# Patient Record
Sex: MALE | Race: WHITE | Employment: FULL TIME | URBAN - NONMETROPOLITAN AREA
[De-identification: names, ages, dates, MRNs, and addresses within clinical notes are randomized per-mention and may not be internally consistent; named-entity substitution may affect disease eponyms.]

---

## 2020-03-23 ENCOUNTER — OFFICE VISIT (OUTPATIENT)
Dept: INTERNAL MEDICINE CLINIC | Age: 60
End: 2020-03-23
Payer: COMMERCIAL

## 2020-03-23 VITALS
WEIGHT: 260 LBS | BODY MASS INDEX: 41.78 KG/M2 | HEART RATE: 66 BPM | SYSTOLIC BLOOD PRESSURE: 138 MMHG | HEIGHT: 66 IN | TEMPERATURE: 97.9 F | DIASTOLIC BLOOD PRESSURE: 78 MMHG

## 2020-03-23 PROCEDURE — 99244 OFF/OP CNSLTJ NEW/EST MOD 40: CPT | Performed by: INTERNAL MEDICINE

## 2020-03-23 RX ORDER — CHLORHEXIDINE GLUCONATE 0.12 MG/ML
RINSE ORAL
COMMUNITY
Start: 2020-03-11 | End: 2020-08-19 | Stop reason: ALTCHOICE

## 2020-03-23 RX ORDER — PENICILLIN V POTASSIUM 250 MG/1
500 TABLET ORAL 4 TIMES DAILY
COMMUNITY
Start: 2020-03-19 | End: 2020-08-19 | Stop reason: ALTCHOICE

## 2020-03-23 ASSESSMENT — PATIENT HEALTH QUESTIONNAIRE - PHQ9
2. FEELING DOWN, DEPRESSED OR HOPELESS: 0
SUM OF ALL RESPONSES TO PHQ QUESTIONS 1-9: 0
SUM OF ALL RESPONSES TO PHQ9 QUESTIONS 1 & 2: 0
1. LITTLE INTEREST OR PLEASURE IN DOING THINGS: 0
SUM OF ALL RESPONSES TO PHQ QUESTIONS 1-9: 0

## 2020-08-19 ENCOUNTER — OFFICE VISIT (OUTPATIENT)
Dept: FAMILY MEDICINE CLINIC | Age: 60
End: 2020-08-19
Payer: COMMERCIAL

## 2020-08-19 VITALS
HEART RATE: 88 BPM | HEIGHT: 66 IN | DIASTOLIC BLOOD PRESSURE: 84 MMHG | TEMPERATURE: 97.8 F | WEIGHT: 265 LBS | SYSTOLIC BLOOD PRESSURE: 160 MMHG | BODY MASS INDEX: 42.59 KG/M2 | RESPIRATION RATE: 14 BRPM

## 2020-08-19 PROBLEM — I10 ESSENTIAL HYPERTENSION: Status: ACTIVE | Noted: 2020-08-19

## 2020-08-19 PROCEDURE — 99396 PREV VISIT EST AGE 40-64: CPT | Performed by: NURSE PRACTITIONER

## 2020-08-19 RX ORDER — LISINOPRIL 20 MG/1
20 TABLET ORAL DAILY
Qty: 30 TABLET | Refills: 0 | Status: SHIPPED | OUTPATIENT
Start: 2020-08-19 | End: 2020-08-31 | Stop reason: SDUPTHER

## 2020-08-19 ASSESSMENT — ENCOUNTER SYMPTOMS
SHORTNESS OF BREATH: 0
ABDOMINAL PAIN: 0
COLOR CHANGE: 0
EYE PAIN: 0
BLOOD IN STOOL: 0
SINUS PRESSURE: 0
EYE REDNESS: 0
EYE ITCHING: 0
WHEEZING: 0
COUGH: 0
EYE DISCHARGE: 0
BACK PAIN: 0
DIARRHEA: 0
CONSTIPATION: 0

## 2020-08-19 NOTE — PATIENT INSTRUCTIONS
during a routine doctor visit. Your doctor will tell you how often to check your blood pressure based on your age, your blood pressure results, and other factors. · Prostate exam. Talk to your doctor about whether you should have a blood test (called a PSA test) for prostate cancer. Experts recommend that you discuss the benefits and risks of the test with your doctor before you decide whether to have this test.  · Diabetes. Ask your doctor whether you should have tests for diabetes. · Vision. Some experts recommend that you have yearly exams for glaucoma and other age-related eye problems starting at age 48. · Hearing. Tell your doctor if you notice any change in your hearing. You can have tests to find out how well you hear. · Colorectal cancer. Your risk for colorectal cancer gets higher as you get older. Some experts say that adults should start regular screening at age 48 and stop at age 76. Others say to start before age 48 or continue after age 76. Talk with your doctor about your risk and when to start and stop screening. · Heart attack and stroke risk. At least every 4 to 6 years, you should have your risk for heart attack and stroke assessed. Your doctor uses factors such as your age, blood pressure, cholesterol, and whether you smoke or have diabetes to show what your risk for a heart attack or stroke is over the next 10 years. · Abdominal aortic aneurysm. Ask your doctor whether you should have a test to check for an aneurysm. You may need a test if you ever smoked or if your parent, brother, sister, or child has had an aneurysm. When should you call for help? Watch closely for changes in your health, and be sure to contact your doctor if you have any problems or symptoms that concern you. Where can you learn more? Go to https://terrie.Shipu. org and sign in to your Sugar [employer] Media account.  Enter S716 in the [facility] box to learn more about \"Well Visit, Men 48 to 72: Care Instructions. \"     If you do not have an account, please click on the \"Sign Up Now\" link. Current as of: August 22, 2019               Content Version: 12.5  © 2006-2020 Healthwise, Incorporated. Care instructions adapted under license by [facility] ([facility]). If you have questions about a medical condition or this instruction, always ask your healthcare professional. [facility] any warranty or liability for your use of this information. Patient Education        High Blood Pressure: Care Instructions  Overview     It's normal for blood pressure to go up and down throughout the day. But if it stays up, you have high blood pressure. Another name for high blood pressure is hypertension. Despite what a lot of people think, high blood pressure usually doesn't cause headaches or make you feel dizzy or lightheaded. It usually has no symptoms. But it does increase your risk of stroke, heart attack, and other problems. You and your doctor will talk about your risks of these problems based on your blood pressure. Your doctor will give you a goal for your blood pressure. Your goal will be based on your health and your age. Lifestyle changes, such as eating healthy and being active, are always important to help lower blood pressure. You might also take medicine to reach your blood pressure goal.  Follow-up care is a key part of your treatment and safety. Be sure to make and go to all appointments, and call your doctor if you are having problems. It's also a good idea to know your test results and keep a list of the medicines you take. How can you care for yourself at home? Medical treatment  · If you stop taking your medicine, your blood pressure will go back up. You may take one or more types of medicine to lower your blood pressure. Be safe with medicines. Take your medicine exactly as prescribed. Call your doctor if you think you are having a problem with your medicine.   · Talk to your doctor serious heart or blood vessel problem. Your blood pressure may be over 180/120. For example, call 911 if:  · You have symptoms of a heart attack. These may include:  ? Chest pain or pressure, or a strange feeling in the chest.  ? Sweating. ? Shortness of breath. ? Nausea or vomiting. ? Pain, pressure, or a strange feeling in the back, neck, jaw, or upper belly or in one or both shoulders or arms. ? Lightheadedness or sudden weakness. ? A fast or irregular heartbeat. · You have symptoms of a stroke. These may include:  ? Sudden numbness, tingling, weakness, or loss of movement in your face, arm, or leg, especially on only one side of your body. ? Sudden vision changes. ? Sudden trouble speaking. ? Sudden confusion or trouble understanding simple statements. ? Sudden problems with walking or balance. ? A sudden, severe headache that is different from past headaches. · You have severe back or belly pain. Do not wait until your blood pressure comes down on its own. Get help right away. Call your doctor now or seek immediate care if:  · Your blood pressure is much higher than normal (such as 180/120 or higher), but you don't have symptoms. · You think high blood pressure is causing symptoms, such as:  ? Severe headache.  ? Blurry vision. Watch closely for changes in your health, and be sure to contact your doctor if:  · Your blood pressure measures higher than your doctor recommends at least 2 times. That means the top number is higher or the bottom number is higher, or both. · You think you may be having side effects from your blood pressure medicine. Where can you learn more? Go to https://Kaixin001gabyNational Payment Network.All My Data. org and sign in to your Sk[employer]mets account. Enter G459 in the [employer] box to learn more about \"High Blood Pressure: Care Instructions. \"     If you do not have an account, please click on the \"Sign Up Now\" link.   Current as of: December 16, 2019               Content using tomatoes, squash, spinach, broccoli, carrots, cauliflower, and onions. ? Have a variety of cut-up vegetables with a low-fat dip as an appetizer instead of chips and dip. ? Sprinkle sunflower seeds or chopped almonds over salads. Or try adding chopped walnuts or almonds to cooked vegetables. ? Try some vegetarian meals using beans and peas. Add garbanzo or kidney beans to salads. Make burritos and tacos with mashed reyes beans or black beans. Where can you learn more? Go to https://Eloquiipepiceweb.CS Disco. org and sign in to your Clipyoo account. Enter W877 in the [employer]dock box to learn more about \"DASH Diet: Care Instructions. \"     If you do not have an account, please click on the \"Sign Up Now\" link. Current as of: December 16, 2019               Content Version: 12.5  © 2006-2020 [employer]. Care instructions adapted under license by [facility] ([facility]). If you have questions about a medical condition or this instruction, always ask your healthcare professional. [place] [address] any warranty or liability for your use of this information. Patient Education        Learning About ACE Inhibitors  What are ACE inhibitors? ACE (angiotensin-converting enzyme) inhibitors are medicines that lower blood pressure. They stop the release of an enzyme. This enzyme makes your blood vessels smaller. Without it, your blood vessels relax and get bigger. This lowers your blood pressure. These medicines also increase how much water and salt go into your urine. This also lowers blood pressure. You may take this kind of medicine if you have high blood pressure. Or you may take it if you have heart problems, kidney problems, or diabetes. If you have coronary artery disease, this medicine can help prevent heart attacks and strokes.   Examples  · benazepril (Lotensin)  · enalapril (Vasotec)  · lisinopril (Prinivil, Zestril)  · ramipril (Altace)  This is not a complete list.  Possible side effects  Side effects may include:  · A cough. · Low blood pressure. This can make you feel dizzy or weak. · Too much potassium in your body. · Swelling of your lips, tongue, or face. If the swelling is severe, you may need treatment right away. Severe swelling can make it hard to breathe, but this is rare. You may have other side effects or reactions not listed here. Check the information that comes with your medicine. What to know about taking this medicine  · ACE inhibitors can cause a dry cough. Talk to your doctor if you have a dry cough. You may need a different medicine. · These medicines can cause an allergic reaction. This can cause a little swelling. Or it can cause red bumps on your skin that hurt. In rare cases, the swelling may make it hard for you to breathe. · Do not take this medicine if you are pregnant or plan to become pregnant. · Take your medicines exactly as prescribed. Call your doctor if you think you are having a problem with your medicine. · Check with your doctor or pharmacist before you use any other medicines. This includes over-the-counter medicines. Make sure your doctor knows all of the medicines, vitamins, herbal products, and supplements you take. Taking some medicines together can cause problems. · You may need regular blood tests. Where can you learn more? Go to https://Bright Industry.DesignCrowd. org and sign in to your Bufys account. Enter P050 in the [facility] box to learn more about \"Learning About ACE Inhibitors. \"     If you do not have an account, please click on the \"Sign Up Now\" link. Current as of: December 16, 2019               Content Version: 12.5  © 6418-3741 Healthwise, Incorporated. Care instructions adapted under license by [facility] ([facility]).  If you have questions about a medical condition or this instruction, always ask your healthcare professional. [address]  any warranty or liability for your use of this information. Patient Education        Home Blood Pressure Test: About This Test  What is it? A home blood pressure test allows you to keep track of your blood pressure at home. Blood pressure is a measure of the force of blood against the walls of your arteries. Blood pressure readings include two numbers, such as 130/80 (say \"130 over 80\"). The first number is the systolic pressure. The second number is the diastolic pressure. Why is this test done? You may do this test at home to:  · Find out if you have high blood pressure. · Track your blood pressure if you have high blood pressure. · Track how well medicine is working to reduce high blood pressure. · Check how lifestyle changes, such as weight loss and exercise, are affecting blood pressure. How do you prepare for the test?  For at least 30 minutes before you take your blood pressure, don't exercise or use caffeine, tobacco, or medicines that raise blood pressure. Take your blood pressure while you feel comfortable and relaxed. Sit quietly with both feet on the floor for at least 5 minutes before the test.  How is the test done? · Sit with your arm slightly bent and resting on a table so that your upper arm is at the same level as your heart. · Roll up your sleeve or take off your shirt to expose your upper arm. · Wrap the blood pressure cuff around your upper arm so that the lower edge of the cuff is about 1 inch above the bend of your elbow. Proceed with the following steps depending on if you are using an automatic or manual pressure monitor. Automatic blood pressure monitors  · Press the on/off button on the automatic monitor and wait until the ready-to-measure \"heart\" symbol appears next to zero in the display window. · Press the start button. The cuff will inflate and deflate by itself. · Your blood pressure numbers will appear on the screen.   · Write your numbers in your log book, along with the date and time.  Manual blood pressure monitors  · Place the earpieces of a stethoscope in your ears, and place the bell of the stethoscope over the artery, just below the cuff. · Close the valve on the rubber inflating bulb. · Squeeze the bulb rapidly with your opposite hand to inflate the cuff until the dial or column of mercury reads about 30 mm Hg higher than your usual systolic pressure. If you do not know your usual pressure, inflate the cuff to 210 mm Hg or until the pulse at your wrist disappears. · Open the pressure valve just slightly by twisting or pressing the valve on the bulb. · As you watch the pressure slowly fall, note the level on the dial at which you first start to hear a pulsing or tapping sound through the stethoscope. This is your systolic blood pressure. · Continue letting the air out slowly. The sounds will become muffled and will finally disappear. Note the pressure when the sounds completely disappear. This is your diastolic blood pressure. Let out all the remaining air. · Write your numbers in your log book, along with the date and time. Follow-up care is a key part of your treatment and safety. Be sure to make and go to all appointments, and call your doctor if you are having problems. It's also a good idea to keep a list of the medicines you take. Where can you learn more? Go to https://UpCounsel.MetaMed. org and sign in to your Social Rewards account. Enter C427 in the [facility] box to learn more about \"Home Blood Pressure Test: About This Test.\"     If you do not have an account, please click on the \"Sign Up Now\" link. Current as of: December 16, 2019               Content Version: 12.5  © 8595-4652 Healthwise, Incorporated. Care instructions adapted under license by [facility] ([facility]).  If you have questions about a medical condition or this instruction, always ask your healthcare professional. Lester Chase any warranty or liability for your use of this information.

## 2020-08-19 NOTE — PROGRESS NOTES
[address] [address]  Dept: [phone]  Dept Fax: [phone]: [phone]     Visit Date:  8/19/2020    Patient:  Rianna Palmer  YOB: 1960  Age: 62 y.o. Gender: male  BMI: Body mass index is 43.16 kg/m². HPI:     Chief Complaint   Patient presents with    Established New Doctor     HTN, hasn't established care in a long time        Rianna Palmer is being seen today to establish care. Body mass index is 43.16 kg/m². reviewed with patient. Nonsmoker. Has not seen provider in >20 years. Recent hip replacement. Had blood work completed at that time. Works as a  in [place]. Feels well. Presents today for hypertension. BP as high as 212/108 while at a recent dentist appt. Decreased to 139/68 30 minutes later at home. BP this morning at home was 138/66. Denies SE at that time- chest pain, SOB, headache, tremors. No other concerns. Medications    Current Outpatient Medications:     lisinopril (PRINIVIL) 20 MG tablet, Take 1 tablet by mouth daily, Disp: 30 tablet, Rfl: 0    The patient has No Known Allergies. Past Medical History  Blu Jara  has a past medical history of Morbid obesity ([address] Osteoarthritis, and Snoring. Past Surgical History  The patient  has a past surgical history that includes hernia repair (1982); Tonsillectomy and adenoidectomy; [place] tooth extraction (1974); other surgical history; and joint replacement (Left, 05/06/2020). Family History  This patient's family history includes Cancer in his mother; Hypertension in his father. Social History  Blu Jara  reports that he has never smoked. He has never used smokeless tobacco. He reports current alcohol use of about 24.0 - 30.0 standard drinks of alcohol per week. He reports that he does not use drugs. Health Maintenance:    Health maintenance reviewed.    Health Maintenance   Topic Date Due    Potassium monitoring  1960    Creatinine monitoring  1960    Hepatitis C screen  1960    HIV screen  11/21/1973    DTaP/Tdap/Td vaccine (1 - Tdap) 11/21/1977    Lipid screen  11/21/1998    Diabetes screen  11/21/1998    Shingles Vaccine (1 of 2) 11/21/2008    Colon cancer screen colonoscopy  11/21/2008    Flu vaccine (1) 09/01/2020    Hepatitis A vaccine  Aged Out    Hepatitis B vaccine  Aged Out    Hib vaccine  Aged Out    Meningococcal (ACWY) vaccine  Aged Out    Pneumococcal 0-64 years Vaccine  Aged Out       Subjective:      Review of Systems   Constitutional: Negative for chills, fatigue and fever. HENT: Negative for congestion, ear pain, sinus pressure and sneezing. Eyes: Negative for pain, discharge, redness and itching. Respiratory: Negative for cough, shortness of breath and wheezing. Cardiovascular: Negative for chest pain, palpitations and leg swelling. Gastrointestinal: Negative for abdominal pain, blood in stool, constipation and diarrhea. Endocrine: Negative for polydipsia, polyphagia and polyuria. Genitourinary: Negative for difficulty urinating and hematuria. Musculoskeletal: Negative for arthralgias, back pain and neck pain. Skin: Negative for color change, pallor and rash. Allergic/Immunologic: Negative for environmental allergies and food allergies. Neurological: Negative for dizziness, light-headedness, numbness and headaches. Psychiatric/Behavioral: Negative for agitation and confusion. The patient is not nervous/anxious. Objective:     BP (!) 160/84   Pulse 88   Temp 97.8 °F (36.6 °C)   Resp 14   Ht 5' 5.7\" (1.669 m)   Wt 265 lb (120.2 kg)   BMI 43.16 kg/m²     Physical Exam  Vitals signs and nursing note reviewed. Constitutional:       Appearance: He is well-developed. HENT:      Head: Normocephalic.       Right Ear: Hearing, tympanic membrane, ear canal and external ear normal.      Left Ear: Hearing, tympanic membrane, ear canal and external ear normal.      Nose:      Right Sinus: No maxillary sinus tenderness or frontal sinus tenderness. Left Sinus: No maxillary sinus tenderness or frontal sinus tenderness. Mouth/Throat:      Mouth: Mucous membranes are moist.      Tongue: No lesions. Pharynx: No posterior oropharyngeal erythema. Eyes:      General:         Right eye: No discharge. Left eye: No discharge. Conjunctiva/sclera: Conjunctivae normal.      Pupils: Pupils are equal, round, and reactive to light. Neck:      Musculoskeletal: Normal range of motion. Vascular: No JVD. Cardiovascular:      Rate and Rhythm: Normal rate and regular rhythm. Heart sounds: Normal heart sounds. No murmur. Pulmonary:      Effort: Pulmonary effort is normal. No tachypnea. Breath sounds: Normal breath sounds. No stridor. No wheezing. Abdominal:      General: Bowel sounds are normal. There is no distension. Palpations: Abdomen is soft. Tenderness: There is no abdominal tenderness. Musculoskeletal:         General: No deformity. Right lower le+ Edema present. Left lower le+ Edema present. Comments: ROM in all extremities WNL. No deformities. Lymphadenopathy:      Head:      Right side of head: No submental or submandibular adenopathy. Left side of head: No submental or submandibular adenopathy. Skin:     General: Skin is warm and dry. Capillary Refill: Capillary refill takes less than 2 seconds. Findings: No rash. Neurological:      Mental Status: He is alert and oriented to person, place, and time. Coordination: Coordination normal.   Psychiatric:         Mood and Affect: Mood normal.         Behavior: Behavior normal.         Thought Content:  Thought content normal.         Judgment: Judgment normal.         Labs Reviewed 2020:    No results found for: WBC, HGB, HCT, PLT, CHOL, TRIG, HDL, LDLDIRECT, ALT, AST, NA, K, CL, CREATININE, BUN, CO2, TSH, PSA, INR, GLUF, LABA1C, LABMICR    Assessment/Plan      1. Well adult exam  - Age-appropriate education provided. - Health maintenance reviewed. - Encouraged healthy diet and regular exercise. - Reviewed previously completed lab work    2. Essential hypertension  - New  - Patient would benefit from initiation of antihypertensive  - Jennifer Courser to start lisinopril 20 mg once daily  - Follow up in 2 weeks  - Notified of potential SE including cough, angioedema  - Encouraged DASH diet, exercise  - lisinopril (PRINIVIL) 20 MG tablet; Take 1 tablet by mouth daily  Dispense: 30 tablet; Refill: 0  - Lipid Panel; Future    3. Screening for hyperlipidemia  - Lipid Panel; Future    4. Screening for colorectal cancer  - AFL - Angel Braxton MD, Gastroenterology, [facility]      Return in about 2 weeks (around 9/2/2020) for Hypertension. Patient given educational materials - see patient instructions. Discussed use, benefit, and side effects of prescribed medications. All patient questions answered. Pt voiced understanding. Reviewed health maintenance.        Electronically signed by JAZZY Moreno CNP on 8/19/2020 at 10:09 AM EDT

## 2020-08-31 ENCOUNTER — OFFICE VISIT (OUTPATIENT)
Dept: FAMILY MEDICINE CLINIC | Age: 60
End: 2020-08-31
Payer: COMMERCIAL

## 2020-08-31 VITALS
DIASTOLIC BLOOD PRESSURE: 82 MMHG | TEMPERATURE: 97.4 F | HEART RATE: 70 BPM | RESPIRATION RATE: 14 BRPM | WEIGHT: 265 LBS | SYSTOLIC BLOOD PRESSURE: 154 MMHG | BODY MASS INDEX: 43.16 KG/M2

## 2020-08-31 LAB
CHOLESTEROL, TOTAL: 195 MG/DL (ref 100–199)
HDLC SERPL-MCNC: 110 MG/DL
LDL CHOLESTEROL CALCULATED: 77 MG/DL
TRIGL SERPL-MCNC: 40 MG/DL (ref 0–199)

## 2020-08-31 PROCEDURE — 99213 OFFICE O/P EST LOW 20 MIN: CPT | Performed by: NURSE PRACTITIONER

## 2020-08-31 RX ORDER — LISINOPRIL 40 MG/1
40 TABLET ORAL DAILY
Qty: 30 TABLET | Refills: 0 | Status: SHIPPED | OUTPATIENT
Start: 2020-08-31 | End: 2020-09-28 | Stop reason: SINTOL

## 2020-08-31 RX ORDER — HYDROCHLOROTHIAZIDE 12.5 MG/1
12.5 TABLET ORAL DAILY
Qty: 30 TABLET | Refills: 0 | Status: SHIPPED | OUTPATIENT
Start: 2020-08-31 | End: 2020-09-28 | Stop reason: SDUPTHER

## 2020-08-31 ASSESSMENT — ENCOUNTER SYMPTOMS
EYE DISCHARGE: 0
EYE REDNESS: 0
SHORTNESS OF BREATH: 0
EYE PAIN: 0
WHEEZING: 0
DIARRHEA: 0
CONSTIPATION: 0
BLOOD IN STOOL: 0
EYE ITCHING: 0
COUGH: 0
SINUS PRESSURE: 0
ABDOMINAL PAIN: 0
BACK PAIN: 0
COLOR CHANGE: 0

## 2020-08-31 NOTE — PATIENT INSTRUCTIONS
\"reduced-sodium\" and \"light sodium\" may still have too much sodium. ? Flavor your food with garlic, lemon juice, onion, vinegar, herbs, and spices instead of salt. Do not use soy sauce, steak sauce, onion salt, garlic salt, mustard, or ketchup on your food. ? Use less salt (or none) when recipes call for it. You can often use half the salt a recipe calls for without losing flavor. · Be physically active. Get at least 30 minutes of exercise on most days of the week. Walking is a good choice. You also may want to do other activities, such as running, swimming, cycling, or playing tennis or team sports. · Limit alcohol to 2 drinks a day for men and 1 drink a day for women. · Eat plenty of fruits, vegetables, and low-fat dairy products. Eat less saturated and total fats. How is high blood pressure treated? · Your doctor will suggest making lifestyle changes to help your heart. For example, your doctor may ask you to eat healthy foods, quit smoking, lose extra weight, and be more active. · If lifestyle changes don't help enough, your doctor may recommend that you take medicine. · When blood pressure is very high, medicines are needed to lower it. Follow-up care is a key part of your treatment and safety. Be sure to make and go to all appointments, and call your doctor if you are having problems. It's also a good idea to know your test results and keep a list of the medicines you take. Where can you learn more? Go to https://Localistoamelie.FMP Products. org and sign in to your Parametric account. Enter P501 in the Thrill box to learn more about \"Learning About High Blood Pressure. \"     If you do not have an account, please click on the \"Sign Up Now\" link. Current as of: December 16, 2019               Content Version: 12.5  © 7386-0823 Healthwise, Incorporated. Care instructions adapted under license by [facility] ([facility]).  If you have questions about a medical condition or this instruction, always ask your healthcare professional. [place] [address] any warranty or liability for your use of this information. Patient Education        Low Sodium Diet (2,000 Milligram): Care Instructions  Your Care Instructions     Too much sodium causes your body to hold on to extra water. This can raise your blood pressure and force your heart and kidneys to work harder. In very serious cases, this could cause you to be put in the hospital. It might even be life-threatening. By limiting sodium, you will feel better and lower your risk of serious problems. The most common source of sodium is salt. People get most of the salt in their diet from canned, prepared, and packaged foods. Fast food and restaurant meals also are very high in sodium. Your doctor will probably limit your sodium to less than 2,000 milligrams (mg) a day. This limit counts all the sodium in prepared and packaged foods and any salt you add to your food. Follow-up care is a key part of your treatment and safety. Be sure to make and go to all appointments, and call your doctor if you are having problems. It's also a good idea to know your test results and keep a list of the medicines you take. How can you care for yourself at home? Read food labels  · Read labels on cans and food packages. The labels tell you how much sodium is in each serving. Make sure that you look at the serving size. If you eat more than the serving size, you have eaten more sodium. · Food labels also tell you the Percent Daily Value for sodium. Choose products with low Percent Daily Values for sodium. · Be aware that sodium can come in forms other than salt, including monosodium glutamate (MSG), sodium citrate, and sodium bicarbonate (baking soda). MSG is often added to Asian food. When you eat out, you can sometimes ask for food without MSG or added salt.   Buy low-sodium foods  · Buy foods that are labeled \"unsalted\" (no salt added), \"sodium-free\" (less than 5 mg of sodium per serving), or \"low-sodium\" (less than 140 mg of sodium per serving). Foods labeled \"reduced-sodium\" and \"light sodium\" may still have too much sodium. Be sure to read the label to see how much sodium you are getting. · Buy fresh vegetables, or frozen vegetables without added sauces. Buy low-sodium versions of canned vegetables, soups, and other canned goods. Prepare low-sodium meals  · Cut back on the amount of salt you use in cooking. This will help you adjust to the taste. Do not add salt after cooking. One teaspoon of salt has about 2,300 mg of sodium. · Take the salt shaker off the table. · Flavor your food with garlic, lemon juice, onion, vinegar, herbs, and spices. Do not use soy sauce, lite soy sauce, steak sauce, onion salt, garlic salt, celery salt, mustard, or ketchup on your food. · Use low-sodium salad dressings, sauces, and ketchup. Or make your own salad dressings and sauces without adding salt. · Use less salt (or none) when recipes call for it. You can often use half the salt a recipe calls for without losing flavor. Other foods such as rice, pasta, and grains do not need added salt. · Rinse canned vegetables, and cook them in fresh water. This removes some--but not all--of the salt. · Avoid water that is naturally high in sodium or that has been treated with water softeners, which add sodium. Call your local water company to find out the sodium content of your water supply. If you buy bottled water, read the label and choose a sodium-free brand. Avoid high-sodium foods  · Avoid eating:  ? Smoked, cured, salted, and canned meat, fish, and poultry. ? Ham, schaefer, hot dogs, and luncheon meats. ? Regular, hard, and processed cheese and regular peanut butter. ? Crackers with salted tops, and other salted snack foods such as pretzels, chips, and salted popcorn. ? Frozen prepared meals, unless labeled low-sodium. ?  Canned and dried soups, broths, and bouillon, unless labeled sodium-free or low-sodium. ? Canned vegetables, unless labeled sodium-free or low-sodium. ? [place] fries, pizza, tacos, and other fast foods. ? Pickles, olives, ketchup, and other condiments, especially soy sauce, unless labeled sodium-free or low-sodium. Where can you learn more? Go to https://Escape the CitypepicLinkConnector Corporation.View Medical. org and sign in to your Niblitz account. Enter G590 in the [facility] box to learn more about \"Low Sodium Diet (2,000 Milligram): Care Instructions. \"     If you do not have an account, please click on the \"Sign Up Now\" link. Current as of: August 22, 2019               Content Version: 12.5  © 2291-8804 Healthwise, Incorporated. Care instructions adapted under license by [facility] ([facility]). If you have questions about a medical condition or this instruction, always ask your healthcare professional. [place] [address] any warranty or liability for your use of this information.

## 2020-08-31 NOTE — PROGRESS NOTES
[facility] [address] [place] [address]  Dept: [phone]  Dept Fax: [phone]: [phone]     Visit Date:  8/31/2020    Patient:  Lia Morris  YOB: 1960  Age: 62 y.o. Gender: male  BMI: Body mass index is 43.16 kg/m². HPI:     Chief Complaint   Patient presents with    Hypertension     no problems with medication, 180s in am and 140s in evening        iLa Morris is being seen today for hypertension follow up. Body mass index is 43.16 kg/m². reviewed with patient. Was started on lisinopril 20 mg two weeks ago. Denies medication SE. Reports improved but elevated blood pressures. SBP as high as 180's in the am and 140's in the evenings. BP is elevated in the office today. Denies chest pain, headache. Medications    Current Outpatient Medications:     hydroCHLOROthiazide (HYDRODIURIL) 12.5 MG tablet, Take 1 tablet by mouth daily, Disp: 30 tablet, Rfl: 0    lisinopril (PRINIVIL;ZESTRIL) 40 MG tablet, Take 1 tablet by mouth daily, Disp: 30 tablet, Rfl: 0    The patient has No Known Allergies. Past Medical History  Alison James  has a past medical history of Morbid obesity ([address] Osteoarthritis, and Snoring. Past Surgical History  The patient  has a past surgical history that includes hernia repair (1982); Tonsillectomy and adenoidectomy; [place] tooth extraction (1974); other surgical history; and joint replacement (Left, 05/06/2020). Family History  This patient's family history includes Cancer in his mother; Hypertension in his father. Social History  Alison James  reports that he has never smoked. He has never used smokeless tobacco. He reports current alcohol use of about 24.0 - 30.0 standard drinks of alcohol per week. He reports that he does not use drugs. Health Maintenance:    Health maintenance reviewed.    Health Maintenance   Topic Date Due    Potassium monitoring  1960  Creatinine monitoring  1960    Hepatitis C screen  1960    HIV screen  11/21/1973    DTaP/Tdap/Td vaccine (1 - Tdap) 11/21/1977    Lipid screen  11/21/1998    Diabetes screen  11/21/1998    Shingles Vaccine (1 of 2) 11/21/2008    Colon cancer screen colonoscopy  11/21/2008    Flu vaccine (1) 09/01/2020    Hepatitis A vaccine  Aged Out    Hepatitis B vaccine  Aged Out    Hib vaccine  Aged Out    Meningococcal (ACWY) vaccine  Aged Out    Pneumococcal 0-64 years Vaccine  Aged Out       Subjective:      Review of Systems   Constitutional: Negative for chills, fatigue and fever. HENT: Negative for congestion, ear pain, sinus pressure and sneezing. Eyes: Negative for pain, discharge, redness and itching. Respiratory: Negative for cough, shortness of breath and wheezing. Cardiovascular: Negative for chest pain, palpitations and leg swelling. Gastrointestinal: Negative for abdominal pain, blood in stool, constipation and diarrhea. Endocrine: Negative for polydipsia, polyphagia and polyuria. Genitourinary: Negative for difficulty urinating and hematuria. Musculoskeletal: Negative for arthralgias, back pain and neck pain. Skin: Negative for color change, pallor and rash. Allergic/Immunologic: Negative for environmental allergies and food allergies. Neurological: Negative for dizziness, light-headedness, numbness and headaches. Psychiatric/Behavioral: Negative for agitation and confusion. The patient is not nervous/anxious. Objective:     BP (!) 154/82   Pulse 70   Temp 97.4 °F (36.3 °C)   Resp 14   Wt 265 lb (120.2 kg)   BMI 43.16 kg/m²     Physical Exam  Vitals signs and nursing note reviewed. Constitutional:       Appearance: He is well-developed. HENT:      Head: Normocephalic.       Right Ear: Hearing, tympanic membrane, ear canal and external ear normal.      Left Ear: Hearing, tympanic membrane, ear canal and external ear normal.      Nose:      Right Sinus: No maxillary sinus tenderness or frontal sinus tenderness. Left Sinus: No maxillary sinus tenderness or frontal sinus tenderness. Mouth/Throat:      Mouth: Mucous membranes are moist.      Tongue: No lesions. Pharynx: No posterior oropharyngeal erythema. Eyes:      General:         Right eye: No discharge. Left eye: No discharge. Conjunctiva/sclera: Conjunctivae normal.      Pupils: Pupils are equal, round, and reactive to light. Neck:      Musculoskeletal: Normal range of motion. Vascular: No JVD. Cardiovascular:      Rate and Rhythm: Normal rate and regular rhythm. Heart sounds: Normal heart sounds. No murmur. Pulmonary:      Effort: Pulmonary effort is normal. No tachypnea. Breath sounds: Normal breath sounds. No stridor. No wheezing. Abdominal:      General: Bowel sounds are normal. There is no distension. Palpations: Abdomen is soft. Tenderness: There is no abdominal tenderness. Musculoskeletal:         General: No deformity. Comments: ROM in all extremities WNL. No deformities. Lymphadenopathy:      Head:      Right side of head: No submental or submandibular adenopathy. Left side of head: No submental or submandibular adenopathy. Skin:     General: Skin is warm and dry. Capillary Refill: Capillary refill takes less than 2 seconds. Findings: No rash. Neurological:      Mental Status: He is alert and oriented to person, place, and time. Coordination: Coordination normal.   Psychiatric:         Mood and Affect: Mood normal.         Behavior: Behavior normal.         Thought Content: Thought content normal.         Judgment: Judgment normal.         Labs Reviewed 8/31/2020:    No results found for: WBC, HGB, HCT, PLT, CHOL, TRIG, HDL, LDLDIRECT, ALT, AST, NA, K, CL, CREATININE, BUN, CO2, TSH, PSA, INR, GLUF, LABA1C, LABMICR    Assessment/Plan      1.  Essential hypertension  - Chronic, uncontrolled  - Increased lisinopril from 20 mg daily to 40 mg daily. Added hctz  - Dash diet, exercise encouraged  - hydroCHLOROthiazide (HYDRODIURIL) 12.5 MG tablet; Take 1 tablet by mouth daily  Dispense: 30 tablet; Refill: 0  - lisinopril (PRINIVIL;ZESTRIL) 40 MG tablet; Take 1 tablet by mouth daily  Dispense: 30 tablet; Refill: 0      Return in about 1 month (around 9/30/2020) for Hypertension. Patient given educational materials - see patient instructions. Discussed use, benefit, and side effects of prescribed medications. All patient questions answered. Pt voiced understanding. Reviewed health maintenance.        Electronically signed by JAZZY Barnes CNP on 8/31/2020 at 8:43 AM EDT

## 2020-09-01 ENCOUNTER — TELEPHONE (OUTPATIENT)
Dept: FAMILY MEDICINE CLINIC | Age: 60
End: 2020-09-01

## 2020-09-01 NOTE — TELEPHONE ENCOUNTER
----- Message from JAZZY Pike CNP sent at 9/1/2020  8:01 AM EDT -----  Cholesterol levels are WNL. Recheck in one year.

## 2020-09-22 ENCOUNTER — TELEPHONE (OUTPATIENT)
Dept: FAMILY MEDICINE CLINIC | Age: 60
End: 2020-09-22

## 2020-09-22 NOTE — TELEPHONE ENCOUNTER
LMTCB- checking w patient to see if he is going to complete his colonoscopy. If he is not interested, please offer FIT or cologuard.

## 2020-09-28 ENCOUNTER — OFFICE VISIT (OUTPATIENT)
Dept: FAMILY MEDICINE CLINIC | Age: 60
End: 2020-09-28
Payer: COMMERCIAL

## 2020-09-28 VITALS
SYSTOLIC BLOOD PRESSURE: 138 MMHG | WEIGHT: 255 LBS | BODY MASS INDEX: 41.53 KG/M2 | TEMPERATURE: 97.2 F | RESPIRATION RATE: 14 BRPM | HEART RATE: 74 BPM | DIASTOLIC BLOOD PRESSURE: 80 MMHG

## 2020-09-28 PROCEDURE — 99213 OFFICE O/P EST LOW 20 MIN: CPT | Performed by: NURSE PRACTITIONER

## 2020-09-28 RX ORDER — HYDROCHLOROTHIAZIDE 12.5 MG/1
12.5 TABLET ORAL DAILY
Qty: 30 TABLET | Refills: 0 | Status: SHIPPED | OUTPATIENT
Start: 2020-09-28 | End: 2020-10-20

## 2020-09-28 RX ORDER — OLMESARTAN MEDOXOMIL 40 MG/1
40 TABLET ORAL DAILY
Qty: 60 TABLET | Refills: 0 | Status: SHIPPED | OUTPATIENT
Start: 2020-09-28 | End: 2020-10-20 | Stop reason: SDUPTHER

## 2020-09-28 ASSESSMENT — ENCOUNTER SYMPTOMS
COUGH: 1
RHINORRHEA: 0
SHORTNESS OF BREATH: 0

## 2020-09-28 NOTE — PATIENT INSTRUCTIONS
Patient Education     Cough Medicine  Coricidin HBP Cold & Flu Tablet    High Blood Pressure: Care Instructions  Overview     It's normal for blood pressure to go up and down throughout the day. But if it stays up, you have high blood pressure. Another name for high blood pressure is hypertension. Despite what a lot of people think, high blood pressure usually doesn't cause headaches or make you feel dizzy or lightheaded. It usually has no symptoms. But it does increase your risk of stroke, heart attack, and other problems. You and your doctor will talk about your risks of these problems based on your blood pressure. Your doctor will give you a goal for your blood pressure. Your goal will be based on your health and your age. Lifestyle changes, such as eating healthy and being active, are always important to help lower blood pressure. You might also take medicine to reach your blood pressure goal.  Follow-up care is a key part of your treatment and safety. Be sure to make and go to all appointments, and call your doctor if you are having problems. It's also a good idea to know your test results and keep a list of the medicines you take. How can you care for yourself at home? Medical treatment  · If you stop taking your medicine, your blood pressure will go back up. You may take one or more types of medicine to lower your blood pressure. Be safe with medicines. Take your medicine exactly as prescribed. Call your doctor if you think you are having a problem with your medicine. · Talk to your doctor before you start taking aspirin every day. Aspirin can help certain people lower their risk of a heart attack or stroke. But taking aspirin isn't right for everyone, because it can cause serious bleeding. · See your doctor regularly. You may need to see the doctor more often at first or until your blood pressure comes down.   · If you are taking blood pressure medicine, talk to your doctor before you take decongestants or anti-inflammatory medicine, such as ibuprofen. Some of these medicines can raise blood pressure. · Learn how to check your blood pressure at home. Lifestyle changes  · Stay at a healthy weight. This is especially important if you put on weight around the waist. Losing even 10 pounds can help you lower your blood pressure. · If your doctor recommends it, get more exercise. Walking is a good choice. Bit by bit, increase the amount you walk every day. Try for at least 30 minutes on most days of the week. You also may want to swim, bike, or do other activities. · Avoid or limit alcohol. Talk to your doctor about whether you can drink any alcohol. · Try to limit how much sodium you eat to less than 2,300 milligrams (mg) a day. Your doctor may ask you to try to eat less than 1,500 mg a day. · Eat plenty of fruits (such as bananas and oranges), vegetables, legumes, whole grains, and low-fat dairy products. · Lower the amount of saturated fat in your diet. Saturated fat is found in animal products such as milk, cheese, and meat. Limiting these foods may help you lose weight and also lower your risk for heart disease. · Do not smoke. Smoking increases your risk for heart attack and stroke. If you need help quitting, talk to your doctor about stop-smoking programs and medicines. These can increase your chances of quitting for good. When should you call for help? Call  911 anytime you think you may need emergency care. This may mean having symptoms that suggest that your blood pressure is causing a serious heart or blood vessel problem. Your blood pressure may be over 180/120. For example, call 911 if:  · You have symptoms of a heart attack. These may include:  ? Chest pain or pressure, or a strange feeling in the chest.  ? Sweating. ? Shortness of breath. ? Nausea or vomiting.   ? Pain, pressure, or a strange feeling in the back, neck, jaw, or upper belly or in one or both shoulders or arms.  ? Lightheadedness or sudden weakness. ? A fast or irregular heartbeat. · You have symptoms of a stroke. These may include:  ? Sudden numbness, tingling, weakness, or loss of movement in your face, arm, or leg, especially on only one side of your body. ? Sudden vision changes. ? Sudden trouble speaking. ? Sudden confusion or trouble understanding simple statements. ? Sudden problems with walking or balance. ? A sudden, severe headache that is different from past headaches. · You have severe back or belly pain. Do not wait until your blood pressure comes down on its own. Get help right away. Call your doctor now or seek immediate care if:  · Your blood pressure is much higher than normal (such as 180/120 or higher), but you don't have symptoms. · You think high blood pressure is causing symptoms, such as:  ? Severe headache.  ? Blurry vision. Watch closely for changes in your health, and be sure to contact your doctor if:  · Your blood pressure measures higher than your doctor recommends at least 2 times. That means the top number is higher or the bottom number is higher, or both. · You think you may be having side effects from your blood pressure medicine. Where can you learn more? Go to https://Neli Technologies.Shipping Company. org and sign in to your Inxero account. Enter V465 in the [facility] box to learn more about \"High Blood Pressure: Care Instructions. \"     If you do not have an account, please click on the \"Sign Up Now\" link. Current as of: December 16, 2019               Content Version: 12.5  © 9995-7168 Healthwise, Incorporated. Care instructions adapted under license by [facility] ([facility]). If you have questions about a medical condition or this instruction, always ask your healthcare professional. [place] [address] any warranty or liability for your use of this information.          Patient Education        Learning About ARBs  Introduction     ARBs (angiotensin II receptor blockers) block a hormone that makes blood vessels narrow. As a result, the blood vessels relax and widen. This lowers blood pressure. ARBs also put more water and salt into the urine. This also lowers blood pressure. ARBs can treat:  · High blood pressure. · Coronary artery disease. · Heart failure. They also may be used to help your kidneys when you have diabetes. Examples  · candesartan (Atacand)  · irbesartan (Avapro)  · losartan (Cozaar)  · olmesartan (Benicar)  · valsartan (Diovan)  This is not a complete list of all ARBs. Possible side effects  Side effects may include:  · Low blood pressure. You may feel dizzy and weak. · High potassium levels. You may have other side effects or reactions not listed here. Check the information that comes with your medicine. What to know about taking this medicine  · ARBs may be used if you had a cough when you tried to take an ACE inhibitor. ARBs are less likely to cause a cough. · You may need regular blood tests. · Take your medicines exactly as prescribed. Call your doctor if you think you are having a problem with your medicine. · Tell your doctor or pharmacist all the medicines you take. This includes over-the-counter medicines, vitamins, herbal products, and supplements. Taking some medicines together can cause problems. · You should not take ARBs if you are pregnant or planning to become pregnant. Where can you learn more? Go to https://ESILLAGEcorinaCommunity Infopoint.Mixpo. org and sign in to your H[employer]i [employer] account. Enter K212 in the [facility] box to learn more about \"Learning About ARBs. \"     If you do not have an account, please click on the \"Sign Up Now\" link. Current as of: December 16, 2019               Content Version: 12.5  © 6905-4712 Healthwise, Incorporated. Care instructions adapted under license by [facility] ([facility]).  If you have questions about a medical condition or this instruction, always ask your healthcare professional. [address] any warranty or liability for your use of this information.

## 2020-09-28 NOTE — PROGRESS NOTES
[address] [facility] [address] [address]  Dept: [phone]  Dept Fax: [phone]: [phone]     Visit Date:  9/28/2020    Patient:  Gabbi Renteria  YOB: 1960  Age: 62 y.o. Gender: male  BMI: Body mass index is 41.53 kg/m². HPI:     Chief Complaint   Patient presents with    Follow-up     hypertension, does have cough-wondering if that a side effect of the medication        Gabbi Renteria is being seen today for hypertension follow up. Body mass index is 41.53 kg/m². reviewed with patient. Blood pressures have improved. Has been taking blood pressures twice daily. Has developed a dry cough since starting lisinopril. Worse in the am. Denies any other SE.    Medications    Current Outpatient Medications:     olmesartan (BENICAR) 40 MG tablet, Take 1 tablet by mouth daily, Disp: 60 tablet, Rfl: 0    hydroCHLOROthiazide (HYDRODIURIL) 12.5 MG tablet, Take 1 tablet by mouth daily, Disp: 30 tablet, Rfl: 0    The patient has No Known Allergies. Past Medical History  Jayden Rice  has a past medical history of Morbid obesity ([address] Osteoarthritis, and Snoring. Past Surgical History  The patient  has a past surgical history that includes hernia repair (1982); Tonsillectomy and adenoidectomy; [place] tooth extraction (1974); other surgical history; and joint replacement (Left, 05/06/2020). Family History  This patient's family history includes Cancer in his mother; Hypertension in his father. Social History  Jayden Rice  reports that he has never smoked. He has never used smokeless tobacco. He reports current alcohol use of about 24.0 - 30.0 standard drinks of alcohol per week. He reports that he does not use drugs. Health Maintenance:    Health maintenance reviewed.    Health Maintenance   Topic Date Due    Potassium monitoring  1960    Creatinine monitoring  1960    Hepatitis C screen 1960    HIV screen  11/21/1973    DTaP/Tdap/Td vaccine (1 - Tdap) 11/21/1977    Diabetes screen  11/21/1998    Shingles Vaccine (1 of 2) 11/21/2008    Colon cancer screen colonoscopy  11/21/2008    Flu vaccine (1) 09/01/2020    Lipid screen  08/31/2025    Hepatitis A vaccine  Aged Out    Hepatitis B vaccine  Aged Out    Hib vaccine  Aged Out    Meningococcal (ACWY) vaccine  Aged Out    Pneumococcal 0-64 years Vaccine  Aged Out       Subjective:      Review of Systems   Constitutional: Negative for fatigue. HENT: Negative for congestion and rhinorrhea. Respiratory: Positive for cough. Negative for shortness of breath. Cardiovascular: Negative for chest pain. Neurological: Negative for light-headedness. Objective:     /80   Pulse 74   Temp 97.2 °F (36.2 °C)   Resp 14   Wt 255 lb (115.7 kg)   BMI 41.53 kg/m²     Physical Exam  Vitals signs and nursing note reviewed. Constitutional:       Appearance: He is well-developed. HENT:      Head: Normocephalic. Right Ear: Hearing, tympanic membrane, ear canal and external ear normal.      Left Ear: Hearing, tympanic membrane, ear canal and external ear normal.      Nose:      Right Sinus: No maxillary sinus tenderness or frontal sinus tenderness. Left Sinus: No maxillary sinus tenderness or frontal sinus tenderness. Mouth/Throat:      Mouth: Mucous membranes are moist.      Tongue: No lesions. Pharynx: No posterior oropharyngeal erythema. Eyes:      General:         Right eye: No discharge. Left eye: No discharge. Conjunctiva/sclera: Conjunctivae normal.      Pupils: Pupils are equal, round, and reactive to light. Neck:      Musculoskeletal: Normal range of motion. Vascular: No JVD. Cardiovascular:      Rate and Rhythm: Normal rate and regular rhythm. Heart sounds: Normal heart sounds. No murmur. Pulmonary:      Effort: Pulmonary effort is normal. No tachypnea.       Breath sounds: Normal breath sounds. No stridor. No wheezing. Abdominal:      General: Bowel sounds are normal. There is no distension. Palpations: Abdomen is soft. Tenderness: There is no abdominal tenderness. Musculoskeletal:         General: No deformity. Comments: ROM in all extremities WNL. No deformities. Lymphadenopathy:      Head:      Right side of head: No submental or submandibular adenopathy. Left side of head: No submental or submandibular adenopathy. Skin:     General: Skin is warm and dry. Capillary Refill: Capillary refill takes less than 2 seconds. Findings: No rash. Neurological:      Mental Status: He is alert and oriented to person, place, and time. Coordination: Coordination normal.   Psychiatric:         Mood and Affect: Mood normal.         Behavior: Behavior normal.         Thought Content: Thought content normal.         Judgment: Judgment normal.         Labs Reviewed 9/28/2020:    Lab Results   Component Value Date    CHOL 195 08/31/2020    TRIG 40 08/31/2020     08/31/2020       Assessment/Plan      1. Essential hypertension  - Chronic, stable  - Stop lisinopril d/t cough, start olmesartan  - Continue tracking blood pressures   - DASH diet  - olmesartan (BENICAR) 40 MG tablet; Take 1 tablet by mouth daily  Dispense: 60 tablet; Refill: 0  - hydroCHLOROthiazide (HYDRODIURIL) 12.5 MG tablet; Take 1 tablet by mouth daily  Dispense: 30 tablet; Refill: 0      Return in about 6 months (around 3/28/2021), or if symptoms worsen or fail to improve, for Hypertension. Patient given educational materials - see patient instructions. Discussed use, benefit, and side effects of prescribed medications. All patient questions answered. Pt voiced understanding. Reviewed health maintenance.        Electronically signed by JAZZY Coombs CNP on 9/28/2020 at 8:10 AM EDT

## 2020-10-20 RX ORDER — OLMESARTAN MEDOXOMIL 40 MG/1
40 TABLET ORAL DAILY
Qty: 90 TABLET | Refills: 1 | Status: SHIPPED | OUTPATIENT
Start: 2020-10-20 | End: 2021-03-29 | Stop reason: SINTOL

## 2020-10-20 RX ORDER — HYDROCHLOROTHIAZIDE 12.5 MG/1
12.5 TABLET ORAL DAILY
Qty: 90 TABLET | Refills: 1 | Status: SHIPPED | OUTPATIENT
Start: 2020-10-20 | End: 2021-04-26 | Stop reason: SDUPTHER

## 2020-10-20 NOTE — TELEPHONE ENCOUNTER
Refill requests completed. Please abstract lab work from his media for creatinine, gfr and potassium.

## 2020-12-17 ENCOUNTER — TELEPHONE (OUTPATIENT)
Dept: FAMILY MEDICINE CLINIC | Age: 60
End: 2020-12-17

## 2020-12-17 NOTE — TELEPHONE ENCOUNTER
Patient was referred to [facility] sent intake paperwork back in August and did not call them back.     Please check with patient to see if he is still interested in colonoscopy, FIT or cologuard

## 2021-03-29 ENCOUNTER — OFFICE VISIT (OUTPATIENT)
Dept: FAMILY MEDICINE CLINIC | Age: 61
End: 2021-03-29

## 2021-03-29 VITALS
WEIGHT: 262 LBS | TEMPERATURE: 97 F | RESPIRATION RATE: 18 BRPM | HEIGHT: 66 IN | HEART RATE: 78 BPM | DIASTOLIC BLOOD PRESSURE: 62 MMHG | SYSTOLIC BLOOD PRESSURE: 138 MMHG | BODY MASS INDEX: 42.11 KG/M2

## 2021-03-29 DIAGNOSIS — R42 EPISODIC LIGHTHEADEDNESS: ICD-10-CM

## 2021-03-29 DIAGNOSIS — I10 ESSENTIAL HYPERTENSION: Primary | ICD-10-CM

## 2021-03-29 DIAGNOSIS — L98.9 SKIN LESION OF SCALP: ICD-10-CM

## 2021-03-29 LAB
ALBUMIN SERPL-MCNC: 4.2 G/DL (ref 3.5–5.1)
ALP BLD-CCNC: 91 U/L (ref 38–126)
ALT SERPL-CCNC: 9 U/L (ref 11–66)
ANION GAP SERPL CALCULATED.3IONS-SCNC: 9 MEQ/L (ref 8–16)
AST SERPL-CCNC: 13 U/L (ref 5–40)
BASOPHILS # BLD: 1 %
BASOPHILS ABSOLUTE: 0 THOU/MM3 (ref 0–0.1)
BILIRUB SERPL-MCNC: 0.3 MG/DL (ref 0.3–1.2)
BUN BLDV-MCNC: 16 MG/DL (ref 7–22)
CALCIUM SERPL-MCNC: 9 MG/DL (ref 8.5–10.5)
CHLORIDE BLD-SCNC: 102 MEQ/L (ref 98–111)
CO2: 27 MEQ/L (ref 23–33)
CREAT SERPL-MCNC: 0.8 MG/DL (ref 0.4–1.2)
EOSINOPHIL # BLD: 2.9 %
EOSINOPHILS ABSOLUTE: 0.1 THOU/MM3 (ref 0–0.4)
ERYTHROCYTE [DISTWIDTH] IN BLOOD BY AUTOMATED COUNT: 11.7 % (ref 11.5–14.5)
ERYTHROCYTE [DISTWIDTH] IN BLOOD BY AUTOMATED COUNT: 41.4 FL (ref 35–45)
GFR SERPL CREATININE-BSD FRML MDRD: > 90 ML/MIN/1.73M2
GLUCOSE BLD-MCNC: 94 MG/DL (ref 70–108)
HCT VFR BLD CALC: 44.8 % (ref 42–52)
HEMOGLOBIN: 14.5 GM/DL (ref 14–18)
IMMATURE GRANS (ABS): 0 THOU/MM3 (ref 0–0.07)
IMMATURE GRANULOCYTES: 0 %
LYMPHOCYTES # BLD: 19.3 %
LYMPHOCYTES ABSOLUTE: 0.7 THOU/MM3 (ref 1–4.8)
MCH RBC QN AUTO: 31.3 PG (ref 26–33)
MCHC RBC AUTO-ENTMCNC: 32.4 GM/DL (ref 32.2–35.5)
MCV RBC AUTO: 96.6 FL (ref 80–94)
MONOCYTES # BLD: 6.8 %
MONOCYTES ABSOLUTE: 0.3 THOU/MM3 (ref 0.4–1.3)
NUCLEATED RED BLOOD CELLS: 0 /100 WBC
PLATELET # BLD: 221 THOU/MM3 (ref 130–400)
PMV BLD AUTO: 11 FL (ref 9.4–12.4)
POTASSIUM SERPL-SCNC: 4 MEQ/L (ref 3.5–5.2)
RBC # BLD: 4.64 MILL/MM3 (ref 4.7–6.1)
SEG NEUTROPHILS: 70 %
SEGMENTED NEUTROPHILS ABSOLUTE COUNT: 2.7 THOU/MM3 (ref 1.8–7.7)
SODIUM BLD-SCNC: 138 MEQ/L (ref 135–145)
TOTAL PROTEIN: 7.3 G/DL (ref 6.1–8)
WBC # BLD: 3.8 THOU/MM3 (ref 4.8–10.8)

## 2021-03-29 PROCEDURE — 99214 OFFICE O/P EST MOD 30 MIN: CPT | Performed by: NURSE PRACTITIONER

## 2021-03-29 RX ORDER — AMLODIPINE BESYLATE 5 MG/1
5 TABLET ORAL DAILY
Qty: 30 TABLET | Refills: 0 | Status: SHIPPED | OUTPATIENT
Start: 2021-03-29 | End: 2021-04-26 | Stop reason: DRUGHIGH

## 2021-03-29 ASSESSMENT — ENCOUNTER SYMPTOMS
EYE REDNESS: 0
EYE ITCHING: 0
SINUS PRESSURE: 0
CONSTIPATION: 0
COLOR CHANGE: 0
BLOOD IN STOOL: 0
BACK PAIN: 0
SHORTNESS OF BREATH: 0
WHEEZING: 0
ROS SKIN COMMENTS: LESION
DIARRHEA: 0
COUGH: 1
EYE DISCHARGE: 0
ABDOMINAL PAIN: 0
EYE PAIN: 0

## 2021-03-29 ASSESSMENT — PATIENT HEALTH QUESTIONNAIRE - PHQ9
SUM OF ALL RESPONSES TO PHQ9 QUESTIONS 1 & 2: 0
SUM OF ALL RESPONSES TO PHQ QUESTIONS 1-9: 0
2. FEELING DOWN, DEPRESSED OR HOPELESS: 0
SUM OF ALL RESPONSES TO PHQ QUESTIONS 1-9: 0
SUM OF ALL RESPONSES TO PHQ QUESTIONS 1-9: 0

## 2021-03-29 NOTE — PROGRESS NOTES
Mary Kauffman (:  1960) is a 56 y.o. male,Established patient, here for evaluation of the following chief complaint(s):  Follow-up (BP, slight cough, light headedness )      ASSESSMENT/PLAN:  1. Essential hypertension  - Unstable  - D/c ARB, start amlodipine. Continue hctz  - Monitor bp daily  - Call office with daily blood pressure readings  -     amLODIPine (NORVASC) 5 MG tablet; Take 1 tablet by mouth daily, Disp-30 tablet, R-0Normal  -     CBC Auto Differential; Future  -     Comprehensive Metabolic Panel; Future  2. Skin lesion of scalp  - Concern for malignancy  -     Toro Moreno MD, Dermatology, [facility]    3. Episodic lightheadedness  - Last episode was 4 days ago. - Continue to monitor  - Keep journal of episodes, including details of potential contributing circumstances    Return for Annual Well Visit. SUBJECTIVE/OBJECTIVE:  6 month follow up for hypertension. Has not been checking blood pressures at home. Reports prolonged cough since initiation of antihypertensive medication. Not improving or worsening. Dry. Nagging. Was switched from ACE to ARB d/t c/o cough during last visit. Reports intermittent lightheadedness. Lasts for 2-3 seconds then resolves spontaneously. Reports 12 episodes over the last 2 weeks. Last occurrence was 4 days ago. Denies syncope, vertigo. Denies concurrent symptoms. Has never experienced similar symptoms in the past. Denies changes in diet, fluid intake and medications. Skin lesion to left temporal scalp. First noticed two years ago. Unsure if it has changed in shape or size. Review of Systems   Constitutional: Negative for chills, fatigue and fever. HENT: Negative for congestion, ear pain, sinus pressure and sneezing. Eyes: Negative for pain, discharge, redness and itching. Respiratory: Positive for cough. Negative for shortness of breath and wheezing. Cardiovascular: Negative for chest pain, palpitations and leg swelling. Gastrointestinal: Negative for abdominal pain, blood in stool, constipation and diarrhea. Endocrine: Negative for polydipsia, polyphagia and polyuria. Genitourinary: Negative for difficulty urinating and hematuria. Musculoskeletal: Negative for arthralgias, back pain and neck pain. Skin: Negative for color change, pallor and rash. Lesion   Allergic/Immunologic: Negative for environmental allergies and food allergies. Neurological: Positive for light-headedness. Negative for dizziness, syncope, facial asymmetry, speech difficulty, weakness, numbness and headaches. Psychiatric/Behavioral: Negative for agitation and confusion. The patient is not nervous/anxious. Physical Exam  Vitals signs and nursing note reviewed. Constitutional:       Appearance: He is well-developed. HENT:      Head: Normocephalic. Right Ear: Hearing, tympanic membrane, ear canal and external ear normal.      Left Ear: Hearing, tympanic membrane, ear canal and external ear normal.      Nose:      Right Sinus: No maxillary sinus tenderness or frontal sinus tenderness. Left Sinus: No maxillary sinus tenderness or frontal sinus tenderness. Mouth/Throat:      Mouth: Mucous membranes are moist.      Tongue: No lesions. Pharynx: No posterior oropharyngeal erythema. Eyes:      General:         Right eye: No discharge. Left eye: No discharge. Conjunctiva/sclera: Conjunctivae normal.      Pupils: Pupils are equal, round, and reactive to light. Neck:      Musculoskeletal: Normal range of motion. Vascular: No JVD. Cardiovascular:      Rate and Rhythm: Normal rate and regular rhythm. Heart sounds: Normal heart sounds. No murmur. Pulmonary:      Effort: Pulmonary effort is normal. No tachypnea. Breath sounds: Normal breath sounds. No stridor. No wheezing. Abdominal:      General: There is no distension. Tenderness: There is no abdominal tenderness.    Musculoskeletal: General: No deformity. Comments: ROM in all extremities WNL. No deformities. Lymphadenopathy:      Head:      Right side of head: No submental or submandibular adenopathy. Left side of head: No submental or submandibular adenopathy. Skin:     General: Skin is warm and dry. Capillary Refill: Capillary refill takes less than 2 seconds. Findings: Lesion present. No rash. Neurological:      Mental Status: He is alert and oriented to person, place, and time. Coordination: Coordination normal.   Psychiatric:         Mood and Affect: Mood normal.         Behavior: Behavior normal.         Thought Content: Thought content normal.         Judgment: Judgment normal.                 An electronic signature was used to authenticate this note.     --JAZZY La - CNP

## 2021-03-29 NOTE — PATIENT INSTRUCTIONS
Patient Education        High Blood Pressure: Care Instructions  Overview     It's normal for blood pressure to go up and down throughout the day. But if it stays up, you have high blood pressure. Another name for high blood pressure is hypertension. Despite what a lot of people think, high blood pressure usually doesn't cause headaches or make you feel dizzy or lightheaded. It usually has no symptoms. But it does increase your risk of stroke, heart attack, and other problems. You and your doctor will talk about your risks of these problems based on your blood pressure. Your doctor will give you a goal for your blood pressure. Your goal will be based on your health and your age. Lifestyle changes, such as eating healthy and being active, are always important to help lower blood pressure. You might also take medicine to reach your blood pressure goal.  Follow-up care is a key part of your treatment and safety. Be sure to make and go to all appointments, and call your doctor if you are having problems. It's also a good idea to know your test results and keep a list of the medicines you take. How can you care for yourself at home? Medical treatment  · If you stop taking your medicine, your blood pressure will go back up. You may take one or more types of medicine to lower your blood pressure. Be safe with medicines. Take your medicine exactly as prescribed. Call your doctor if you think you are having a problem with your medicine. · Talk to your doctor before you start taking aspirin every day. Aspirin can help certain people lower their risk of a heart attack or stroke. But taking aspirin isn't right for everyone, because it can cause serious bleeding. · See your doctor regularly. You may need to see the doctor more often at first or until your blood pressure comes down.   · If you are taking blood pressure medicine, talk to your doctor before you take decongestants or anti-inflammatory medicine, such as ibuprofen. Some of these medicines can raise blood pressure. · Learn how to check your blood pressure at home. Lifestyle changes  · Stay at a healthy weight. This is especially important if you put on weight around the waist. Losing even 10 pounds can help you lower your blood pressure. · If your doctor recommends it, get more exercise. Walking is a good choice. Bit by bit, increase the amount you walk every day. Try for at least 30 minutes on most days of the week. You also may want to swim, bike, or do other activities. · Avoid or limit alcohol. Talk to your doctor about whether you can drink any alcohol. · Try to limit how much sodium you eat to less than 2,300 milligrams (mg) a day. Your doctor may ask you to try to eat less than 1,500 mg a day. · Eat plenty of fruits (such as bananas and oranges), vegetables, legumes, whole grains, and low-fat dairy products. · Lower the amount of saturated fat in your diet. Saturated fat is found in animal products such as milk, cheese, and meat. Limiting these foods may help you lose weight and also lower your risk for heart disease. · Do not smoke. Smoking increases your risk for heart attack and stroke. If you need help quitting, talk to your doctor about stop-smoking programs and medicines. These can increase your chances of quitting for good. When should you call for help? Call  911 anytime you think you may need emergency care. This may mean having symptoms that suggest that your blood pressure is causing a serious heart or blood vessel problem. Your blood pressure may be over 180/120. For example, call 911 if:    · You have symptoms of a heart attack. These may include:  ? Chest pain or pressure, or a strange feeling in the chest.  ? Sweating. ? Shortness of breath. ? Nausea or vomiting. ? Pain, pressure, or a strange feeling in the back, neck, jaw, or upper belly or in one or both shoulders or arms. ? Lightheadedness or sudden weakness.   ? A fast or irregular heartbeat.     · You have symptoms of a stroke. These may include:  ? Sudden numbness, tingling, weakness, or loss of movement in your face, arm, or leg, especially on only one side of your body. ? Sudden vision changes. ? Sudden trouble speaking. ? Sudden confusion or trouble understanding simple statements. ? Sudden problems with walking or balance. ? A sudden, severe headache that is different from past headaches.     · You have severe back or belly pain. Do not wait until your blood pressure comes down on its own. Get help right away. Call your doctor now or seek immediate care if:    · Your blood pressure is much higher than normal (such as 180/120 or higher), but you don't have symptoms.     · You think high blood pressure is causing symptoms, such as:  ? Severe headache.  ? Blurry vision. Watch closely for changes in your health, and be sure to contact your doctor if:    · Your blood pressure measures higher than your doctor recommends at least 2 times. That means the top number is higher or the bottom number is higher, or both.     · You think you may be having side effects from your blood pressure medicine. Where can you learn more? Go to https://ClarityAdpePeeleweb.[employer]. org and sign in to your [employer] account. Enter Q192 in the [employer] box to learn more about \"High Blood Pressure: Care Instructions. \"     If you do not have an account, please click on the \"Sign Up Now\" link. Current as of: August 31, 2020               Content Version: 12.8  © 2006-2021 [employer]. Care instructions adapted under license by [facility] ([facility]). If you have questions about a medical condition or this instruction, always ask your healthcare professional. [place] [address] any warranty or liability for your use of this information.          Patient Education        DASH Diet: Care Instructions  Your Care Instructions     The DASH diet is an eating plan that can help lower your blood pressure. DASH stands for Dietary Approaches to Stop Hypertension. Hypertension is high blood pressure. The DASH diet focuses on eating foods that are high in calcium, potassium, and magnesium. These nutrients can lower blood pressure. The foods that are highest in these nutrients are fruits, vegetables, low-fat dairy products, nuts, seeds, and legumes. But taking calcium, potassium, and magnesium supplements instead of eating foods that are high in those nutrients does not have the same effect. The DASH diet also includes whole grains, fish, and poultry. The DASH diet is one of several lifestyle changes your doctor may recommend to lower your high blood pressure. Your doctor may also want you to decrease the amount of sodium in your diet. Lowering sodium while following the DASH diet can lower blood pressure even further than just the DASH diet alone. Follow-up care is a key part of your treatment and safety. Be sure to make and go to all appointments, and call your doctor if you are having problems. It's also a good idea to know your test results and keep a list of the medicines you take. How can you care for yourself at home? Following the DASH diet  · Eat 4 to 5 servings of fruit each day. A serving is 1 medium-sized piece of fruit, ½ cup chopped or canned fruit, 1/4 cup dried fruit, or 4 ounces (½ cup) of fruit juice. Choose fruit more often than fruit juice. · Eat 4 to 5 servings of vegetables each day. A serving is 1 cup of lettuce or raw leafy vegetables, ½ cup of chopped or cooked vegetables, or 4 ounces (½ cup) of vegetable juice. Choose vegetables more often than vegetable juice. · Get 2 to 3 servings of low-fat and fat-free dairy each day. A serving is 8 ounces of milk, 1 cup of yogurt, or 1 ½ ounces of cheese. · Eat 6 to 8 servings of grains each day.  A serving is 1 slice of bread, 1 ounce of dry cereal, or ½ cup of cooked rice, pasta, or cooked cereal. Try to choose whole-grain products as much as possible. · Limit lean meat, poultry, and fish to 2 servings each day. A serving is 3 ounces, about the size of a deck of cards. · Eat 4 to 5 servings of nuts, seeds, and legumes (cooked dried beans, lentils, and split peas) each week. A serving is 1/3 cup of nuts, 2 tablespoons of seeds, or ½ cup of cooked beans or peas. · Limit fats and oils to 2 to 3 servings each day. A serving is 1 teaspoon of vegetable oil or 2 tablespoons of salad dressing. · Limit sweets and added sugars to 5 servings or less a week. A serving is 1 tablespoon jelly or jam, ½ cup sorbet, or 1 cup of lemonade. · Eat less than 2,300 milligrams (mg) of sodium a day. If you limit your sodium to 1,500 mg a day, you can lower your blood pressure even more. · Be aware that all of these are the suggested number of servings for people who eat 1,800 to 2,000 calories a day. Your recommended number of servings may be different if you need more or fewer calories. Tips for success  · Start small. Do not try to make dramatic changes to your diet all at once. You might feel that you are missing out on your favorite foods and then be more likely to not follow the plan. Make small changes, and stick with them. Once those changes become habit, add a few more changes. · Try some of the following:  ? Make it a goal to eat a fruit or vegetable at every meal and at snacks. This will make it easy to get the recommended amount of fruits and vegetables each day. ? Try yogurt topped with fruit and nuts for a snack or healthy dessert. ? Add lettuce, tomato, cucumber, and onion to sandwiches. ? Combine a ready-made pizza crust with low-fat mozzarella cheese and lots of vegetable toppings. Try using tomatoes, squash, spinach, broccoli, carrots, cauliflower, and onions. ? Have a variety of cut-up vegetables with a low-fat dip as an appetizer instead of chips and dip. ? Sprinkle sunflower seeds or chopped almonds over salads.  Or try adding chopped walnuts or almonds to cooked vegetables. ? Try some vegetarian meals using beans and peas. Add garbanzo or kidney beans to salads. Make burritos and tacos with mashed reyes beans or black beans. Where can you learn more? Go to https://chpewyattewdelia.[facility]. org and sign in to your [employer] account. Enter D038 in the [employer] box to learn more about \"DASH Diet: Care Instructions. \"     If you do not have an account, please click on the \"Sign Up Now\" link. Current as of: August 31, 2020               Content Version: 12.8  © 2006-2021 [employer]. Care instructions adapted under license by [facility] ([facility]). If you have questions about a medical condition or this instruction, always ask your healthcare professional. [address] any warranty or liability for your use of this information. Patient Education        Learning About Diuretics for High Blood Pressure  Overview  Diuretics help to lower blood pressure. This reduces your risk of a heart attack and stroke. It also reduces your risk of kidney disease. Diuretics cause your kidneys to remove sodium and water. They also relax the blood vessel walls. These help lower your blood pressure. Examples  · Chlorthalidone  · Hydrochlorothiazide  Possible side effects  There are some common side effects. They are:  · Too little potassium. · Feeling dizzy. · Rash. · Urinating a lot. · High blood sugar. (But this is not common.)  You may have other side effects. Check the information that comes with your medicine. What to know about taking this medicine  · You may take other medicines for blood pressure. Diuretics can help those work better. They can also prevent extra fluid in your body. · You may need to take potassium pills. Ask your doctor about this. · You may need blood tests to check on your health. For example, you may have tests to check your kidneys and your potassium level.   · Take your medicines exactly as prescribed. Call your doctor if you think you are having a problem with your medicine. · Check with your doctor or pharmacist before you use any other medicines. This includes over-the-counter medicines. Make sure your doctor knows all of the medicines, vitamins, herbal products, and supplements you take. Taking some medicines together can cause problems. Where can you learn more? Go to https://chpepiceweb.[email or web address] org and sign in to your [employer] account. Enter L242 in the [facility] box to learn more about \"Learning About Diuretics for High Blood Pressure. \"     If you do not have an account, please click on the \"Sign Up Now\" link. Current as of: August 31, 2020               Content Version: 12.8  © 2006-2021 [employer], [employer]. Care instructions adapted under license by [facility] ([facility]). If you have questions about a medical condition or this instruction, always ask your healthcare professional. [place] [address] any warranty or liability for your use of this information. Patient Education        Lightheadedness or Faintness: Care Instructions  Your Care Instructions  Lightheadedness is a feeling that you are about to faint or \"pass out. \" You do not feel as if you or your surroundings are moving. It is different from vertigo, which is the feeling that you or things around you are spinning or tilting. Lightheadedness usually goes away or gets better when you lie down. If lightheadedness gets worse, it can lead to a fainting spell. It is common to feel lightheaded from time to time. Lightheadedness usually is not caused by a serious problem. It often is caused by a short-lasting drop in blood pressure and blood flow to your head that occurs when you get up too quickly from a seated or lying position. Follow-up care is a key part of your treatment and safety.  Be sure to make and go to all appointments, and call your doctor if you are having problems. It's also a good idea to know your test results and keep a list of the medicines you take. How can you care for yourself at home? · Lie down for 1 or 2 minutes when you feel lightheaded. After lying down, sit up slowly and remain sitting for 1 to 2 minutes before slowly standing up. · Avoid movements, positions, or activities that have made you lightheaded in the past.  · Get plenty of rest, especially if you have a cold or flu, which can cause lightheadedness. · Make sure you drink plenty of fluids, especially if you have a fever or have been sweating. · Do not drive or put yourself and others in danger while you feel lightheaded. When should you call for help? Call 911 anytime you think you may need emergency care. For example, call if:    · You have symptoms of a stroke. These may include:  ? Sudden numbness, tingling, weakness, or loss of movement in your face, arm, or leg, especially on only one side of your body. ? Sudden vision changes. ? Sudden trouble speaking. ? Sudden confusion or trouble understanding simple statements. ? Sudden problems with walking or balance. ? A sudden, severe headache that is different from past headaches.     · You have symptoms of a heart attack. These may include:  ? Chest pain or pressure, or a strange feeling in the chest.  ? Sweating. ? Shortness of breath. ? Nausea or vomiting. ? Pain, pressure, or a strange feeling in the back, neck, jaw, or upper belly or in one or both shoulders or arms. ? Lightheadedness or sudden weakness. ? A fast or irregular heartbeat. After you call 911, the  may tell you to chew 1 adult-strength or 2 to 4 low-dose aspirin. Wait for an ambulance. Do not try to drive yourself. Watch closely for changes in your health, and be sure to contact your doctor if:    · Your lightheadedness gets worse or does not get better with home care. Where can you learn more?   Go to [email or web address]://[email or web address].[email or web address] and sign in to your [employer] account. Enter V706 in the [employer] box to learn more about \"Lightheadedness or Faintness: Care Instructions. \"     If you do not have an account, please click on the \"Sign Up Now\" link. Current as of: February 26, 2020               Content Version: 12.8  © 2006-2021 [facility], [facility]. Care instructions adapted under license by [facility] ([facility]). If you have questions about a medical condition or this instruction, always ask your healthcare professional. [place] [address] any warranty or liability for your use of this information.

## 2021-03-29 NOTE — PROGRESS NOTES
Referral faxed to Dr Alegre [facility] with a notes asking them to contact patient to schedule.

## 2021-03-30 ENCOUNTER — TELEPHONE (OUTPATIENT)
Dept: FAMILY MEDICINE CLINIC | Age: 61
End: 2021-03-30

## 2021-03-30 NOTE — TELEPHONE ENCOUNTER
----- Message from JAZZY Ruth CNP sent at 3/30/2021  8:44 AM EDT -----  Labs look okay. Recheck in one year.

## 2021-04-22 DIAGNOSIS — I10 ESSENTIAL HYPERTENSION: ICD-10-CM

## 2021-04-22 RX ORDER — AMLODIPINE BESYLATE 5 MG/1
TABLET ORAL
Qty: 30 TABLET | Refills: 0 | OUTPATIENT
Start: 2021-04-22

## 2021-04-26 RX ORDER — AMLODIPINE BESYLATE 10 MG/1
10 TABLET ORAL DAILY
Qty: 30 TABLET | Refills: 0 | Status: SHIPPED | OUTPATIENT
Start: 2021-04-26 | End: 2021-05-24 | Stop reason: SDUPTHER

## 2021-04-26 RX ORDER — HYDROCHLOROTHIAZIDE 25 MG/1
25 TABLET ORAL DAILY
Qty: 30 TABLET | Refills: 0 | Status: SHIPPED | OUTPATIENT
Start: 2021-04-26 | End: 2021-05-24 | Stop reason: SDUPTHER

## 2021-05-19 DIAGNOSIS — I10 ESSENTIAL HYPERTENSION: ICD-10-CM

## 2021-05-19 RX ORDER — AMLODIPINE BESYLATE 10 MG/1
TABLET ORAL
Qty: 30 TABLET | Refills: 0 | OUTPATIENT
Start: 2021-05-19

## 2021-05-19 NOTE — TELEPHONE ENCOUNTER
Alice Cavazos needs refill of   Requested Prescriptions     Pending Prescriptions Disp Refills    amLODIPine (NORVASC) 10 MG tablet [Pharmacy Med Name: AMLODIPINE BESYLATE 10 MG TAB] 30 tablet 0     Sig: TAKE 1 TABLET BY MOUTH EVERY DAY       Last Filled on:  4/26/2021    Last Visit Date:  3/29/2021    Next Visit Date:    5/24/2021

## 2021-05-24 ENCOUNTER — OFFICE VISIT (OUTPATIENT)
Dept: FAMILY MEDICINE CLINIC | Age: 61
End: 2021-05-24
Payer: COMMERCIAL

## 2021-05-24 VITALS
BODY MASS INDEX: 43.29 KG/M2 | DIASTOLIC BLOOD PRESSURE: 94 MMHG | RESPIRATION RATE: 12 BRPM | WEIGHT: 266.6 LBS | HEART RATE: 68 BPM | SYSTOLIC BLOOD PRESSURE: 156 MMHG

## 2021-05-24 DIAGNOSIS — I10 ESSENTIAL HYPERTENSION: ICD-10-CM

## 2021-05-24 LAB
ANION GAP SERPL CALCULATED.3IONS-SCNC: 9 MEQ/L (ref 8–16)
BUN BLDV-MCNC: 11 MG/DL (ref 7–22)
CALCIUM SERPL-MCNC: 9.6 MG/DL (ref 8.5–10.5)
CHLORIDE BLD-SCNC: 98 MEQ/L (ref 98–111)
CO2: 32 MEQ/L (ref 23–33)
CREAT SERPL-MCNC: 0.6 MG/DL (ref 0.4–1.2)
GFR SERPL CREATININE-BSD FRML MDRD: > 90 ML/MIN/1.73M2
GLUCOSE BLD-MCNC: 84 MG/DL (ref 70–108)
POTASSIUM SERPL-SCNC: 4.3 MEQ/L (ref 3.5–5.2)
SODIUM BLD-SCNC: 139 MEQ/L (ref 135–145)

## 2021-05-24 PROCEDURE — 99214 OFFICE O/P EST MOD 30 MIN: CPT | Performed by: NURSE PRACTITIONER

## 2021-05-24 RX ORDER — HYDROCHLOROTHIAZIDE 50 MG/1
50 TABLET ORAL DAILY
Qty: 30 TABLET | Refills: 0 | Status: SHIPPED | OUTPATIENT
Start: 2021-05-24 | End: 2021-06-16 | Stop reason: SDUPTHER

## 2021-05-24 RX ORDER — AMLODIPINE BESYLATE 10 MG/1
10 TABLET ORAL DAILY
Qty: 30 TABLET | Refills: 0 | Status: SHIPPED | OUTPATIENT
Start: 2021-05-24 | End: 2021-06-16

## 2021-05-24 SDOH — ECONOMIC STABILITY: FOOD INSECURITY: WITHIN THE PAST 12 MONTHS, THE FOOD YOU BOUGHT JUST DIDN'T LAST AND YOU DIDN'T HAVE MONEY TO GET MORE.: NEVER TRUE

## 2021-05-24 SDOH — ECONOMIC STABILITY: FOOD INSECURITY: WITHIN THE PAST 12 MONTHS, YOU WORRIED THAT YOUR FOOD WOULD RUN OUT BEFORE YOU GOT MONEY TO BUY MORE.: NEVER TRUE

## 2021-05-24 ASSESSMENT — SOCIAL DETERMINANTS OF HEALTH (SDOH): HOW HARD IS IT FOR YOU TO PAY FOR THE VERY BASICS LIKE FOOD, HOUSING, MEDICAL CARE, AND HEATING?: NOT HARD AT ALL

## 2021-05-24 ASSESSMENT — ENCOUNTER SYMPTOMS: COUGH: 0

## 2021-05-24 NOTE — PATIENT INSTRUCTIONS
Patient Education        High Blood Pressure: Care Instructions  Overview     It's normal for blood pressure to go up and down throughout the day. But if it stays up, you have high blood pressure. Another name for high blood pressure is hypertension. Despite what a lot of people think, high blood pressure usually doesn't cause headaches or make you feel dizzy or lightheaded. It usually has no symptoms. But it does increase your risk of stroke, heart attack, and other problems. You and your doctor will talk about your risks of these problems based on your blood pressure. Your doctor will give you a goal for your blood pressure. Your goal will be based on your health and your age. Lifestyle changes, such as eating healthy and being active, are always important to help lower blood pressure. You might also take medicine to reach your blood pressure goal.  Follow-up care is a key part of your treatment and safety. Be sure to make and go to all appointments, and call your doctor if you are having problems. It's also a good idea to know your test results and keep a list of the medicines you take. How can you care for yourself at home? Medical treatment  · If you stop taking your medicine, your blood pressure will go back up. You may take one or more types of medicine to lower your blood pressure. Be safe with medicines. Take your medicine exactly as prescribed. Call your doctor if you think you are having a problem with your medicine. · Talk to your doctor before you start taking aspirin every day. Aspirin can help certain people lower their risk of a heart attack or stroke. But taking aspirin isn't right for everyone, because it can cause serious bleeding. · See your doctor regularly. You may need to see the doctor more often at first or until your blood pressure comes down.   · If you are taking blood pressure medicine, talk to your doctor before you take decongestants or anti-inflammatory medicine, such as ibuprofen. Some of these medicines can raise blood pressure. · Learn how to check your blood pressure at home. Lifestyle changes  · Stay at a healthy weight. This is especially important if you put on weight around the waist. Losing even 10 pounds can help you lower your blood pressure. · If your doctor recommends it, get more exercise. Walking is a good choice. Bit by bit, increase the amount you walk every day. Try for at least 30 minutes on most days of the week. You also may want to swim, bike, or do other activities. · Avoid or limit alcohol. Talk to your doctor about whether you can drink any alcohol. · Try to limit how much sodium you eat to less than 2,300 milligrams (mg) a day. Your doctor may ask you to try to eat less than 1,500 mg a day. · Eat plenty of fruits (such as bananas and oranges), vegetables, legumes, whole grains, and low-fat dairy products. · Lower the amount of saturated fat in your diet. Saturated fat is found in animal products such as milk, cheese, and meat. Limiting these foods may help you lose weight and also lower your risk for heart disease. · Do not smoke. Smoking increases your risk for heart attack and stroke. If you need help quitting, talk to your doctor about stop-smoking programs and medicines. These can increase your chances of quitting for good. When should you call for help? Call  911 anytime you think you may need emergency care. This may mean having symptoms that suggest that your blood pressure is causing a serious heart or blood vessel problem. Your blood pressure may be over 180/120. For example, call 911 if:    · You have symptoms of a heart attack. These may include:  ? Chest pain or pressure, or a strange feeling in the chest.  ? Sweating. ? Shortness of breath. ? Nausea or vomiting. ? Pain, pressure, or a strange feeling in the back, neck, jaw, or upper belly or in one or both shoulders or arms. ? Lightheadedness or sudden weakness.   ? A fast or irregular heartbeat.     · You have symptoms of a stroke. These may include:  ? Sudden numbness, tingling, weakness, or loss of movement in your face, arm, or leg, especially on only one side of your body. ? Sudden vision changes. ? Sudden trouble speaking. ? Sudden confusion or trouble understanding simple statements. ? Sudden problems with walking or balance. ? A sudden, severe headache that is different from past headaches.     · You have severe back or belly pain. Do not wait until your blood pressure comes down on its own. Get help right away. Call your doctor now or seek immediate care if:    · Your blood pressure is much higher than normal (such as 180/120 or higher), but you don't have symptoms.     · You think high blood pressure is causing symptoms, such as:  ? Severe headache.  ? Blurry vision. Watch closely for changes in your health, and be sure to contact your doctor if:    · Your blood pressure measures higher than your doctor recommends at least 2 times. That means the top number is higher or the bottom number is higher, or both.     · You think you may be having side effects from your blood pressure medicine. Where can you learn more? Go to https://Horse Sense ShoespeTPG Marineeweb.Atreca. org and sign in to your Super Vitamin D account. Enter D476 in the Applico box to learn more about \"High Blood Pressure: Care Instructions. \"     If you do not have an account, please click on the \"Sign Up Now\" link. Current as of: August 31, 2020               Content Version: 12.8  © 2006-2021 [employer]. Care instructions adapted under license by [facility] ([facility]). If you have questions about a medical condition or this instruction, always ask your healthcare professional. [place] [address] any warranty or liability for your use of this information.          Patient Education        DASH Diet: Care Instructions  Your Care Instructions     The DASH diet is an eating plan that can help lower your blood pressure. DASH stands for Dietary Approaches to Stop Hypertension. Hypertension is high blood pressure. The DASH diet focuses on eating foods that are high in calcium, potassium, and magnesium. These nutrients can lower blood pressure. The foods that are highest in these nutrients are fruits, vegetables, low-fat dairy products, nuts, seeds, and legumes. But taking calcium, potassium, and magnesium supplements instead of eating foods that are high in those nutrients does not have the same effect. The DASH diet also includes whole grains, fish, and poultry. The DASH diet is one of several lifestyle changes your doctor may recommend to lower your high blood pressure. Your doctor may also want you to decrease the amount of sodium in your diet. Lowering sodium while following the DASH diet can lower blood pressure even further than just the DASH diet alone. Follow-up care is a key part of your treatment and safety. Be sure to make and go to all appointments, and call your doctor if you are having problems. It's also a good idea to know your test results and keep a list of the medicines you take. How can you care for yourself at home? Following the DASH diet  · Eat 4 to 5 servings of fruit each day. A serving is 1 medium-sized piece of fruit, ½ cup chopped or canned fruit, 1/4 cup dried fruit, or 4 ounces (½ cup) of fruit juice. Choose fruit more often than fruit juice. · Eat 4 to 5 servings of vegetables each day. A serving is 1 cup of lettuce or raw leafy vegetables, ½ cup of chopped or cooked vegetables, or 4 ounces (½ cup) of vegetable juice. Choose vegetables more often than vegetable juice. · Get 2 to 3 servings of low-fat and fat-free dairy each day. A serving is 8 ounces of milk, 1 cup of yogurt, or 1 ½ ounces of cheese. · Eat 6 to 8 servings of grains each day.  A serving is 1 slice of bread, 1 ounce of dry cereal, or ½ cup of cooked rice, pasta, or cooked cereal. Try to choose whole-grain products as much as possible. · Limit lean meat, poultry, and fish to 2 servings each day. A serving is 3 ounces, about the size of a deck of cards. · Eat 4 to 5 servings of nuts, seeds, and legumes (cooked dried beans, lentils, and split peas) each week. A serving is 1/3 cup of nuts, 2 tablespoons of seeds, or ½ cup of cooked beans or peas. · Limit fats and oils to 2 to 3 servings each day. A serving is 1 teaspoon of vegetable oil or 2 tablespoons of salad dressing. · Limit sweets and added sugars to 5 servings or less a week. A serving is 1 tablespoon jelly or jam, ½ cup sorbet, or 1 cup of lemonade. · Eat less than 2,300 milligrams (mg) of sodium a day. If you limit your sodium to 1,500 mg a day, you can lower your blood pressure even more. · Be aware that all of these are the suggested number of servings for people who eat 1,800 to 2,000 calories a day. Your recommended number of servings may be different if you need more or fewer calories. Tips for success  · Start small. Do not try to make dramatic changes to your diet all at once. You might feel that you are missing out on your favorite foods and then be more likely to not follow the plan. Make small changes, and stick with them. Once those changes become habit, add a few more changes. · Try some of the following:  ? Make it a goal to eat a fruit or vegetable at every meal and at snacks. This will make it easy to get the recommended amount of fruits and vegetables each day. ? Try yogurt topped with fruit and nuts for a snack or healthy dessert. ? Add lettuce, tomato, cucumber, and onion to sandwiches. ? Combine a ready-made pizza crust with low-fat mozzarella cheese and lots of vegetable toppings. Try using tomatoes, squash, spinach, broccoli, carrots, cauliflower, and onions. ? Have a variety of cut-up vegetables with a low-fat dip as an appetizer instead of chips and dip. ? Sprinkle sunflower seeds or chopped almonds over salads.  Or try adding chopped walnuts or almonds to cooked vegetables. ? Try some vegetarian meals using beans and peas. Add garbanzo or kidney beans to salads. Make burritos and tacos with mashed reyes beans or black beans. Where can you learn more? Go to https://chpewyattewdelia.healthzePASS. org and sign in to your Murfie account. Enter I319 in the Dinamundo box to learn more about \"DASH Diet: Care Instructions. \"     If you do not have an account, please click on the \"Sign Up Now\" link. Current as of: August 31, 2020               Content Version: 12.8  © 2006-2021 [employer]. Care instructions adapted under license by [facility] ([facility]). If you have questions about a medical condition or this instruction, always ask your healthcare professional. [address] any warranty or liability for your use of this information. Patient Education        Home Blood Pressure Test: About This Test  What is it? A home blood pressure test allows you to keep track of your blood pressure at home. Blood pressure is a measure of the force of blood against the walls of your arteries. Blood pressure readings include two numbers, such as 130/80 (say \"130 over 80\"). The first number is the systolic pressure. The second number is the diastolic pressure. Why is this test done? You may do this test at home to:  · Find out if you have high blood pressure. · Track your blood pressure if you have high blood pressure. · Track how well medicine is working to reduce high blood pressure. · Check how lifestyle changes, such as weight loss and exercise, are affecting blood pressure. How do you prepare for the test?  For at least 30 minutes before you take your blood pressure, don't exercise, drink caffeine, or smoke. Empty your bladder before the test. Sit quietly with your back straight and both feet on the floor for at least 5 minutes.  This helps you take your blood pressure while you feel comfortable and relaxed. How is the test done? · If your doctor recommends it, take your blood pressure twice a day. Take it in the morning and evening. · Sit with your arm slightly bent and resting on a table so that your upper arm is at the same level as your heart. · Use the same arm each time you take your blood pressure. · Place the blood pressure cuff on the bare skin of your upper arm. You may have to roll up your sleeve, remove your arm from the sleeve, or take your shirt off. · Wrap the blood pressure cuff around your upper arm so that the lower edge of the cuff is about 1 inch above the bend of your elbow. · Do not move, talk, or text while you take your blood pressure. Follow the instructions that came with your blood pressure monitor. They might be different from the following. · Press the on/off button on the automatic monitor. Then you may need to wait until the screen says the monitor is ready. · Press the start button. The cuff will inflate and deflate by itself. · Your blood pressure numbers will appear on the screen. · Wait one minute and take your blood pressure again. · If your monitor does not automatically save your numbers, write them in your log book, along with the date and time. Follow-up care is a key part of your treatment and safety. Be sure to make and go to all appointments, and call your doctor if you are having problems. It's also a good idea to keep a list of the medicines you take. Where can you learn more? Go to https://PicplumpewyattewExercise.com.GENIAC. org and sign in to your SugarSync account. Enter C427 in the [facility] box to learn more about \"Home Blood Pressure Test: About This Test.\"     If you do not have an account, please click on the \"Sign Up Now\" link. Current as of: August 31, 2020               Content Version: 12.8  © 5133-7765 Healthwise, Incorporated. Care instructions adapted under license by [facility] ([facility]).  If you have questions about a

## 2021-05-24 NOTE — PROGRESS NOTES
Ny Harry (:  1960) is a 56 y.o. male,Established patient, here for evaluation of the following chief complaint(s):  Follow-up (BP)         ASSESSMENT/PLAN:  1. Essential hypertension  - Chronic, unstable  - Add metoprolol, increase HCTZ  - Continue amlodipine  - Consider cardiology consult if bp not improved with today's medication changes  - Daily blood pressure journal  - DASH diet, regular exercise encouraged  -     amLODIPine (NORVASC) 10 MG tablet; Take 1 tablet by mouth daily, Disp-30 tablet, R-0Normal  -     hydroCHLOROthiazide (HYDRODIURIL) 50 MG tablet; Take 1 tablet by mouth daily, Disp-30 tablet, R-0Normal  -     metoprolol tartrate (LOPRESSOR) 25 MG tablet; Take 1 tablet by mouth 2 times daily, Disp-60 tablet, R-0Normal  -     Basic Metabolic Panel; Future      Return in about 6 months (around 2021) for Hypertension. Subjective   SUBJECTIVE/OBJECTIVE:  One month hypertension follow up. Currently taking amlodipine and HCTZ. Blood pressures remain elevated. Presents with blood pressure journal. Denies medication SE. Denies headache, chest pain. Was unable to tolerate ACE and ARB previously. Reports cough has improved since discontinuing medications. Reports medication compliance. Review of Systems   Constitutional: Negative for fatigue. Respiratory: Negative for cough. Cardiovascular: Negative for chest pain, palpitations and leg swelling. Neurological: Negative for light-headedness and headaches. Objective   Physical Exam  Vitals and nursing note reviewed. Constitutional:       Appearance: He is well-developed. HENT:      Head: Normocephalic and atraumatic. Right Ear: Hearing, tympanic membrane, ear canal and external ear normal.      Left Ear: Hearing, tympanic membrane, ear canal and external ear normal.      Nose:      Right Sinus: No maxillary sinus tenderness or frontal sinus tenderness.       Left Sinus: No maxillary sinus tenderness or

## 2021-05-25 ENCOUNTER — TELEPHONE (OUTPATIENT)
Dept: FAMILY MEDICINE CLINIC | Age: 61
End: 2021-05-25

## 2021-05-25 NOTE — TELEPHONE ENCOUNTER
Per HIPAA, message left for pt notifying him labs are normal and to call the office if any questions.

## 2021-06-14 DIAGNOSIS — I10 ESSENTIAL HYPERTENSION: ICD-10-CM

## 2021-06-14 NOTE — TELEPHONE ENCOUNTER
Sherlene Kayser needs refill of   Requested Prescriptions     Pending Prescriptions Disp Refills    hydroCHLOROthiazide (HYDRODIURIL) 50 MG tablet [Pharmacy Med Name: HYDROCHLOROTHIAZIDE 50 MG TAB] 30 tablet 0     Sig: TAKE 1 TABLET BY MOUTH EVERY DAY       Last Filled on:  5/24/21     Last Visit Date:  5/24/2021    Next Visit Date:    8/30/2021

## 2021-06-15 RX ORDER — HYDROCHLOROTHIAZIDE 50 MG/1
TABLET ORAL
Qty: 30 TABLET | Refills: 0 | OUTPATIENT
Start: 2021-06-15

## 2021-06-16 DIAGNOSIS — I10 ESSENTIAL HYPERTENSION: ICD-10-CM

## 2021-06-16 RX ORDER — AMLODIPINE BESYLATE 10 MG/1
TABLET ORAL
Qty: 30 TABLET | Refills: 0 | Status: SHIPPED | OUTPATIENT
Start: 2021-06-16 | End: 2021-07-13

## 2021-06-16 RX ORDER — METOPROLOL TARTRATE 50 MG/1
50 TABLET, FILM COATED ORAL 2 TIMES DAILY
Qty: 60 TABLET | Refills: 0 | Status: SHIPPED | OUTPATIENT
Start: 2021-06-16 | End: 2021-07-13

## 2021-06-16 RX ORDER — HYDROCHLOROTHIAZIDE 50 MG/1
50 TABLET ORAL DAILY
Qty: 30 TABLET | Refills: 0 | Status: SHIPPED | OUTPATIENT
Start: 2021-06-16 | End: 2021-07-12

## 2021-06-16 NOTE — TELEPHONE ENCOUNTER
Patient called back and left message on nurses vm to call him back.    Attempted to call, no answer, left another message for pt to call the office

## 2021-06-16 NOTE — TELEPHONE ENCOUNTER
Patient called back and left  with BP readings, states last 3 days BP was 172/92, 181/91, 170/80.   States BP usually runs 170's-180's/84-94    Refill request also received for amlodipine and metoprolol

## 2021-06-18 ENCOUNTER — NURSE TRIAGE (OUTPATIENT)
Dept: OTHER | Facility: CLINIC | Age: 61
End: 2021-06-18

## 2021-06-18 NOTE — TELEPHONE ENCOUNTER
Reason for Disposition   [1] Caller requesting NON-URGENT health information AND [2] PCP's office is the best resource    Answer Assessment - Initial Assessment Questions  1. REASON FOR CALL or QUESTION: \"What is your reason for calling today? \" or \"How can I best help you? \" or \"What question do you have that I can help answer? \"      Patient was returning a call back to his PCP about his ongoing hypertension and getting referred to a cardiologist. Office is currently closed so patient instructed to call back Monday morning. Patient reports BP this morning was 171/90. Patient reports he is taking his prescriptions as prescribed and denies any symptoms. Patient instructed to call back with any new symptoms. Protocols used: INFORMATION ONLY CALL - NO TRIAGE-ADULT-AH    Received call from Teresa Ruggiero at [facility] with [employer] Complaint. Brief description of triage: See above note    Triage indicates for patient to: See disposition    Care advice provided, patient verbalizes understanding; denies any other questions or concerns; instructed to call back for any new or worsening symptoms. Attention Provider: Thank you for allowing me to participate in the care of your patient. The patient was connected to triage in response to information provided to the [facility]. Please do not respond through this encounter as the response is not directed to a shared pool.

## 2021-06-22 ENCOUNTER — TELEPHONE (OUTPATIENT)
Dept: CARDIOLOGY CLINIC | Age: 61
End: 2021-06-22

## 2021-06-22 ENCOUNTER — OFFICE VISIT (OUTPATIENT)
Dept: CARDIOLOGY CLINIC | Age: 61
End: 2021-06-22
Payer: COMMERCIAL

## 2021-06-22 VITALS
HEART RATE: 53 BPM | DIASTOLIC BLOOD PRESSURE: 80 MMHG | SYSTOLIC BLOOD PRESSURE: 178 MMHG | HEIGHT: 65 IN | WEIGHT: 168 LBS | BODY MASS INDEX: 27.99 KG/M2

## 2021-06-22 DIAGNOSIS — R06.02 SOB (SHORTNESS OF BREATH) ON EXERTION: ICD-10-CM

## 2021-06-22 DIAGNOSIS — I10 ESSENTIAL HYPERTENSION: Primary | ICD-10-CM

## 2021-06-22 PROCEDURE — 99204 OFFICE O/P NEW MOD 45 MIN: CPT | Performed by: NUCLEAR MEDICINE

## 2021-06-22 PROCEDURE — 93000 ELECTROCARDIOGRAM COMPLETE: CPT | Performed by: NUCLEAR MEDICINE

## 2021-06-22 RX ORDER — VALSARTAN 160 MG/1
160 TABLET ORAL DAILY
COMMUNITY
End: 2021-06-22 | Stop reason: SDUPTHER

## 2021-06-22 RX ORDER — VALSARTAN 160 MG/1
160 TABLET ORAL DAILY
Qty: 90 TABLET | Refills: 1 | Status: SHIPPED | OUTPATIENT
Start: 2021-06-22 | End: 2021-12-10

## 2021-06-22 ASSESSMENT — ENCOUNTER SYMPTOMS
CHEST TIGHTNESS: 0
CONSTIPATION: 0
PHOTOPHOBIA: 0
RECTAL PAIN: 0
SHORTNESS OF BREATH: 1
BACK PAIN: 1
COLOR CHANGE: 0
ABDOMINAL DISTENTION: 0
BLOOD IN STOOL: 0
DIARRHEA: 0
NAUSEA: 0
VOMITING: 0
ANAL BLEEDING: 0
ABDOMINAL PAIN: 0

## 2021-06-22 NOTE — PROGRESS NOTES
[facility] [address]  [address] [address]  Dept: [phone]  Dept Fax: [phone]  Loc: [phone]    Visit Date: 6/22/2021    Altagracia Daniels is a 56 y.o. male who presents todayfor:  Chief Complaint   Patient presents with    New Patient    Hypertension    Obesity   here for the first time  Dealing with uncontrolled HTN   Different meds tried   Still high   No chest pain   Does have severe obesity   Does have sleep apnea signs   Not tested  Does have some baseline dyspnea  No changes clinically lately   No known CAD before  No known stents or CABG   Doesn't have Dm or hyperlipidemia  No dizziness  No syncope  No smoking  Heavier drinking alcohol   Family history of CAD       HPI:  HPI  Past Medical History:   Diagnosis Date    Morbid obesity ([address]     Osteoarthritis     Snoring     possible apnea - per wife, better since lost 20# recently. BMI 41.97, neck circ 17.5 cm, no daytime somnolence, no am headaches. Past Surgical History:   Procedure Laterality Date    HERNIA REPAIR  1982    left inguinal    JOINT REPLACEMENT Left 05/06/2020    left hip    OTHER SURGICAL HISTORY      right leg vein stripping    TONSILLECTOMY AND ADENOIDECTOMY      WISDOM TOOTH EXTRACTION  1974     Family History   Problem Relation Age of Onset    Cancer Mother     Hypertension Father      Social History     Tobacco Use    Smoking status: Never Smoker    Smokeless tobacco: Never Used   Substance Use Topics    Alcohol use:  Yes     Alcohol/week: 24.0 - 30.0 standard drinks     Types: 24 - 30 Cans of beer per week     Comment: case in one week       Current Outpatient Medications   Medication Sig Dispense Refill    metoprolol tartrate (LOPRESSOR) 50 MG tablet Take 1 tablet by mouth 2 times daily 60 tablet 0    amLODIPine (NORVASC) 10 MG tablet TAKE 1 TABLET BY MOUTH EVERY DAY 30 tablet 0    hydroCHLOROthiazide (HYDRODIURIL) 50 MG tablet Take 1 tablet by mouth daily 30 tablet 0     No current facility-administered medications for this visit. Allergies   Allergen Reactions    Ace Inhibitors      cough    Olmesartan      Cough       Health Maintenance   Topic Date Due    Hepatitis C screen  Never done    COVID-19 Vaccine (1) Never done    HIV screen  Never done    DTaP/Tdap/Td vaccine (1 - Tdap) Never done    Shingles Vaccine (1 of 2) Never done    Colon cancer screen colonoscopy  Never done    Flu vaccine (Season Ended) 09/01/2021    Potassium monitoring  05/24/2022    Creatinine monitoring  05/24/2022    Lipid screen  08/31/2025    Hepatitis A vaccine  Aged Out    Hepatitis B vaccine  Aged Out    Hib vaccine  Aged Out    Meningococcal (ACWY) vaccine  Aged Out    Pneumococcal 0-64 years Vaccine  Aged Out       Subjective:  Review of Systems   Constitutional: Positive for fatigue. Negative for diaphoresis. HENT: Negative for ear discharge and mouth sores. Eyes: Negative for photophobia. Respiratory: Positive for shortness of breath. Negative for chest tightness. Cardiovascular: Negative for chest pain, palpitations and leg swelling. Gastrointestinal: Negative for abdominal distention, abdominal pain, anal bleeding, blood in stool, constipation, diarrhea, nausea, rectal pain and vomiting. Endocrine: Negative for polyphagia. Genitourinary: Negative for dysuria, hematuria and urgency. Musculoskeletal: Positive for arthralgias and back pain. Negative for gait problem, joint swelling, myalgias, neck pain and neck stiffness. Skin: Negative for color change, pallor, rash and wound. Neurological: Negative for syncope, facial asymmetry and light-headedness. Psychiatric/Behavioral: Negative for confusion, decreased concentration, dysphoric mood and hallucinations. The patient is not hyperactive. Objective:  Physical Exam  HENT:      Head: Normocephalic.       Right Ear: Tympanic membrane normal.      Nose: Nose normal. Mouth/Throat:      Mouth: Mucous membranes are moist.   Eyes:      Pupils: Pupils are equal, round, and reactive to light. Cardiovascular:      Rate and Rhythm: Normal rate. Heart sounds: Murmur heard. Pulmonary:      Effort: No respiratory distress. Breath sounds: No stridor. No wheezing, rhonchi or rales. Chest:      Chest wall: No tenderness. Abdominal:      General: There is no distension. Palpations: There is no mass. Tenderness: There is no abdominal tenderness. There is no right CVA tenderness, left CVA tenderness, guarding or rebound. Hernia: No hernia is present. Musculoskeletal:         General: No swelling, tenderness, deformity or signs of injury. Cervical back: Normal range of motion. Right lower leg: No edema. Left lower leg: No edema. Skin:     Coloration: Skin is not jaundiced or pale. Findings: No bruising, erythema, lesion or rash. Neurological:      Mental Status: He is alert and oriented to person, place, and time. Cranial Nerves: No cranial nerve deficit. Sensory: No sensory deficit. Motor: No weakness. Coordination: Coordination normal.      Gait: Gait normal.      Deep Tendon Reflexes: Reflexes normal.   Psychiatric:         Mood and Affect: Mood normal.         Thought Content: Thought content normal.       BP (!) 178/80   Pulse 53   Ht 5' 5\" (1.651 m)   Wt 168 lb (76.2 kg)   BMI 27.96 kg/m²     Assessment:      Diagnosis Orders   1. Essential hypertension  EKG 12 lead   2. SOB (shortness of breath) on exertion  EKG 12 lead   as above  Controlled HTN   Higher risk for CAD  Higher risk for sleep apnea  Obesity: extensive discussion was made with the patient regarding diet and weight control including specific food items to avoid and cut down  ECG in office was done today. I reviewed the ECG. No acute findings      Plan:  No follow-ups on file.   Consider diovan   Monitor the BP  Echo   Obesity: extensive

## 2021-07-07 DIAGNOSIS — I10 ESSENTIAL HYPERTENSION: ICD-10-CM

## 2021-07-07 DIAGNOSIS — R06.02 SOB (SHORTNESS OF BREATH) ON EXERTION: ICD-10-CM

## 2021-07-13 DIAGNOSIS — I10 ESSENTIAL HYPERTENSION: ICD-10-CM

## 2021-07-13 RX ORDER — METOPROLOL TARTRATE 50 MG/1
50 TABLET, FILM COATED ORAL 2 TIMES DAILY
Qty: 60 TABLET | Refills: 0 | Status: SHIPPED | OUTPATIENT
Start: 2021-07-13 | End: 2021-08-12

## 2021-07-13 RX ORDER — AMLODIPINE BESYLATE 10 MG/1
TABLET ORAL
Qty: 30 TABLET | Refills: 1 | Status: SHIPPED | OUTPATIENT
Start: 2021-07-13 | End: 2021-09-02 | Stop reason: SDUPTHER

## 2021-07-13 NOTE — TELEPHONE ENCOUNTER
Monica Monday needs refill of   Requested Prescriptions     Pending Prescriptions Disp Refills    amLODIPine (NORVASC) 10 MG tablet [Pharmacy Med Name: AMLODIPINE BESYLATE 10 MG TAB] 30 tablet 1     Sig: TAKE 1 TABLET BY MOUTH EVERY DAY       Last Filled on:  06/16/2021    Last Visit Date:  5/24/2021    Next Visit Date:    8/30/2021

## 2021-07-19 ENCOUNTER — TELEPHONE (OUTPATIENT)
Dept: CARDIOLOGY CLINIC | Age: 61
End: 2021-07-19

## 2021-07-19 NOTE — TELEPHONE ENCOUNTER
Spoke with pt about BP   Been running    180/87   130/70  134/67  173/77  143/82    Please advise if you want him to continue same dose of diovan

## 2021-07-19 NOTE — TELEPHONE ENCOUNTER
Patient said that he has refills on this medication but he wanted to verify with the provider if he was wanting him to stay on this dosage amount or if it should be changed.  He said he only met with the doctor the one time and had an ECHO but has not heard anything since and just wanted to follow up.  valsartan (DIOVAN) 160 MG tablet  DOLV 06/22/2021 DONV 12/14/2021  # [phone]

## 2021-07-22 ENCOUNTER — OFFICE VISIT (OUTPATIENT)
Dept: FAMILY MEDICINE CLINIC | Age: 61
End: 2021-07-22
Payer: COMMERCIAL

## 2021-07-22 ENCOUNTER — NURSE TRIAGE (OUTPATIENT)
Dept: OTHER | Facility: CLINIC | Age: 61
End: 2021-07-22

## 2021-07-22 VITALS
RESPIRATION RATE: 16 BRPM | DIASTOLIC BLOOD PRESSURE: 80 MMHG | WEIGHT: 275 LBS | SYSTOLIC BLOOD PRESSURE: 124 MMHG | BODY MASS INDEX: 45.76 KG/M2 | HEART RATE: 68 BPM

## 2021-07-22 DIAGNOSIS — L03.115 CELLULITIS OF RIGHT LEG: Primary | ICD-10-CM

## 2021-07-22 PROCEDURE — 99213 OFFICE O/P EST LOW 20 MIN: CPT | Performed by: NURSE PRACTITIONER

## 2021-07-22 RX ORDER — SULFAMETHOXAZOLE AND TRIMETHOPRIM 800; 160 MG/1; MG/1
1 TABLET ORAL 2 TIMES DAILY
Qty: 20 TABLET | Refills: 0 | Status: SHIPPED | OUTPATIENT
Start: 2021-07-22 | End: 2021-08-01

## 2021-07-22 ASSESSMENT — ENCOUNTER SYMPTOMS
SHORTNESS OF BREATH: 0
COLOR CHANGE: 1

## 2021-07-22 NOTE — PATIENT INSTRUCTIONS
Patient Education        Cellulitis: Care Instructions  Your Care Instructions     Cellulitis is a skin infection caused by bacteria, most often strep or staph. It often occurs after a break in the skin from a scrape, cut, bite, or puncture, or after a rash. Cellulitis may be treated without doing tests to find out what caused it. But your doctor may do tests, if needed, to look for a specific bacteria, like methicillin-resistant Staphylococcus aureus (MRSA). The doctor has checked you carefully, but problems can develop later. If you notice any problems or new symptoms, get medical treatment right away. Follow-up care is a key part of your treatment and safety. Be sure to make and go to all appointments, and call your doctor if you are having problems. It's also a good idea to know your test results and keep a list of the medicines you take. How can you care for yourself at home? · Take your antibiotics as directed. Do not stop taking them just because you feel better. You need to take the full course of antibiotics. · Prop up the infected area on pillows to reduce pain and swelling. Try to keep the area above the level of your heart as often as you can. · If your doctor told you how to care for your wound, follow your doctor's instructions. If you did not get instructions, follow this general advice:  ? Wash the wound with clean water 2 times a day. Don't use hydrogen peroxide or alcohol, which can slow healing. ? You may cover the wound with a thin layer of petroleum jelly, such as Vaseline, and a nonstick bandage. ? Apply more petroleum jelly and replace the bandage as needed. · Be safe with medicines. Take pain medicines exactly as directed. ? If the doctor gave you a prescription medicine for pain, take it as prescribed. ? If you are not taking a prescription pain medicine, ask your doctor if you can take an over-the-counter medicine.   To prevent cellulitis in the future  · Try to prevent cuts, scrapes, or other injuries to your skin. Cellulitis most often occurs where there is a break in the skin. · If you get a scrape, cut, mild burn, or bite, wash the wound with clean water as soon as you can to help avoid infection. Don't use hydrogen peroxide or alcohol, which can slow healing. · If you have swelling in your legs (edema), support stockings and good skin care may help prevent leg sores and cellulitis. · Take care of your feet, especially if you have diabetes or other conditions that increase the risk of infection. Wear shoes and socks. Do not go barefoot. If you have athlete's foot or other skin problems on your feet, talk to your doctor about how to treat them. When should you call for help? Call your doctor now or seek immediate medical care if:    · You have signs that your infection is getting worse, such as:  ? Increased pain, swelling, warmth, or redness. ? Red streaks leading from the area. ? Pus draining from the area. ? A fever.     · You get a rash. Watch closely for changes in your health, and be sure to contact your doctor if:    · You do not get better as expected. Where can you learn more? Go to https://[employer]ia. org and sign in to your [email or web address] account. Enter P843 in the [facility] box to learn more about \"Cellulitis: Care Instructions. \"     If you do not have an account, please click on the \"Sign Up Now\" link. Current as of: March 3, 2021               Content Version: 12.9  © 2006-2021 [employer]. Care instructions adapted under license by [facility] ([facility]). If you have questions about a medical condition or this instruction, always ask your healthcare professional. [place] [address] any warranty or liability for your use of this information.

## 2021-07-22 NOTE — PROGRESS NOTES
[facility] (:  1960) is a 56 y.o. male,Established patient, here for evaluation of the following chief complaint(s):  Leg Swelling (right leg )         ASSESSMENT/PLAN:  1. Cellulitis of right leg  - Acute  - Start antibiotic therapy  - Compression stocking x12 hours per day d/t significant chronic leg swelling  - Notify office if symptoms not improving or worsening  -     sulfamethoxazole-trimethoprim (BACTRIM DS) 800-160 MG per tablet; Take 1 tablet by mouth 2 times daily for 10 days, Disp-20 tablet, R-0Normal      Return if symptoms worsen or fail to improve. Subjective   SUBJECTIVE/OBJECTIVE:  Right leg swelling, redness, drainage. Yellow/white. Purulent. Reports unilateral RLE swelling is normal for him. Ongoing for last 5-6 years. Had a phlebectomy approximately 15 years ago which temporarily improved symptoms. Has not been wearing compression stockings. Denies fever, SOB, nausea. Review of Systems   Constitutional: Negative for fatigue and fever. Respiratory: Negative for shortness of breath. Cardiovascular: Positive for leg swelling. Negative for chest pain. Gastrointestinal: Negative for nausea. Skin: Positive for color change and wound. Objective   Physical Exam  Vitals and nursing note reviewed. Constitutional:       Appearance: He is well-developed. HENT:      Head: Normocephalic and atraumatic. Right Ear: Hearing, tympanic membrane, ear canal and external ear normal.      Left Ear: Hearing, tympanic membrane, ear canal and external ear normal.      Nose:      Right Sinus: No maxillary sinus tenderness or frontal sinus tenderness. Left Sinus: No maxillary sinus tenderness or frontal sinus tenderness. Eyes:      General:         Right eye: No discharge. Left eye: No discharge. Conjunctiva/sclera: Conjunctivae normal.      Pupils: Pupils are equal, round, and reactive to light. Neck:      Vascular: No JVD.    Cardiovascular:

## 2021-07-22 NOTE — TELEPHONE ENCOUNTER
Reason for Disposition   SEVERE swelling (e.g., swelling extends above knee, entire leg is swollen, weeping fluid)    Answer Assessment - Initial Assessment Questions  1. ONSET: \"When did the swelling start? \" (e.g., minutes, hours, days)        4-5 yrs ago and has had vein surgery in past but in the last week it started swelling more than normal, weeping, feels like its on fire at times, open sores on leg \"like the skin just broke\"    2. LOCATION: \"What part of the leg is swollen? \"  \"Are both legs swollen or just one leg? \"         BLLE - R leg is \"10x worse than the L leg\" and R leg is blue and purple but has been seen by a doctor for this previous- not a new onset- pt states it has been like that for about 15 yrs     3. SEVERITY: \"How bad is the swelling? \" (e.g., localized; mild, moderate, severe)   - Localized - small area of swelling localized to one leg   - MILD pedal edema - swelling limited to foot and ankle, pitting edema < 1/4 inch (6 mm) deep, rest and elevation eliminate most or all swelling   - MODERATE edema - swelling of lower leg to knee, pitting edema > 1/4 inch (6 mm) deep, rest and elevation only partially reduce swelling   - SEVERE edema - swelling extends above knee, facial or hand swelling present           R- to the knee with weeping, sleeps with wedge pillows and they look fine when he wakes, leg swells throughout the day from knee to ankle- ankle and foot are not swollen, does pit when pushed on             L - to the knee from ankle area, but very mild swelling and has good color     4. REDNESS: \"Does the swelling look red or infected? \"          R - Hard to tell if it is red but does look like it possibly is and it is hot to touch            L- pink and no heat     5. PAIN: \"Is the swelling painful to touch? \" If so, ask: \"How painful is it? \"   (Scale 1-10; mild, moderate or severe)          R feels like its on fire at times, but not a constant pain           L- no pain     6.  FEVER: \"Do you have a fever? \" If so, ask: \"What is it, how was it measured, and when did it start? \"            Denies     7. CAUSE: \"What do you think is causing the leg swelling? \"            Has HTN, but echo was good just recently, but states he has not ever been given diagnosis for swelling     8. MEDICAL HISTORY: \"Do you have a history of heart failure, kidney disease, liver failure, or cancer? \"            Denies all     9. RECURRENT SYMPTOM: \"Have you had leg swelling before? \" If so, ask: \"When was the last time? \" \"What happened that time? \"            Pt has had swelling for the past 4-5 yrs but the sores, weeping, burning is all new     10. OTHER SYMPTOMS: \"Do you have any other symptoms? \" (e.g., chest pain, difficulty breathing)             Denies all other symptoms     11. PREGNANCY: \"Is there any chance you are pregnant? \" \"When was your last menstrual period? \"        NA    Protocols used: LEG SWELLING AND EDEMA-ADULT-OH    Received call from [address] [facility] with Red Flag Complaint. Brief description of triage: see above     Triage indicates for patient to go to [address] [place], [place] [address]r [facility] for R leg swelling that is weeping with open sore areas, redness and warmth. Writer called PCP office and was unable to speak to a provider. Writer called Urbano Goins NP with recommendation of ED now. Pt advised, verbalized understanding and was agreeable to plan. Care advice provided, patient verbalizes understanding; denies any other questions or concerns; instructed to call back for any new or worsening symptoms. Attention Provider: Thank you for allowing me to participate in the care of your patient. The patient was connected to triage in response to information provided to the [facility]. Please do not respond through this encounter as the response is not directed to a shared pool.

## 2021-07-23 ASSESSMENT — ENCOUNTER SYMPTOMS: NAUSEA: 0

## 2021-07-30 ENCOUNTER — TELEPHONE (OUTPATIENT)
Dept: FAMILY MEDICINE CLINIC | Age: 61
End: 2021-07-30

## 2021-07-30 NOTE — TELEPHONE ENCOUNTER
Pt wife notified of dose adjustment, she states his leg has improved, swelling is gone by morning and wound I drying up.

## 2021-07-30 NOTE — TELEPHONE ENCOUNTER
Pt called, states his BP was 97/54 yesterday afternoon. He felt dizzy, light headed and fatigue. Home readings 154/82, 107/53  Pt scheduled an appointment for Monday 08/2/2021 to discuss. Recommendations prior to appointment?

## 2021-08-02 ENCOUNTER — OFFICE VISIT (OUTPATIENT)
Dept: FAMILY MEDICINE CLINIC | Age: 61
End: 2021-08-02
Payer: COMMERCIAL

## 2021-08-02 VITALS
SYSTOLIC BLOOD PRESSURE: 120 MMHG | HEART RATE: 55 BPM | DIASTOLIC BLOOD PRESSURE: 70 MMHG | RESPIRATION RATE: 18 BRPM | WEIGHT: 270 LBS | BODY MASS INDEX: 44.93 KG/M2

## 2021-08-02 DIAGNOSIS — I10 ESSENTIAL HYPERTENSION: Primary | ICD-10-CM

## 2021-08-02 PROCEDURE — 99213 OFFICE O/P EST LOW 20 MIN: CPT | Performed by: NURSE PRACTITIONER

## 2021-08-02 ASSESSMENT — ENCOUNTER SYMPTOMS: SHORTNESS OF BREATH: 0

## 2021-08-02 NOTE — PROGRESS NOTES
sounds. No stridor. No wheezing. Abdominal:      General: There is no distension. Tenderness: There is no abdominal tenderness. Musculoskeletal:         General: No deformity. Cervical back: Normal range of motion. Right lower le+ Pitting Edema present. Left lower le+ Pitting Edema present. Comments: ROM in all extremities WNL. No deformities. Lymphadenopathy:      Head:      Right side of head: No submental or submandibular adenopathy. Left side of head: No submental or submandibular adenopathy. Skin:     General: Skin is warm and dry. Capillary Refill: Capillary refill takes less than 2 seconds. Findings: Wound present. No rash. Neurological:      Mental Status: He is alert and oriented to person, place, and time. Coordination: Coordination normal.   Psychiatric:         Mood and Affect: Mood normal.         Behavior: Behavior normal.         Thought Content: Thought content normal.         Judgment: Judgment normal.                  An electronic signature was used to authenticate this note.     --Caprice Mason, JAZZY - CNP

## 2021-08-02 NOTE — PATIENT INSTRUCTIONS
Patient Education        DASH Diet: Care Instructions  Your Care Instructions     The DASH diet is an eating plan that can help lower your blood pressure. DASH stands for Dietary Approaches to Stop Hypertension. Hypertension is high blood pressure. The DASH diet focuses on eating foods that are high in calcium, potassium, and magnesium. These nutrients can lower blood pressure. The foods that are highest in these nutrients are fruits, vegetables, low-fat dairy products, nuts, seeds, and legumes. But taking calcium, potassium, and magnesium supplements instead of eating foods that are high in those nutrients does not have the same effect. The DASH diet also includes whole grains, fish, and poultry. The DASH diet is one of several lifestyle changes your doctor may recommend to lower your high blood pressure. Your doctor may also want you to decrease the amount of sodium in your diet. Lowering sodium while following the DASH diet can lower blood pressure even further than just the DASH diet alone. Follow-up care is a key part of your treatment and safety. Be sure to make and go to all appointments, and call your doctor if you are having problems. It's also a good idea to know your test results and keep a list of the medicines you take. How can you care for yourself at home? Following the DASH diet  · Eat 4 to 5 servings of fruit each day. A serving is 1 medium-sized piece of fruit, ½ cup chopped or canned fruit, 1/4 cup dried fruit, or 4 ounces (½ cup) of fruit juice. Choose fruit more often than fruit juice. · Eat 4 to 5 servings of vegetables each day. A serving is 1 cup of lettuce or raw leafy vegetables, ½ cup of chopped or cooked vegetables, or 4 ounces (½ cup) of vegetable juice. Choose vegetables more often than vegetable juice. · Get 2 to 3 servings of low-fat and fat-free dairy each day. A serving is 8 ounces of milk, 1 cup of yogurt, or 1 ½ ounces of cheese. · Eat 6 to 8 servings of grains each day. A serving is 1 slice of bread, 1 ounce of dry cereal, or ½ cup of cooked rice, pasta, or cooked cereal. Try to choose whole-grain products as much as possible. · Limit lean meat, poultry, and fish to 2 servings each day. A serving is 3 ounces, about the size of a deck of cards. · Eat 4 to 5 servings of nuts, seeds, and legumes (cooked dried beans, lentils, and split peas) each week. A serving is 1/3 cup of nuts, 2 tablespoons of seeds, or ½ cup of cooked beans or peas. · Limit fats and oils to 2 to 3 servings each day. A serving is 1 teaspoon of vegetable oil or 2 tablespoons of salad dressing. · Limit sweets and added sugars to 5 servings or less a week. A serving is 1 tablespoon jelly or jam, ½ cup sorbet, or 1 cup of lemonade. · Eat less than 2,300 milligrams (mg) of sodium a day. If you limit your sodium to 1,500 mg a day, you can lower your blood pressure even more. · Be aware that all of these are the suggested number of servings for people who eat 1,800 to 2,000 calories a day. Your recommended number of servings may be different if you need more or fewer calories. Tips for success  · Start small. Do not try to make dramatic changes to your diet all at once. You might feel that you are missing out on your favorite foods and then be more likely to not follow the plan. Make small changes, and stick with them. Once those changes become habit, add a few more changes. · Try some of the following:  ? Make it a goal to eat a fruit or vegetable at every meal and at snacks. This will make it easy to get the recommended amount of fruits and vegetables each day. ? Try yogurt topped with fruit and nuts for a snack or healthy dessert. ? Add lettuce, tomato, cucumber, and onion to sandwiches. ? Combine a ready-made pizza crust with low-fat mozzarella cheese and lots of vegetable toppings. Try using tomatoes, squash, spinach, broccoli, carrots, cauliflower, and onions. ?  Have a variety of cut-up vegetables with a low-fat dip as an appetizer instead of chips and dip. ? Sprinkle sunflower seeds or chopped almonds over salads. Or try adding chopped walnuts or almonds to cooked vegetables. ? Try some vegetarian meals using beans and peas. Add garbanzo or kidney beans to salads. Make burritos and tacos with mashed reyes beans or black beans. Where can you learn more? Go to https://Oonair[employer]University Media.Eve. org and sign in to your Tailored account. Enter C609 in the [employer] box to learn more about \"DASH Diet: Care Instructions. \"     If you do not have an account, please click on the \"Sign Up Now\" link. Current as of: August 31, 2020               Content Version: 12.9  © 1848-8428 [employer]. Care instructions adapted under license by [facility] ([facility] [place]). If you have questions about a medical condition or this instruction, always ask your healthcare professional. [place] [address] any warranty or liability for your use of this information. Patient Education        High Blood Pressure: Care Instructions  Overview     It's normal for blood pressure to go up and down throughout the day. But if it stays up, you have high blood pressure. Another name for high blood pressure is hypertension. Despite what a lot of people think, high blood pressure usually doesn't cause headaches or make you feel dizzy or lightheaded. It usually has no symptoms. But it does increase your risk of stroke, heart attack, and other problems. You and your doctor will talk about your risks of these problems based on your blood pressure. Your doctor will give you a goal for your blood pressure. Your goal will be based on your health and your age. Lifestyle changes, such as eating healthy and being active, are always important to help lower blood pressure. You might also take medicine to reach your blood pressure goal.  Follow-up care is a key part of your treatment and safety.  Be sure to make and go to all appointments, and call your doctor if you are having problems. It's also a good idea to know your test results and keep a list of the medicines you take. How can you care for yourself at home? Medical treatment  · If you stop taking your medicine, your blood pressure will go back up. You may take one or more types of medicine to lower your blood pressure. Be safe with medicines. Take your medicine exactly as prescribed. Call your doctor if you think you are having a problem with your medicine. · Talk to your doctor before you start taking aspirin every day. Aspirin can help certain people lower their risk of a heart attack or stroke. But taking aspirin isn't right for everyone, because it can cause serious bleeding. · See your doctor regularly. You may need to see the doctor more often at first or until your blood pressure comes down. · If you are taking blood pressure medicine, talk to your doctor before you take decongestants or anti-inflammatory medicine, such as ibuprofen. Some of these medicines can raise blood pressure. · Learn how to check your blood pressure at home. Lifestyle changes  · Stay at a healthy weight. This is especially important if you put on weight around the waist. Losing even 10 pounds can help you lower your blood pressure. · If your doctor recommends it, get more exercise. Walking is a good choice. Bit by bit, increase the amount you walk every day. Try for at least 30 minutes on most days of the week. You also may want to swim, bike, or do other activities. · Avoid or limit alcohol. Talk to your doctor about whether you can drink any alcohol. · Try to limit how much sodium you eat to less than 2,300 milligrams (mg) a day. Your doctor may ask you to try to eat less than 1,500 mg a day. · Eat plenty of fruits (such as bananas and oranges), vegetables, legumes, whole grains, and low-fat dairy products. · Lower the amount of saturated fat in your diet.  Saturated fat is found in animal products such as milk, cheese, and meat. Limiting these foods may help you lose weight and also lower your risk for heart disease. · Do not smoke. Smoking increases your risk for heart attack and stroke. If you need help quitting, talk to your doctor about stop-smoking programs and medicines. These can increase your chances of quitting for good. When should you call for help? Call 911  anytime you think you may need emergency care. This may mean having symptoms that suggest that your blood pressure is causing a serious heart or blood vessel problem. Your blood pressure may be over 180/120. For example, call 911 if:    · You have symptoms of a heart attack. These may include:  ? Chest pain or pressure, or a strange feeling in the chest.  ? Sweating. ? Shortness of breath. ? Nausea or vomiting. ? Pain, pressure, or a strange feeling in the back, neck, jaw, or upper belly or in one or both shoulders or arms. ? Lightheadedness or sudden weakness. ? A fast or irregular heartbeat.     · You have symptoms of a stroke. These may include:  ? Sudden numbness, tingling, weakness, or loss of movement in your face, arm, or leg, especially on only one side of your body. ? Sudden vision changes. ? Sudden trouble speaking. ? Sudden confusion or trouble understanding simple statements. ? Sudden problems with walking or balance. ? A sudden, severe headache that is different from past headaches.     · You have severe back or belly pain. Do not wait until your blood pressure comes down on its own. Get help right away. Call your doctor now or seek immediate care if:    · Your blood pressure is much higher than normal (such as 180/120 or higher), but you don't have symptoms.     · You think high blood pressure is causing symptoms, such as:  ? Severe headache.  ? Blurry vision.    Watch closely for changes in your health, and be sure to contact your doctor if:    · Your blood pressure measures higher than your doctor recommends at least 2 times. That means the top number is higher or the bottom number is higher, or both.     · You think you may be having side effects from your blood pressure medicine. Where can you learn more? Go to https://terrie.FashionAde.com ([employer]). org and sign in to your Wote account. Enter W482 in the [employer] box to learn more about \"High Blood Pressure: Care Instructions. \"     If you do not have an account, please click on the \"Sign Up Now\" link. Current as of: August 31, 2020               Content Version: 12.9  © 2006-2021 [employer]. Care instructions adapted under license by [facility] ([facility]). If you have questions about a medical condition or this instruction, always ask your healthcare professional. [address] any warranty or liability for your use of this information. Patient Education        Home Blood Pressure Test: About This Test  What is it? A home blood pressure test allows you to keep track of your blood pressure at home. Blood pressure is a measure of the force of blood against the walls of your arteries. Blood pressure readings include two numbers, such as 130/80 (say \"130 over 80\"). The first number is the systolic pressure. The second number is the diastolic pressure. Why is this test done? You may do this test at home to:  · Find out if you have high blood pressure. · Track your blood pressure if you have high blood pressure. · Track how well medicine is working to reduce high blood pressure. · Check how lifestyle changes, such as weight loss and exercise, are affecting blood pressure. How do you prepare for the test?  For at least 30 minutes before you take your blood pressure, don't exercise, drink caffeine, or smoke. Empty your bladder before the test. Sit quietly with your back straight and both feet on the floor for at least 5 minutes.  This helps you take your blood pressure while you feel comfortable and relaxed. How is the test done? · If your doctor recommends it, take your blood pressure twice a day. Take it in the morning and evening. · Sit with your arm slightly bent and resting on a table so that your upper arm is at the same level as your heart. · Use the same arm each time you take your blood pressure. · Place the blood pressure cuff on the bare skin of your upper arm. You may have to roll up your sleeve, remove your arm from the sleeve, or take your shirt off. · Wrap the blood pressure cuff around your upper arm so that the lower edge of the cuff is about 1 inch above the bend of your elbow. · Do not move, talk, or text while you take your blood pressure. Follow the instructions that came with your blood pressure monitor. They might be different from the following. · Press the on/off button on the automatic monitor. Then you may need to wait until the screen says the monitor is ready. · Press the start button. The cuff will inflate and deflate by itself. · Your blood pressure numbers will appear on the screen. · Wait one minute and take your blood pressure again. · If your monitor does not automatically save your numbers, write them in your log book, along with the date and time. Follow-up care is a key part of your treatment and safety. Be sure to make and go to all appointments, and call your doctor if you are having problems. It's also a good idea to keep a list of the medicines you take. Where can you learn more? Go to https://LamahuipewyattewGreenbird [employer] Technology.IndiaIdeas. org and sign in to your Convene account. Enter C427 in the [employer] box to learn more about \"Home Blood Pressure Test: About This Test.\"     If you do not have an account, please click on the \"Sign Up Now\" link. Current as of: August 31, 2020               Content Version: 12.9  © 2006-2021 Healthwise, Incorporated. Care instructions adapted under license by [facility] ([facility]).  If you have questions about a medical condition or this instruction, always ask your healthcare professional. [place] [address] any warranty or liability for your use of this information.

## 2021-09-02 ENCOUNTER — OFFICE VISIT (OUTPATIENT)
Dept: FAMILY MEDICINE CLINIC | Age: 61
End: 2021-09-02
Payer: COMMERCIAL

## 2021-09-02 VITALS
OXYGEN SATURATION: 96 % | DIASTOLIC BLOOD PRESSURE: 68 MMHG | BODY MASS INDEX: 42.72 KG/M2 | SYSTOLIC BLOOD PRESSURE: 128 MMHG | RESPIRATION RATE: 18 BRPM | HEART RATE: 60 BPM | HEIGHT: 67 IN | TEMPERATURE: 98 F | WEIGHT: 272.2 LBS

## 2021-09-02 DIAGNOSIS — Z12.12 SCREENING FOR COLORECTAL CANCER: ICD-10-CM

## 2021-09-02 DIAGNOSIS — Z12.11 SCREENING FOR COLORECTAL CANCER: ICD-10-CM

## 2021-09-02 DIAGNOSIS — I10 ESSENTIAL HYPERTENSION: ICD-10-CM

## 2021-09-02 DIAGNOSIS — Z00.00 WELL ADULT EXAM: Primary | ICD-10-CM

## 2021-09-02 PROCEDURE — 99396 PREV VISIT EST AGE 40-64: CPT | Performed by: NURSE PRACTITIONER

## 2021-09-02 RX ORDER — AMLODIPINE BESYLATE 10 MG/1
10 TABLET ORAL DAILY
Qty: 90 TABLET | Refills: 1 | Status: SHIPPED | OUTPATIENT
Start: 2021-09-02 | End: 2022-02-09 | Stop reason: SDUPTHER

## 2021-09-02 ASSESSMENT — ENCOUNTER SYMPTOMS
SHORTNESS OF BREATH: 0
EYE DISCHARGE: 0
EYE PAIN: 0
COLOR CHANGE: 0
SINUS PRESSURE: 0
WHEEZING: 0
ABDOMINAL PAIN: 0
EYE ITCHING: 0
DIARRHEA: 0
BACK PAIN: 0
BLOOD IN STOOL: 0
CONSTIPATION: 0
EYE REDNESS: 0
COUGH: 0

## 2021-09-02 ASSESSMENT — PATIENT HEALTH QUESTIONNAIRE - PHQ9
2. FEELING DOWN, DEPRESSED OR HOPELESS: 0
SUM OF ALL RESPONSES TO PHQ9 QUESTIONS 1 & 2: 0
1. LITTLE INTEREST OR PLEASURE IN DOING THINGS: 0
SUM OF ALL RESPONSES TO PHQ QUESTIONS 1-9: 0

## 2021-09-02 NOTE — PATIENT INSTRUCTIONS
Patient Education        Well Visit, Men 48 to 72: Care Instructions  Overview     Well visits can help you stay healthy. Your doctor has checked your overall health and may have suggested ways to take good care of yourself. Your doctor also may have recommended tests. At home, you can help prevent illness with healthy eating, regular exercise, and other steps. Follow-up care is a key part of your treatment and safety. Be sure to make and go to all appointments, and call your doctor if you are having problems. It's also a good idea to know your test results and keep a list of the medicines you take. How can you care for yourself at home? · Get screening tests that you and your doctor decide on. Screening helps find diseases before any symptoms appear. · Eat healthy foods. Choose fruits, vegetables, whole grains, protein, and low-fat dairy foods. Limit fat, especially saturated fat. Reduce salt in your diet. · Limit alcohol. Have no more than 2 drinks a day or 14 drinks a week. · Get at least 30 minutes of exercise on most days of the week. Walking is a good choice. You also may want to do other activities, such as running, swimming, cycling, or playing tennis or team sports. · Reach and stay at a healthy weight. This will lower your risk for many problems, such as obesity, diabetes, heart disease, and high blood pressure. · Do not smoke. Smoking can make health problems worse. If you need help quitting, talk to your doctor about stop-smoking programs and medicines. These can increase your chances of quitting for good. · Care for your mental health. It is easy to get weighed down by worry and stress. Learn strategies to manage stress, like deep breathing and mindfulness, and stay connected with your family and community. If you find you often feel sad or hopeless, talk with your doctor. Treatment can help.   · Talk to your doctor about whether you have any risk factors for sexually transmitted infections (STIs). You can help prevent STIs if you wait to have sex with a new partner (or partners) until you've each been tested for STIs. It also helps if you use condoms (male or female condoms) and if you limit your sex partners to one person who only has sex with you. Vaccines are available for some STIs. · If it's important to you to prevent pregnancy with your partner, talk with your doctor about birth control options that might be best for you. · If you think you may have a problem with alcohol or drug use, talk to your doctor. This includes prescription medicines (such as amphetamines and opioids) and illegal drugs (such as cocaine and methamphetamine). Your doctor can help you figure out what type of treatment is best for you. · Protect your skin from too much sun. When you're outdoors from 10 a.m. to 4 p.m., stay in the shade or cover up with clothing and a hat with a wide brim. Wear sunglasses that block UV rays. Even when it's cloudy, put broad-spectrum sunscreen (SPF 30 or higher) on any exposed skin. · See a dentist one or two times a year for checkups and to have your teeth cleaned. · Wear a seat belt in the car. When should you call for help? Watch closely for changes in your health, and be sure to contact your doctor if you have any problems or symptoms that concern you. Where can you learn more? Go to https://Hydrocapsuleamelie.health-partners. org and sign in to your iPrint account. Enter Y922 in the [facility] box to learn more about \"Well Visit, Men 48 to 72: Care Instructions. \"     If you do not have an account, please click on the \"Sign Up Now\" link. Current as of: May 27, 2020               Content Version: 12.9  © 2006-2021 Healthwise, [employer]. Care instructions adapted under license by [facility] ([facility]).  If you have questions about a medical condition or this instruction, always ask your healthcare professional. [address]  any warranty or liability for your use of this information. Patient Education        High Blood Pressure: Care Instructions  Overview     It's normal for blood pressure to go up and down throughout the day. But if it stays up, you have high blood pressure. Another name for high blood pressure is hypertension. Despite what a lot of people think, high blood pressure usually doesn't cause headaches or make you feel dizzy or lightheaded. It usually has no symptoms. But it does increase your risk of stroke, heart attack, and other problems. You and your doctor will talk about your risks of these problems based on your blood pressure. Your doctor will give you a goal for your blood pressure. Your goal will be based on your health and your age. Lifestyle changes, such as eating healthy and being active, are always important to help lower blood pressure. You might also take medicine to reach your blood pressure goal.  Follow-up care is a key part of your treatment and safety. Be sure to make and go to all appointments, and call your doctor if you are having problems. It's also a good idea to know your test results and keep a list of the medicines you take. How can you care for yourself at home? Medical treatment  · If you stop taking your medicine, your blood pressure will go back up. You may take one or more types of medicine to lower your blood pressure. Be safe with medicines. Take your medicine exactly as prescribed. Call your doctor if you think you are having a problem with your medicine. · Talk to your doctor before you start taking aspirin every day. Aspirin can help certain people lower their risk of a heart attack or stroke. But taking aspirin isn't right for everyone, because it can cause serious bleeding. · See your doctor regularly. You may need to see the doctor more often at first or until your blood pressure comes down.   · If you are taking blood pressure medicine, talk to your doctor before you take decongestants or anti-inflammatory medicine, such as ibuprofen. Some of these medicines can raise blood pressure. · Learn how to check your blood pressure at home. Lifestyle changes  · Stay at a healthy weight. This is especially important if you put on weight around the waist. Losing even 10 pounds can help you lower your blood pressure. · If your doctor recommends it, get more exercise. Walking is a good choice. Bit by bit, increase the amount you walk every day. Try for at least 30 minutes on most days of the week. You also may want to swim, bike, or do other activities. · Avoid or limit alcohol. Talk to your doctor about whether you can drink any alcohol. · Try to limit how much sodium you eat to less than 2,300 milligrams (mg) a day. Your doctor may ask you to try to eat less than 1,500 mg a day. · Eat plenty of fruits (such as bananas and oranges), vegetables, legumes, whole grains, and low-fat dairy products. · Lower the amount of saturated fat in your diet. Saturated fat is found in animal products such as milk, cheese, and meat. Limiting these foods may help you lose weight and also lower your risk for heart disease. · Do not smoke. Smoking increases your risk for heart attack and stroke. If you need help quitting, talk to your doctor about stop-smoking programs and medicines. These can increase your chances of quitting for good. When should you call for help? Call 911  anytime you think you may need emergency care. This may mean having symptoms that suggest that your blood pressure is causing a serious heart or blood vessel problem. Your blood pressure may be over 180/120. For example, call 911 if:    · You have symptoms of a heart attack. These may include:  ? Chest pain or pressure, or a strange feeling in the chest.  ? Sweating. ? Shortness of breath. ? Nausea or vomiting. ? Pain, pressure, or a strange feeling in the back, neck, jaw, or upper belly or in one or both shoulders or arms.   ? Lightheadedness or sudden weakness. ? A fast or irregular heartbeat.     · You have symptoms of a stroke. These may include:  ? Sudden numbness, tingling, weakness, or loss of movement in your face, arm, or leg, especially on only one side of your body. ? Sudden vision changes. ? Sudden trouble speaking. ? Sudden confusion or trouble understanding simple statements. ? Sudden problems with walking or balance. ? A sudden, severe headache that is different from past headaches.     · You have severe back or belly pain. Do not wait until your blood pressure comes down on its own. Get help right away. Call your doctor now or seek immediate care if:    · Your blood pressure is much higher than normal (such as 180/120 or higher), but you don't have symptoms.     · You think high blood pressure is causing symptoms, such as:  ? Severe headache.  ? Blurry vision. Watch closely for changes in your health, and be sure to contact your doctor if:    · Your blood pressure measures higher than your doctor recommends at least 2 times. That means the top number is higher or the bottom number is higher, or both.     · You think you may be having side effects from your blood pressure medicine. Where can you learn more? Go to https://MedDaypeTextÃ¡do.GeckoGo. org and sign in to your Optiant account. Enter W976 in the [employer] box to learn more about \"High Blood Pressure: Care Instructions. \"     If you do not have an account, please click on the \"Sign Up Now\" link. Current as of: August 31, 2020               Content Version: 12.9  © 2006-2021 Healthwise, Incorporated. Care instructions adapted under license by [facility] ([facility]). If you have questions about a medical condition or this instruction, always ask your healthcare professional. [place] [address] any warranty or liability for your use of this information.          Patient Education        DASH Diet: Care Instructions  Your Care Instructions     The DASH diet is an eating plan that can help lower your blood pressure. DASH stands for Dietary Approaches to Stop Hypertension. Hypertension is high blood pressure. The DASH diet focuses on eating foods that are high in calcium, potassium, and magnesium. These nutrients can lower blood pressure. The foods that are highest in these nutrients are fruits, vegetables, low-fat dairy products, nuts, seeds, and legumes. But taking calcium, potassium, and magnesium supplements instead of eating foods that are high in those nutrients does not have the same effect. The DASH diet also includes whole grains, fish, and poultry. The DASH diet is one of several lifestyle changes your doctor may recommend to lower your high blood pressure. Your doctor may also want you to decrease the amount of sodium in your diet. Lowering sodium while following the DASH diet can lower blood pressure even further than just the DASH diet alone. Follow-up care is a key part of your treatment and safety. Be sure to make and go to all appointments, and call your doctor if you are having problems. It's also a good idea to know your test results and keep a list of the medicines you take. How can you care for yourself at home? Following the DASH diet  · Eat 4 to 5 servings of fruit each day. A serving is 1 medium-sized piece of fruit, ½ cup chopped or canned fruit, 1/4 cup dried fruit, or 4 ounces (½ cup) of fruit juice. Choose fruit more often than fruit juice. · Eat 4 to 5 servings of vegetables each day. A serving is 1 cup of lettuce or raw leafy vegetables, ½ cup of chopped or cooked vegetables, or 4 ounces (½ cup) of vegetable juice. Choose vegetables more often than vegetable juice. · Get 2 to 3 servings of low-fat and fat-free dairy each day. A serving is 8 ounces of milk, 1 cup of yogurt, or 1 ½ ounces of cheese. · Eat 6 to 8 servings of grains each day.  A serving is 1 slice of bread, 1 ounce of dry cereal, or ½ cup of cooked rice, pasta, or cooked cereal. Try to choose whole-grain products as much as possible. · Limit lean meat, poultry, and fish to 2 servings each day. A serving is 3 ounces, about the size of a deck of cards. · Eat 4 to 5 servings of nuts, seeds, and legumes (cooked dried beans, lentils, and split peas) each week. A serving is 1/3 cup of nuts, 2 tablespoons of seeds, or ½ cup of cooked beans or peas. · Limit fats and oils to 2 to 3 servings each day. A serving is 1 teaspoon of vegetable oil or 2 tablespoons of salad dressing. · Limit sweets and added sugars to 5 servings or less a week. A serving is 1 tablespoon jelly or jam, ½ cup sorbet, or 1 cup of lemonade. · Eat less than 2,300 milligrams (mg) of sodium a day. If you limit your sodium to 1,500 mg a day, you can lower your blood pressure even more. · Be aware that all of these are the suggested number of servings for people who eat 1,800 to 2,000 calories a day. Your recommended number of servings may be different if you need more or fewer calories. Tips for success  · Start small. Do not try to make dramatic changes to your diet all at once. You might feel that you are missing out on your favorite foods and then be more likely to not follow the plan. Make small changes, and stick with them. Once those changes become habit, add a few more changes. · Try some of the following:  ? Make it a goal to eat a fruit or vegetable at every meal and at snacks. This will make it easy to get the recommended amount of fruits and vegetables each day. ? Try yogurt topped with fruit and nuts for a snack or healthy dessert. ? Add lettuce, tomato, cucumber, and onion to sandwiches. ? Combine a ready-made pizza crust with low-fat mozzarella cheese and lots of vegetable toppings. Try using tomatoes, squash, spinach, broccoli, carrots, cauliflower, and onions. ? Have a variety of cut-up vegetables with a low-fat dip as an appetizer instead of chips and dip. ?  Sprinkle sunflower seeds or chopped almonds over salads. Or try adding chopped walnuts or almonds to cooked vegetables. ? Try some vegetarian meals using beans and peas. Add garbanzo or kidney beans to salads. Make burritos and tacos with mashed reyes beans or black beans. Where can you learn more? Go to https://chpepiceweb.Twenga. org and sign in to your [employer] account. Enter F507 in the [facility] box to learn more about \"DASH Diet: Care Instructions. \"     If you do not have an account, please click on the \"Sign Up Now\" link. Current as of: August 31, 2020               Content Version: 12.9  © 2006-2021 [employer]. Care instructions adapted under license by [facility] ([facility]). If you have questions about a medical condition or this instruction, always ask your healthcare professional. [place] [address] any warranty or liability for your use of this information. Patient Education        Colon Cancer Screening: Care Instructions  Overview     Colorectal cancer occurs in the colon or rectum. That's the lower part of your digestive system. It often starts in small growths called polyps in the colon or rectum. Polyps are usually found with screening tests. Depending on the type of test, any polyps found may be removed during the tests. Colorectal cancer usually does not cause symptoms at first. But regular tests can help find it early, before it spreads and becomes harder to treat. Your risk for colorectal cancer gets higher as you get older. Some experts say that adults should start regular screening at age 48 and stop at age 76. Others say to start before age 48 or continue after age 76. Talk with your doctor about your risk and when to start and stop screening. You may have one of several tests. Follow-up care is a key part of your treatment and safety. Be sure to make and go to all appointments, and call your doctor if you are having problems.  It's also a good idea to know your test results and keep a list of the medicines you take. What are the main screening tests for colon cancer? The screening tests are:  Stool tests. These include the guaiac fecal occult blood test (gFOBT), the fecal immunochemical test (FIT), and the combined fecal immunochemical test and stool DNA test (FIT-DNA). These tests check stool samples for signs of cancer. If your test is positive, you will need to have a colonoscopy. Sigmoidoscopy. This test lets your doctor look at the lining of your rectum and the lowest part of your colon. Your doctor uses a lighted tube called a sigmoidoscope. This test can't find cancers or polyps in the upper part of your colon. In some cases, polyps that are found can be removed. But if your doctor finds polyps, you will need to have a colonoscopy to check the upper part of your colon. Colonoscopy. This test lets your doctor look at the lining of your rectum and your entire colon. The doctor uses a thin, flexible tool called a colonoscope. It can also be used to remove polyps or get a tissue sample (biopsy). A less common test is CT colonography (CTC). It's also called virtual colonoscopy. Who should be screened for colorectal cancer? Your risk for colorectal cancer gets higher as you get older. Some experts say that adults should start regular screening at age 48 and stop at age 76. Others say to start before age 48 or continue after age 76. Talk with your doctor about your risk and when to start and stop screening. How often you need screening depends on the type of test you get:  Stool tests. Every 1 or 2 years for FIT or gFOBT. Every 3 years for sDNA, also called FIT-DNA. Tests that look inside the colon. Every 5 or 10 years for sigmoidoscopy. Every 5 years for CT colonography (virtual colonoscopy). Every 10 years for colonoscopy. Experts agree that people at higher risk may need to be tested sooner and more often.  This includes people who have a strong family history of colon cancer. Talk to your doctor about which test is best for you and when to be tested. When should you call for help? Watch closely for changes in your health, and be sure to contact your doctor if:    · You have any changes in your bowel habits.     · You have any problems. Where can you learn more? Go to https://chpewyattewdelia.Gociety. org and sign in to your Stilnest account. Enter 006 [id number] in the [employer] box to learn more about \"Colon Cancer Screening: Care Instructions. \"     If you do not have an account, please click on the \"Sign Up Now\" link. Current as of: December 17, 2020               Content Version: 12.9  © 2006-2021 Healthwise, Incorporated. Care instructions adapted under license by [facility] ([facility]). If you have questions about a medical condition or this instruction, always ask your healthcare professional. [address] any warranty or liability for your use of this information.

## 2021-09-02 NOTE — PROGRESS NOTES
[address] [facility] [address] [address]  Dept: [phone]  Dept Fax: [phone]: [phone]     Visit Date:  9/2/2021    Patient:  Nelson Fuentes  YOB: 1960  Age: 56 y.o. Gender: male  BMI: Body mass index is 42.63 kg/m². Nelson Fuentes, Established patient, is being seen today for   Chief Complaint   Patient presents with   [facility] Annual Exam   .     Assessment/Plan      1. Well adult exam  - Age-appropriate education provided. - Health maintenance reviewed. - Encouraged healthy diet and regular exercise. - Declines COVID-19 vaccination    2. Essential hypertension  - Chronic, stable  - Continue amlodipine, valsartan, metoprolol and hctz  - DASH diet, regular exercise encouraged  - Amlodipine refill placed  - amLODIPine (NORVASC) 10 MG tablet; Take 1 tablet by mouth daily  Dispense: 90 tablet; Refill: 1    3. Screening for colorectal cancer  - [facility]) - Meghan Garcia MD, Gastroenterology, [facility]      Return in about 6 months (around 3/2/2022) for Hypertension. HPI:     Annual exam. Health maintenance reviewed. Needs medication refill. Labs are up to date. No current concerns. Feels well. PHQ-9 Total Score: 0 (9/2/2021  3:59 PM)        Medications    Current Outpatient Medications:     amLODIPine (NORVASC) 10 MG tablet, Take 1 tablet by mouth daily, Disp: 90 tablet, Rfl: 1    hydroCHLOROthiazide (HYDRODIURIL) 50 MG tablet, Take 1 tablet by mouth daily, Disp: 90 tablet, Rfl: 1    metoprolol tartrate (LOPRESSOR) 25 MG tablet, Take 1 tablet by mouth 2 times daily, Disp: 180 tablet, Rfl: 1    valsartan (DIOVAN) 160 MG tablet, Take 1 tablet by mouth daily, Disp: 90 tablet, Rfl: 1    The patient is allergic to ace inhibitors and olmesartan. Past Medical History  Gayle Jama  has a past medical history of Morbid obesity ([address] Osteoarthritis, and Snoring.     Past Surgical History  The patient has a past surgical history that includes hernia repair (1982); Tonsillectomy and adenoidectomy; [place] tooth extraction (1974); other surgical history; and joint replacement (Left, 05/06/2020). Family History  This patient's family history includes Cancer in his mother; Hypertension in his father. Social History  Bonita Main  reports that he has never smoked. He has never used smokeless tobacco. He reports current alcohol use of about 24.0 - 30.0 standard drinks of alcohol per week. He reports that he does not use drugs. Health Maintenance:    Health maintenance reviewed. Health Maintenance   Topic Date Due    Hepatitis C screen  Never done    COVID-19 Vaccine (1) Never done    HIV screen  Never done    DTaP/Tdap/Td vaccine (1 - Tdap) Never done    Colon cancer screen colonoscopy  Never done    Shingles Vaccine (1 of 2) Never done    Flu vaccine (1) Never done    Potassium monitoring  05/24/2022    Creatinine monitoring  05/24/2022    Lipid screen  08/31/2025    Hepatitis A vaccine  Aged Out    Hepatitis B vaccine  Aged Out    Hib vaccine  Aged Out    Meningococcal (ACWY) vaccine  Aged Out    Pneumococcal 0-64 years Vaccine  Aged Out       Subjective/Objective:      Review of Systems   Constitutional: Negative for chills, fatigue and fever. HENT: Negative for congestion, ear pain, sinus pressure and sneezing. Eyes: Negative for pain, discharge, redness and itching. Respiratory: Negative for cough, shortness of breath and wheezing. Cardiovascular: Positive for leg swelling. Negative for chest pain and palpitations. Gastrointestinal: Negative for abdominal pain, blood in stool, constipation and diarrhea. Endocrine: Negative for polydipsia, polyphagia and polyuria. Genitourinary: Negative for difficulty urinating and hematuria. Musculoskeletal: Negative for arthralgias, back pain and neck pain. Skin: Negative for color change, pallor and rash.    Allergic/Immunologic: Negative for environmental allergies and food allergies. Neurological: Negative for dizziness, light-headedness, numbness and headaches. Psychiatric/Behavioral: Negative for agitation and confusion. The patient is not nervous/anxious. /68 (Site: Right Upper Arm, Position: Sitting, Cuff Size: Medium Adult)   Pulse 60   Temp 98 °F (36.7 °C) (Infrared)   Resp 18   Ht 5' 7\" (1.702 m)   Wt 272 lb 3.2 oz (123.5 kg)   SpO2 96%   BMI 42.63 kg/m²     Physical Exam  Vitals and nursing note reviewed. Constitutional:       Appearance: He is well-developed. HENT:      Head: Normocephalic and atraumatic. Right Ear: Hearing, tympanic membrane, ear canal and external ear normal.      Left Ear: Hearing, tympanic membrane, ear canal and external ear normal.      Nose:      Right Sinus: No maxillary sinus tenderness or frontal sinus tenderness. Left Sinus: No maxillary sinus tenderness or frontal sinus tenderness. Eyes:      General:         Right eye: No discharge. Left eye: No discharge. Conjunctiva/sclera: Conjunctivae normal.      Pupils: Pupils are equal, round, and reactive to light. Neck:      Vascular: No JVD. Cardiovascular:      Rate and Rhythm: Normal rate and regular rhythm. Heart sounds: Normal heart sounds. No murmur heard. Pulmonary:      Effort: Pulmonary effort is normal. No tachypnea. Breath sounds: Normal breath sounds. No stridor. No wheezing. Abdominal:      General: There is no distension. Tenderness: There is no abdominal tenderness. Musculoskeletal:         General: No deformity. Cervical back: Normal range of motion. Right lower le+ Pitting Edema present. Left lower le+ Pitting Edema present. Comments: ROM in all extremities WNL. No deformities. Lymphadenopathy:      Head:      Right side of head: No submental or submandibular adenopathy. Left side of head: No submental or submandibular adenopathy. Skin:     General: Skin is warm and dry. Capillary Refill: Capillary refill takes less than 2 seconds. Findings: No rash. Neurological:      Mental Status: He is alert and oriented to person, place, and time. Coordination: Coordination normal.   Psychiatric:         Mood and Affect: Mood normal.         Behavior: Behavior normal.         Thought Content: Thought content normal.         Judgment: Judgment normal.           Labs Reviewed 9/2/2021:    Lab Results   Component Value Date    WBC 3.8 (L) 03/29/2021    HGB 14.5 03/29/2021    HCT 44.8 03/29/2021     03/29/2021    CHOL 195 08/31/2020    TRIG 40 08/31/2020     08/31/2020    ALT 9 (L) 03/29/2021    AST 13 03/29/2021     05/24/2021    K 4.3 05/24/2021    CL 98 05/24/2021    CREATININE 0.6 05/24/2021    BUN 11 05/24/2021    CO2 32 05/24/2021           Patient given educational materials - see patient instructions. Discussed use, benefit, and side effects of prescribed medications. All patient questions answered. Pt voiced understanding. Reviewed health maintenance.        Electronically signed by JAZZY Carrillo CNP on 9/2/2021 at 4:04 PM EDT

## 2021-12-10 RX ORDER — VALSARTAN 160 MG/1
TABLET ORAL
Qty: 30 TABLET | Refills: 5 | Status: SHIPPED | OUTPATIENT
Start: 2021-12-10 | End: 2022-02-09 | Stop reason: SDUPTHER

## 2021-12-14 ENCOUNTER — TELEPHONE (OUTPATIENT)
Dept: CARDIOLOGY CLINIC | Age: 61
End: 2021-12-14

## 2022-02-08 ENCOUNTER — OFFICE VISIT (OUTPATIENT)
Dept: CARDIOLOGY CLINIC | Age: 62
End: 2022-02-08
Payer: COMMERCIAL

## 2022-02-08 VITALS
HEART RATE: 115 BPM | HEIGHT: 66 IN | SYSTOLIC BLOOD PRESSURE: 157 MMHG | BODY MASS INDEX: 43.55 KG/M2 | DIASTOLIC BLOOD PRESSURE: 82 MMHG | WEIGHT: 271 LBS

## 2022-02-08 DIAGNOSIS — I10 PRIMARY HYPERTENSION: Primary | ICD-10-CM

## 2022-02-08 PROCEDURE — 99214 OFFICE O/P EST MOD 30 MIN: CPT | Performed by: NUCLEAR MEDICINE

## 2022-02-08 NOTE — PROGRESS NOTES
[facility]  [address] [address]  Dept: [phone]  Dept Fax: [phone]  Loc: [phone]    Visit Date: 2/8/2022    Altagracia Daniels is a 59 y.o. male who presents todayfor:  Chief Complaint   Patient presents with    Check-Up    Hypertension    Obesity     Still higher BP  Not checked at home  Higher HR  Does have signs of sleep apnea  Not checked before  No chest pain  Some baseline dyspnea  Weight issues  Severe obesity       HPI:  HPI  Past Medical History:   Diagnosis Date    Morbid obesity ([address]     Osteoarthritis     Snoring     possible apnea - per wife, better since lost 20# recently. BMI 41.97, neck circ 17.5 cm, no daytime somnolence, no am headaches. Past Surgical History:   Procedure Laterality Date    HERNIA REPAIR  1982    left inguinal    JOINT REPLACEMENT Left 05/06/2020    left hip    OTHER SURGICAL HISTORY      right leg vein stripping    TONSILLECTOMY AND ADENOIDECTOMY      WISDOM TOOTH EXTRACTION  1974     Family History   Problem Relation Age of Onset    Cancer Mother     Hypertension Father      Social History     Tobacco Use    Smoking status: Never Smoker    Smokeless tobacco: Never Used   Substance Use Topics    Alcohol use: Yes     Alcohol/week: 24.0 - 30.0 standard drinks     Types: 24 - 30 Cans of beer per week     Comment: case in one week       Current Outpatient Medications   Medication Sig Dispense Refill    Multiple Vitamins-Minerals (MULTI COMPLETE PO) Take by mouth      valsartan (DIOVAN) 160 MG tablet TAKE 1 TABLET BY MOUTH EVERY DAY 30 tablet 5    amLODIPine (NORVASC) 10 MG tablet Take 1 tablet by mouth daily 90 tablet 1    hydroCHLOROthiazide (HYDRODIURIL) 50 MG tablet Take 1 tablet by mouth daily 90 tablet 1    metoprolol tartrate (LOPRESSOR) 25 MG tablet Take 1 tablet by mouth 2 times daily 180 tablet 1     No current facility-administered medications for this visit. Allergies   Allergen Reactions    Ace Inhibitors      cough    Olmesartan      Cough       Health Maintenance   Topic Date Due    Hepatitis C screen  Never done    COVID-19 Vaccine (1) Never done    HIV screen  Never done    DTaP/Tdap/Td vaccine (1 - Tdap) Never done    Colon cancer screen colonoscopy  Never done    Shingles Vaccine (1 of 2) Never done    Flu vaccine (1) Never done    Potassium monitoring  05/24/2022    Creatinine monitoring  05/24/2022    Depression Screen  09/02/2022    Lipid screen  08/31/2025    Hepatitis A vaccine  Aged Out    Hepatitis B vaccine  Aged Out    Hib vaccine  Aged Out    Meningococcal (ACWY) vaccine  Aged Out    Pneumococcal 0-64 years Vaccine  Aged Out       Subjective:  Review of Systems  General:   No fever, no chills, some fatigue or weight loss  Pulmonary:    some dyspnea, no wheezing  Cardiac:    Denies recent chest pain,   GI:     No nausea or vomiting, no abdominal pain  Neuro:    No dizziness or light headedness,   Musculoskeletal:  No recent active issues  Extremities:   No edema, no obvious claudication       Objective:  Physical Exam  BP (!) 157/82   Pulse 115   Ht 5' 6\" (1.676 m)   Wt 271 lb (122.9 kg)   BMI 43.74 kg/m²   General:   Well developed, well nourished  Lungs:   Clear to auscultation  Heart:    Normal S1 S2, Slight murmur. no rubs, no gallops  Abdomen:   Soft, non tender, no organomegalies, positive bowel sounds  Extremities:   No edema, no cyanosis, good peripheral pulses  Neurological:   Awake, alert, oriented. No obvious focal deficits  Musculoskelatal:  No obvious deformities    Assessment:      Diagnosis Orders   1. Primary hypertension     still uncontrolled  HTN  Higher HR  Possible sleep apnea  Obesity: extensive discussion was made with the patient regarding diet and weight control including specific food items to avoid and cut down      Plan:  No follow-ups on file.   Discussed  Increase metoprolol dose  Monitor the BP at home  Consider a sleep study   Continue risk factor modification and medical management  Thank you for allowing me to participate in the care of your patient. Please don't hesitate to contact me regarding any further issues related to the patient care    Orders Placed:  No orders of the defined types were placed in this encounter. Medications Prescribed:  No orders of the defined types were placed in this encounter. Discussed use, benefit, and side effects of prescribed medications. All patient questions answered. Pt voicedunderstanding. Instructed to continue current medications, diet and exercise. Continue risk factor modification and medical management. Patient agreed with treatment plan. Follow up as directed.     Electronically signedby Mk Peterson MD on 2/8/2022 at 9:09 AM

## 2022-02-09 ENCOUNTER — OFFICE VISIT (OUTPATIENT)
Dept: FAMILY MEDICINE CLINIC | Age: 62
End: 2022-02-09
Payer: COMMERCIAL

## 2022-02-09 VITALS
WEIGHT: 271 LBS | RESPIRATION RATE: 16 BRPM | HEART RATE: 56 BPM | BODY MASS INDEX: 43.74 KG/M2 | DIASTOLIC BLOOD PRESSURE: 78 MMHG | SYSTOLIC BLOOD PRESSURE: 132 MMHG

## 2022-02-09 DIAGNOSIS — I10 ESSENTIAL HYPERTENSION: Primary | ICD-10-CM

## 2022-02-09 DIAGNOSIS — Z12.12 SCREENING FOR COLORECTAL CANCER: ICD-10-CM

## 2022-02-09 DIAGNOSIS — G47.9 SLEEP DISTURBANCE: ICD-10-CM

## 2022-02-09 DIAGNOSIS — Z12.11 SCREENING FOR COLORECTAL CANCER: ICD-10-CM

## 2022-02-09 PROBLEM — Z96.642 HISTORY OF LEFT HIP REPLACEMENT: Status: ACTIVE | Noted: 2022-02-09

## 2022-02-09 PROCEDURE — 99214 OFFICE O/P EST MOD 30 MIN: CPT | Performed by: NURSE PRACTITIONER

## 2022-02-09 RX ORDER — VALSARTAN 160 MG/1
160 TABLET ORAL 2 TIMES DAILY
Qty: 180 TABLET | Refills: 1 | Status: SHIPPED | OUTPATIENT
Start: 2022-02-09 | End: 2022-04-14 | Stop reason: SDUPTHER

## 2022-02-09 RX ORDER — AMLODIPINE BESYLATE 10 MG/1
10 TABLET ORAL DAILY
Qty: 90 TABLET | Refills: 1 | Status: SHIPPED | OUTPATIENT
Start: 2022-02-09 | End: 2022-08-25

## 2022-02-09 RX ORDER — HYDROCHLOROTHIAZIDE 50 MG/1
50 TABLET ORAL DAILY
Qty: 90 TABLET | Refills: 1 | Status: SHIPPED | OUTPATIENT
Start: 2022-02-09 | End: 2022-08-17

## 2022-02-09 ASSESSMENT — ENCOUNTER SYMPTOMS
WHEEZING: 0
EYE DISCHARGE: 0
SINUS PRESSURE: 0
SHORTNESS OF BREATH: 0
CONSTIPATION: 0
BACK PAIN: 0
EYE PAIN: 0
EYE ITCHING: 0
COLOR CHANGE: 0
EYE REDNESS: 0
ABDOMINAL PAIN: 0
BLOOD IN STOOL: 0
DIARRHEA: 0
COUGH: 0

## 2022-02-09 NOTE — PATIENT INSTRUCTIONS
Patient Education        High Blood Pressure: Care Instructions  Overview     It's normal for blood pressure to go up and down throughout the day. But if it stays up, you have high blood pressure. Another name for high blood pressure is hypertension. Despite what a lot of people think, high blood pressure usually doesn't cause headaches or make you feel dizzy or lightheaded. It usually has no symptoms. But it does increase your risk of stroke, heart attack, and other problems. You and your doctor will talk about your risks of these problems based on your blood pressure. Your doctor will give you a goal for your blood pressure. Your goal will be based on your health and your age. Lifestyle changes, such as eating healthy and being active, are always important to help lower blood pressure. You might also take medicine to reach your blood pressure goal.  Follow-up care is a key part of your treatment and safety. Be sure to make and go to all appointments, and call your doctor if you are having problems. It's also a good idea to know your test results and keep a list of the medicines you take. How can you care for yourself at home? Medical treatment  · If you stop taking your medicine, your blood pressure will go back up. You may take one or more types of medicine to lower your blood pressure. Be safe with medicines. Take your medicine exactly as prescribed. Call your doctor if you think you are having a problem with your medicine. · Talk to your doctor before you start taking aspirin every day. Aspirin can help certain people lower their risk of a heart attack or stroke. But taking aspirin isn't right for everyone, because it can cause serious bleeding. · See your doctor regularly. You may need to see the doctor more often at first or until your blood pressure comes down.   · If you are taking blood pressure medicine, talk to your doctor before you take decongestants or anti-inflammatory medicine, such as ibuprofen. Some of these medicines can raise blood pressure. · Learn how to check your blood pressure at home. Lifestyle changes  · Stay at a healthy weight. This is especially important if you put on weight around the waist. Losing even 10 pounds can help you lower your blood pressure. · If your doctor recommends it, get more exercise. Walking is a good choice. Bit by bit, increase the amount you walk every day. Try for at least 30 minutes on most days of the week. You also may want to swim, bike, or do other activities. · Avoid or limit alcohol. Talk to your doctor about whether you can drink any alcohol. · Try to limit how much sodium you eat to less than 2,300 milligrams (mg) a day. Your doctor may ask you to try to eat less than 1,500 mg a day. · Eat plenty of fruits (such as bananas and oranges), vegetables, legumes, whole grains, and low-fat dairy products. · Lower the amount of saturated fat in your diet. Saturated fat is found in animal products such as milk, cheese, and meat. Limiting these foods may help you lose weight and also lower your risk for heart disease. · Do not smoke. Smoking increases your risk for heart attack and stroke. If you need help quitting, talk to your doctor about stop-smoking programs and medicines. These can increase your chances of quitting for good. When should you call for help? Call 911  anytime you think you may need emergency care. This may mean having symptoms that suggest that your blood pressure is causing a serious heart or blood vessel problem. Your blood pressure may be over 180/120. For example, call 911 if:    · You have symptoms of a heart attack. These may include:  ? Chest pain or pressure, or a strange feeling in the chest.  ? Sweating. ? Shortness of breath. ? Nausea or vomiting. ? Pain, pressure, or a strange feeling in the back, neck, jaw, or upper belly or in one or both shoulders or arms. ? Lightheadedness or sudden weakness.   ? A fast or irregular heartbeat.     · You have symptoms of a stroke. These may include:  ? Sudden numbness, tingling, weakness, or loss of movement in your face, arm, or leg, especially on only one side of your body. ? Sudden vision changes. ? Sudden trouble speaking. ? Sudden confusion or trouble understanding simple statements. ? Sudden problems with walking or balance. ? A sudden, severe headache that is different from past headaches.     · You have severe back or belly pain. Do not wait until your blood pressure comes down on its own. Get help right away. Call your doctor now or seek immediate care if:    · Your blood pressure is much higher than normal (such as 180/120 or higher), but you don't have symptoms.     · You think high blood pressure is causing symptoms, such as:  ? Severe headache.  ? Blurry vision. Watch closely for changes in your health, and be sure to contact your doctor if:    · Your blood pressure measures higher than your doctor recommends at least 2 times. That means the top number is higher or the bottom number is higher, or both.     · You think you may be having side effects from your blood pressure medicine. Where can you learn more? Go to https://Mosaic Mallpepiceweb.Extricom. org and sign in to your Busbud account. Enter V729 in the [employer] box to learn more about \"High Blood Pressure: Care Instructions. \"     If you do not have an account, please click on the \"Sign Up Now\" link. Current as of: April 29, 2021               Content Version: 13.1  © 2006-2021 [employer]. Care instructions adapted under license by [facility] ([facility]). If you have questions about a medical condition or this instruction, always ask your healthcare professional. [place] [address] any warranty or liability for your use of this information.          Patient Education        DASH Diet: Care Instructions  Your Care Instructions     The DASH diet is an eating plan that can help lower your blood pressure. DASH stands for Dietary Approaches to Stop Hypertension. Hypertension is high blood pressure. The DASH diet focuses on eating foods that are high in calcium, potassium, and magnesium. These nutrients can lower blood pressure. The foods that are highest in these nutrients are fruits, vegetables, low-fat dairy products, nuts, seeds, and legumes. But taking calcium, potassium, and magnesium supplements instead of eating foods that are high in those nutrients does not have the same effect. The DASH diet also includes whole grains, fish, and poultry. The DASH diet is one of several lifestyle changes your doctor may recommend to lower your high blood pressure. Your doctor may also want you to decrease the amount of sodium in your diet. Lowering sodium while following the DASH diet can lower blood pressure even further than just the DASH diet alone. Follow-up care is a key part of your treatment and safety. Be sure to make and go to all appointments, and call your doctor if you are having problems. It's also a good idea to know your test results and keep a list of the medicines you take. How can you care for yourself at home? Following the DASH diet  · Eat 4 to 5 servings of fruit each day. A serving is 1 medium-sized piece of fruit, ½ cup chopped or canned fruit, 1/4 cup dried fruit, or 4 ounces (½ cup) of fruit juice. Choose fruit more often than fruit juice. · Eat 4 to 5 servings of vegetables each day. A serving is 1 cup of lettuce or raw leafy vegetables, ½ cup of chopped or cooked vegetables, or 4 ounces (½ cup) of vegetable juice. Choose vegetables more often than vegetable juice. · Get 2 to 3 servings of low-fat and fat-free dairy each day. A serving is 8 ounces of milk, 1 cup of yogurt, or 1 ½ ounces of cheese. · Eat 6 to 8 servings of grains each day.  A serving is 1 slice of bread, 1 ounce of dry cereal, or ½ cup of cooked rice, pasta, or cooked cereal. Try to choose whole-grain products as much as possible. · Limit lean meat, poultry, and fish to 2 servings each day. A serving is 3 ounces, about the size of a deck of cards. · Eat 4 to 5 servings of nuts, seeds, and legumes (cooked dried beans, lentils, and split peas) each week. A serving is 1/3 cup of nuts, 2 tablespoons of seeds, or ½ cup of cooked beans or peas. · Limit fats and oils to 2 to 3 servings each day. A serving is 1 teaspoon of vegetable oil or 2 tablespoons of salad dressing. · Limit sweets and added sugars to 5 servings or less a week. A serving is 1 tablespoon jelly or jam, ½ cup sorbet, or 1 cup of lemonade. · Eat less than 2,300 milligrams (mg) of sodium a day. If you limit your sodium to 1,500 mg a day, you can lower your blood pressure even more. · Be aware that all of these are the suggested number of servings for people who eat 1,800 to 2,000 calories a day. Your recommended number of servings may be different if you need more or fewer calories. Tips for success  · Start small. Do not try to make dramatic changes to your diet all at once. You might feel that you are missing out on your favorite foods and then be more likely to not follow the plan. Make small changes, and stick with them. Once those changes become habit, add a few more changes. · Try some of the following:  ? Make it a goal to eat a fruit or vegetable at every meal and at snacks. This will make it easy to get the recommended amount of fruits and vegetables each day. ? Try yogurt topped with fruit and nuts for a snack or healthy dessert. ? Add lettuce, tomato, cucumber, and onion to sandwiches. ? Combine a ready-made pizza crust with low-fat mozzarella cheese and lots of vegetable toppings. Try using tomatoes, squash, spinach, broccoli, carrots, cauliflower, and onions. ? Have a variety of cut-up vegetables with a low-fat dip as an appetizer instead of chips and dip. ? Sprinkle sunflower seeds or chopped almonds over salads.  Or try adding chopped walnuts or almonds to cooked vegetables. ? Try some vegetarian meals using beans and peas. Add garbanzo or kidney beans to salads. Make burritos and tacos with mashed reyes beans or black beans. Where can you learn more? Go to https://[email or web address].[email or web address] org and sign in to your Conviva account. Enter V893 in the SocialSamba box to learn more about \"DASH Diet: Care Instructions. \"     If you do not have an account, please click on the \"Sign Up Now\" link. Current as of: April 29, 2021               Content Version: 13.1  © 2006-2021 [employer]. Care instructions adapted under license by [facility] ([facility]). If you have questions about a medical condition or this instruction, always ask your healthcare professional. [facility] any warranty or liability for your use of this information. Patient Education        Colon Cancer Screening: Care Instructions  Overview     Colorectal cancer occurs in the colon or rectum. That's the lower part of your digestive system. It often starts in small growths called polyps in the colon or rectum. Polyps are usually found with screening tests. Depending on the type of test, any polyps found may be removed during the tests. Colorectal cancer usually does not cause symptoms at first. But regular tests can help find it early, before it spreads and becomes harder to treat. Your risk for colorectal cancer gets higher as you get older. Experts recommend starting screening at age 39 for people who are at average risk. Talk with your doctor about your risk and when to start and stop screening. You may have one of several tests. Follow-up care is a key part of your treatment and safety. Be sure to make and go to all appointments, and call your doctor if you are having problems. It's also a good idea to know your test results and keep a list of the medicines you take.   What are the main screening tests for colon cancer? The screening tests are:  Stool tests. These include the guaiac fecal occult blood test (gFOBT), the fecal immunochemical test (FIT), and the combined fecal immunochemical test and stool DNA test (FIT-DNA). These tests check stool samples for signs of cancer. If your test is positive, you will need to have a colonoscopy. Sigmoidoscopy. This test lets your doctor look at the lining of your rectum and the lowest part of your colon. Your doctor uses a lighted tube called a sigmoidoscope. This test can't find cancers or polyps in the upper part of your colon. In some cases, polyps that are found can be removed. But if your doctor finds polyps, you will need to have a colonoscopy to check the upper part of your colon. Colonoscopy. This test lets your doctor look at the lining of your rectum and your entire colon. The doctor uses a thin, flexible tool called a colonoscope. It can also be used to remove polyps or get a tissue sample (biopsy). A less common test is CT colonography (CTC). It's also called virtual colonoscopy. Who should be screened for colorectal cancer? Your risk for colorectal cancer gets higher as you get older. Experts recommend starting screening at age 39 for people who are at average risk. Talk with your doctor about your risk and when to start and stop screening. How often you need screening depends on the type of test you get:  Stool tests. Every year for FIT or gFOBT. Every 1 to 3 years for sDNA, also called FIT-DNA. Tests that look inside the colon. Every 5 years for sigmoidoscopy. (If you do the FIT test every year, you can get this test every 10 years.)  Every 5 years for CT colonography (virtual colonoscopy). Every 10 years for colonoscopy. Experts agree that people at higher risk may need to be tested sooner and more often. This includes people who have a strong family history of colon cancer.  Talk to your doctor about which test is best for you and when to be tested. When should you call for help? Watch closely for changes in your health, and be sure to contact your doctor if:    · You have any changes in your bowel habits.     · You have any problems. Where can you learn more? Go to [email or web address] and sign in to your [employer] account. Enter 029 [id number] in the [facility] box to learn more about \"Colon Cancer Screening: Care Instructions. \"     If you do not have an account, please click on the \"Sign Up Now\" link. Current as of: September 8, 2021               Content Version: 13.1  © 2006-2021 [employer]. Care instructions adapted under license by [facility] ([facility]). If you have questions about a medical condition or this instruction, always ask your healthcare professional. [address] any warranty or liability for your use of this information. Patient Education        Sleep Apnea: Care Instructions  Overview     Sleep apnea means that you frequently stop breathing for 10 seconds or longer during sleep. It can be mild to severe, based on the number of times an hour that you stop breathing. Blocked or narrowed airways in your nose, mouth, or throat can cause sleep apnea. Your airway can become blocked when your throat muscles and tongue relax during sleep. You can help treat sleep apnea at home by making lifestyle changes. You also can use a CPAP breathing machine that keeps tissues in the throat from blocking your airway. Or your doctor may suggest that you use a breathing device while you sleep. It helps keep your airway open. This could be a device that you put in your mouth. In some cases, surgery may be needed to remove enlarged tissues in the throat. Follow-up care is a key part of your treatment and safety. Be sure to make and go to all appointments, and call your doctor if you are having problems.  It's also a good idea to know your test results and keep a list of the medicines you take.  How can you care for yourself at home? · Lose weight, if needed. · Sleep on your side. It may help mild apnea. · Avoid alcohol and medicines such as sleeping pills, opioids, or sedatives before bed. · Don't smoke. If you need help quitting, talk to your doctor. · Prop up the head of your bed. · Treat breathing problems, such as a stuffy nose, that are caused by a cold or allergies. · Try a continuous positive airway pressure (CPAP) breathing machine if your doctor recommends it. · If CPAP doesn't work for you, ask your doctor if you can try other masks, settings, or breathing machines. · Try oral breathing devices or other nasal devices. · Talk to your doctor if your nose feels dry or bleeds, or if it gets runny or stuffy when you use a breathing machine. · Tell your doctor if you're sleepy during the day and it affects your daily life. Don't drive or operate machinery when you're drowsy. When should you call for help? Watch closely for changes in your health, and be sure to contact your doctor if:    · You still have sleep apnea even though you have made lifestyle changes.     · You are thinking of trying a device such as CPAP.     · You are having problems using a CPAP or similar machine.     · You are still sleepy during the day, and it affects your daily life. Where can you learn more? Go to https://Hacker SchoolpeOverdog.[email or web address] org and sign in to your [employer] account. Enter Z664 in the [facility] box to learn more about \"Sleep Apnea: Care Instructions. \"     If you do not have an account, please click on the \"Sign Up Now\" link. Current as of: July 6, 2021               Content Version: 13.1  © 5118-4639 Healthwise, Incorporated. Care instructions adapted under license by [facility] ([facility]).  If you have questions about a medical condition or this instruction, always ask your healthcare professional. [address] any warranty or liability for your use of this information.

## 2022-02-09 NOTE — PROGRESS NOTES
[address] [facility] [address] [address]  Dept: [phone]  Dept Fax: [phone]: [phone]     Visit Date:  2/9/2022    Patient:  Nickie Lentz  YOB: 1960  Age: 59 y.o. Gender: male  BMI: Body mass index is 43.74 kg/m². Nickie Lentz, Established patient, is being seen today for   Chief Complaint   Patient presents with    Follow-up     HTN    . Assessment/Plan      1. Essential hypertension  - Chronic, stable  - Continue hctz, metoprolol, amlodipine and valsartan  - DASH diet, regular exercise encouraged  - hydroCHLOROthiazide (HYDRODIURIL) 50 MG tablet; Take 1 tablet by mouth daily  Dispense: 90 tablet; Refill: 1  - metoprolol tartrate (LOPRESSOR) 25 MG tablet; Take 1 tablet by mouth 2 times daily  Dispense: 180 tablet; Refill: 1  - amLODIPine (NORVASC) 10 MG tablet; Take 1 tablet by mouth daily  Dispense: 90 tablet; Refill: 1  - valsartan (DIOVAN) 160 MG tablet; Take 1 tablet by mouth 2 times daily  Dispense: 180 tablet; Refill: 1    2. Sleep disturbance  - New  - Concern for LYNNE given loud snoring and witnessed apneic periods  - [address] [place] [address]    Screening for colorectal cancer  - [facility] - Mary Lawrence MD, Gastroenterology, [email or web address]    Return in about 6 months (around 8/9/2022) for Hypertension. HPI:     6 month follow up. Health maintenance reviewed. Continues to follow with cardiology. Blood pressure currently controlled. Reports medication compliance. Concern for lynne. Reports snoring and apneic periods. Feels rested though when he wakes.          Medications    Current Outpatient Medications:     hydroCHLOROthiazide (HYDRODIURIL) 50 MG tablet, Take 1 tablet by mouth daily, Disp: 90 tablet, Rfl: 1    metoprolol tartrate (LOPRESSOR) 25 MG tablet, Take 1 tablet by mouth 2 times daily, Disp: 180 tablet, Rfl: 1    amLODIPine (NORVASC) 10 MG tablet, Take 1 tablet by mouth daily, Disp: 90 tablet, Rfl: 1    valsartan (DIOVAN) 160 MG tablet, Take 1 tablet by mouth 2 times daily, Disp: 180 tablet, Rfl: 1    Multiple Vitamins-Minerals (MULTI COMPLETE PO), Take by mouth, Disp: , Rfl:     The patient is allergic to ace inhibitors and olmesartan. Past Medical History  Mely Elizabeth  has a past medical history of Morbid obesity ([address] Osteoarthritis, and Snoring. Past Surgical History  The patient  has a past surgical history that includes hernia repair (1982); Tonsillectomy and adenoidectomy; [place] tooth extraction (1974); other surgical history; and joint replacement (Left, 05/06/2020). Family History  This patient's family history includes Cancer in his mother; Hypertension in his father. Social History  Mely Elizabeth  reports that he has never smoked. He has never used smokeless tobacco. He reports current alcohol use of about 24.0 - 30.0 standard drinks of alcohol per week. He reports that he does not use drugs. Health Maintenance:    Health maintenance reviewed. Health Maintenance   Topic Date Due    Hepatitis C screen  Never done    COVID-19 Vaccine (1) Never done    HIV screen  Never done    DTaP/Tdap/Td vaccine (1 - Tdap) Never done    Colon cancer screen colonoscopy  Never done    Shingles Vaccine (1 of 2) Never done    Flu vaccine (1) Never done   [employer] Visit (AWV)  Never done    Potassium monitoring  05/24/2022    Creatinine monitoring  05/24/2022    Depression Screen  09/02/2022    Lipid screen  08/31/2025    Hepatitis A vaccine  Aged Out    Hepatitis B vaccine  Aged Out    Hib vaccine  Aged Out    Meningococcal (ACWY) vaccine  Aged Out    Pneumococcal 0-64 years Vaccine  Aged Out       Subjective/Objective:      Review of Systems   Constitutional: Negative for chills, fatigue and fever. HENT: Negative for congestion, ear pain, sinus pressure and sneezing. Eyes: Negative for pain, discharge, redness and itching.    Respiratory: Negative for cough, shortness of breath and wheezing. Cardiovascular: Negative for chest pain, palpitations and leg swelling. Gastrointestinal: Negative for abdominal pain, blood in stool, constipation and diarrhea. Endocrine: Negative for polydipsia, polyphagia and polyuria. Genitourinary: Negative for difficulty urinating and hematuria. Musculoskeletal: Negative for arthralgias, back pain and neck pain. Skin: Negative for color change, pallor and rash. Allergic/Immunologic: Negative for environmental allergies and food allergies. Neurological: Negative for dizziness, light-headedness, numbness and headaches. Psychiatric/Behavioral: Negative for agitation and confusion. The patient is not nervous/anxious. /78   Pulse 56   Resp 16   Wt 271 lb (122.9 kg)   BMI 43.74 kg/m²     Physical Exam  Vitals and nursing note reviewed. Constitutional:       Appearance: He is well-developed. HENT:      Head: Normocephalic and atraumatic. Right Ear: Hearing, tympanic membrane, ear canal and external ear normal.      Left Ear: Hearing, tympanic membrane, ear canal and external ear normal.      Nose:      Right Sinus: No maxillary sinus tenderness or frontal sinus tenderness. Left Sinus: No maxillary sinus tenderness or frontal sinus tenderness. Eyes:      General:         Right eye: No discharge. Left eye: No discharge. Conjunctiva/sclera: Conjunctivae normal.      Pupils: Pupils are equal, round, and reactive to light. Neck:      Vascular: No JVD. Cardiovascular:      Rate and Rhythm: Normal rate and regular rhythm. Heart sounds: Normal heart sounds. No murmur heard. Pulmonary:      Effort: Pulmonary effort is normal. No tachypnea. Breath sounds: Normal breath sounds. No stridor. No wheezing. Abdominal:      General: There is no distension. Tenderness: There is no abdominal tenderness. Musculoskeletal:         General: No deformity.       Cervical back: Normal range of motion. Right lower le+ Edema present. Left lower le+ Edema present. Comments: ROM in all extremities WNL. No deformities. Lymphadenopathy:      Head:      Right side of head: No submental or submandibular adenopathy. Left side of head: No submental or submandibular adenopathy. Skin:     General: Skin is warm and dry. Capillary Refill: Capillary refill takes less than 2 seconds. Findings: No rash. Neurological:      Mental Status: He is alert and oriented to person, place, and time. Coordination: Coordination normal.   Psychiatric:         Mood and Affect: Mood normal.         Behavior: Behavior normal.         Thought Content: Thought content normal.         Judgment: Judgment normal.           Labs Reviewed 2022:    Lab Results   Component Value Date    WBC 3.8 (L) 2021    HGB 14.5 2021    HCT 44.8 2021     2021    CHOL 195 2020    TRIG 40 2020     2020    ALT 9 (L) 2021    AST 13 2021     2021    K 4.3 2021    CL 98 2021    CREATININE 0.6 2021    BUN 11 2021    CO2 32 2021       On this date 2022 I have spent 30 minutes reviewing previous notes, test results and face to face with the patient discussing the diagnosis and importance of compliance with the treatment plan as well as documenting on the day of the visit. Patient given educational materials - see patient instructions. Discussed use, benefit, and side effects of prescribed medications. All patient questions answered. Pt voiced understanding. Reviewed health maintenance.        Electronically signed by JAZZY Martinez CNP on 2022 at 8:48 AM EST

## 2022-02-14 ENCOUNTER — TELEPHONE (OUTPATIENT)
Dept: FAMILY MEDICINE CLINIC | Age: 62
End: 2022-02-14

## 2022-03-04 ENCOUNTER — TELEPHONE (OUTPATIENT)
Dept: FAMILY MEDICINE CLINIC | Age: 62
End: 2022-03-04

## 2022-03-04 NOTE — TELEPHONE ENCOUNTER
----- Message from Shannon Giles sent at 3/3/2022  4:15 PM EST -----  Subject: Medication Problem    QUESTIONS  Name of Medication? valsartan (DIOVAN) 160 MG tablet  Patient-reported dosage and instructions? Patient wants to know the   dosage. What question or problem do you have with the medication? Patient is not   sure about how you want to them them. Preferred Pharmacy? [id number], [address]  Pharmacy phone number (if available)? [phone]  Additional Information for Provider?   ---------------------------------------------------------------------------  --------------  CALL BACK INFO  What is the best way for the office to contact you? OK to leave message on   voicemail  Preferred Call Back Phone Number? [phone]  ---------------------------------------------------------------------------  --------------  SCRIPT ANSWERS  Relationship to Patient?  Self

## 2022-04-14 DIAGNOSIS — I10 ESSENTIAL HYPERTENSION: ICD-10-CM

## 2022-04-14 RX ORDER — VALSARTAN 160 MG/1
TABLET ORAL
Qty: 90 TABLET | Refills: 1 | OUTPATIENT
Start: 2022-04-14

## 2022-04-14 RX ORDER — VALSARTAN 160 MG/1
160 TABLET ORAL DAILY
Qty: 90 TABLET | Refills: 3 | Status: SHIPPED | OUTPATIENT
Start: 2022-04-14

## 2022-04-14 NOTE — TELEPHONE ENCOUNTER
Received a refill request for Valsartan 160 mg. Checking the sig QD or BID. LM for pt to return call.

## 2022-04-20 ENCOUNTER — OFFICE VISIT (OUTPATIENT)
Dept: FAMILY MEDICINE CLINIC | Age: 62
End: 2022-04-20
Payer: COMMERCIAL

## 2022-04-20 VITALS
SYSTOLIC BLOOD PRESSURE: 126 MMHG | BODY MASS INDEX: 41.16 KG/M2 | WEIGHT: 255 LBS | HEART RATE: 56 BPM | DIASTOLIC BLOOD PRESSURE: 62 MMHG | RESPIRATION RATE: 16 BRPM

## 2022-04-20 DIAGNOSIS — I10 ESSENTIAL HYPERTENSION: ICD-10-CM

## 2022-04-20 DIAGNOSIS — R42 DIZZINESS: Primary | ICD-10-CM

## 2022-04-20 PROCEDURE — 99213 OFFICE O/P EST LOW 20 MIN: CPT | Performed by: NURSE PRACTITIONER

## 2022-04-20 ASSESSMENT — ENCOUNTER SYMPTOMS: SINUS PRESSURE: 0

## 2022-04-20 NOTE — PROGRESS NOTES
Kei Jeffries (:  1960) is a 59 y.o. male,Established patient, here for evaluation of the following chief complaint(s):  Dizziness (started Saturday, mostly in mornings )         ASSESSMENT/PLAN:  1. Dizziness  - Acute  - Symptoms likely related to decreased blood pressure, potentially d/t recent weight loss  - Encouraged Cruz Matado to delay administration of losartan until noon to resolve suspected temporary decrease in bp with morning medication administration  - Notify office if no improvement in symptoms    2. Essential hypertension  - Continue current medications  - May need to decrease medication dosages given recent weight loss and episodic dizziness    Return in about 5 months (around 2022), or if symptoms worsen or fail to improve. Subjective   SUBJECTIVE/OBJECTIVE:  New onset dizziness started 4-5 days ago. BP's following the 3 episodes were as follow: 120/69, 115/61, 140/81 with HR 79. Episodes occurred in the mid morning. Taking all of his blood pressure medications early in the am. Reports intentional weight loss over the last 2 months. Has lost 16lbs since February. Identifies improved diet as the cause of weight loss. Dizziness  This is a new problem. The current episode started in the past 7 days. The problem occurs daily. The problem has been unchanged. Pertinent negatives include no congestion, fatigue or vertigo. Nothing aggravates the symptoms. He has tried rest for the symptoms. The treatment provided moderate relief. Review of Systems   Constitutional: Negative for fatigue. HENT: Negative for congestion, ear pain and sinus pressure. Neurological: Positive for dizziness and light-headedness. Negative for vertigo and syncope. Objective   Physical Exam  Vitals and nursing note reviewed. Constitutional:       General: He is awake. Appearance: Normal appearance. HENT:      Head: Normocephalic and atraumatic.       Right Ear: Hearing and external ear normal.      Left Ear: Hearing and external ear normal.      Nose: Nose normal. No congestion or rhinorrhea. Eyes:      General: Lids are normal.         Right eye: No discharge. Left eye: No discharge. Conjunctiva/sclera: Conjunctivae normal.   Neck:      Trachea: No tracheal deviation. Cardiovascular:      Rate and Rhythm: Normal rate and regular rhythm. Heart sounds: Normal heart sounds. No murmur heard. Pulmonary:      Effort: Pulmonary effort is normal. No respiratory distress. Breath sounds: No stridor. No wheezing. Musculoskeletal:      Cervical back: Full passive range of motion without pain. Skin:     General: Skin is dry. Coloration: Skin is not jaundiced or pale. Neurological:      General: No focal deficit present. Mental Status: He is alert. Mental status is at baseline. Psychiatric:         Mood and Affect: Mood and affect normal.         Behavior: Behavior is cooperative. An electronic signature was used to authenticate this note.     --Diana De Jesus, APRN - CNP

## 2022-04-20 NOTE — PATIENT INSTRUCTIONS
Take valsartan at noon with lunch. Continue to take other medications as previously scheduled. Patient Education     High Blood Pressure: Care Instructions  Overview     It's normal for blood pressure to go up and down throughout the day. But if it stays up, you have high blood pressure. Another name for high blood pressure ishypertension. Despite what a lot of people think, high blood pressure usually doesn't cause headaches or make you feel dizzy or lightheaded. It usually has no symptoms. But it does increase your risk of stroke, heart attack, and other problems. You and your doctor will talk about your risks of these problems based on yourblood pressure. Your doctor will give you a goal for your blood pressure. Your goal will bebased on your health and your age. Lifestyle changes, such as eating healthy and being active, are always important to help lower blood pressure. You might also take medicine to reachyour blood pressure goal.  Follow-up care is a key part of your treatment and safety. Be sure to make and go to all appointments, and call your doctor if you are having problems. It's also a good idea to know your test results and keep alist of the medicines you take. How can you care for yourself at home? Medical treatment   If you stop taking your medicine, your blood pressure will go back up. You may take one or more types of medicine to lower your blood pressure. Be safe with medicines. Take your medicine exactly as prescribed. Call your doctor if you think you are having a problem with your medicine.  Talk to your doctor before you start taking aspirin every day. Aspirin can help certain people lower their risk of a heart attack or stroke. But taking aspirin isn't right for everyone, because it can cause serious bleeding.  See your doctor regularly. You may need to see the doctor more often at first or until your blood pressure comes down.    If you are taking blood pressure medicine, talk to your doctor before you take decongestants or anti-inflammatory medicine, such as ibuprofen. Some of these medicines can raise blood pressure.  Learn how to check your blood pressure at home. Lifestyle changes   Stay at a healthy weight. This is especially important if you put on weight around the waist. Losing even 10 pounds can help you lower your blood pressure.  If your doctor recommends it, get more exercise. Walking is a good choice. Bit by bit, increase the amount you walk every day. Try for at least 30 minutes on most days of the week. You also may want to swim, bike, or do other activities.  Avoid or limit alcohol. Talk to your doctor about whether you can drink any alcohol.  Try to limit how much sodium you eat to less than 2,300 milligrams (mg) a day. Your doctor may ask you to try to eat less than 1,500 mg a day.  Eat plenty of fruits (such as bananas and oranges), vegetables, legumes, whole grains, and low-fat dairy products.  Lower the amount of saturated fat in your diet. Saturated fat is found in animal products such as milk, cheese, and meat. Limiting these foods may help you lose weight and also lower your risk for heart disease.  Do not smoke. Smoking increases your risk for heart attack and stroke. If you need help quitting, talk to your doctor about stop-smoking programs and medicines. These can increase your chances of quitting for good. When should you call for help? Call 911  anytime you think you may need emergency care. This may mean having symptoms that suggest that your blood pressure is causing a serious heart or blood vessel problem. Your blood pressure may be over 180/120. For example, call 911 if:     You have symptoms of a heart attack. These may include:  ? Chest pain or pressure, or a strange feeling in the chest.  ? Sweating. ? Shortness of breath. ? Nausea or vomiting.   ? Pain, pressure, or a strange feeling in the back, neck, jaw, or upper belly or in one or both shoulders or arms. ? Lightheadedness or sudden weakness. ? A fast or irregular heartbeat.      You have symptoms of a stroke. These may include:  ? Sudden numbness, tingling, weakness, or loss of movement in your face, arm, or leg, especially on only one side of your body. ? Sudden vision changes. ? Sudden trouble speaking. ? Sudden confusion or trouble understanding simple statements. ? Sudden problems with walking or balance. ? A sudden, severe headache that is different from past headaches.      You have severe back or belly pain. Do not wait until your blood pressure comes down on its own. Get help right away. Call your doctor now or seek immediate care if:     Your blood pressure is much higher than normal (such as 180/120 or higher), but you don't have symptoms.      You think high blood pressure is causing symptoms, such as:  ? Severe headache.  ? Blurry vision. Watch closely for changes in your health, and be sure to contact your doctor if:     Your blood pressure measures higher than your doctor recommends at least 2 times. That means the top number is higher or the bottom number is higher, or both.      You think you may be having side effects from your blood pressure medicine. Where can you learn more? Go to https://Tamecco.Pop.it. org and sign in to your GoPollGo account. Enter F283 in the [facility] box to learn more about \"High Blood Pressure: Care Instructions. \"     If you do not have an account, please click on the \"Sign Up Now\" link. Current as of: January 10, 2022               Content Version: 13.2  © 2006-2022 Healthwise, Incorporated. Care instructions adapted under license by [facility] ([facility]). If you have questions about a medical condition or this instruction, always ask your healthcare professional. [place] [address] any warranty or liability for your use of this information.          Patient Education        DASH Diet: Care Instructions  Your Care Instructions     The DASH diet is an eating plan that can help lower your blood pressure. DASH stands for Dietary Approaches to Stop Hypertension. Hypertension is high bloodpressure. The DASH diet focuses on eating foods that are high in calcium, potassium, and magnesium. These nutrients can lower blood pressure. The foods that are highest in these nutrients are fruits, vegetables, low-fat dairy products, nuts, seeds, and legumes. But taking calcium, potassium, and magnesium supplements instead of eating foods that are high in those nutrients does not have the same effect. The DASH diet also includes whole grains, fish, and poultry. The DASH diet is one of several lifestyle changes your doctor may recommend to lower your high blood pressure. Your doctor may also want you to decrease the amount of sodium in your diet. Lowering sodium while following the DASH dietcan lower blood pressure even further than just the DASH diet alone. Follow-up care is a key part of your treatment and safety. Be sure to make and go to all appointments, and call your doctor if you are having problems. It's also a good idea to know your test results and keep alist of the medicines you take. How can you care for yourself at home? Following the DASH diet   Eat 4 to 5 servings of fruit each day. A serving is 1 medium-sized piece of fruit, ½ cup chopped or canned fruit, 1/4 cup dried fruit, or 4 ounces (½ cup) of fruit juice. Choose fruit more often than fruit juice.  Eat 4 to 5 servings of vegetables each day. A serving is 1 cup of lettuce or raw leafy vegetables, ½ cup of chopped or cooked vegetables, or 4 ounces (½ cup) of vegetable juice. Choose vegetables more often than vegetable juice.  Get 2 to 3 servings of low-fat and fat-free dairy each day. A serving is 8 ounces of milk, 1 cup of yogurt, or 1 ½ ounces of cheese.  Eat 6 to 8 servings of grains each day.  A serving is 1 slice of bread, 1 ounce of dry cereal, or ½ cup of cooked rice, pasta, or cooked cereal. Try to choose whole-grain products as much as possible.  Limit lean meat, poultry, and fish to 2 servings each day. A serving is 3 ounces, about the size of a deck of cards.  Eat 4 to 5 servings of nuts, seeds, and legumes (cooked dried beans, lentils, and split peas) each week. A serving is 1/3 cup of nuts, 2 tablespoons of seeds, or ½ cup of cooked beans or peas.  Limit fats and oils to 2 to 3 servings each day. A serving is 1 teaspoon of vegetable oil or 2 tablespoons of salad dressing.  Limit sweets and added sugars to 5 servings or less a week. A serving is 1 tablespoon jelly or jam, ½ cup sorbet, or 1 cup of lemonade.  Eat less than 2,300 milligrams (mg) of sodium a day. If you limit your sodium to 1,500 mg a day, you can lower your blood pressure even more.  Be aware that all of these are the suggested number of servings for people who eat 1,800 to 2,000 calories a day. Your recommended number of servings may be different if you need more or fewer calories. Tips for success   Start small. Do not try to make dramatic changes to your diet all at once. You might feel that you are missing out on your favorite foods and then be more likely to not follow the plan. Make small changes, and stick with them. Once those changes become habit, add a few more changes.  Try some of the following:  ? Make it a goal to eat a fruit or vegetable at every meal and at snacks. This will make it easy to get the recommended amount of fruits and vegetables each day. ? Try yogurt topped with fruit and nuts for a snack or healthy dessert. ? Add lettuce, tomato, cucumber, and onion to sandwiches. ? Combine a ready-made pizza crust with low-fat mozzarella cheese and lots of vegetable toppings. Try using tomatoes, squash, spinach, broccoli, carrots, cauliflower, and onions. ?  Have a variety of cut-up vegetables with a low-fat dip as an appetizer instead of chips and dip. ? Sprinkle sunflower seeds or chopped almonds over salads. Or try adding chopped walnuts or almonds to cooked vegetables. ? Try some vegetarian meals using beans and peas. Add garbanzo or kidney beans to salads. Make burritos and tacos with mashed reyes beans or black beans. Where can you learn more? Go to https://chpepicewdelia.Scannx. org and sign in to your Mandata [employer]) account. Enter Q345 in the [employer] box to learn more about \"DASH Diet: Care Instructions. \"     If you do not have an account, please click on the \"Sign Up Now\" link. Current as of: January 10, 2022               Content Version: 13.2  © 2006-2022 [employer]. Care instructions adapted under license by [facility] ([facility]). If you have questions about a medical condition or this instruction, always ask your healthcare professional. [place] [address] any warranty or liability for your use of this information. Patient Education        Home Blood Pressure Test: About This Test  What is it? A home blood pressure test allows you to keep track of your blood pressure at home. Blood pressure is a measure of the force of blood against the walls of your arteries. Blood pressure readings include two numbers, such as 130/80 (say \"130 over 80\"). The first number is the systolic pressure. The second number isthe diastolic pressure. Why is this test done? You may do this test at home to:   Find out if you have high blood pressure.  Track your blood pressure if you have high blood pressure.  Track how well medicine is working to reduce high blood pressure.  Check how lifestyle changes, such as weight loss and exercise, are affecting blood pressure. How do you prepare for the test?  For at least 30 minutes before you take your blood pressure, don't exercise, drink caffeine, or smoke.  Empty your bladder before the test. Sit quietly with your back straight and both feet on the floor for at least 5 minutes. Thishelps you take your blood pressure while you feel comfortable and relaxed. How is the test done?  If your doctor recommends it, take your blood pressure twice a day. Take it in the morning and evening.  Sit with your arm slightly bent and resting on a table so that your upper arm is at the same level as your heart.  Use the same arm each time you take your blood pressure.  Place the blood pressure cuff on the bare skin of your upper arm. You may have to roll up your sleeve, remove your arm from the sleeve, or take your shirt off.  Wrap the blood pressure cuff around your upper arm so that the lower edge of the cuff is about 1 inch above the bend of your elbow.  Do not move, talk, or text while you take your blood pressure. Follow the instructions that came with your blood pressure monitor. They mightbe different from the following.  Press the on/off button on the automatic monitor. Then you may need to wait until the screen says the monitor is ready.  Press the start button. The cuff will inflate and deflate by itself.  Your blood pressure numbers will appear on the screen.  Wait one minute and take your blood pressure again.  If your monitor does not automatically save your numbers, write them in your log book, along with the date and time. Follow-up care is a key part of your treatment and safety. Be sure to make and go to all appointments, and call your doctor if you arehaving problems. It's also a good idea to keep a list of the medicines you take. Where can you learn more? Go to https://terrie.Field [email or web address] org and sign in to your [employer] account. Enter C427 in the [employer] box to learn more about \"Home Blood Pressure Test: About This Test.\"     If you do not have an account, please click on the \"Sign Up Now\" link. Current as of: January 10, 2022               Content Version: 13.2  © 2006-2022 Healthwise, [facility].    Care instructions adapted under license by [facility] ([facility]). If you have questions about a medical condition or this instruction, always ask your healthcare professional. [address] any warranty or liability for your use of this information. Patient Education        Dizziness: Care Instructions  Your Care Instructions  Dizziness is the feeling of unsteadiness or fuzziness in your head. It is different than having vertigo, which is a feeling that the room is spinning or that you are moving or falling. It is also different from lightheadedness,which is the feeling that you are about to faint. It can be hard to know what causes dizziness. Some people feel dizzy when they have migraine headaches. Sometimes bouts of flu can make you feel dizzy. Some medical conditions, such as heart problems or high blood pressure, can make you feel dizzy. Many medicines can cause dizziness, including medicines for highblood pressure, pain, or anxiety. If a medicine causes your symptoms, your doctor may recommend that you stop or change the medicine. If it is a problem with your heart, you may need medicine to help your heart work better. If there is no clear reason for your symptoms, your doctor may suggest watching and waiting for a while to see if thedizziness goes away on its own. Follow-up care is a key part of your treatment and safety. Be sure to make and go to all appointments, and call your doctor if you are having problems. It's also a good idea to know your test results and keep alist of the medicines you take. How can you care for yourself at home?  If your doctor recommends or prescribes medicine, take it exactly as directed. Call your doctor if you think you are having a problem with your medicine.  Do not drive while you feel dizzy.  Try to prevent falls. Steps you can take include:  ? Using nonskid mats, adding grab bars near the tub, and using night-lights.   ? Clearing your home so that walkways are free of anything you might trip on.  ? Letting family and friends know that you have been feeling dizzy. This will help them know how to help you. When should you call for help? Call 911 anytime you think you may need emergency care. For example, call if:     You passed out (lost consciousness).      You have dizziness along with symptoms of a heart attack. These may include:  ? Chest pain or pressure, or a strange feeling in the chest.  ? Sweating. ? Shortness of breath. ? Nausea or vomiting. ? Pain, pressure, or a strange feeling in the back, neck, jaw, or upper belly or in one or both shoulders or arms. ? Lightheadedness or sudden weakness. ? A fast or irregular heartbeat.      You have symptoms of a stroke. These may include:  ? Sudden numbness, tingling, weakness, or loss of movement in your face, arm, or leg, especially on only one side of your body. ? Sudden vision changes. ? Sudden trouble speaking. ? Sudden confusion or trouble understanding simple statements. ? Sudden problems with walking or balance. ? A sudden, severe headache that is different from past headaches. Call your doctor now or seek immediate medical care if:     You feel dizzy and have a fever, headache, or ringing in your ears.      You have new or increased nausea and vomiting.      Your dizziness does not go away or comes back. Watch closely for changes in your health, and be sure to contact your doctor if:     You do not get better as expected. Where can you learn more? Go to https://HealthiNationamelie.BioAmber. org and sign in to your Anafore account. Enter A271 in the [facility] box to learn more about \"Dizziness: Care Instructions. \"     If you do not have an account, please click on the \"Sign Up Now\" link. Current as of: July 1, 2021               Content Version: 13.2  © 5429-5538 Healthwise, Incorporated. Care instructions adapted under license by [facility] ([facility]).  If you have questions about a medical condition or this instruction, always ask your healthcare professional. [place] [address] any warranty or liability for your use of this information.

## 2022-04-25 ENCOUNTER — INITIAL CONSULT (OUTPATIENT)
Dept: PULMONOLOGY | Age: 62
End: 2022-04-25
Payer: COMMERCIAL

## 2022-04-25 VITALS
WEIGHT: 260.6 LBS | HEIGHT: 66 IN | TEMPERATURE: 97.9 F | OXYGEN SATURATION: 98 % | BODY MASS INDEX: 41.88 KG/M2 | DIASTOLIC BLOOD PRESSURE: 78 MMHG | SYSTOLIC BLOOD PRESSURE: 145 MMHG | HEART RATE: 52 BPM

## 2022-04-25 DIAGNOSIS — R35.1 NOCTURIA: ICD-10-CM

## 2022-04-25 DIAGNOSIS — R06.83 LOUD SNORING: Primary | ICD-10-CM

## 2022-04-25 DIAGNOSIS — I10 PRIMARY HYPERTENSION: ICD-10-CM

## 2022-04-25 DIAGNOSIS — Z91.89 AT RISK FOR APNEA: ICD-10-CM

## 2022-04-25 DIAGNOSIS — R06.81 WITNESSED APNEIC SPELLS: ICD-10-CM

## 2022-04-25 DIAGNOSIS — G47.9 SLEEP DISTURBANCE: ICD-10-CM

## 2022-04-25 DIAGNOSIS — E66.01 MORBID OBESITY (HCC): ICD-10-CM

## 2022-04-25 PROCEDURE — 99203 OFFICE O/P NEW LOW 30 MIN: CPT | Performed by: INTERNAL MEDICINE

## 2022-04-25 NOTE — PATIENT INSTRUCTIONS
Patient Education        Sleep Apnea: Care Instructions  Overview     Sleep apnea means that you frequently stop breathing for 10 seconds or longer during sleep. It can be mild to severe, based on the number of times an hourthat you stop breathing. Blocked or narrowed airways in your nose, mouth, or throat can cause sleep apnea. Your airway can become blocked when your throat muscles and tongue relaxduring sleep. You can help treat sleep apnea at home by making lifestyle changes. You also can use a CPAP breathing machine that keeps tissues in the throat from blocking your airway. Or your doctor may suggest that you use a breathing device while you sleep. It helps keep your airway open. This could be a device that you put in your mouth. In some cases, surgery may be needed to remove enlarged tissuesin the throat. Follow-up care is a key part of your treatment and safety. Be sure to make and go to all appointments, and call your doctor if you are having problems. It's also a good idea to know your test results and keep alist of the medicines you take. How can you care for yourself at home?  Lose weight, if needed.  Sleep on your side. It may help mild apnea.  Avoid alcohol and medicines such as sleeping pills, opioids, or sedatives before bed.  Don't smoke. If you need help quitting, talk to your doctor.  Prop up the head of your bed.  Treat breathing problems, such as a stuffy nose, that are caused by a cold or allergies.  Try a continuous positive airway pressure (CPAP) breathing machine if your doctor recommends it.  If CPAP doesn't work for you, ask your doctor if you can try other masks, settings, or breathing machines.  Try oral breathing devices or other nasal devices.  Talk to your doctor if your nose feels dry or bleeds, or if it gets runny or stuffy when you use a breathing machine.  Tell your doctor if you're sleepy during the day and it affects your daily life.  Don't drive or operate machinery when you're drowsy. When should you call for help? Watch closely for changes in your health, and be sure to contact your doctor if:     You still have sleep apnea even though you have made lifestyle changes.      You are thinking of trying a device such as CPAP.      You are having problems using a CPAP or similar machine.      You are still sleepy during the day, and it affects your daily life. Where can you learn more? Go to https://MoonshadopeEagle Eye Solutions.Music Messenger (MM). org and sign in to your EarDish account. Enter D019 in the [employer]s box to learn more about \"Sleep Apnea: Care Instructions. \"     If you do not have an account, please click on the \"Sign Up Now\" link. Current as of: July 6, 2021               Content Version: 13.2  © 9878-2776 Healthwise, Incorporated. Care instructions adapted under license by [facility] ([facility]). If you have questions about a medical condition or this instruction, always ask your healthcare professional. [address] any warranty or liability for your use of this information.

## 2022-04-25 NOTE — PROGRESS NOTES
New Sleep Patient H/P    Presentation:  Elva Martin is referred by Ga Lam CNP for  NELL        Jose Aiken c/o loud snoring, daytime somnolence, gain but recently lost 30 lb current BMI 42.06, fragmented non restorative sleep, gets up at least 3 times to urinate, difficult to control HTN on Diovan, Lopressor, HCTZ and Amlodipine  Brother has NELL    Time in Bed:   Bedtime: 10a.m. Awakens  5 a.m. Different on weekends? Yes  How? Sleeps in    Elva Martin falls asleep in immediatly. Any awakenings? Yes--at least 3 to urinate  Difficulty Falling back to sleep? No  Symptoms began:  several years ago. Symptoms include: snoring, excessive daytime sleepiness, disrupted sleep, naps    Previous evaluation and treatment? No      He denies any history of sleep walking or sleep talking. No history of seizures activity. No history suggestive of restless legs syndrome. No history of bruxism. No history of head injury. Naps:  Any naps? No and are they helpful No    Snoring and Apneas:  Do you snore or been told you a snore? Yes  How long have known about your snoring? years  Any witnessed apneas? Yes  Any awakenings with choking or gasping? No    Dreams:  Any recurring dreams? No  Hallucinations? No  Sleep Paralysis? No  Symptoms of Cataplexy? No    Driving History:  Do you have a CDL or drive long distances for work? No  Any driving accidents in the past year? No  Any sleepiness while driving? Yes    Weight:  Any change in weight over the past year? No   How about past 5 years? No      Past Medical History:   Diagnosis Date    Morbid obesity ([address]     Osteoarthritis     Snoring     possible apnea - per wife, better since lost 20# recently. BMI 41.97, neck circ 17.5 cm, no daytime somnolence, no am headaches.        Past Surgical History:   Procedure Laterality Date    HERNIA REPAIR  1982    left inguinal    JOINT REPLACEMENT Left 05/06/2020    left hip    OTHER SURGICAL HISTORY right leg vein stripping    TONSILLECTOMY AND ADENOIDECTOMY      WISDOM TOOTH EXTRACTION  1974       Social History     Tobacco Use    Smoking status: Never Smoker    Smokeless tobacco: Never Used   Substance Use Topics    Alcohol use: Yes     Alcohol/week: 24.0 - 30.0 standard drinks     Types: 24 - 30 Cans of beer per week     Comment: case in one week     Drug use: Never       Allergies   Allergen Reactions    Ace Inhibitors      cough    Olmesartan      Cough         Current Outpatient Medications   Medication Sig Dispense Refill    valsartan (DIOVAN) 160 MG tablet Take 1 tablet by mouth daily 90 tablet 3    hydroCHLOROthiazide (HYDRODIURIL) 50 MG tablet Take 1 tablet by mouth daily 90 tablet 1    metoprolol tartrate (LOPRESSOR) 25 MG tablet Take 1 tablet by mouth 2 times daily 180 tablet 1    amLODIPine (NORVASC) 10 MG tablet Take 1 tablet by mouth daily 90 tablet 1    Multiple Vitamins-Minerals (MULTI COMPLETE PO) Take by mouth       No current facility-administered medications for this visit. Family History   Problem Relation Age of Onset    Cancer Mother     Hypertension Father         Any family history of any sleep problems or any one in your family on CPAP? Yes    Social History     Tobacco Use    Smoking status: Never Smoker    Smokeless tobacco: Never Used   Substance Use Topics    Alcohol use: Yes     Alcohol/week: 24.0 - 30.0 standard drinks     Types: 24 - 30 Cans of beer per week     Comment: case in one week     Drug use: Never     Caffeine Intake: How much soda (pop), coffee, tea, power drinks do you ingest per day? 0 per day. Employment History:  Where do you work?   What are your shifts? 8a to 6p      Review of Systems:   General/Constitutional:   HENT: Negative. Eyes: Negative. Upper respiratory tract: No nasal stuffiness or post nasal drip. Lower respiratory tract/ lungs: No cough or sputum production. No hemoptysis.   Cardiovascular: No palpitations or chest pain. Gastrointestinal: No nausea or vomiting. Neurological: No focal neurologiacal weakness. Extremities: No edema. Musculoskeletal: No complaints. Genitourinary: No complaints. Hematological: Negative. Psychiatric/Behavioral: Negative. Skin: No itching. Physical Exam:    HEIGHTHeight: 5' 6\" (167.6 cm) WEIGHTWeight: 260 lb 9.6 oz (118.2 kg)    BMI:  There is no height or weight on file to calculate BMI. Neck Size: 19     ESS: 8   SAQLI: 80  Vitals:   Vitals:    04/25/22 0952   BP: (!) 145/78   Site: Right Upper Arm   Position: Sitting   Cuff Size: Medium Adult   Pulse: 52   Temp: 97.9 °F (36.6 °C)   TempSrc: Temporal   SpO2: 98%  Comment: on RA   Weight: 260 lb 9.6 oz (118.2 kg)   Height: 5' 6\" (1.676 m)        Mallampati Score: 3    Physical Exam :  Constitutional: BMI 42.06  HENT:   Head: Normocephalic and atraumatic. Mouth/Throat: Large tongue, crowded pharynx, mallampati class 3   Eyes: Conjunctivae are normal. PERRLA. No scleral icterus. Neck: Neck supple. No JVD present. No tracheal deviation present. 19 in circumference  Cardiovascular: Normal rate, regular rhythm, normal heart sounds. No murmur heard. Pulmonary/Chest: Effort normal and breath sounds normal. No stridor. No respiratory distress. No wheezes. No rales. Abdominal: Soft. No distension. No tenderness. Musculoskeletal: Normal range of motion. Lymphadenopathy:  No cervical adenopathy. Neurological: Alert and oriented to person, place, and time. No focal deficits. Skin: Skin is warm and dry. Patient is not diaphoretic. Psychiatric: Normal behavior with normal mood and affect. Diagnostic Data:    Assessment    Diagnosis Orders   1. Loud snoring  Home Sleep Study   2. Witnessed apneic spells  Home Sleep Study   3. Primary hypertension  Home Sleep Study   4. Morbid obesity ([address]  Home Sleep Study   5. At risk for apnea     6. Sleep disturbance     7.  Nocturia           Plan   Orders Placed This Encounter Procedures    Home Sleep Study     Standing Status:   Future     Standing Expiration Date:   4/25/2023     Order Specific Question:   Location For Sleep Study     Answer:   [address]     Order Specific Question:   Select Sleep Lab Location     Answer:   [address]          Mask Desensitization and Pre study teaching? No  Weight Loss Information Given? Yes  Sleep Hygiene Discussed? Yes    -He was advised to call [email or web address] regarding supplies if needed.  -He call my office for earlier appointment if needed for worsening of sleep symptoms.  -Naomi Trivedi educated about my impression and plan. Patient verbalizes understanding.

## 2022-04-27 ENCOUNTER — HOSPITAL ENCOUNTER (OUTPATIENT)
Dept: SLEEP CENTER | Age: 62
Discharge: HOME | End: 2022-04-29
Payer: COMMERCIAL

## 2022-04-27 DIAGNOSIS — E66.01 MORBID OBESITY (HCC): ICD-10-CM

## 2022-04-27 DIAGNOSIS — I10 PRIMARY HYPERTENSION: ICD-10-CM

## 2022-04-27 DIAGNOSIS — R06.83 LOUD SNORING: ICD-10-CM

## 2022-04-27 DIAGNOSIS — R06.81 WITNESSED APNEIC SPELLS: ICD-10-CM

## 2022-04-27 PROCEDURE — 95806 SLEEP STUDY UNATT&RESP EFFT: CPT

## 2022-04-27 NOTE — PROGRESS NOTES
Syed Oconnell presents today for a HST instruction and demonstration on unit # [id number]. Questions were asked and answers given. He was able to return demonstration and verbalized understanding. The sleep center control room phone number was provided in case questions arise during the study. Informed patient to call 911 in case of an emergency. He states he will return the unit tomorrow before 1000. Covid screening questions asked.

## 2022-04-28 LAB — STATUS: NORMAL

## 2022-05-02 DIAGNOSIS — G47.33 OSA (OBSTRUCTIVE SLEEP APNEA): Primary | ICD-10-CM

## 2022-05-04 NOTE — PROGRESS NOTES
[address] [place] [address]                               SLEEP STUDY REPORT    PATIENT NAME: Sabrina Warren                   :        1960  MED REC NO:   [id number]                           ROOM:  ACCOUNT NO:   [de-identified]                           ADMIT DATE: 2022  PROVIDER:     Henrik Molina M.D.    Kenzie Pilar:  2022    HOME SLEEP STUDY REPORT    REFERRING PROVIDER:  Demarco Diaz CNP    HISTORY OF PRESENT ILLNESS:  The patient is a 77-year-old gentleman with  complaints of loud snoring, nonrestorative sleep, daytime somnolence. Weight 260 pounds, height 66 inches, BMI 42. METHODS: The patient underwent Type III Portable Monitoring Sleep Study  including the simultaneous recording of oral-nasal airflow, rib and  abdominal respiratory effort, pulse rate, oxygen saturation, and body  position. Scoring criteria is consistent with the current published  AASM standards for scoring of apneas and hypopneas 1B. DETAILS OF THE STUDY:  The patient came by the sleep lab, picked up his  HST unit #[id number]. He was given instructions how to set it up. The  patient returned the unit the next morning. The information was  downloaded and scored. Total recording time 503 minutes, lights off  time 10:00 p.m., lights on time 06:24 a.m. RESPIRATORY SUMMARY:  84 obstructive apneas, 296 obstructive hypopneas  for an EZEKIEL of 45.3.  314 respiratory events occurred during supine  position and 66 during non-supine position. PULSE OXIMETRY SUMMARY:  Mean oxygen saturation 90.5%, lowest oxygen  saturation 66%. There was 94 minutes of oxygen saturation below 88%. BODY POSITION SUMMARY:  405 minutes in supine position, 98 minutes in  non-supine position, 24 minutes in upright position. ECG SUMMARY:  Normal sinus rhythm. There were short sinus pauses  associated with obstructive hypopneas.     ASSESSMENT:  Obstructive sleep apnea with an EZEKIEL of 45.3. RECOMMENDATIONS:  Due to the severity of the findings, PAP treatment is  recommended. The patient will be contacted, and if he is agreeable, we  will initiate APAP therapies. Follow up in the sleep clinic eight weeks  after initiating treatments.         Kenan Gillespie M.D.    D: 05/02/2022 23:05:37       T: 05/03/2022 16:13:47     [email or web address]  Job#: [id number]     Doc#: [id number]    CC:

## 2022-05-09 NOTE — PROGRESS NOTES
[facility]    Selina Jackson, 59 y.o.  [id number]      Pt of Dr. Mat Viveros  Nurses Notes   This pt is here for hypersomnia HST f/u   Study Results  Initial Study Date -  4/27/2022  AHI -  45.3    TotalEvents - 380  (Apneas  84  Hypopneas 296  Central  0)   (Total Sleep Time - 503.4 min)  Time with Sats below 88% - 94.2 min  SAQLI: 85  ESS: 7  N:19  MP:3  Interval History       Selina Jackson is a 59 y.o. old male who comes in to review the results of his recent sleep study, to answer questions and to explore options for treatment. he continues to have symptoms of snoring, periods of not breathing  Allergies  Allergies   Allergen Reactions    Ace Inhibitors      cough    Olmesartan      Cough       Meds  Current Outpatient Medications   Medication Sig Dispense Refill    valsartan (DIOVAN) 160 MG tablet Take 1 tablet by mouth daily 90 tablet 3    hydroCHLOROthiazide (HYDRODIURIL) 50 MG tablet Take 1 tablet by mouth daily 90 tablet 1    metoprolol tartrate (LOPRESSOR) 25 MG tablet Take 1 tablet by mouth 2 times daily 180 tablet 1    amLODIPine (NORVASC) 10 MG tablet Take 1 tablet by mouth daily 90 tablet 1    Multiple Vitamins-Minerals (MULTI COMPLETE PO) Take by mouth       No current facility-administered medications for this visit. ROS  Review of Systems   Constitutional: Negative for activity change, appetite change, chills and fever. HENT: Negative for congestion and postnasal drip. Eyes: Negative. Respiratory: Negative for cough, chest tightness, shortness of breath, wheezing and stridor. Cardiovascular: Negative for chest pain and leg swelling. Gastrointestinal: Negative for diarrhea and nausea. Endocrine: Negative. Genitourinary: Negative. Musculoskeletal: Negative. Negative for arthralgias and back pain. Skin: Negative. Allergic/Immunologic: Negative. Neurological: Negative. Negative for dizziness and light-headedness. Psychiatric/Behavioral: Negative. All other systems reviewed and are negative. Exam  Vitals -  /78 (Site: Left Upper Arm, Position: Sitting, Cuff Size: Medium Adult)   Pulse 75   Temp 98.4 °F (36.9 °C) (Temporal)   Ht 5' 6\" (1.676 m)   Wt 267 lb 6.4 oz (121.3 kg)   SpO2 95% Comment: on RA  BMI 43.16 kg/m²    Body mass index is 43.16 kg/m². Oxygen level - Room Air  Physical Exam  Constitutional:       Appearance: Normal appearance. He is normal weight. HENT:      Head: Normocephalic and atraumatic. Right Ear: External ear normal.      Left Ear: External ear normal.      Nose: Nose normal.   Eyes:      Extraocular Movements: Extraocular movements intact. Conjunctiva/sclera: Conjunctivae normal.      Pupils: Pupils are equal, round, and reactive to light. Pulmonary:      Effort: Pulmonary effort is normal.   Musculoskeletal:      Cervical back: Normal range of motion and neck supple. Neurological:      General: No focal deficit present. Mental Status: He is alert and oriented to person, place, and time. Psychiatric:         Attention and Perception: Attention and perception normal.         Mood and Affect: Mood and affect normal.         Speech: Speech normal.         Behavior: Behavior normal. Behavior is cooperative. Thought Content: Thought content normal.         Cognition and Memory: Cognition normal.         Judgment: Judgment normal.        Assessment   Diagnosis Orders   1. NELL (obstructive sleep apnea)     2. Morbid obesity with BMI of 40.0-44.9, adult ([address]     3. Loud snoring     4. Primary hypertension        Recommendations  PSG positive for sleep apnea  Given severity of NELL, recommend PAP  The benefits and limitations of each were discussed.     After addressing his  concerns, Venessa Moran  decided to move ahead with APAP  Follow up 6-8 weeks after PAP set up        Nesha Campos PA-C, [facility]  5/10/2022

## 2022-05-10 ENCOUNTER — OFFICE VISIT (OUTPATIENT)
Dept: PULMONOLOGY | Age: 62
End: 2022-05-10
Payer: COMMERCIAL

## 2022-05-10 VITALS
TEMPERATURE: 98.4 F | WEIGHT: 267.4 LBS | HEART RATE: 75 BPM | HEIGHT: 66 IN | SYSTOLIC BLOOD PRESSURE: 132 MMHG | DIASTOLIC BLOOD PRESSURE: 78 MMHG | BODY MASS INDEX: 42.97 KG/M2 | OXYGEN SATURATION: 95 %

## 2022-05-10 DIAGNOSIS — E66.01 MORBID OBESITY WITH BMI OF 40.0-44.9, ADULT (HCC): ICD-10-CM

## 2022-05-10 DIAGNOSIS — G47.33 OSA (OBSTRUCTIVE SLEEP APNEA): Primary | ICD-10-CM

## 2022-05-10 DIAGNOSIS — R06.83 LOUD SNORING: ICD-10-CM

## 2022-05-10 DIAGNOSIS — I10 PRIMARY HYPERTENSION: ICD-10-CM

## 2022-05-10 PROCEDURE — 99214 OFFICE O/P EST MOD 30 MIN: CPT | Performed by: PHYSICIAN ASSISTANT

## 2022-05-10 ASSESSMENT — ENCOUNTER SYMPTOMS
COUGH: 0
DIARRHEA: 0
CHEST TIGHTNESS: 0
NAUSEA: 0
SHORTNESS OF BREATH: 0
WHEEZING: 0
EYES NEGATIVE: 1
STRIDOR: 0
ALLERGIC/IMMUNOLOGIC NEGATIVE: 1
BACK PAIN: 0

## 2022-07-18 ENCOUNTER — OFFICE VISIT (OUTPATIENT)
Dept: PULMONOLOGY | Age: 62
End: 2022-07-18
Payer: COMMERCIAL

## 2022-07-18 VITALS
SYSTOLIC BLOOD PRESSURE: 132 MMHG | DIASTOLIC BLOOD PRESSURE: 78 MMHG | WEIGHT: 262 LBS | OXYGEN SATURATION: 99 % | BODY MASS INDEX: 42.11 KG/M2 | HEIGHT: 66 IN | TEMPERATURE: 98.4 F | HEART RATE: 62 BPM

## 2022-07-18 DIAGNOSIS — E66.01 MORBID OBESITY WITH BMI OF 40.0-44.9, ADULT (HCC): ICD-10-CM

## 2022-07-18 DIAGNOSIS — G47.33 OSA (OBSTRUCTIVE SLEEP APNEA): Primary | ICD-10-CM

## 2022-07-18 PROCEDURE — 99213 OFFICE O/P EST LOW 20 MIN: CPT | Performed by: PHYSICIAN ASSISTANT

## 2022-07-18 ASSESSMENT — ENCOUNTER SYMPTOMS
BACK PAIN: 0
WHEEZING: 0
SHORTNESS OF BREATH: 0
NAUSEA: 0
CHEST TIGHTNESS: 0
EYES NEGATIVE: 1
STRIDOR: 0
DIARRHEA: 0
ALLERGIC/IMMUNOLOGIC NEGATIVE: 1
COUGH: 0

## 2022-07-18 NOTE — PROGRESS NOTES
[facility]      Katt Pham         [phone]  7/18/2022   Chief Complaint   Patient presents with    Follow-up     2 month NELL follow up after set up         Pt of Dr. Gay MARADIAGA Download:   Original or initial AHI: 45.3     Date of initial study: 04/27/2022      Compliant  80%     Noncompliant 0 %     PAP Type Auto   Level  10/20 cmH2O    Avg Hrs/Day 6 hours 41 minutes   AHI: 4.1   Recorded compliance dates , 06/15/2022 to 07/14/2022  Machine/Mfg:   [x] ResMed    [] Respironics/Dreamstation   Interface:   [] Nasal    [] Nasal pillows   [x] FFM      Provider:      [x] [facility]     [][facility]     [] [employer]    [] Cathalene Pap    [] [facility]               [] [facility]      [] [employer]    [] [address]      [] Other    Neck Size: 19  Mallampati Mallampati 3  ESS:  4  SAQLI: 81    Here is a scan of the most recent download:                Presentation:   Jimbo Augustin presents for sleep medicine follow up for obstructive sleep apnea  Since the last visit, Jimbo Augustin is doing well with PAP. He is sleeping well and feels rested. Wife is happy!! Equipment issues: The pressure is  acceptable, the mask is acceptable     Sleep issues:  Do you feel better? Yes  More rested? Yes   Better concentration? yes    Progress History:   Since last visit any new medical issues? No  New ER or hospital visits? No  Any new or changes in medicines? No  Any new sleep medicines? No    Review of Systems -   Review of Systems   Constitutional:  Negative for activity change, appetite change, chills and fever. HENT:  Negative for congestion and postnasal drip. Eyes: Negative. Respiratory:  Negative for cough, chest tightness, shortness of breath, wheezing and stridor. Cardiovascular:  Negative for chest pain and leg swelling. Gastrointestinal:  Negative for diarrhea and nausea. Endocrine: Negative. Genitourinary: Negative. Musculoskeletal: Negative.   Negative for arthralgias and back pain.   Skin: Negative. Allergic/Immunologic: Negative. Neurological: Negative. Negative for dizziness and light-headedness. Psychiatric/Behavioral: Negative. All other systems reviewed and are negative. Physical Exam:    BMI:  Body mass index is 42.29 kg/m². Wt Readings from Last 3 Encounters:   07/18/22 262 lb (118.8 kg)   05/10/22 267 lb 6.4 oz (121.3 kg)   04/25/22 260 lb 9.6 oz (118.2 kg)     Weight stable / unchanged  Vitals: /78   Pulse 62   Temp 98.4 °F (36.9 °C)   Ht 5' 6\" (1.676 m)   Wt 262 lb (118.8 kg)   SpO2 99% Comment: r/a  BMI 42.29 kg/m²       Physical Exam  Constitutional:       Appearance: Normal appearance. He is normal weight. HENT:      Head: Normocephalic and atraumatic. Right Ear: External ear normal.      Left Ear: External ear normal.      Nose: Nose normal.   Eyes:      Extraocular Movements: Extraocular movements intact. Conjunctiva/sclera: Conjunctivae normal.      Pupils: Pupils are equal, round, and reactive to light. Pulmonary:      Effort: Pulmonary effort is normal.   Musculoskeletal:      Cervical back: Normal range of motion and neck supple. Neurological:      General: No focal deficit present. Mental Status: He is alert and oriented to person, place, and time. Psychiatric:         Attention and Perception: Attention and perception normal.         Mood and Affect: Mood and affect normal.         Speech: Speech normal.         Behavior: Behavior normal. Behavior is cooperative. Thought Content: Thought content normal.         Cognition and Memory: Cognition normal.         Judgment: Judgment normal.         ASSESSMENT/DIAGNOSIS     Diagnosis Orders   1. NELL (obstructive sleep apnea)        2. Morbid obesity with BMI of 40.0-44.9, adult [facility])                 Plan   Do you need any equipment today?  No  - Download reviewed and discussed with patient  - He  was advised to continue current positive airway pressure therapy with above described pressure. - He  advised to keep good compliance with current recommended pressure to get optimal results and clinical improvement  - Recommend 7-9 hours of sleep with PAP  - He was advised to call [employer] company regarding supplies if needed.   -He call my office for earlier appointment if needed for worsening of sleep symptoms.   - He was instructed on weight loss  - Kellen Greenfield was educated about my impression and plan. Patient verbalizesunderstanding.   We will see Gege Orozco back in: 1 year with download    Information added by my medical assistant/LPN was reviewed today         Reed Garcia PA-C, MPAS  7/18/2022

## 2022-08-17 DIAGNOSIS — I10 ESSENTIAL HYPERTENSION: ICD-10-CM

## 2022-08-17 RX ORDER — HYDROCHLOROTHIAZIDE 50 MG/1
TABLET ORAL
Qty: 90 TABLET | Refills: 1 | Status: SHIPPED | OUTPATIENT
Start: 2022-08-17

## 2022-08-17 NOTE — TELEPHONE ENCOUNTER
Lb Aguilar needs refill of   Requested Prescriptions     Pending Prescriptions Disp Refills    metoprolol tartrate (LOPRESSOR) 25 MG tablet [Pharmacy Med Name: METOPROLOL TARTRATE 25 MG TAB] 180 tablet 1     Sig: TAKE 1 TABLET BY MOUTH TWICE A DAY    hydroCHLOROthiazide (HYDRODIURIL) 50 MG tablet [Pharmacy Med Name: HYDROCHLOROTHIAZIDE 50 MG TAB] 90 tablet 1     Sig: TAKE 1 TABLET BY MOUTH EVERY DAY       Last Filled on:  2/9/2022 90 with 1 refill    Last Visit Date:  4/20/2022    Next Visit Date:    9/21/2022    Will be out before his follow up appt.

## 2022-08-25 DIAGNOSIS — I10 ESSENTIAL HYPERTENSION: ICD-10-CM

## 2022-08-25 RX ORDER — AMLODIPINE BESYLATE 10 MG/1
TABLET ORAL
Qty: 30 TABLET | Refills: 0 | Status: SHIPPED | OUTPATIENT
Start: 2022-08-25 | End: 2022-09-21 | Stop reason: SDUPTHER

## 2022-08-25 NOTE — TELEPHONE ENCOUNTER
Lb Aguilar needs refill of   Requested Prescriptions     Pending Prescriptions Disp Refills    amLODIPine (NORVASC) 10 MG tablet [Pharmacy Med Name: AMLODIPINE BESYLATE 10 MG TAB] 90 tablet 1     Sig: TAKE 1 TABLET BY MOUTH EVERY DAY       Last Filled on:  02/9/2022    Last Visit Date:  4/20/2022    Next Visit Date:    9/21/2022    Needs to get enough to get through to appt.

## 2022-09-17 DIAGNOSIS — I10 ESSENTIAL HYPERTENSION: ICD-10-CM

## 2022-09-19 RX ORDER — AMLODIPINE BESYLATE 10 MG/1
TABLET ORAL
Qty: 30 TABLET | Refills: 0 | OUTPATIENT
Start: 2022-09-19

## 2022-09-21 ENCOUNTER — OFFICE VISIT (OUTPATIENT)
Dept: FAMILY MEDICINE CLINIC | Age: 62
End: 2022-09-21
Payer: COMMERCIAL

## 2022-09-21 VITALS
WEIGHT: 264.2 LBS | SYSTOLIC BLOOD PRESSURE: 124 MMHG | RESPIRATION RATE: 18 BRPM | HEIGHT: 65 IN | BODY MASS INDEX: 44.02 KG/M2 | OXYGEN SATURATION: 98 % | HEART RATE: 80 BPM | DIASTOLIC BLOOD PRESSURE: 80 MMHG

## 2022-09-21 DIAGNOSIS — R35.1 NOCTURIA: ICD-10-CM

## 2022-09-21 DIAGNOSIS — I10 ESSENTIAL HYPERTENSION: ICD-10-CM

## 2022-09-21 DIAGNOSIS — Z00.00 WELL ADULT EXAM: Primary | ICD-10-CM

## 2022-09-21 PROBLEM — G47.33 OSA (OBSTRUCTIVE SLEEP APNEA): Status: ACTIVE | Noted: 2022-09-21

## 2022-09-21 PROCEDURE — 99396 PREV VISIT EST AGE 40-64: CPT | Performed by: NURSE PRACTITIONER

## 2022-09-21 RX ORDER — AMLODIPINE BESYLATE 10 MG/1
10 TABLET ORAL DAILY
Qty: 60 TABLET | Refills: 0 | Status: SHIPPED | OUTPATIENT
Start: 2022-09-21 | End: 2022-11-20

## 2022-09-21 SDOH — ECONOMIC STABILITY: FOOD INSECURITY: WITHIN THE PAST 12 MONTHS, YOU WORRIED THAT YOUR FOOD WOULD RUN OUT BEFORE YOU GOT MONEY TO BUY MORE.: NEVER TRUE

## 2022-09-21 SDOH — ECONOMIC STABILITY: FOOD INSECURITY: WITHIN THE PAST 12 MONTHS, THE FOOD YOU BOUGHT JUST DIDN'T LAST AND YOU DIDN'T HAVE MONEY TO GET MORE.: NEVER TRUE

## 2022-09-21 ASSESSMENT — SOCIAL DETERMINANTS OF HEALTH (SDOH): HOW HARD IS IT FOR YOU TO PAY FOR THE VERY BASICS LIKE FOOD, HOUSING, MEDICAL CARE, AND HEATING?: NOT HARD AT ALL

## 2022-09-21 ASSESSMENT — ENCOUNTER SYMPTOMS
BLOOD IN STOOL: 0
EYE PAIN: 0
CONSTIPATION: 0
WHEEZING: 0
COUGH: 0
EYE ITCHING: 0
BACK PAIN: 0
EYE DISCHARGE: 0
SHORTNESS OF BREATH: 0
EYE REDNESS: 0
DIARRHEA: 0
SINUS PRESSURE: 0
ABDOMINAL PAIN: 0
COLOR CHANGE: 0

## 2022-09-21 NOTE — PROGRESS NOTES
[address] [address]  Dept: [phone]  Dept Fax: [phone]: [phone]     Visit Date:  9/21/2022    Patient:  Homa Eduardo  YOB: 1960  Age: 59 y.o. Gender: male  BMI: Body mass index is 43.43 kg/m². Homa Eduardo, Established patient, is being seen today for   Chief Complaint   Patient presents with    Annual Exam     refills   . Assessment/Plan      1. Well adult exam  - Age-appropriate education provided. - Health maintenance reviewed. - Encourage healthy diet and regular exercise. 2. Essential hypertension  - Chronic, controlled  - Continue amlodipine, metoprolol, hctz and valsartan  - DASH diet, regular exercise encouraged  - amLODIPine (NORVASC) 10 MG tablet; Take 1 tablet by mouth daily  Dispense: 60 tablet; Refill: 0  - CBC with Auto Differential; Future  - Comprehensive Metabolic Panel; Future  - Lipid Panel; Future    3. Nocturia  - PSA, Prostatic Specific Antigen; Future    Return in about 6 months (around 3/21/2023) for Hypertension. HPI:     Annual physical. Health maintenance reviewed. Needs updated blood work and medication refills. Following with pulmonology. Wearing a cpap at night. No current concerns. PHQ-9 Total Score: 0 (9/21/2022  2:50 PM)      Medications    Current Outpatient Medications:     amLODIPine (NORVASC) 10 MG tablet, Take 1 tablet by mouth daily, Disp: 60 tablet, Rfl: 0    metoprolol tartrate (LOPRESSOR) 25 MG tablet, TAKE 1 TABLET BY MOUTH TWICE A DAY, Disp: 180 tablet, Rfl: 1    hydroCHLOROthiazide (HYDRODIURIL) 50 MG tablet, TAKE 1 TABLET BY MOUTH EVERY DAY, Disp: 90 tablet, Rfl: 1    valsartan (DIOVAN) 160 MG tablet, Take 1 tablet by mouth daily, Disp: 90 tablet, Rfl: 3    Multiple Vitamins-Minerals (MULTI COMPLETE PO), Take by mouth, Disp: , Rfl:     The patient is allergic to ace inhibitors and olmesartan.     Past Medical History  Jorge Tapia  has a past medical history of Morbid obesity ([address] Osteoarthritis, and Snoring. Past Surgical History  The patient  has a past surgical history that includes hernia repair (1982); Tonsillectomy and adenoidectomy; [place] tooth extraction (1974); other surgical history; and joint replacement (Left, 05/06/2020). Family History  This patient's family history includes Cancer in his mother; Hypertension in his father. Social History  Jorge Tapia  reports that he has never smoked. He has never used smokeless tobacco. He reports current alcohol use of about 24.0 - 30.0 standard drinks per week. He reports that he does not use drugs. Health Maintenance:    Health maintenance reviewed. Health Maintenance   Topic Date Due    COVID-19 Vaccine (1) Never done    HIV screen  Never done    Hepatitis C screen  Never done    DTaP/Tdap/Td vaccine (1 - Tdap) Never done    Colorectal Cancer Screen  Never done    Shingles vaccine (1 of 2) Never done    Flu vaccine (1) Never done    Depression Screen  09/02/2022    Lipids  08/31/2025    Hepatitis A vaccine  Aged Out    Hepatitis B vaccine  Aged Out    Hib vaccine  Aged Out    Meningococcal (ACWY) vaccine  Aged Out    Pneumococcal 0-64 years Vaccine  Aged Out       Subjective/Objective:      Review of Systems   Constitutional:  Negative for chills, fatigue and fever. HENT:  Negative for congestion, ear pain, sinus pressure and sneezing. Eyes:  Negative for pain, discharge, redness and itching. Respiratory:  Negative for cough, shortness of breath and wheezing. Cardiovascular:  Negative for chest pain, palpitations and leg swelling. Gastrointestinal:  Negative for abdominal pain, blood in stool, constipation and diarrhea. Endocrine: Negative for polydipsia, polyphagia and polyuria. Genitourinary:  Negative for difficulty urinating and hematuria. Musculoskeletal:  Negative for arthralgias, back pain and neck pain.    Skin:  Negative for color change, pallor and rash. Allergic/Immunologic: Negative for environmental allergies and food allergies. Neurological:  Negative for dizziness, light-headedness, numbness and headaches. Psychiatric/Behavioral:  Negative for agitation, confusion and dysphoric mood. The patient is not nervous/anxious. /80   Pulse 80   Resp 18   Ht 5' 5.4\" (1.661 m)   Wt 264 lb 3.2 oz (119.8 kg)   SpO2 98%   BMI 43.43 kg/m²     Physical Exam  Vitals and nursing note reviewed. Constitutional:       General: He is awake. Appearance: Normal appearance. HENT:      Head: Normocephalic and atraumatic. Right Ear: Hearing and external ear normal.      Left Ear: Hearing and external ear normal.      Nose: Nose normal. No congestion or rhinorrhea. Eyes:      General: Lids are normal.         Right eye: No discharge. Left eye: No discharge. Conjunctiva/sclera: Conjunctivae normal.   Neck:      Trachea: No tracheal deviation. Cardiovascular:      Rate and Rhythm: Normal rate and regular rhythm. Heart sounds: Normal heart sounds. No murmur heard. Pulmonary:      Effort: Pulmonary effort is normal. No respiratory distress. Breath sounds: No stridor. No wheezing. Musculoskeletal:      Cervical back: Full passive range of motion without pain. Right lower le+ Pitting Edema present. Left lower le+ Pitting Edema present. Skin:     General: Skin is dry. Coloration: Skin is not jaundiced or pale. Neurological:      General: No focal deficit present. Mental Status: He is alert. Mental status is at baseline. Psychiatric:         Mood and Affect: Mood and affect normal.         Behavior: Behavior is cooperative.          Labs Reviewed 2022:    Lab Results   Component Value Date    WBC 3.8 (L) 2021    HGB 14.5 2021    HCT 44.8 2021     2021    CHOL 195 2020    TRIG 40 2020     2020    ALT 9 (L) 03/29/2021    AST 13 03/29/2021     05/24/2021    K 4.3 05/24/2021    CL 98 05/24/2021    CREATININE 0.6 05/24/2021    BUN 11 05/24/2021    CO2 32 05/24/2021           Patient given educational materials - see patient instructions. Discussed use, benefit, and side effects of prescribed medications. All patient questions answered. Pt voiced understanding. Reviewed health maintenance.        Electronically signed by JAZZY Lea CNP on 9/21/2022 at 3:28 PM EDT

## 2022-09-28 ENCOUNTER — NURSE ONLY (OUTPATIENT)
Dept: FAMILY MEDICINE CLINIC | Age: 62
End: 2022-09-28
Payer: COMMERCIAL

## 2022-09-28 DIAGNOSIS — R35.1 NOCTURIA: ICD-10-CM

## 2022-09-28 DIAGNOSIS — I10 ESSENTIAL HYPERTENSION: ICD-10-CM

## 2022-09-28 LAB
ALBUMIN SERPL-MCNC: 4.5 G/DL (ref 3.5–5.1)
ALP BLD-CCNC: 96 U/L (ref 38–126)
ALT SERPL-CCNC: 14 U/L (ref 11–66)
ANION GAP SERPL CALCULATED.3IONS-SCNC: 11 MEQ/L (ref 8–16)
AST SERPL-CCNC: 23 U/L (ref 5–40)
BASOPHILS # BLD: 1.6 %
BASOPHILS ABSOLUTE: 0.1 THOU/MM3 (ref 0–0.1)
BILIRUB SERPL-MCNC: 1.2 MG/DL (ref 0.3–1.2)
BUN BLDV-MCNC: 12 MG/DL (ref 7–22)
CALCIUM SERPL-MCNC: 10 MG/DL (ref 8.5–10.5)
CHLORIDE BLD-SCNC: 94 MEQ/L (ref 98–111)
CHOLESTEROL, TOTAL: 202 MG/DL (ref 100–199)
CO2: 30 MEQ/L (ref 23–33)
CREAT SERPL-MCNC: 0.7 MG/DL (ref 0.4–1.2)
EOSINOPHIL # BLD: 2.2 %
EOSINOPHILS ABSOLUTE: 0.1 THOU/MM3 (ref 0–0.4)
ERYTHROCYTE [DISTWIDTH] IN BLOOD BY AUTOMATED COUNT: 13.2 % (ref 11.5–14.5)
ERYTHROCYTE [DISTWIDTH] IN BLOOD BY AUTOMATED COUNT: 47.5 FL (ref 35–45)
GFR SERPL CREATININE-BSD FRML MDRD: > 90 ML/MIN/1.73M2
GLUCOSE BLD-MCNC: 98 MG/DL (ref 70–108)
HCT VFR BLD CALC: 42.5 % (ref 42–52)
HDLC SERPL-MCNC: 102 MG/DL
HEMOGLOBIN: 14.6 GM/DL (ref 14–18)
IMMATURE GRANS (ABS): 0.02 THOU/MM3 (ref 0–0.07)
IMMATURE GRANULOCYTES: 0.6 %
LDL CHOLESTEROL CALCULATED: 95 MG/DL
LYMPHOCYTES # BLD: 18 %
LYMPHOCYTES ABSOLUTE: 0.6 THOU/MM3 (ref 1–4.8)
MCH RBC QN AUTO: 33.3 PG (ref 26–33)
MCHC RBC AUTO-ENTMCNC: 34.4 GM/DL (ref 32.2–35.5)
MCV RBC AUTO: 97 FL (ref 80–94)
MONOCYTES # BLD: 12.1 %
MONOCYTES ABSOLUTE: 0.4 THOU/MM3 (ref 0.4–1.3)
NUCLEATED RED BLOOD CELLS: 0 /100 WBC
PLATELET # BLD: 275 THOU/MM3 (ref 130–400)
PMV BLD AUTO: 10 FL (ref 9.4–12.4)
POTASSIUM SERPL-SCNC: 4.7 MEQ/L (ref 3.5–5.2)
PROSTATE SPECIFIC ANTIGEN: 0.93 NG/ML (ref 0–1)
RBC # BLD: 4.38 MILL/MM3 (ref 4.7–6.1)
SEG NEUTROPHILS: 65.5 %
SEGMENTED NEUTROPHILS ABSOLUTE COUNT: 2.1 THOU/MM3 (ref 1.8–7.7)
SODIUM BLD-SCNC: 135 MEQ/L (ref 135–145)
TOTAL PROTEIN: 7.4 G/DL (ref 6.1–8)
TRIGL SERPL-MCNC: 27 MG/DL (ref 0–199)
WBC # BLD: 3.2 THOU/MM3 (ref 4.8–10.8)

## 2022-09-28 PROCEDURE — 36415 COLL VENOUS BLD VENIPUNCTURE: CPT | Performed by: NURSE PRACTITIONER

## 2022-11-13 DIAGNOSIS — I10 ESSENTIAL HYPERTENSION: ICD-10-CM

## 2022-11-14 RX ORDER — AMLODIPINE BESYLATE 10 MG/1
10 TABLET ORAL DAILY
Qty: 90 TABLET | Refills: 1 | Status: SHIPPED | OUTPATIENT
Start: 2022-11-14 | End: 2023-05-13

## 2022-11-14 NOTE — TELEPHONE ENCOUNTER
Clora Arm called requesting a refill on the following medications:  Requested Prescriptions     Pending Prescriptions Disp Refills    amLODIPine (NORVASC) 10 MG tablet [Pharmacy Med Name: AMLODIPINE BESYLATE 10 MG TAB] 60 tablet 0     Sig: TAKE 1 TABLET BY MOUTH EVERY DAY       Date of last visit: 9/21/2022  Date of next visit (if applicable):3/22/2023  Date of last refill: 9/21/22 60*0  Pharmacy Name: [address]    Patient has f/u w you in March, 30*3 refills pending.     Thanks,  Fariha Borges

## 2023-01-27 DIAGNOSIS — I10 ESSENTIAL HYPERTENSION: ICD-10-CM

## 2023-01-27 RX ORDER — VALSARTAN 160 MG/1
TABLET ORAL
Qty: 180 TABLET | Refills: 1 | OUTPATIENT
Start: 2023-01-27

## 2023-02-21 DIAGNOSIS — I10 ESSENTIAL HYPERTENSION: ICD-10-CM

## 2023-02-21 RX ORDER — HYDROCHLOROTHIAZIDE 50 MG/1
TABLET ORAL
Qty: 30 TABLET | Refills: 0 | Status: SHIPPED | OUTPATIENT
Start: 2023-02-21

## 2023-02-21 NOTE — TELEPHONE ENCOUNTER
Jadon Guadarrama called requesting a refill on the following medications:  Requested Prescriptions     Pending Prescriptions Disp Refills    metoprolol tartrate (LOPRESSOR) 25 MG tablet [Pharmacy Med Name: METOPROLOL TARTRATE 25 MG TAB] 60 tablet 0     Sig: TAKE 1 TABLET BY MOUTH TWICE A DAY    hydroCHLOROthiazide (HYDRODIURIL) 50 MG tablet [Pharmacy Med Name: HYDROCHLOROTHIAZIDE 50 MG TAB] 30 tablet 0     Sig: TAKE 1 TABLET BY MOUTH EVERY DAY       Date of last visit: 9/21/2022  Date of next visit (if applicable):3/22/2023  Date of last refill: 8/2022  Pharmacy Name: [facility] [address] [facility]    Patient needs short term prior to appt w Franklin Martinez

## 2023-03-18 DIAGNOSIS — I10 ESSENTIAL HYPERTENSION: ICD-10-CM

## 2023-03-20 RX ORDER — HYDROCHLOROTHIAZIDE 50 MG/1
TABLET ORAL
Qty: 30 TABLET | Refills: 0 | OUTPATIENT
Start: 2023-03-20

## 2023-03-20 NOTE — TELEPHONE ENCOUNTER
Dasha Lam called requesting a refill on the following medications:  Requested Prescriptions     Pending Prescriptions Disp Refills    hydroCHLOROthiazide (HYDRODIURIL) 50 MG tablet [Pharmacy Med Name: HYDROCHLOROTHIAZIDE 50 MG TAB] 30 tablet 0     Sig: TAKE 1 TABLET BY MOUTH EVERY DAY       Date of last visit: 9/21/2022  Date of next visit (if applicable):Visit date not found  Date of last refill: 2/21/2023 30  Pharmacy Name: [facility],  Obdulia Luna, [address] [place] [address]

## 2023-03-22 ENCOUNTER — OFFICE VISIT (OUTPATIENT)
Dept: FAMILY MEDICINE CLINIC | Age: 63
End: 2023-03-22
Payer: COMMERCIAL

## 2023-03-22 VITALS
OXYGEN SATURATION: 98 % | DIASTOLIC BLOOD PRESSURE: 62 MMHG | SYSTOLIC BLOOD PRESSURE: 116 MMHG | HEART RATE: 55 BPM | BODY MASS INDEX: 45.7 KG/M2 | WEIGHT: 278 LBS

## 2023-03-22 DIAGNOSIS — I10 ESSENTIAL HYPERTENSION: Primary | ICD-10-CM

## 2023-03-22 DIAGNOSIS — Z12.11 COLON CANCER SCREENING: ICD-10-CM

## 2023-03-22 PROCEDURE — 3074F SYST BP LT 130 MM HG: CPT | Performed by: NURSE PRACTITIONER

## 2023-03-22 PROCEDURE — 3078F DIAST BP <80 MM HG: CPT | Performed by: NURSE PRACTITIONER

## 2023-03-22 PROCEDURE — 99213 OFFICE O/P EST LOW 20 MIN: CPT | Performed by: NURSE PRACTITIONER

## 2023-03-22 RX ORDER — VALSARTAN 160 MG/1
160 TABLET ORAL DAILY
Qty: 90 TABLET | Refills: 3 | Status: SHIPPED | OUTPATIENT
Start: 2023-03-22

## 2023-03-22 RX ORDER — AMLODIPINE BESYLATE 10 MG/1
10 TABLET ORAL DAILY
Qty: 90 TABLET | Refills: 3 | Status: SHIPPED | OUTPATIENT
Start: 2023-03-22 | End: 2024-03-16

## 2023-03-22 RX ORDER — HYDROCHLOROTHIAZIDE 50 MG/1
50 TABLET ORAL DAILY
Qty: 90 TABLET | Refills: 3 | Status: SHIPPED | OUTPATIENT
Start: 2023-03-22 | End: 2024-03-16

## 2023-03-22 SDOH — ECONOMIC STABILITY: INCOME INSECURITY: HOW HARD IS IT FOR YOU TO PAY FOR THE VERY BASICS LIKE FOOD, HOUSING, MEDICAL CARE, AND HEATING?: NOT HARD AT ALL

## 2023-03-22 SDOH — ECONOMIC STABILITY: FOOD INSECURITY: WITHIN THE PAST 12 MONTHS, YOU WORRIED THAT YOUR FOOD WOULD RUN OUT BEFORE YOU GOT MONEY TO BUY MORE.: NEVER TRUE

## 2023-03-22 SDOH — ECONOMIC STABILITY: HOUSING INSECURITY
IN THE LAST 12 MONTHS, WAS THERE A TIME WHEN YOU DID NOT HAVE A STEADY PLACE TO SLEEP OR SLEPT IN A SHELTER (INCLUDING NOW)?: NO

## 2023-03-22 SDOH — ECONOMIC STABILITY: FOOD INSECURITY: WITHIN THE PAST 12 MONTHS, THE FOOD YOU BOUGHT JUST DIDN'T LAST AND YOU DIDN'T HAVE MONEY TO GET MORE.: NEVER TRUE

## 2023-03-22 ASSESSMENT — PATIENT HEALTH QUESTIONNAIRE - PHQ9
SUM OF ALL RESPONSES TO PHQ9 QUESTIONS 1 & 2: 0
2. FEELING DOWN, DEPRESSED OR HOPELESS: 0
SUM OF ALL RESPONSES TO PHQ QUESTIONS 1-9: 0
1. LITTLE INTEREST OR PLEASURE IN DOING THINGS: 0
SUM OF ALL RESPONSES TO PHQ QUESTIONS 1-9: 0

## 2023-03-22 NOTE — PROGRESS NOTES
Bri Pozo (:  1960) is a 57 y.o. male,Established patient, here for evaluation of the following chief complaint(s):  Follow-up (HTN )         ASSESSMENT/PLAN:  1. Essential hypertension  - Chronic, controlled  - Continue metoprolol, hctz, amlodipine and valsartan  - DASH diet, aerobic exercise 3-4x per week for 30-40 minutes at a time encouraged  -     metoprolol tartrate (LOPRESSOR) 25 MG tablet; Take 1 tablet by mouth 2 times daily, Disp-180 tablet, R-3Normal  -     hydroCHLOROthiazide (HYDRODIURIL) 50 MG tablet; Take 1 tablet by mouth daily, Disp-90 tablet, R-3Normal  -     amLODIPine (NORVASC) 10 MG tablet; Take 1 tablet by mouth daily, Disp-90 tablet, R-3Normal  -     valsartan (DIOVAN) 160 MG tablet; Take 1 tablet by mouth daily, Disp-90 tablet, R-3Normal    2. Colon cancer screening  - New referral [facility] - Destiny Gutierrez MD, Gastroenterology[email or web address]    Return in about 6 months (around 2023) for Annual Well Visit. Subjective   SUBJECTIVE/OBJECTIVE:  Hypertension  This is a chronic problem. The current episode started more than 1 year ago. The problem is unchanged. The problem is controlled. Pertinent negatives include no chest pain or headaches. There are no associated agents to hypertension. Risk factors for coronary artery disease include male gender and obesity. Past treatments include angiotensin blockers, calcium channel blockers, beta blockers and diuretics. The current treatment provides significant improvement. There are no compliance problems. Review of Systems   Cardiovascular:  Positive for leg swelling. Negative for chest pain. Neurological:  Negative for light-headedness and headaches. Objective   Physical Exam  Vitals and nursing note reviewed. Constitutional:       General: He is awake. Appearance: Normal appearance. HENT:      Head: Normocephalic and atraumatic.       Right Ear: Hearing and external ear normal.      Left Ear:

## 2023-07-03 ENCOUNTER — OFFICE VISIT (OUTPATIENT)
Dept: FAMILY MEDICINE CLINIC | Age: 63
End: 2023-07-03
Payer: COMMERCIAL

## 2023-07-03 VITALS
WEIGHT: 276 LBS | RESPIRATION RATE: 20 BRPM | SYSTOLIC BLOOD PRESSURE: 142 MMHG | DIASTOLIC BLOOD PRESSURE: 72 MMHG | BODY MASS INDEX: 45.37 KG/M2 | HEART RATE: 86 BPM

## 2023-07-03 DIAGNOSIS — L03.115 CELLULITIS OF RIGHT LEG: Primary | ICD-10-CM

## 2023-07-03 PROCEDURE — 3078F DIAST BP <80 MM HG: CPT | Performed by: NURSE PRACTITIONER

## 2023-07-03 PROCEDURE — 99213 OFFICE O/P EST LOW 20 MIN: CPT | Performed by: NURSE PRACTITIONER

## 2023-07-03 PROCEDURE — 3077F SYST BP >= 140 MM HG: CPT | Performed by: NURSE PRACTITIONER

## 2023-07-03 RX ORDER — SULFAMETHOXAZOLE AND TRIMETHOPRIM 800; 160 MG/1; MG/1
1 TABLET ORAL 2 TIMES DAILY
Qty: 20 TABLET | Refills: 0 | Status: SHIPPED | OUTPATIENT
Start: 2023-07-03 | End: 2023-07-13

## 2023-07-03 ASSESSMENT — ENCOUNTER SYMPTOMS
NAUSEA: 0
COLOR CHANGE: 1
VOMITING: 0

## 2023-07-03 NOTE — PROGRESS NOTES
Stacy Montgomery (:  1960) is a 57 y.o. male,Established patient, here for evaluation of the following chief complaint(s):  Leg Swelling (Right, red, painful, x1 week )         ASSESSMENT/PLAN:  1. Cellulitis of right leg  - Acute  - Start bactrim for treatment of cellulitis  - Wound care instructions provided  - No drainage available for culture  - Leg elevation encouraged  - Follow up in 1 week  -     sulfamethoxazole-trimethoprim (BACTRIM DS) 800-160 MG per tablet; Take 1 tablet by mouth 2 times daily for 10 days, Disp-20 tablet, R-0Normal      Return in about 1 week (around 7/10/2023) for cellulitis f/u. Subjective   SUBJECTIVE/OBJECTIVE:  Patient presents with pain and swelling of the right leg. Symptoms started 1 week ago. Redness, swelling, yellow discoloration and pain. Pain is 7-8/10. Pain is slightly worsening. No fever, n/v.       Review of Systems   Constitutional:  Negative for fever. Cardiovascular:  Positive for leg swelling. Gastrointestinal:  Negative for nausea and vomiting. Skin:  Positive for color change and wound. Objective   Physical Exam  Vitals and nursing note reviewed. Constitutional:       General: He is awake. He is not in acute distress. Appearance: Normal appearance. HENT:      Head: Normocephalic and atraumatic. Right Ear: Hearing and external ear normal.      Left Ear: Hearing and external ear normal.      Nose: Nose normal. No nasal deformity, congestion or rhinorrhea. Mouth/Throat:      Lips: Pink. No lesions. Eyes:      General: Lids are normal.         Right eye: No discharge. Left eye: No discharge. Conjunctiva/sclera: Conjunctivae normal.   Neck:      Trachea: No tracheal deviation. Cardiovascular:      Heart sounds: Normal heart sounds. No murmur heard. Pulmonary:      Effort: Pulmonary effort is normal. No accessory muscle usage or respiratory distress. Breath sounds: No stridor.    Musculoskeletal:

## 2023-07-10 ENCOUNTER — OFFICE VISIT (OUTPATIENT)
Dept: FAMILY MEDICINE CLINIC | Age: 63
End: 2023-07-10
Payer: COMMERCIAL

## 2023-07-10 VITALS
SYSTOLIC BLOOD PRESSURE: 128 MMHG | WEIGHT: 276 LBS | BODY MASS INDEX: 45.37 KG/M2 | RESPIRATION RATE: 18 BRPM | HEART RATE: 56 BPM | DIASTOLIC BLOOD PRESSURE: 66 MMHG

## 2023-07-10 DIAGNOSIS — L03.115 CELLULITIS OF RIGHT LEG: Primary | ICD-10-CM

## 2023-07-10 PROCEDURE — 3074F SYST BP LT 130 MM HG: CPT | Performed by: NURSE PRACTITIONER

## 2023-07-10 PROCEDURE — 3078F DIAST BP <80 MM HG: CPT | Performed by: NURSE PRACTITIONER

## 2023-07-10 PROCEDURE — 99213 OFFICE O/P EST LOW 20 MIN: CPT | Performed by: NURSE PRACTITIONER

## 2023-07-10 ASSESSMENT — ENCOUNTER SYMPTOMS: NAUSEA: 0

## 2023-07-10 NOTE — PROGRESS NOTES
Bassem Finnegan (:  1960) is a 57 y.o. male,Established patient, here for evaluation of the following chief complaint(s):  Leg Swelling (1 week follow up, better, pain is better )         ASSESSMENT/PLAN:  1. Cellulitis of right leg  - Improving  - Continue antibiotic treatment  - Culture obtained from wound  - Update POC based on results of culture  - Wound care instructions provided  -     Culture, Aerobic      Return if symptoms worsen or fail to improve. Subjective   SUBJECTIVE/OBJECTIVE:  Patient presents for right leg cellulitis follow up. Was started on bactrim 1 week ago. Pain has improved. More pink tissue. No change in drainage. Itching has improved. Denies presence of a fever, n/v. Denies antibiotic SE. Review of Systems   Constitutional:  Negative for fever. Cardiovascular:  Positive for leg swelling. Gastrointestinal:  Negative for nausea. Musculoskeletal:  Positive for myalgias. Skin:  Positive for wound. Objective   Physical Exam  Vitals and nursing note reviewed. Constitutional:       General: He is not in acute distress. Appearance: Normal appearance. HENT:      Head: Normocephalic and atraumatic. Right Ear: External ear normal.      Left Ear: External ear normal.      Nose: No nasal deformity or rhinorrhea. Mouth/Throat:      Lips: Pink. No lesions. Eyes:      General: Lids are normal.         Right eye: No discharge. Left eye: No discharge. Conjunctiva/sclera: Conjunctivae normal.   Pulmonary:      Effort: No accessory muscle usage or respiratory distress. Breath sounds: No stridor. Musculoskeletal:      Cervical back: Normal range of motion. Skin:     Coloration: Skin is not pale. Findings: Wound present. No rash. Neurological:      General: No focal deficit present. Mental Status: He is alert. Mental status is at baseline.    Psychiatric:         Mood and Affect: Mood normal.         Behavior:

## 2023-07-11 ENCOUNTER — TELEPHONE (OUTPATIENT)
Dept: FAMILY MEDICINE CLINIC | Age: 63
End: 2023-07-11

## 2023-07-11 NOTE — TELEPHONE ENCOUNTER
Pt called in stating that he is still having pain in his legs after his appt yesterday. . Wants to know what he can take.

## 2023-07-11 NOTE — TELEPHONE ENCOUNTER
Patient is not taking tylenol or motrin, he will alternate those and let us know if that does not help

## 2023-07-13 ENCOUNTER — TELEPHONE (OUTPATIENT)
Dept: FAMILY MEDICINE CLINIC | Age: 63
End: 2023-07-13

## 2023-07-13 DIAGNOSIS — L03.115 CELLULITIS OF RIGHT LEG: Primary | ICD-10-CM

## 2023-07-13 LAB
BACTERIA SPEC AEROBE CULT: ABNORMAL
BACTERIA SPEC AEROBE CULT: ABNORMAL
GRAM STN SPEC: ABNORMAL
ORGANISM: ABNORMAL

## 2023-07-13 NOTE — TELEPHONE ENCOUNTER
----- Message from Meredith Humphreys MD sent at 7/13/2023 12:38 PM EDT -----  Culture is growing MSSA, resistant to the bactrim, stop.   Ep'ed dicloxacillin to [facility].

## 2023-07-20 ENCOUNTER — OFFICE VISIT (OUTPATIENT)
Dept: FAMILY MEDICINE CLINIC | Age: 63
End: 2023-07-20
Payer: COMMERCIAL

## 2023-07-20 VITALS
DIASTOLIC BLOOD PRESSURE: 60 MMHG | HEART RATE: 60 BPM | RESPIRATION RATE: 16 BRPM | WEIGHT: 272.2 LBS | BODY MASS INDEX: 44.74 KG/M2 | SYSTOLIC BLOOD PRESSURE: 128 MMHG

## 2023-07-20 DIAGNOSIS — M79.605 PAIN IN BOTH LOWER EXTREMITIES: ICD-10-CM

## 2023-07-20 DIAGNOSIS — L03.115 CELLULITIS OF RIGHT LOWER LEG: ICD-10-CM

## 2023-07-20 DIAGNOSIS — I10 ESSENTIAL HYPERTENSION: ICD-10-CM

## 2023-07-20 DIAGNOSIS — M79.604 PAIN IN BOTH LOWER EXTREMITIES: ICD-10-CM

## 2023-07-20 DIAGNOSIS — R60.0 BILATERAL LOWER EXTREMITY EDEMA: Primary | ICD-10-CM

## 2023-07-20 PROCEDURE — 3074F SYST BP LT 130 MM HG: CPT | Performed by: NURSE PRACTITIONER

## 2023-07-20 PROCEDURE — 3078F DIAST BP <80 MM HG: CPT | Performed by: NURSE PRACTITIONER

## 2023-07-20 PROCEDURE — 99213 OFFICE O/P EST LOW 20 MIN: CPT | Performed by: NURSE PRACTITIONER

## 2023-07-20 RX ORDER — FUROSEMIDE 20 MG/1
20 TABLET ORAL 2 TIMES DAILY
Qty: 180 TABLET | Refills: 1 | Status: SHIPPED | OUTPATIENT
Start: 2023-07-20 | End: 2024-01-16

## 2023-07-20 RX ORDER — TRAMADOL HYDROCHLORIDE 50 MG/1
50 TABLET ORAL EVERY 6 HOURS PRN
Qty: 12 TABLET | Refills: 0 | Status: SHIPPED | OUTPATIENT
Start: 2023-07-20 | End: 2023-07-23

## 2023-07-20 RX ORDER — POTASSIUM CHLORIDE 1500 MG/1
20 TABLET, EXTENDED RELEASE ORAL 2 TIMES DAILY WITH MEALS
Qty: 180 TABLET | Refills: 1 | Status: SHIPPED | OUTPATIENT
Start: 2023-07-20 | End: 2024-01-16

## 2023-07-20 ASSESSMENT — ENCOUNTER SYMPTOMS
NAUSEA: 0
COLOR CHANGE: 0

## 2023-07-20 NOTE — PROGRESS NOTES
Ambrosio Spence (:  1960) is a 57 y.o. male,Established patient, here for evaluation of the following chief complaint(s):  Follow-up (07/10/2023 cellulitis of right leg)         ASSESSMENT/PLAN:  1. Bilateral lower extremity edema  - Chronic, uncontrolled  - Stop hctz, start lasix bid with potassium supplement  - Avoid sodium-concentrated foods  - Elevate legs as able  - Encouraged increased strength compression stockings (approx 20 mmHg) 12 hours daily  - Follow up blood work in 2 weeks  -     furosemide (LASIX) 20 MG tablet; Take 1 tablet by mouth 2 times daily, Disp-180 tablet, R-1Normal  -     potassium chloride (KLOR-CON M) 20 MEQ TBCR extended release tablet; Take 1 tablet by mouth 2 times daily (with meals), Disp-180 tablet, R-1Normal  -     Basic Metabolic Panel; Future    2. Cellulitis of right lower leg  - Improving  - Continue antibiotic treatment and wound care    3. Essential hypertension  - Swap furosemide for hctz to better control leg swelling  - Monitor bp at home  -     furosemide (LASIX) 20 MG tablet; Take 1 tablet by mouth 2 times daily, Disp-180 tablet, R-1Normal  -     Basic Metabolic Panel; Future    4. Pain in both lower extremities  - ST tramadol for severe pain. Continue otc treatments  -     traMADol (ULTRAM) 50 MG tablet; Take 1 tablet by mouth every 6 hours as needed for Pain for up to 3 days. Max Daily Amount: 200 mg, Disp-12 tablet, R-0Normal      Return if symptoms worsen or fail to improve. Subjective   SUBJECTIVE/OBJECTIVE:  Patient presents for cellulitis follow up. Pain continues. Pain remains 6/10 with otc treatments.      PDMP Monitoring:    Last PDMP Samir Badillo as Reviewed:  Review User Review Instant Review Result   Navi Akhtar 2023  1:43 PM Reviewed PDMP [1]     Last Controlled Substance Monitoring Documentation      [facility] Office Visit from 2023 in [address] [place] [address]   Periodic Controlled Substance Monitoring No signs of

## 2023-07-24 ENCOUNTER — OFFICE VISIT (OUTPATIENT)
Dept: PULMONOLOGY | Age: 63
End: 2023-07-24
Payer: COMMERCIAL

## 2023-07-24 VITALS
HEIGHT: 66 IN | WEIGHT: 273.2 LBS | HEART RATE: 54 BPM | BODY MASS INDEX: 43.91 KG/M2 | OXYGEN SATURATION: 97 % | SYSTOLIC BLOOD PRESSURE: 124 MMHG | DIASTOLIC BLOOD PRESSURE: 78 MMHG | TEMPERATURE: 98.2 F

## 2023-07-24 DIAGNOSIS — E66.01 MORBID OBESITY WITH BMI OF 40.0-44.9, ADULT (HCC): ICD-10-CM

## 2023-07-24 DIAGNOSIS — G47.33 OSA (OBSTRUCTIVE SLEEP APNEA): Primary | ICD-10-CM

## 2023-07-24 PROCEDURE — 99213 OFFICE O/P EST LOW 20 MIN: CPT | Performed by: PHYSICIAN ASSISTANT

## 2023-07-24 PROCEDURE — 3078F DIAST BP <80 MM HG: CPT | Performed by: PHYSICIAN ASSISTANT

## 2023-07-24 PROCEDURE — 3074F SYST BP LT 130 MM HG: CPT | Performed by: PHYSICIAN ASSISTANT

## 2023-07-24 ASSESSMENT — ENCOUNTER SYMPTOMS
EYES NEGATIVE: 1
DIARRHEA: 0
NAUSEA: 0
CHEST TIGHTNESS: 0
BACK PAIN: 0
STRIDOR: 0
WHEEZING: 0
COUGH: 0
ALLERGIC/IMMUNOLOGIC NEGATIVE: 1
SHORTNESS OF BREATH: 0

## 2023-07-24 NOTE — PROGRESS NOTES
[facility]      Isabel Brown         [phone]  7/24/2023   Chief Complaint   Patient presents with    Follow-up     1 year lynne        Pt of Dr. Chrystie Severs     PAP Download:   Luci Mejia or initial AHI: 45.3     Date of initial study: 04/27/2022       Compliant  97%     Noncompliant 3 %     PAP Type auto Level  10/20   Avg Hrs/Day 7  AHI: 2.8   Recorded compliance dates [id number]  Machine/Mfg:   [x] ResMed    [] Respironics/Dreamstation   Interface:   [] Nasal    [] Nasal pillows   [x] FFM      Provider:      [x] [facility]     [][employer]     [] [employer]    [] Gretel Rail    [] [employer]               [] [employer]      [] Adaptive    [] [address] [facility] [address]:      [] Other    Neck Size: 19  Mallampati 3  ESS:  4  SAQLI: 88    Here is a scan of the most recent download:                  Presentation:   Neftaly Watson presents for sleep medicine follow up for obstructive sleep apnea  Since the last visit, Nefatly Watson is doing well with PAP. He is sleeping well and feels rested. Equipment issues: The pressure is  acceptable, the mask is acceptable     Sleep issues:  Do you feel better? Yes  More rested? Yes   Better concentration? yes    Progress History:   Since last visit any new medical issues? Yes cellulitis  New ER or hospital visits? No  Any new or changes in medicines? No  Any new sleep medicines? No    Review of Systems -   Review of Systems   Constitutional:  Negative for activity change, appetite change, chills and fever. HENT:  Negative for congestion and postnasal drip. Eyes: Negative. Respiratory:  Negative for cough, chest tightness, shortness of breath, wheezing and stridor. Cardiovascular:  Negative for chest pain and leg swelling. Gastrointestinal:  Negative for diarrhea and nausea. Endocrine: Negative. Genitourinary: Negative. Musculoskeletal: Negative. Negative for arthralgias and back pain. Skin: Negative. Allergic/Immunologic: Negative.     Neurological:

## 2023-08-03 ENCOUNTER — NURSE ONLY (OUTPATIENT)
Dept: FAMILY MEDICINE CLINIC | Age: 63
End: 2023-08-03
Payer: COMMERCIAL

## 2023-08-03 DIAGNOSIS — R60.0 BILATERAL LOWER EXTREMITY EDEMA: ICD-10-CM

## 2023-08-03 DIAGNOSIS — I10 ESSENTIAL HYPERTENSION: ICD-10-CM

## 2023-08-03 LAB
ANION GAP SERPL CALC-SCNC: 11 MEQ/L (ref 8–16)
BUN SERPL-MCNC: 13 MG/DL (ref 7–22)
CALCIUM SERPL-MCNC: 9.7 MG/DL (ref 8.5–10.5)
CHLORIDE SERPL-SCNC: 97 MEQ/L (ref 98–111)
CO2 SERPL-SCNC: 28 MEQ/L (ref 23–33)
CREAT SERPL-MCNC: 0.8 MG/DL (ref 0.4–1.2)
GFR SERPL CREATININE-BSD FRML MDRD: > 60 ML/MIN/1.73M2
GLUCOSE SERPL-MCNC: 143 MG/DL (ref 70–108)
POTASSIUM SERPL-SCNC: 5.5 MEQ/L (ref 3.5–5.2)
SODIUM SERPL-SCNC: 136 MEQ/L (ref 135–145)

## 2023-08-03 PROCEDURE — 36415 COLL VENOUS BLD VENIPUNCTURE: CPT | Performed by: NURSE PRACTITIONER

## 2023-08-04 ENCOUNTER — TELEPHONE (OUTPATIENT)
Dept: FAMILY MEDICINE CLINIC | Age: 63
End: 2023-08-04

## 2023-08-04 DIAGNOSIS — E87.5 SERUM POTASSIUM ELEVATED: Primary | ICD-10-CM

## 2023-08-04 NOTE — TELEPHONE ENCOUNTER
----- Message from JAZZY Kendall CNP sent at 8/3/2023  4:43 PM EDT -----  Potassium is too high. Skip potassium dosing tomorrow. Decrease potassium to once daily dosing starting on Saturday. Recheck bmp on Monday.

## 2023-08-04 NOTE — TELEPHONE ENCOUNTER
Patient informed, he will come to office to recheck K on Monday. He has not taken his K as of yet today.

## 2023-08-07 ENCOUNTER — NURSE ONLY (OUTPATIENT)
Dept: FAMILY MEDICINE CLINIC | Age: 63
End: 2023-08-07
Payer: COMMERCIAL

## 2023-08-07 DIAGNOSIS — E87.5 SERUM POTASSIUM ELEVATED: ICD-10-CM

## 2023-08-07 LAB
ANION GAP SERPL CALC-SCNC: 10 MEQ/L (ref 8–16)
BUN SERPL-MCNC: 9 MG/DL (ref 7–22)
CALCIUM SERPL-MCNC: 9.4 MG/DL (ref 8.5–10.5)
CHLORIDE SERPL-SCNC: 98 MEQ/L (ref 98–111)
CO2 SERPL-SCNC: 30 MEQ/L (ref 23–33)
CREAT SERPL-MCNC: 0.7 MG/DL (ref 0.4–1.2)
GFR SERPL CREATININE-BSD FRML MDRD: > 60 ML/MIN/1.73M2
GLUCOSE SERPL-MCNC: 109 MG/DL (ref 70–108)
POTASSIUM SERPL-SCNC: 5.1 MEQ/L (ref 3.5–5.2)
SODIUM SERPL-SCNC: 138 MEQ/L (ref 135–145)

## 2023-08-07 PROCEDURE — 36415 COLL VENOUS BLD VENIPUNCTURE: CPT | Performed by: NURSE PRACTITIONER

## 2023-08-08 ENCOUNTER — TELEPHONE (OUTPATIENT)
Dept: FAMILY MEDICINE CLINIC | Age: 63
End: 2023-08-08

## 2023-08-08 NOTE — TELEPHONE ENCOUNTER
----- Message from JAZZY Dye CNP sent at 8/7/2023  4:54 PM EDT -----  Potassium improved. Continue 20 mg daily.

## 2023-08-24 ENCOUNTER — OFFICE VISIT (OUTPATIENT)
Dept: FAMILY MEDICINE CLINIC | Age: 63
End: 2023-08-24
Payer: COMMERCIAL

## 2023-08-24 VITALS
SYSTOLIC BLOOD PRESSURE: 118 MMHG | BODY MASS INDEX: 44.55 KG/M2 | HEART RATE: 64 BPM | WEIGHT: 276 LBS | DIASTOLIC BLOOD PRESSURE: 80 MMHG

## 2023-08-24 DIAGNOSIS — R60.0 BILATERAL LOWER EXTREMITY EDEMA: ICD-10-CM

## 2023-08-24 DIAGNOSIS — S81.801D WOUND OF RIGHT LOWER EXTREMITY, SUBSEQUENT ENCOUNTER: Primary | ICD-10-CM

## 2023-08-24 PROCEDURE — 3074F SYST BP LT 130 MM HG: CPT | Performed by: NURSE PRACTITIONER

## 2023-08-24 PROCEDURE — 99213 OFFICE O/P EST LOW 20 MIN: CPT | Performed by: NURSE PRACTITIONER

## 2023-08-24 PROCEDURE — 3079F DIAST BP 80-89 MM HG: CPT | Performed by: NURSE PRACTITIONER

## 2023-08-24 RX ORDER — MELOXICAM 7.5 MG/1
7.5 TABLET ORAL DAILY
Qty: 30 TABLET | Refills: 3 | Status: SHIPPED | OUTPATIENT
Start: 2023-08-24

## 2023-08-24 ASSESSMENT — ENCOUNTER SYMPTOMS
SHORTNESS OF BREATH: 0
NAUSEA: 0

## 2023-08-24 NOTE — PROGRESS NOTES
Poppy Mendes (:  1960) is a 57 y.o. male,Established patient, here for evaluation of the following chief complaint(s):  Leg Pain (Right, burning sensation, pain )         ASSESSMENT/PLAN:  1. Wound of right lower extremity, subsequent encounter  - Chronic  - New compression stockings x12 hours daily  - Elevate legs throughout the day periodically, avoid sodium-concentrated dietary intake  - Continue wound care at home with soap and water, non adhesive bandages  - Wound care referral for further evaluation and treatment  - Meloxicam for pain relief. Avoid use of additional nsaids  -     meloxicam (MOBIC) 7.5 MG tablet; Take 1 tablet by mouth daily, Disp-30 tablet, R-3Normal  -     Misc. Devices MISC; Disp-2 each, R-0, Print1 pair of above the knee compression stockings. Strength of 30 mmHg. -     Anant Martinez    2. Bilateral lower extremity edema  -     Misc. Devices MISC; Disp-2 each, R-0, Print1 pair of above the knee compression stockings. Strength of 30 mmHg. Return if symptoms worsen or fail to improve. Subjective   SUBJECTIVE/OBJECTIVE:  Patient presents for evaluation of leg pain. No fever. Treated for cellulitis 1 month ago. Culture revealed MSSA. Treated with dicloxacillin. Chronic bilateral leg swelling. Typically wears supportive stockings. Wounds to the right leg x2 months. Do not appear infected. Minimal drainage. No odor. No redness. No new swelling. Pain is sharp. Resolves within seconds. Occurs intermittently throughout the day. Leg Pain       Review of Systems   Constitutional:  Negative for fever. Respiratory:  Negative for shortness of breath. Cardiovascular:  Positive for leg swelling. Gastrointestinal:  Negative for nausea. Musculoskeletal:  Positive for myalgias. Skin:  Positive for wound. Objective   Physical Exam  Vitals and nursing note reviewed. Constitutional:       General: He is not in acute distress.

## 2023-08-25 ENCOUNTER — TELEPHONE (OUTPATIENT)
Dept: WOUND CARE | Age: 63
End: 2023-08-25

## 2023-08-31 ENCOUNTER — HOSPITAL ENCOUNTER (OUTPATIENT)
Dept: WOUND CARE | Age: 63
Discharge: HOME | End: 2023-08-31
Payer: COMMERCIAL

## 2023-08-31 VITALS
DIASTOLIC BLOOD PRESSURE: 83 MMHG | SYSTOLIC BLOOD PRESSURE: 179 MMHG | OXYGEN SATURATION: 99 % | BODY MASS INDEX: 44.55 KG/M2 | TEMPERATURE: 98 F | HEART RATE: 62 BPM | RESPIRATION RATE: 18 BRPM | HEIGHT: 66 IN

## 2023-08-31 DIAGNOSIS — I87.2 CHRONIC VENOUS STASIS DERMATITIS OF RIGHT LOWER EXTREMITY: ICD-10-CM

## 2023-08-31 DIAGNOSIS — I83.891 VENOUS STASIS ULCER OF RIGHT LOWER LEG WITH EDEMA OF RIGHT LOWER LEG (HCC): Primary | ICD-10-CM

## 2023-08-31 DIAGNOSIS — I83.019 VENOUS STASIS ULCER OF RIGHT LOWER LEG WITH EDEMA OF RIGHT LOWER LEG (HCC): Primary | ICD-10-CM

## 2023-08-31 DIAGNOSIS — R60.0 VENOUS STASIS ULCER OF RIGHT LOWER LEG WITH EDEMA OF RIGHT LOWER LEG (HCC): Primary | ICD-10-CM

## 2023-08-31 DIAGNOSIS — L97.919 VENOUS STASIS ULCER OF RIGHT LOWER LEG WITH EDEMA OF RIGHT LOWER LEG (HCC): Primary | ICD-10-CM

## 2023-08-31 PROCEDURE — 99204 OFFICE O/P NEW MOD 45 MIN: CPT | Performed by: NURSE PRACTITIONER

## 2023-08-31 PROCEDURE — 99213 OFFICE O/P EST LOW 20 MIN: CPT

## 2023-08-31 RX ORDER — BETAMETHASONE DIPROPIONATE 0.05 %
OINTMENT (GRAM) TOPICAL ONCE
OUTPATIENT
Start: 2023-08-31 | End: 2023-08-31

## 2023-08-31 RX ORDER — SODIUM CHLOR/HYPOCHLOROUS ACID 0.033 %
SOLUTION, IRRIGATION IRRIGATION ONCE
Status: CANCELLED | OUTPATIENT
Start: 2023-08-31 | End: 2023-08-31

## 2023-08-31 RX ORDER — BACITRACIN ZINC AND POLYMYXIN B SULFATE 500; 1000 [USP'U]/G; [USP'U]/G
OINTMENT TOPICAL ONCE
Status: CANCELLED | OUTPATIENT
Start: 2023-08-31 | End: 2023-08-31

## 2023-08-31 RX ORDER — GINSENG 100 MG
CAPSULE ORAL ONCE
OUTPATIENT
Start: 2023-08-31 | End: 2023-08-31

## 2023-08-31 RX ORDER — LIDOCAINE 40 MG/G
CREAM TOPICAL ONCE
OUTPATIENT
Start: 2023-08-31 | End: 2023-08-31

## 2023-08-31 RX ORDER — LIDOCAINE 50 MG/G
OINTMENT TOPICAL ONCE
OUTPATIENT
Start: 2023-08-31 | End: 2023-08-31

## 2023-08-31 RX ORDER — CLOBETASOL PROPIONATE 0.5 MG/G
OINTMENT TOPICAL ONCE
OUTPATIENT
Start: 2023-08-31 | End: 2023-08-31

## 2023-08-31 RX ORDER — IBUPROFEN 200 MG
TABLET ORAL ONCE
OUTPATIENT
Start: 2023-08-31 | End: 2023-08-31

## 2023-08-31 RX ORDER — GENTAMICIN SULFATE 1 MG/G
OINTMENT TOPICAL ONCE
OUTPATIENT
Start: 2023-08-31 | End: 2023-08-31

## 2023-08-31 RX ORDER — IBUPROFEN 200 MG
TABLET ORAL ONCE
Status: CANCELLED | OUTPATIENT
Start: 2023-08-31 | End: 2023-08-31

## 2023-08-31 RX ORDER — LIDOCAINE 40 MG/G
CREAM TOPICAL ONCE
Status: CANCELLED | OUTPATIENT
Start: 2023-08-31 | End: 2023-08-31

## 2023-08-31 RX ORDER — BETAMETHASONE DIPROPIONATE 0.05 %
OINTMENT (GRAM) TOPICAL ONCE
Status: CANCELLED | OUTPATIENT
Start: 2023-08-31 | End: 2023-08-31

## 2023-08-31 RX ORDER — AMMONIUM LACTATE 12 G/100G
LOTION TOPICAL
Qty: 1 EACH | Refills: 1 | Status: SHIPPED | OUTPATIENT
Start: 2023-08-31

## 2023-08-31 RX ORDER — LIDOCAINE HYDROCHLORIDE 20 MG/ML
JELLY TOPICAL ONCE
Status: CANCELLED | OUTPATIENT
Start: 2023-08-31 | End: 2023-08-31

## 2023-08-31 RX ORDER — BACITRACIN ZINC AND POLYMYXIN B SULFATE 500; 1000 [USP'U]/G; [USP'U]/G
OINTMENT TOPICAL ONCE
OUTPATIENT
Start: 2023-08-31 | End: 2023-08-31

## 2023-08-31 RX ORDER — LIDOCAINE HYDROCHLORIDE 40 MG/ML
SOLUTION TOPICAL ONCE
Status: CANCELLED | OUTPATIENT
Start: 2023-08-31 | End: 2023-08-31

## 2023-08-31 RX ORDER — CLOBETASOL PROPIONATE 0.5 MG/G
OINTMENT TOPICAL ONCE
Status: CANCELLED | OUTPATIENT
Start: 2023-08-31 | End: 2023-08-31

## 2023-08-31 RX ORDER — LIDOCAINE HYDROCHLORIDE 40 MG/ML
SOLUTION TOPICAL ONCE
OUTPATIENT
Start: 2023-08-31 | End: 2023-08-31

## 2023-08-31 RX ORDER — LIDOCAINE 50 MG/G
OINTMENT TOPICAL ONCE
Status: CANCELLED | OUTPATIENT
Start: 2023-08-31 | End: 2023-08-31

## 2023-08-31 RX ORDER — GENTAMICIN SULFATE 1 MG/G
OINTMENT TOPICAL ONCE
Status: CANCELLED | OUTPATIENT
Start: 2023-08-31 | End: 2023-08-31

## 2023-08-31 RX ORDER — SODIUM CHLOR/HYPOCHLOROUS ACID 0.033 %
SOLUTION, IRRIGATION IRRIGATION ONCE
OUTPATIENT
Start: 2023-08-31 | End: 2023-08-31

## 2023-08-31 RX ORDER — LIDOCAINE HYDROCHLORIDE 20 MG/ML
JELLY TOPICAL ONCE
OUTPATIENT
Start: 2023-08-31 | End: 2023-08-31

## 2023-08-31 RX ORDER — GINSENG 100 MG
CAPSULE ORAL ONCE
Status: CANCELLED | OUTPATIENT
Start: 2023-08-31 | End: 2023-08-31

## 2023-08-31 ASSESSMENT — PAIN DESCRIPTION - LOCATION: LOCATION: LEG

## 2023-08-31 ASSESSMENT — PAIN DESCRIPTION - ORIENTATION: ORIENTATION: RIGHT

## 2023-08-31 ASSESSMENT — PAIN DESCRIPTION - DESCRIPTORS: DESCRIPTORS: ACHING

## 2023-08-31 ASSESSMENT — PAIN SCALES - GENERAL: PAINLEVEL_OUTOF10: 5

## 2023-08-31 NOTE — PROGRESS NOTES
[address] [facility] [address] [facility] f: [phone] f: [phone] p: [phone] [email or web address]      Ordering Center:   [address] [facility] [address] [phone]  WOUND CARE Dept: [phone]   [facility] NUMBER [phone]    Patient Information:      [address] [facility] [address] [facility] [place] [facility]   [phone]   : 1960  AGE: 57 y.o. GENDER: male   EPISODE DATE: 2023    Insurance:      PRIMARY INSURANCE:  Plan: Ruby PARHAM BEVERLY  Coverage: [employer]  Effective Date: 2022  Group Number: [unfilled]  Subscriber Number: [id number] - (Commercial)    Payer/Plan Subscr  Sex Relation Sub. Ins. ID Effective Group Num   1. AMBIKA Harrell Lis 1960 Male Self [id number]                                   [address]       Patient Wound Information:      Problem List Items Addressed This Visit          Other    Venous stasis ulcer of right lower leg with edema of right lower leg [facility]) - Primary    Relevant Orders    Initiate Outpatient Wound Care Protocol       WOUNDS REQUIRING DRESSING SUPPLIES:     Wound 23 Leg Right;Lateral;Lower (Active)   Wound Image   23 0906   Wound Etiology Venous 23 0906   Dressing Status Clean;Dry; Intact; New dressing applied 23 0906   Wound Cleansed Cleansed with saline 23 0906   Dressing/Treatment Triad hydro/zinc oxide-based hydrophilic paste  3184   Offloading for Diabetic Foot Ulcers Offloading not required 23 0906   Wound Length (cm) 4 cm 23 0906   Wound Width (cm) 2 cm 23 0906   Wound Depth (cm) 0.2 cm 23 0906   Wound Surface Area (cm^2) 8 cm^2 23 0906   Wound Volume (cm^3) 1.6 cm^3 23   Wound Assessment Granulation tissue;Slough 23   Drainage Amount Moderate (25-50%) 23   Drainage Description

## 2023-08-31 NOTE — PROGRESS NOTES
Compression Garments:  Wound care orders will come in a separate order. Supply Company for [employer]:     [address] [facility] Final [address] [facility] [place], [address] [facility] f: [phone] f: [phone] p: [phone] [email or web address]      Ordering Center:     [facility]  [address]  [facility] [address]  [phone]  WOUND CARE Dept: [phone]   [facility] NUMBER [phone]    Patient Information:      [address] [facility] [address]  [facility]   [phone]   : 1960  AGE: 57 y.o. GENDER: male   TODAYS DATE:  2023    Insurance:      PRIMARY INSURANCE:  Plan: Barnet Gitelman [facility]  Coverage: [employer]ELANIA  Effective Date: 2022  Group Number: [unfilled]  Subscriber Number: [id number] - (Commercial)    Payer/Plan Subscr  Sex Relation Sub. Ins. ID Effective Group Num   1. AMBIKA Chavez Corral 1960 Male Self [id number]                                   [address]       Patient Information:      Problem List Items Addressed This Visit          Other    Venous stasis ulcer of right lower leg with edema of right lower leg ([address]) - Primary    Relevant Orders    Initiate Outpatient Wound Care Protocol    Chronic venous stasis dermatitis of right lower extremity    Relevant Medications    ammonium lactate (LAC-HYDRIN) 12 % lotion       Wound 23 Leg Right;Lateral;Lower (Active)   Wound Image   23 09   Wound Etiology Venous 23   Dressing Status Clean;Dry; Intact; New dressing applied 23   Wound Cleansed Cleansed with saline 23   Dressing/Treatment Triad hydro/zinc oxide-based hydrophilic paste  5832   Offloading for Diabetic Foot Ulcers Offloading not required 23   Wound Length (cm) 4 cm 23   Wound Width (cm) 2 cm 08/31/23 0906   Wound Depth (cm) 0.2 cm 23 0906   Wound Surface Area (cm^2) 8 cm^2 23 0906   Wound Volume

## 2023-08-31 NOTE — DISCHARGE INSTRUCTIONS
Visit Discharge/Physician Orders:  -keep legs elevated, at much as you can  -use lotion for itching daily  -wear compression sock over top of dressings       Wound Location: right lower leg x 2 open areas     Dressing orders:     1) Gather wound care supplies and arrange on clean table. 2) Wash your hands with soap and water or use alcohol based hand  for 20 seconds (sing \"Happy Birthday\" twice). 3) Cleanse wounds with normal saline or wound cleanser and gauze. Pat dry with clean gauze. 4) Lower right leg lateral- small amount of triad to wound bed and excel SAP 4x4 , change daily   Lower right leg anterior leg- small amount of triad to wound bed and excel SAP 6x7, change daily     Keep all dressings clean & dry. Follow up visit:   3 Weeks Sept 20 at 1:30 pm     Supplies:    Duration of dressings: 30      We have sent your supply order to the following company:  [employer] Phone # [phone]   If you don't receive the items you were expecting or don't know what the items are that you received, call the company where the order was sent. If you are unable to obtain wound supplies, continue to use the supplies you have available until you are able to reach us. It is most important to keep the wound covered at all times. It is YOUR responsibility to make sure that supplies are re-ordered before you run out. Re-order telephone numbers are included in each package. Keep next scheduled appointment. Please give 24 hour notice if unable to keep appointment. [phone]    If you experience any of the following, please call the Wound Care Service during business hours: Monday through Friday 8:00 am - 4:30 pm  ([phone]).    *Increase in pain   *Temperature over 101   *Increase in drainage from your wound or a foul odor   *Uncontrolled swelling   *Need for compression bandage changes due to slippage, breakthrough drainage    If you need medical attention outside of business hours, please contact
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
## 2023-08-31 NOTE — PLAN OF CARE
Problem: Wound:  Goal: Will show signs of wound healing; wound closure and no evidence of infection  Description: Will show signs of wound healing; wound closure and no evidence of infection  Outcome: Progressing  Seen today for bilateral leg wound. See avs for discharge. Care plan reviewed with patient. Patient  verbalize understanding of the plan of care and contribute to goal setting.

## 2023-09-05 ENCOUNTER — TELEPHONE (OUTPATIENT)
Dept: WOUND CARE | Age: 63
End: 2023-09-05

## 2023-09-05 DIAGNOSIS — R60.0 VENOUS STASIS ULCER OF RIGHT LOWER LEG WITH EDEMA OF RIGHT LOWER LEG (HCC): Primary | ICD-10-CM

## 2023-09-05 DIAGNOSIS — L97.919 VENOUS STASIS ULCER OF RIGHT LOWER LEG WITH EDEMA OF RIGHT LOWER LEG (HCC): Primary | ICD-10-CM

## 2023-09-05 DIAGNOSIS — I83.019 VENOUS STASIS ULCER OF RIGHT LOWER LEG WITH EDEMA OF RIGHT LOWER LEG (HCC): Primary | ICD-10-CM

## 2023-09-05 DIAGNOSIS — I83.891 VENOUS STASIS ULCER OF RIGHT LOWER LEG WITH EDEMA OF RIGHT LOWER LEG (HCC): Primary | ICD-10-CM

## 2023-09-05 NOTE — TELEPHONE ENCOUNTER
Patient called regarding dressing supplies that were ordered last week at his visit. Patient states they called and told him he has an $8000 deductible that has not been met and it would be $841 for a 1 month supply of triad paste and excel foams. Discussed with provider. Provider gave orders to use triad paste and roll gauze daily until his appointment on 9/20/23 and we can reevaluate dressings at that time. Patient states he will stop up tomorrow to around 4pm to grab some samples.

## 2023-09-20 ENCOUNTER — HOSPITAL ENCOUNTER (OUTPATIENT)
Dept: WOUND CARE | Age: 63
Discharge: HOME | End: 2023-09-20
Payer: COMMERCIAL

## 2023-09-20 ENCOUNTER — OFFICE VISIT (OUTPATIENT)
Dept: FAMILY MEDICINE CLINIC | Age: 63
End: 2023-09-20
Payer: COMMERCIAL

## 2023-09-20 VITALS
DIASTOLIC BLOOD PRESSURE: 82 MMHG | HEIGHT: 66 IN | WEIGHT: 270 LBS | SYSTOLIC BLOOD PRESSURE: 134 MMHG | HEART RATE: 78 BPM | RESPIRATION RATE: 16 BRPM | BODY MASS INDEX: 43.39 KG/M2

## 2023-09-20 DIAGNOSIS — I10 ESSENTIAL HYPERTENSION: ICD-10-CM

## 2023-09-20 DIAGNOSIS — I87.2 CHRONIC VENOUS STASIS DERMATITIS OF RIGHT LOWER EXTREMITY: ICD-10-CM

## 2023-09-20 DIAGNOSIS — Z00.00 WELL ADULT EXAM: Primary | ICD-10-CM

## 2023-09-20 DIAGNOSIS — R60.0 VENOUS STASIS ULCER OF RIGHT LOWER LEG WITH EDEMA OF RIGHT LOWER LEG (HCC): Primary | ICD-10-CM

## 2023-09-20 DIAGNOSIS — I83.891 VENOUS STASIS ULCER OF RIGHT LOWER LEG WITH EDEMA OF RIGHT LOWER LEG (HCC): Primary | ICD-10-CM

## 2023-09-20 DIAGNOSIS — Z13.220 SCREENING FOR HYPERCHOLESTEROLEMIA: ICD-10-CM

## 2023-09-20 DIAGNOSIS — L97.919 VENOUS STASIS ULCER OF RIGHT LOWER LEG WITH EDEMA OF RIGHT LOWER LEG (HCC): Primary | ICD-10-CM

## 2023-09-20 DIAGNOSIS — I83.019 VENOUS STASIS ULCER OF RIGHT LOWER LEG WITH EDEMA OF RIGHT LOWER LEG (HCC): Primary | ICD-10-CM

## 2023-09-20 DIAGNOSIS — R35.1 NOCTURIA: ICD-10-CM

## 2023-09-20 PROCEDURE — 3075F SYST BP GE 130 - 139MM HG: CPT | Performed by: NURSE PRACTITIONER

## 2023-09-20 PROCEDURE — 11042 DBRDMT SUBQ TIS 1ST 20SQCM/<: CPT

## 2023-09-20 PROCEDURE — 3079F DIAST BP 80-89 MM HG: CPT | Performed by: NURSE PRACTITIONER

## 2023-09-20 PROCEDURE — 17250 CHEM CAUT OF GRANLTJ TISSUE: CPT

## 2023-09-20 PROCEDURE — 99396 PREV VISIT EST AGE 40-64: CPT | Performed by: NURSE PRACTITIONER

## 2023-09-20 PROCEDURE — 6370000000 HC RX 637 (ALT 250 FOR IP): Performed by: NURSE PRACTITIONER

## 2023-09-20 RX ORDER — GENTAMICIN SULFATE 1 MG/G
OINTMENT TOPICAL ONCE
OUTPATIENT
Start: 2023-09-20 | End: 2023-09-20

## 2023-09-20 RX ORDER — LIDOCAINE HYDROCHLORIDE 20 MG/ML
JELLY TOPICAL ONCE
OUTPATIENT
Start: 2023-09-20 | End: 2023-09-20

## 2023-09-20 RX ORDER — LIDOCAINE HYDROCHLORIDE 20 MG/ML
JELLY TOPICAL ONCE
Status: COMPLETED | OUTPATIENT
Start: 2023-09-20 | End: 2023-09-20

## 2023-09-20 RX ORDER — IBUPROFEN 200 MG
TABLET ORAL ONCE
OUTPATIENT
Start: 2023-09-20 | End: 2023-09-20

## 2023-09-20 RX ORDER — CLOBETASOL PROPIONATE 0.5 MG/G
OINTMENT TOPICAL ONCE
OUTPATIENT
Start: 2023-09-20 | End: 2023-09-20

## 2023-09-20 RX ORDER — LIDOCAINE HYDROCHLORIDE 40 MG/ML
SOLUTION TOPICAL ONCE
OUTPATIENT
Start: 2023-09-20 | End: 2023-09-20

## 2023-09-20 RX ORDER — SODIUM CHLOR/HYPOCHLOROUS ACID 0.033 %
SOLUTION, IRRIGATION IRRIGATION ONCE
OUTPATIENT
Start: 2023-09-20 | End: 2023-09-20

## 2023-09-20 RX ORDER — LIDOCAINE 50 MG/G
OINTMENT TOPICAL ONCE
OUTPATIENT
Start: 2023-09-20 | End: 2023-09-20

## 2023-09-20 RX ORDER — BETAMETHASONE DIPROPIONATE 0.05 %
OINTMENT (GRAM) TOPICAL ONCE
OUTPATIENT
Start: 2023-09-20 | End: 2023-09-20

## 2023-09-20 RX ORDER — GINSENG 100 MG
CAPSULE ORAL ONCE
OUTPATIENT
Start: 2023-09-20 | End: 2023-09-20

## 2023-09-20 RX ORDER — BACITRACIN ZINC AND POLYMYXIN B SULFATE 500; 1000 [USP'U]/G; [USP'U]/G
OINTMENT TOPICAL ONCE
OUTPATIENT
Start: 2023-09-20 | End: 2023-09-20

## 2023-09-20 RX ORDER — LIDOCAINE 40 MG/G
CREAM TOPICAL ONCE
Status: DISCONTINUED | OUTPATIENT
Start: 2023-09-20 | End: 2023-09-20

## 2023-09-20 RX ORDER — TRIAMCINOLONE ACETONIDE 1 MG/G
OINTMENT TOPICAL ONCE
OUTPATIENT
Start: 2023-09-20 | End: 2023-09-20

## 2023-09-20 RX ORDER — LIDOCAINE 40 MG/G
CREAM TOPICAL ONCE
OUTPATIENT
Start: 2023-09-20 | End: 2023-09-20

## 2023-09-20 RX ADMIN — LIDOCAINE HYDROCHLORIDE: 20 JELLY TOPICAL at 13:37

## 2023-09-20 RX ADMIN — SILVER NITRATE APPLICATORS 1 EACH: 25; 75 STICK TOPICAL at 13:46

## 2023-09-20 ASSESSMENT — ENCOUNTER SYMPTOMS: SHORTNESS OF BREATH: 0

## 2023-09-20 NOTE — PROGRESS NOTES
Serosanguinous 09/20/23 1315   Odor None 09/20/23 1315   Maira-wound Assessment Dry/flaky; Hemosiderin staining (brown yellow); Hypopigmented 09/20/23 1315   Margins Attached edges 09/20/23 1315   Wound Thickness Description not for Pressure Injury Full thickness 09/20/23 1315   Number of days: 20        Total Surface Area Debrided:  12.6 sq cm   Estimated Blood Loss: Minimal amount blood loss . Hemostasis Achieved:  by silver nitrate stick  Procedural Pain: 0  / 10   Post Procedural Pain: 0 / 10   Response to treatment: Patient tolerated procedure well with no complaints of pain. Written patient dismissal instructions given to patient and signed by patient or POA. Patient voiced understanding that the importance of adherence to instructions is paramount to wound healing improvement or success. Discharge Instructions         Visit Discharge/Physician Orders:  -keep legs elevated, at much as you can  -wear compression sock over top of dressings  -Silver nitrate applied to wound on right lateral leg today, you will see gray drainage and this is normal.    -Use wound cleanser before putting new dressings on        Wound Location: right lower leg x 2 open areas      Dressing orders:      1) Gather wound care supplies and arrange on clean table. 2) Wash your hands with soap and water or use alcohol based hand  for 20 seconds (sing \"Happy Birthday\" twice). 3) Cleanse wounds with normal saline or wound cleanser and gauze. Pat dry with clean gauze. 4) Lower right leg lateral- small amount of triad to wound bed and 4x4 gauze then roll gauze change daily apply compression stockings in the morning and remove at bedtime   Lower right leg anterior leg- small amount of triad to wound bed and 4x4 gauze then roll gauze, change daily compression stockings in the morning and remove at bedtime      Keep all dressings clean & dry.      Follow up visit:   3 Weeks 10/9/23 @ 3 pm      Supplies:     Duration of

## 2023-09-20 NOTE — PLAN OF CARE
Problem: Wound:  Goal: Will show signs of wound healing; wound closure and no evidence of infection  Description: Will show signs of wound healing; wound closure and no evidence of infection  Outcome: Progressing     Patient seen at the wound clinic today for evaluation of right leg wounds, please see AVS. Care plan reviewed with patient. Patient verbalizes understanding of the plan of care and contribute to goal setting.

## 2023-09-25 ENCOUNTER — NURSE ONLY (OUTPATIENT)
Dept: FAMILY MEDICINE CLINIC | Age: 63
End: 2023-09-25
Payer: COMMERCIAL

## 2023-09-25 DIAGNOSIS — R35.1 NOCTURIA: ICD-10-CM

## 2023-09-25 DIAGNOSIS — Z13.220 SCREENING FOR HYPERCHOLESTEROLEMIA: ICD-10-CM

## 2023-09-25 LAB
CHOLEST SERPL-MCNC: 194 MG/DL (ref 100–199)
HDLC SERPL-MCNC: 89 MG/DL
LDLC SERPL CALC-MCNC: 97 MG/DL
PSA SERPL-MCNC: 0.87 NG/ML (ref 0–1)
TRIGL SERPL-MCNC: 38 MG/DL (ref 0–199)

## 2023-09-25 PROCEDURE — 36415 COLL VENOUS BLD VENIPUNCTURE: CPT | Performed by: NURSE PRACTITIONER

## 2023-09-26 ENCOUNTER — TELEPHONE (OUTPATIENT)
Dept: FAMILY MEDICINE CLINIC | Age: 63
End: 2023-09-26

## 2023-09-26 DIAGNOSIS — E78.00 HYPERCHOLESTEROLEMIA: Primary | ICD-10-CM

## 2023-09-28 RX ORDER — ATORVASTATIN CALCIUM 20 MG/1
20 TABLET, FILM COATED ORAL DAILY
Qty: 90 TABLET | Refills: 0 | Status: SHIPPED | OUTPATIENT
Start: 2023-09-28 | End: 2023-12-27

## 2023-10-07 NOTE — TELEPHONE ENCOUNTER
----- Message from JAZZY Mcqueen CNP sent at 9/26/2023  8:34 AM EDT -----  PSA is stable, cholesterol okay but ascvd risk score is elevated at 10.3%. recommend starting statin therapy to reduce risk of mi and stroke.      The 10-year ASCVD risk score (Anne CONDE, et al., 2019) is: 10.3%    Values used to calculate the score:      Age: 57 years      Sex: Male      Is Non- : No      Diabetic: No      Tobacco smoker: No      Systolic Blood Pressure: 638 mmHg      Is BP treated: Yes      HDL Cholesterol: 89 mg/dL      Total Cholesterol: 194 mg/dL
Detailed message lrft for patient, encourage patient to return call if interested in statin therapy.
Lab order mailed to patient
Medication ep'ed to requested pharmacy. Follow up blood work ordered.
Patient called back stating he is interested in cholesterol medication
given to parent in ER by ER staff during admission time/patient representative

## 2023-10-09 ENCOUNTER — HOSPITAL ENCOUNTER (OUTPATIENT)
Dept: WOUND CARE | Age: 63
Discharge: HOME | End: 2023-10-09
Payer: COMMERCIAL

## 2023-10-09 VITALS
DIASTOLIC BLOOD PRESSURE: 75 MMHG | OXYGEN SATURATION: 96 % | HEART RATE: 87 BPM | SYSTOLIC BLOOD PRESSURE: 170 MMHG | TEMPERATURE: 98.2 F | RESPIRATION RATE: 18 BRPM

## 2023-10-09 DIAGNOSIS — R60.0 VENOUS STASIS ULCER OF RIGHT LOWER LEG WITH EDEMA OF RIGHT LOWER LEG (HCC): Primary | ICD-10-CM

## 2023-10-09 DIAGNOSIS — I83.891 VENOUS STASIS ULCER OF RIGHT LOWER LEG WITH EDEMA OF RIGHT LOWER LEG (HCC): Primary | ICD-10-CM

## 2023-10-09 DIAGNOSIS — I83.019 VENOUS STASIS ULCER OF RIGHT LOWER LEG WITH EDEMA OF RIGHT LOWER LEG (HCC): Primary | ICD-10-CM

## 2023-10-09 DIAGNOSIS — L97.919 VENOUS STASIS ULCER OF RIGHT LOWER LEG WITH EDEMA OF RIGHT LOWER LEG (HCC): Primary | ICD-10-CM

## 2023-10-09 PROCEDURE — 97597 DBRDMT OPN WND 1ST 20 CM/<: CPT

## 2023-10-09 PROCEDURE — 6370000000 HC RX 637 (ALT 250 FOR IP): Performed by: NURSE PRACTITIONER

## 2023-10-09 RX ORDER — LIDOCAINE HYDROCHLORIDE 20 MG/ML
JELLY TOPICAL ONCE
OUTPATIENT
Start: 2023-10-09 | End: 2023-10-09

## 2023-10-09 RX ORDER — LIDOCAINE 40 MG/G
CREAM TOPICAL ONCE
OUTPATIENT
Start: 2023-10-09 | End: 2023-10-09

## 2023-10-09 RX ORDER — TRIAMCINOLONE ACETONIDE 1 MG/G
OINTMENT TOPICAL ONCE
OUTPATIENT
Start: 2023-10-09 | End: 2023-10-09

## 2023-10-09 RX ORDER — BETAMETHASONE DIPROPIONATE 0.05 %
OINTMENT (GRAM) TOPICAL ONCE
OUTPATIENT
Start: 2023-10-09 | End: 2023-10-09

## 2023-10-09 RX ORDER — SODIUM CHLOR/HYPOCHLOROUS ACID 0.033 %
SOLUTION, IRRIGATION IRRIGATION ONCE
OUTPATIENT
Start: 2023-10-09 | End: 2023-10-09

## 2023-10-09 RX ORDER — LIDOCAINE HYDROCHLORIDE 40 MG/ML
SOLUTION TOPICAL ONCE
OUTPATIENT
Start: 2023-10-09 | End: 2023-10-09

## 2023-10-09 RX ORDER — LIDOCAINE HYDROCHLORIDE 20 MG/ML
JELLY TOPICAL ONCE
Status: COMPLETED | OUTPATIENT
Start: 2023-10-09 | End: 2023-10-09

## 2023-10-09 RX ORDER — GINSENG 100 MG
CAPSULE ORAL ONCE
OUTPATIENT
Start: 2023-10-09 | End: 2023-10-09

## 2023-10-09 RX ORDER — IBUPROFEN 200 MG
TABLET ORAL ONCE
OUTPATIENT
Start: 2023-10-09 | End: 2023-10-09

## 2023-10-09 RX ORDER — LIDOCAINE 50 MG/G
OINTMENT TOPICAL ONCE
OUTPATIENT
Start: 2023-10-09 | End: 2023-10-09

## 2023-10-09 RX ORDER — GENTAMICIN SULFATE 1 MG/G
OINTMENT TOPICAL ONCE
OUTPATIENT
Start: 2023-10-09 | End: 2023-10-09

## 2023-10-09 RX ORDER — BACITRACIN ZINC AND POLYMYXIN B SULFATE 500; 1000 [USP'U]/G; [USP'U]/G
OINTMENT TOPICAL ONCE
OUTPATIENT
Start: 2023-10-09 | End: 2023-10-09

## 2023-10-09 RX ORDER — LIDOCAINE HYDROCHLORIDE 20 MG/ML
JELLY TOPICAL ONCE
Status: DISCONTINUED | OUTPATIENT
Start: 2023-10-09 | End: 2023-10-10 | Stop reason: HOSPADM

## 2023-10-09 RX ORDER — CLOBETASOL PROPIONATE 0.5 MG/G
OINTMENT TOPICAL ONCE
OUTPATIENT
Start: 2023-10-09 | End: 2023-10-09

## 2023-10-09 RX ADMIN — LIDOCAINE HYDROCHLORIDE: 20 JELLY TOPICAL at 14:57

## 2023-10-09 ASSESSMENT — PAIN SCALES - GENERAL: PAINLEVEL_OUTOF10: 2

## 2023-10-09 NOTE — PATIENT INSTRUCTIONS
Visit Discharge/Physician Orders:  -bilateral wounds debrided today  -keep legs elevated, at much as you can  -wear compression sock over top of dressings  -Use wound cleanser before putting new dressings on          Wound Location: right lower leg x 2 open areas      Dressing orders:      1) Gather wound care supplies and arrange on clean table. 2) Wash your hands with soap and water or use alcohol based hand  for 20 seconds (sing \"Happy Birthday\" twice). 3) Cleanse wounds with normal saline or wound cleanser and gauze. Pat dry with clean gauze. 4) Lower right leg lateral- small amount of triad to wound bed and 4x4 gauze, ace wrap,  change daily apply compression stockings in the morning and remove at bedtime   Lower right leg anterior leg- small amount of triad to wound bed and 4x4 gauze, ace wrap, change daily compression stockings in the morning and remove at bedtime      Keep all dressings clean & dry. Follow up visit:   3 weeks 10-30-23 at 3:00 pm      Supplies:     Duration of dressings: 30                     We have sent your supply order to the following company:  [employer] Phone # [phone]   If you don't receive the items you were expecting or don't know what the items are that you received, call the company where the order was sent. If you are unable to obtain wound supplies, continue to use the supplies you have available until you are able to reach us. It is most important to keep the wound covered at all times. It is YOUR responsibility to make sure that supplies are re-ordered before you run out. Re-order telephone numbers are included in each package. Keep next scheduled appointment. Please give 24 hour notice if unable to keep appointment. [phone]     If you experience any of the following, please call the Wound Care Service during business hours: Monday through Friday 8:00 am - 4:30 pm  ([phone]).               *Increase in pain

## 2023-10-09 NOTE — PLAN OF CARE
Problem: Wound:  Goal: Will show signs of wound healing; wound closure and no evidence of infection  Description: Will show signs of wound healing; wound closure and no evidence of infection  Outcome: Progressing   Seen today for right leg. See avs for discharge. Patient denies the need for dressing supplies at this time. Care plan reviewed with patient. Patient verbalize understanding of the plan of care and contribute to goal setting.

## 2023-10-09 NOTE — PROGRESS NOTES
management: lacHydrin daily as needed to both legs    Risk of complications and/or mortality of patient management: This patient has a low risk of morbidity and mortality from additional diagnostic testing or treatment. This is due to the above conditions affecting wound healing as well as patient and procedure risk factors. Education and discussion held with patient regarding these disease processes pertinent to wound(s). Other pertinent decisions include: N/A . The patient's diagnosis or treatment is not significantly limited by social determinants of health as noted by: N/A . Discussion of management or test interpretation with N/A. Procedures done this visit:   Procedure Note  Indications: Based on my examination of this patient's wound(s)/ulcer(s) today, debridement is required to promote healing and evaluate the wound base. Debridement: Selective Debridement/Non-Excisional Debridement    Using: curette the wound(s)/ulcer(s) was/were debrided down through and including the removal of epidermis and dermis. Performed by: JAZZY Alicia CNP  Consent obtained: Yes  Time out taken: Yes  Pain Control:   Lidocaine gel  Devitalized Tissue Debrided: fibrin, biofilm, slough, and exudate    Pre Debridement Measurements:  Are located in the [place]  Documentation Flow Sheet    Diabetic/Pressure/Non Pressure Ulcers only:  Ulcer: Non-Pressure ulcer, limited to breakdown of skin     Wound/Ulcer #: 1 and 2  Post Debridement Measurements:  Wound/Ulcer Descriptions are Pre Debridement except measurements:  Wound 08/31/23 Leg Right;Lateral;Lower (Active)   Wound Image   10/09/23 1445   Wound Etiology Venous 10/09/23 1445   Dressing Status New dressing applied 10/09/23 1445   Wound Cleansed Cleansed with saline 10/09/23 1445   Dressing/Treatment Dry dressing; Ace wrap;Triad hydro/zinc oxide-based hydrophilic paste 33/23/68 1196   Offloading for Diabetic Foot Ulcers Offloading not required 10/09/23

## 2023-10-30 ENCOUNTER — HOSPITAL ENCOUNTER (OUTPATIENT)
Dept: WOUND CARE | Age: 63
Discharge: HOME | End: 2023-10-30
Payer: COMMERCIAL

## 2023-10-30 VITALS
DIASTOLIC BLOOD PRESSURE: 77 MMHG | SYSTOLIC BLOOD PRESSURE: 174 MMHG | TEMPERATURE: 97.6 F | OXYGEN SATURATION: 98 % | RESPIRATION RATE: 18 BRPM | HEART RATE: 73 BPM

## 2023-10-30 DIAGNOSIS — L97.919 VENOUS STASIS ULCER OF RIGHT LOWER LEG WITH EDEMA OF RIGHT LOWER LEG (HCC): Primary | ICD-10-CM

## 2023-10-30 DIAGNOSIS — I83.891 VENOUS STASIS ULCER OF RIGHT LOWER LEG WITH EDEMA OF RIGHT LOWER LEG (HCC): Primary | ICD-10-CM

## 2023-10-30 DIAGNOSIS — R60.0 VENOUS STASIS ULCER OF RIGHT LOWER LEG WITH EDEMA OF RIGHT LOWER LEG (HCC): Primary | ICD-10-CM

## 2023-10-30 DIAGNOSIS — I83.019 VENOUS STASIS ULCER OF RIGHT LOWER LEG WITH EDEMA OF RIGHT LOWER LEG (HCC): Primary | ICD-10-CM

## 2023-10-30 PROCEDURE — 99213 OFFICE O/P EST LOW 20 MIN: CPT

## 2023-10-30 PROCEDURE — 99213 OFFICE O/P EST LOW 20 MIN: CPT | Performed by: NURSE PRACTITIONER

## 2023-10-30 RX ORDER — TRIAMCINOLONE ACETONIDE 1 MG/G
OINTMENT TOPICAL ONCE
OUTPATIENT
Start: 2023-10-30 | End: 2023-10-30

## 2023-10-30 RX ORDER — GENTAMICIN SULFATE 1 MG/G
OINTMENT TOPICAL ONCE
OUTPATIENT
Start: 2023-10-30 | End: 2023-10-30

## 2023-10-30 RX ORDER — LIDOCAINE HYDROCHLORIDE 20 MG/ML
JELLY TOPICAL ONCE
OUTPATIENT
Start: 2023-10-30 | End: 2023-10-30

## 2023-10-30 RX ORDER — LIDOCAINE HYDROCHLORIDE 40 MG/ML
SOLUTION TOPICAL ONCE
OUTPATIENT
Start: 2023-10-30 | End: 2023-10-30

## 2023-10-30 RX ORDER — SODIUM CHLOR/HYPOCHLOROUS ACID 0.033 %
SOLUTION, IRRIGATION IRRIGATION ONCE
OUTPATIENT
Start: 2023-10-30 | End: 2023-10-30

## 2023-10-30 RX ORDER — GINSENG 100 MG
CAPSULE ORAL ONCE
OUTPATIENT
Start: 2023-10-30 | End: 2023-10-30

## 2023-10-30 RX ORDER — IBUPROFEN 200 MG
TABLET ORAL ONCE
OUTPATIENT
Start: 2023-10-30 | End: 2023-10-30

## 2023-10-30 RX ORDER — LIDOCAINE 50 MG/G
OINTMENT TOPICAL ONCE
OUTPATIENT
Start: 2023-10-30 | End: 2023-10-30

## 2023-10-30 RX ORDER — CLOBETASOL PROPIONATE 0.5 MG/G
OINTMENT TOPICAL ONCE
OUTPATIENT
Start: 2023-10-30 | End: 2023-10-30

## 2023-10-30 RX ORDER — BETAMETHASONE DIPROPIONATE 0.05 %
OINTMENT (GRAM) TOPICAL ONCE
OUTPATIENT
Start: 2023-10-30 | End: 2023-10-30

## 2023-10-30 RX ORDER — BACITRACIN ZINC AND POLYMYXIN B SULFATE 500; 1000 [USP'U]/G; [USP'U]/G
OINTMENT TOPICAL ONCE
OUTPATIENT
Start: 2023-10-30 | End: 2023-10-30

## 2023-10-30 RX ORDER — LIDOCAINE 40 MG/G
CREAM TOPICAL ONCE
OUTPATIENT
Start: 2023-10-30 | End: 2023-10-30
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
## 2023-10-30 NOTE — PATIENT INSTRUCTIONS
Visit Discharge/Physician Orders:  -keep legs elevated, at much as you can  -wear compression sock over top of dressings  -Use wound cleanser before putting new dressings on           Wound Location: right lower leg x 2 open areas      Dressing orders:      1) Gather wound care supplies and arrange on clean table. 2) Wash your hands with soap and water or use alcohol based hand  for 20 seconds (sing \"Happy Birthday\" twice). 3) Cleanse wounds with normal saline or wound cleanser and gauze. Pat dry with clean gauze. 4) Lower right leg lateral- small amount of triad to wound bed and 4x4 gauze, ace wrap,  change daily apply compression stockings in the morning and remove at bedtime   Lower right leg anterior leg- small amount of triad to wound bed and 4x4 gauze, ace wrap, change daily compression stockings in the morning and remove at bedtime      Keep all dressings clean & dry. Follow up visit:   2 weeks Monday November 13 at 2:30 pm      Supplies:     Duration of dressings: 30                     We have sent your supply order to the following company:  [employer] Phone # [phone]   If you don't receive the items you were expecting or don't know what the items are that you received, call the company where the order was sent. If you are unable to obtain wound supplies, continue to use the supplies you have available until you are able to reach us. It is most important to keep the wound covered at all times. It is YOUR responsibility to make sure that supplies are re-ordered before you run out. Re-order telephone numbers are included in each package. Keep next scheduled appointment. Please give 24 hour notice if unable to keep appointment. [phone]     If you experience any of the following, please call the Wound Care Service during business hours: Monday through Friday 8:00 am - 4:30 pm  ([phone]).               *Increase in pain              *Temperature over 101

## 2023-10-31 ENCOUNTER — TELEPHONE (OUTPATIENT)
Dept: FAMILY MEDICINE CLINIC | Age: 63
End: 2023-10-31

## 2023-10-31 NOTE — TELEPHONE ENCOUNTER
Pt wife called, she was working on forms for Pt work, discussed Pt medications and problems. All questions answered.

## 2023-10-31 NOTE — TELEPHONE ENCOUNTER
Spoke to wife, patient is applying for new insurance coverage. We do not have forms, she states she needs the prognosis on his diagnosis-  HTN  HLD  Osteoarthritis  Edema    There is no options, just a free text box.  This is for patient to get approved for medical coverage

## 2023-10-31 NOTE — TELEPHONE ENCOUNTER
Pt wife called, to complete form for work, she needs a prognosis on each of patients problems.  Please advise

## 2023-11-13 ENCOUNTER — HOSPITAL ENCOUNTER (OUTPATIENT)
Dept: WOUND CARE | Age: 63
Discharge: HOME | End: 2023-11-13
Payer: MEDICARE

## 2023-11-13 VITALS
TEMPERATURE: 97.7 F | RESPIRATION RATE: 18 BRPM | SYSTOLIC BLOOD PRESSURE: 166 MMHG | HEART RATE: 62 BPM | DIASTOLIC BLOOD PRESSURE: 84 MMHG | OXYGEN SATURATION: 96 %

## 2023-11-13 DIAGNOSIS — L97.919 VENOUS STASIS ULCER OF RIGHT LOWER LEG WITH EDEMA OF RIGHT LOWER LEG (HCC): Primary | ICD-10-CM

## 2023-11-13 DIAGNOSIS — I87.2 CHRONIC VENOUS STASIS DERMATITIS OF RIGHT LOWER EXTREMITY: ICD-10-CM

## 2023-11-13 DIAGNOSIS — R60.0 VENOUS STASIS ULCER OF RIGHT LOWER LEG WITH EDEMA OF RIGHT LOWER LEG (HCC): Primary | ICD-10-CM

## 2023-11-13 DIAGNOSIS — I83.891 VENOUS STASIS ULCER OF RIGHT LOWER LEG WITH EDEMA OF RIGHT LOWER LEG (HCC): Primary | ICD-10-CM

## 2023-11-13 DIAGNOSIS — I83.019 VENOUS STASIS ULCER OF RIGHT LOWER LEG WITH EDEMA OF RIGHT LOWER LEG (HCC): Primary | ICD-10-CM

## 2023-11-13 PROCEDURE — 99213 OFFICE O/P EST LOW 20 MIN: CPT | Performed by: NURSE PRACTITIONER

## 2023-11-13 PROCEDURE — 97597 DBRDMT OPN WND 1ST 20 CM/<: CPT | Performed by: NURSE PRACTITIONER

## 2023-11-13 PROCEDURE — 6370000000 HC RX 637 (ALT 250 FOR IP): Performed by: NURSE PRACTITIONER

## 2023-11-13 PROCEDURE — 97597 DBRDMT OPN WND 1ST 20 CM/<: CPT

## 2023-11-13 RX ORDER — BACITRACIN ZINC AND POLYMYXIN B SULFATE 500; 1000 [USP'U]/G; [USP'U]/G
OINTMENT TOPICAL ONCE
OUTPATIENT
Start: 2023-11-13 | End: 2023-11-13

## 2023-11-13 RX ORDER — GINSENG 100 MG
CAPSULE ORAL ONCE
OUTPATIENT
Start: 2023-11-13 | End: 2023-11-13

## 2023-11-13 RX ORDER — GENTAMICIN SULFATE 1 MG/G
OINTMENT TOPICAL ONCE
OUTPATIENT
Start: 2023-11-13 | End: 2023-11-13

## 2023-11-13 RX ORDER — CLOBETASOL PROPIONATE 0.5 MG/G
OINTMENT TOPICAL ONCE
OUTPATIENT
Start: 2023-11-13 | End: 2023-11-13

## 2023-11-13 RX ORDER — LIDOCAINE 50 MG/G
OINTMENT TOPICAL ONCE
OUTPATIENT
Start: 2023-11-13 | End: 2023-11-13

## 2023-11-13 RX ORDER — LIDOCAINE HYDROCHLORIDE 20 MG/ML
JELLY TOPICAL ONCE
OUTPATIENT
Start: 2023-11-13 | End: 2023-11-13

## 2023-11-13 RX ORDER — IBUPROFEN 200 MG
TABLET ORAL ONCE
OUTPATIENT
Start: 2023-11-13 | End: 2023-11-13

## 2023-11-13 RX ORDER — TRIAMCINOLONE ACETONIDE 1 MG/G
OINTMENT TOPICAL ONCE
OUTPATIENT
Start: 2023-11-13 | End: 2023-11-13

## 2023-11-13 RX ORDER — LIDOCAINE HYDROCHLORIDE 20 MG/ML
JELLY TOPICAL ONCE
Status: COMPLETED | OUTPATIENT
Start: 2023-11-13 | End: 2023-11-13

## 2023-11-13 RX ORDER — BETAMETHASONE DIPROPIONATE 0.05 %
OINTMENT (GRAM) TOPICAL ONCE
OUTPATIENT
Start: 2023-11-13 | End: 2023-11-13

## 2023-11-13 RX ORDER — SODIUM CHLOR/HYPOCHLOROUS ACID 0.033 %
SOLUTION, IRRIGATION IRRIGATION ONCE
OUTPATIENT
Start: 2023-11-13 | End: 2023-11-13

## 2023-11-13 RX ORDER — LIDOCAINE 40 MG/G
CREAM TOPICAL ONCE
OUTPATIENT
Start: 2023-11-13 | End: 2023-11-13

## 2023-11-13 RX ORDER — LIDOCAINE HYDROCHLORIDE 40 MG/ML
SOLUTION TOPICAL ONCE
OUTPATIENT
Start: 2023-11-13 | End: 2023-11-13

## 2023-11-13 RX ADMIN — LIDOCAINE HYDROCHLORIDE: 20 JELLY TOPICAL at 14:52

## 2023-11-13 NOTE — PLAN OF CARE
Problem: Wound:  Goal: Will show signs of wound healing; wound closure and no evidence of infection  Description: Will show signs of wound healing; wound closure and no evidence of infection  Outcome: Progressing   Seen for right leg wound. See avs for discharge   Care plan reviewed with patient . Patient  verbalize understanding of the plan of care and contribute to goal setting.

## 2023-11-13 NOTE — PATIENT INSTRUCTIONS
Visit Discharge/Physician Orders:  -keep legs elevated, at much as you can  -wear compression sock over top of dressings  -Use wound cleanser before putting new dressings on  -debridement done today      Wound Location: right lower leg x 2 open areas      Dressing orders:      1) Gather wound care supplies and arrange on clean table. 2) Wash your hands with soap and water or use alcohol based hand  for 20 seconds (sing \"Happy Birthday\" twice). 3) Cleanse wounds with normal saline or wound cleanser and gauze. Pat dry with clean gauze. 4) Lower right leg outer- small amount of triad to wound bed and cover with excel silicone boarder foam, change daily. apply compression stockings in the morning and remove at bedtime   Lower right leg inner- piece of collagen to wound bed,moisten with saline and cover with excel silicone boarder foam, change daily. Apply compression stockings in the morning and remove at bedtime      Keep all dressings clean & dry. Follow up visit:   3 weeks Wednesday December 6 at 1:00 pm      Supplies:     Duration of dressings: 30                     We have sent your supply order to the following company:  [employer] Phone # [phone]   If you don't receive the items you were expecting or don't know what the items are that you received, call the company where the order was sent. If you are unable to obtain wound supplies, continue to use the supplies you have available until you are able to reach us. It is most important to keep the wound covered at all times. It is YOUR responsibility to make sure that supplies are re-ordered before you run out. Re-order telephone numbers are included in each package. Keep next scheduled appointment. Please give 24 hour notice if unable to keep appointment. [phone]     If you experience any of the following, please call the Wound Care Service during business hours: Monday through Friday 8:00 am - 4:30 pm  ([phone]).

## 2023-11-13 NOTE — PROGRESS NOTES
[address] [facility] [place] [address] [facility] f: [phone] f: [phone] p: [phone] [email or web address]      Ordering Center:   [facility]  [address]  [facility] [address]  [phone]  WOUND CARE Dept: [phone]   [facility] NUMBER [phone]    Patient Information:      [address] [facility] [address]  [place]   [phone]   : 1960  AGE: 57 y.o. GENDER: male   EPISODE DATE: 2023    Insurance:      PRIMARY INSURANCE:  Plan: MEDICARE PART A AND B  Coverage: MEDICARE  Effective Date: 2023  Group Number: [unfilled]  Subscriber Number: [id number] - (Medicare)    Payer/Plan Subscr  Sex Relation Sub. Ins. ID Effective Group Num   1. Gevena Pleasure 1960 Male Self [id number]                                    [address] [address]   2.  Jenny Hams 1960 Male Self [id number]                                   [address]       Patient Wound Information:      Problem List Items Addressed This Visit          Other    Venous stasis ulcer of right lower leg with edema of right lower leg ([address]) - Primary    Relevant Orders    Initiate Outpatient Wound Care Protocol       WOUNDS REQUIRING DRESSING SUPPLIES:     Wound 23 Leg Right;Lateral;Lower (Active)   Wound Image   23 1430   Wound Etiology Venous 23 1430   Dressing Status New dressing applied 23 1430   Wound Cleansed Cleansed with saline 23 1430   Dressing/Treatment Triad hydro/zinc oxide-based hydrophilic paste;Silicone border  1430   Offloading for Diabetic Foot Ulcers Offloading not required 23 1430   Wound Length (cm) 2.8 cm 23 1430   Wound Width (cm) 1.5 cm 23 1430   Wound Depth (cm) 0.1 cm 23 1430   Wound Surface Area (cm^2) 4.2 cm^2 23 1430   Change in Wound Size % (l*w) 47.5 23 1430   Wound
open areas      Dressing orders:      1) Gather wound care supplies and arrange on clean table. 2) Wash your hands with soap and water or use alcohol based hand  for 20 seconds (sing \"Happy Birthday\" twice). 3) Cleanse wounds with normal saline or wound cleanser and gauze. Pat dry with clean gauze. 4) Lower right leg outer- small amount of triad to wound bed and cover with excel silicone boarder foam, change daily. apply compression stockings in the morning and remove at bedtime   Lower right leg inner- piece of collagen to wound bed,moisten with saline and cover with excel silicone boarder foam, change daily. Apply compression stockings in the morning and remove at bedtime      Keep all dressings clean & dry. Follow up visit:   3 weeks Wednesday December 6 at 1:00 pm      Supplies:     Duration of dressings: 30                     We have sent your supply order to the following company:  [email or web address] Phone # [phone]   If you don't receive the items you were expecting or don't know what the items are that you received, call the company where the order was sent. If you are unable to obtain wound supplies, continue to use the supplies you have available until you are able to reach us. It is most important to keep the wound covered at all times. It is YOUR responsibility to make sure that supplies are re-ordered before you run out. Re-order telephone numbers are included in each package. Keep next scheduled appointment. Please give 24 hour notice if unable to keep appointment. [phone]     If you experience any of the following, please call the Wound Care Service during business hours: Monday through Friday 8:00 am - 4:30 pm  ([phone]).               *Increase in pain              *Temperature over 101              *Increase in drainage from your wound or a foul odor              *Uncontrolled swelling              *Need for compression bandage changes due to slippage,

## 2023-12-06 ENCOUNTER — HOSPITAL ENCOUNTER (OUTPATIENT)
Dept: WOUND CARE | Age: 63
Discharge: HOME | End: 2023-12-06
Payer: MEDICARE

## 2023-12-06 VITALS
OXYGEN SATURATION: 96 % | DIASTOLIC BLOOD PRESSURE: 69 MMHG | RESPIRATION RATE: 18 BRPM | TEMPERATURE: 98.1 F | SYSTOLIC BLOOD PRESSURE: 144 MMHG | HEART RATE: 73 BPM

## 2023-12-06 DIAGNOSIS — I83.891 VENOUS STASIS ULCER OF RIGHT LOWER LEG WITH EDEMA OF RIGHT LOWER LEG (HCC): Primary | ICD-10-CM

## 2023-12-06 DIAGNOSIS — L97.919 VENOUS STASIS ULCER OF RIGHT LOWER LEG WITH EDEMA OF RIGHT LOWER LEG (HCC): Primary | ICD-10-CM

## 2023-12-06 DIAGNOSIS — I83.019 VENOUS STASIS ULCER OF RIGHT LOWER LEG WITH EDEMA OF RIGHT LOWER LEG (HCC): Primary | ICD-10-CM

## 2023-12-06 DIAGNOSIS — R60.0 VENOUS STASIS ULCER OF RIGHT LOWER LEG WITH EDEMA OF RIGHT LOWER LEG (HCC): Primary | ICD-10-CM

## 2023-12-06 PROCEDURE — 29580 STRAPPING UNNA BOOT: CPT

## 2023-12-06 PROCEDURE — 99213 OFFICE O/P EST LOW 20 MIN: CPT | Performed by: NURSE PRACTITIONER

## 2023-12-06 RX ORDER — LIDOCAINE HYDROCHLORIDE 20 MG/ML
JELLY TOPICAL ONCE
OUTPATIENT
Start: 2023-12-06 | End: 2023-12-06

## 2023-12-06 RX ORDER — TRIAMCINOLONE ACETONIDE 1 MG/G
OINTMENT TOPICAL ONCE
OUTPATIENT
Start: 2023-12-06 | End: 2023-12-06

## 2023-12-06 RX ORDER — LIDOCAINE HYDROCHLORIDE 40 MG/ML
SOLUTION TOPICAL ONCE
OUTPATIENT
Start: 2023-12-06 | End: 2023-12-06

## 2023-12-06 RX ORDER — IBUPROFEN 200 MG
TABLET ORAL ONCE
OUTPATIENT
Start: 2023-12-06 | End: 2023-12-06

## 2023-12-06 RX ORDER — GENTAMICIN SULFATE 1 MG/G
OINTMENT TOPICAL ONCE
OUTPATIENT
Start: 2023-12-06 | End: 2023-12-06

## 2023-12-06 RX ORDER — GINSENG 100 MG
CAPSULE ORAL ONCE
OUTPATIENT
Start: 2023-12-06 | End: 2023-12-06

## 2023-12-06 RX ORDER — CLOBETASOL PROPIONATE 0.5 MG/G
OINTMENT TOPICAL ONCE
OUTPATIENT
Start: 2023-12-06 | End: 2023-12-06

## 2023-12-06 RX ORDER — LIDOCAINE 50 MG/G
OINTMENT TOPICAL ONCE
OUTPATIENT
Start: 2023-12-06 | End: 2023-12-06

## 2023-12-06 RX ORDER — BETAMETHASONE DIPROPIONATE 0.05 %
OINTMENT (GRAM) TOPICAL ONCE
OUTPATIENT
Start: 2023-12-06 | End: 2023-12-06

## 2023-12-06 RX ORDER — SODIUM CHLOR/HYPOCHLOROUS ACID 0.033 %
SOLUTION, IRRIGATION IRRIGATION ONCE
OUTPATIENT
Start: 2023-12-06 | End: 2023-12-06

## 2023-12-06 RX ORDER — LIDOCAINE 40 MG/G
CREAM TOPICAL ONCE
OUTPATIENT
Start: 2023-12-06 | End: 2023-12-06

## 2023-12-06 RX ORDER — BACITRACIN ZINC AND POLYMYXIN B SULFATE 500; 1000 [USP'U]/G; [USP'U]/G
OINTMENT TOPICAL ONCE
OUTPATIENT
Start: 2023-12-06 | End: 2023-12-06

## 2023-12-06 NOTE — PATIENT INSTRUCTIONS
Visit Discharge/Physician Orders:  - Keep legs elevated, as much as you can. Wound Location: Right lower leg x 2       Dressing orders:       Right leg wounds- Applied alginate to wound beds. Wrapped with unna boot, drawtex over wound areas, then roll gauze and coban from base of toes to bend of knee. Dressing to be changed once weekly in wound clinic. If unna boot becomes too tight- raise/elevate legs above the level of the heart for 15-20 minutes if swelling does not go down then carefully cut off unna boot and call clinic or go to local ER or family physician. If unna boot becomes wet or starts to roll down then carefully remove unna boot and call clinic. Keep all dressings clean & dry. Follow up visit: 1 week - Wednesday December 13th at 1:30 pm      Supplies:     Duration of dressings: 30                     We have sent your supply order to the following company:  [employer] Phone # [phone]   If you don't receive the items you were expecting or don't know what the items are that you received, call the company where the order was sent. If you are unable to obtain wound supplies, continue to use the supplies you have available until you are able to reach us. It is most important to keep the wound covered at all times. It is YOUR responsibility to make sure that supplies are re-ordered before you run out. Re-order telephone numbers are included in each package. Keep next scheduled appointment. Please give 24 hour notice if unable to keep appointment. [phone]     If you experience any of the following, please call the Wound Care Service during business hours: Monday through Friday 8:00 am - 4:30 pm  ([phone]).               *Increase in pain              *Temperature over 101              *Increase in drainage from your wound or a foul odor              *Uncontrolled swelling              *Need for compression bandage changes due to slippage, breakthrough drainage     If you

## 2023-12-06 NOTE — PROGRESS NOTES
[employer] Application   Below Knee    NAME:  Scott Corral  YOB: 1960  MEDICAL RECORD NUMBER:  [id number]  DATE:  12/6/2023    Citlali Goss boot: Appied primary and secondary dressing as ordered. Applied Unna roll from toes to knee overlapping each time. Applied ace wrap or coban from toes to below the knee. Secured with tape and/or metal clips covered with tape. Instructed patient/caregiver to keep dressing dry and intact. DO NOT REMOVE DRESSING. Instructed pt/family/caregiver to report excessive draining, loose bandage, wet dressing, severe pain or tingling in toes. Applied [employer] dressing below the knee to right lower leg. Unna Boot(s) were applied per  Guidelines.      Electronically signed by Karel Medina RN on 12/6/2023 at 1:52 PM
continue to use the supplies you have available until you are able to reach us. It is most important to keep the wound covered at all times. It is YOUR responsibility to make sure that supplies are re-ordered before you run out. Re-order telephone numbers are included in each package. Keep next scheduled appointment. Please give 24 hour notice if unable to keep appointment. [phone]     If you experience any of the following, please call the Wound Care Service during business hours: Monday through Friday 8:00 am - 4:30 pm  ([phone]). *Increase in pain              *Temperature over 101              *Increase in drainage from your wound or a foul odor              *Uncontrolled swelling              *Need for compression bandage changes due to slippage, breakthrough drainage     If you need medical attention outside of business hours, please contact your Primary Care Doctor or go to the nearest emergency room.       Electronically signed by JAZZY Moreno CNP on 12/6/2023 at 5:53 PM

## 2023-12-06 NOTE — PLAN OF CARE
Problem: Wound:  Goal: Will show signs of wound healing; wound closure and no evidence of infection  Description: Will show signs of wound healing; wound closure and no evidence of infection  Outcome: Progressing   Patient presents to wound clinic for follow up of right leg wounds. Discharge instructions/dressing orders per AVS. Follow up scheduled in 1 week. Care plan reviewed with patient. Patient verbalize understanding of the plan of care and contribute to goal setting.

## 2023-12-13 ENCOUNTER — HOSPITAL ENCOUNTER (OUTPATIENT)
Dept: WOUND CARE | Age: 63
Discharge: HOME | End: 2023-12-13
Payer: MEDICARE

## 2023-12-13 VITALS
HEART RATE: 60 BPM | DIASTOLIC BLOOD PRESSURE: 70 MMHG | RESPIRATION RATE: 16 BRPM | TEMPERATURE: 97.1 F | SYSTOLIC BLOOD PRESSURE: 159 MMHG

## 2023-12-13 DIAGNOSIS — L97.919 VENOUS STASIS ULCER OF RIGHT LOWER LEG WITH EDEMA OF RIGHT LOWER LEG (HCC): Primary | ICD-10-CM

## 2023-12-13 DIAGNOSIS — R60.0 VENOUS STASIS ULCER OF RIGHT LOWER LEG WITH EDEMA OF RIGHT LOWER LEG (HCC): Primary | ICD-10-CM

## 2023-12-13 DIAGNOSIS — I83.891 VENOUS STASIS ULCER OF RIGHT LOWER LEG WITH EDEMA OF RIGHT LOWER LEG (HCC): Primary | ICD-10-CM

## 2023-12-13 DIAGNOSIS — I83.019 VENOUS STASIS ULCER OF RIGHT LOWER LEG WITH EDEMA OF RIGHT LOWER LEG (HCC): Primary | ICD-10-CM

## 2023-12-13 PROCEDURE — 99212 OFFICE O/P EST SF 10 MIN: CPT | Performed by: NURSE PRACTITIONER

## 2023-12-13 PROCEDURE — 29580 STRAPPING UNNA BOOT: CPT

## 2023-12-13 RX ORDER — SODIUM CHLOR/HYPOCHLOROUS ACID 0.033 %
SOLUTION, IRRIGATION IRRIGATION ONCE
OUTPATIENT
Start: 2023-12-13 | End: 2023-12-13

## 2023-12-13 RX ORDER — LIDOCAINE HYDROCHLORIDE 20 MG/ML
JELLY TOPICAL ONCE
OUTPATIENT
Start: 2023-12-13 | End: 2023-12-13

## 2023-12-13 RX ORDER — LIDOCAINE HYDROCHLORIDE 40 MG/ML
SOLUTION TOPICAL ONCE
OUTPATIENT
Start: 2023-12-13 | End: 2023-12-13

## 2023-12-13 RX ORDER — CLOBETASOL PROPIONATE 0.5 MG/G
OINTMENT TOPICAL ONCE
OUTPATIENT
Start: 2023-12-13 | End: 2023-12-13

## 2023-12-13 RX ORDER — BACITRACIN ZINC AND POLYMYXIN B SULFATE 500; 1000 [USP'U]/G; [USP'U]/G
OINTMENT TOPICAL ONCE
OUTPATIENT
Start: 2023-12-13 | End: 2023-12-13

## 2023-12-13 RX ORDER — GENTAMICIN SULFATE 1 MG/G
OINTMENT TOPICAL ONCE
OUTPATIENT
Start: 2023-12-13 | End: 2023-12-13

## 2023-12-13 RX ORDER — IBUPROFEN 200 MG
TABLET ORAL ONCE
OUTPATIENT
Start: 2023-12-13 | End: 2023-12-13

## 2023-12-13 RX ORDER — GINSENG 100 MG
CAPSULE ORAL ONCE
OUTPATIENT
Start: 2023-12-13 | End: 2023-12-13

## 2023-12-13 RX ORDER — LIDOCAINE 50 MG/G
OINTMENT TOPICAL ONCE
OUTPATIENT
Start: 2023-12-13 | End: 2023-12-13

## 2023-12-13 RX ORDER — BETAMETHASONE DIPROPIONATE 0.05 %
OINTMENT (GRAM) TOPICAL ONCE
OUTPATIENT
Start: 2023-12-13 | End: 2023-12-13

## 2023-12-13 RX ORDER — LIDOCAINE 40 MG/G
CREAM TOPICAL ONCE
OUTPATIENT
Start: 2023-12-13 | End: 2023-12-13

## 2023-12-13 RX ORDER — TRIAMCINOLONE ACETONIDE 1 MG/G
OINTMENT TOPICAL ONCE
OUTPATIENT
Start: 2023-12-13 | End: 2023-12-13

## 2023-12-13 NOTE — PATIENT INSTRUCTIONS
Visit Discharge/Physician Orders:  - Keep legs elevated, as much as you can. - Unna boot applied today. If unna boot becomes too tight- raise/elevate legs above the level of the heart for 15-20 minutes if swelling does not go down then carefully cut off unna boot and call clinic or go to local ER or family physician. If unna boot becomes wet or starts to roll down then carefully remove unna boot and call clinic. Wound Location: Right lower leg x 2       Dressing orders:       Right leg anterior wound- Applied collagen, alginate to wound bed. Wrapped with unna boot, ABD over wound areas, then roll gauze and coban from base of toes to bend of knee. Dressing to be changed once weekly in wound clinic. Right leg lateral wound- Alginate to wound bed. Wrapped with unna boot, ABD over wound areas, then roll gauze and coban from base of toes to bend of knee. Dressing to be changed once weekly in wound clinic. If unna boot becomes too tight- raise/elevate legs above the level of the heart for 15-20 minutes if swelling does not go down then carefully cut off unna boot and call clinic or go to local ER or family physician. If unna boot becomes wet or starts to roll down then carefully remove unna boot and call clinic. Keep all dressings clean & dry. Follow up visit: 1 week - 12/20/23 @ 3pm  2 week- 12/27/23 @ 1 pm      Supplies:     Duration of dressings: 30                     We have sent your supply order to the following company:  [employer] Phone # [phone]   If you don't receive the items you were expecting or don't know what the items are that you received, call the company where the order was sent. If you are unable to obtain wound supplies, continue to use the supplies you have available until you are able to reach us. It is most important to keep the wound covered at all times. It is YOUR responsibility to make sure that supplies are re-ordered before you run out.  Re-order telephone numbers
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
## 2023-12-13 NOTE — PROGRESS NOTES
[employer] Application   Below Knee    NAME:  Sharon Cano  YOB: 1960  MEDICAL RECORD NUMBER:  [id number]  DATE:  12/13/2023    Vermateo Sharp boot: Appied primary and secondary dressing as ordered. Applied Unna roll from toes to knee overlapping each time. Applied ace wrap or coban from toes to below the knee. Secured with tape and/or metal clips covered with tape. Instructed patient/caregiver to keep dressing dry and intact. DO NOT REMOVE DRESSING. Instructed pt/family/caregiver to report excessive draining, loose bandage, wet dressing, severe pain or tingling in toes. Applied [employer] dressing below the knee to right lower leg. Unna Boot(s) were applied per  Guidelines.      Electronically signed by Rashaad Caceres RN on 12/13/2023 at 2:42 PM

## 2023-12-13 NOTE — PLAN OF CARE
Problem: Wound:  Goal: Will show signs of wound healing; wound closure and no evidence of infection  Description: Will show signs of wound healing; wound closure and no evidence of infection  Outcome: Progressing     Patient seen at wound clinic today for right leg wound, please see AVS. Care plan reviewed with patient. Patient and verbalizes understanding of the plan of care and contribute to goal setting.

## 2023-12-27 ENCOUNTER — HOSPITAL ENCOUNTER (OUTPATIENT)
Dept: WOUND CARE | Age: 63
Discharge: HOME | End: 2023-12-27
Payer: MEDICARE

## 2023-12-27 VITALS
RESPIRATION RATE: 16 BRPM | SYSTOLIC BLOOD PRESSURE: 165 MMHG | HEART RATE: 64 BPM | DIASTOLIC BLOOD PRESSURE: 70 MMHG | TEMPERATURE: 97.2 F | OXYGEN SATURATION: 97 %

## 2023-12-27 DIAGNOSIS — L97.919 VENOUS STASIS ULCER OF RIGHT LOWER LEG WITH EDEMA OF RIGHT LOWER LEG (HCC): ICD-10-CM

## 2023-12-27 DIAGNOSIS — I83.891 VENOUS STASIS ULCER OF RIGHT LOWER LEG WITH EDEMA OF RIGHT LOWER LEG (HCC): ICD-10-CM

## 2023-12-27 DIAGNOSIS — I83.019 VENOUS STASIS ULCER OF RIGHT LOWER LEG WITH EDEMA OF RIGHT LOWER LEG (HCC): ICD-10-CM

## 2023-12-27 DIAGNOSIS — I87.2 CHRONIC VENOUS STASIS DERMATITIS OF RIGHT LOWER EXTREMITY: Primary | ICD-10-CM

## 2023-12-27 DIAGNOSIS — R60.0 VENOUS STASIS ULCER OF RIGHT LOWER LEG WITH EDEMA OF RIGHT LOWER LEG (HCC): ICD-10-CM

## 2023-12-27 PROCEDURE — 99212 OFFICE O/P EST SF 10 MIN: CPT | Performed by: NURSE PRACTITIONER

## 2023-12-27 PROCEDURE — 97597 DBRDMT OPN WND 1ST 20 CM/<: CPT

## 2023-12-27 PROCEDURE — 97597 DBRDMT OPN WND 1ST 20 CM/<: CPT | Performed by: NURSE PRACTITIONER

## 2023-12-27 PROCEDURE — 29580 STRAPPING UNNA BOOT: CPT

## 2023-12-27 RX ORDER — LIDOCAINE 40 MG/G
CREAM TOPICAL ONCE
OUTPATIENT
Start: 2023-12-27 | End: 2023-12-27

## 2023-12-27 RX ORDER — BETAMETHASONE DIPROPIONATE 0.05 %
OINTMENT (GRAM) TOPICAL ONCE
OUTPATIENT
Start: 2023-12-27 | End: 2023-12-27

## 2023-12-27 RX ORDER — SODIUM CHLOR/HYPOCHLOROUS ACID 0.033 %
SOLUTION, IRRIGATION IRRIGATION ONCE
OUTPATIENT
Start: 2023-12-27 | End: 2023-12-27

## 2023-12-27 RX ORDER — LIDOCAINE HYDROCHLORIDE 20 MG/ML
JELLY TOPICAL ONCE
OUTPATIENT
Start: 2023-12-27 | End: 2023-12-27

## 2023-12-27 RX ORDER — LIDOCAINE 50 MG/G
OINTMENT TOPICAL ONCE
OUTPATIENT
Start: 2023-12-27 | End: 2023-12-27

## 2023-12-27 RX ORDER — CLOBETASOL PROPIONATE 0.5 MG/G
OINTMENT TOPICAL ONCE
OUTPATIENT
Start: 2023-12-27 | End: 2023-12-27

## 2023-12-27 RX ORDER — GINSENG 100 MG
CAPSULE ORAL ONCE
OUTPATIENT
Start: 2023-12-27 | End: 2023-12-27

## 2023-12-27 RX ORDER — TRIAMCINOLONE ACETONIDE 1 MG/G
OINTMENT TOPICAL ONCE
OUTPATIENT
Start: 2023-12-27 | End: 2023-12-27

## 2023-12-27 RX ORDER — IBUPROFEN 200 MG
TABLET ORAL ONCE
OUTPATIENT
Start: 2023-12-27 | End: 2023-12-27

## 2023-12-27 RX ORDER — GENTAMICIN SULFATE 1 MG/G
OINTMENT TOPICAL ONCE
OUTPATIENT
Start: 2023-12-27 | End: 2023-12-27

## 2023-12-27 RX ORDER — BACITRACIN ZINC AND POLYMYXIN B SULFATE 500; 1000 [USP'U]/G; [USP'U]/G
OINTMENT TOPICAL ONCE
OUTPATIENT
Start: 2023-12-27 | End: 2023-12-27

## 2023-12-27 RX ORDER — LIDOCAINE HYDROCHLORIDE 40 MG/ML
SOLUTION TOPICAL ONCE
OUTPATIENT
Start: 2023-12-27 | End: 2023-12-27
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
## 2023-12-27 NOTE — PATIENT INSTRUCTIONS
Visit Discharge/Physician Orders:  - wound debridement done today   - Keep legs elevated, as much as you can. - Unna boot applied today. Wound Location: Right lower leg x 2       Dressing orders:       Right leg anterior wound- Applied triad to johnny wound. Hydrofera blue to wound bed, moistened with saline. Wrapped with unna boot, ABD over wound areas, then roll gauze and coban from base of toes to bend of knee. Dressing to be changed once weekly in wound clinic. Right leg lateral wound- triad paste to johnny wound. Hydrofera blue to wound bed. Wrapped with unna boot, ABD over wound areas, then roll gauze and coban from base of toes to bend of knee. Dressing to be changed once weekly in wound clinic. If unna boot becomes too tight- raise/elevate legs above the level of the heart for 15-20 minutes if swelling does not go down then carefully cut off unna boot and call clinic or go to local ER or family physician. If unna boot becomes wet or starts to roll down then carefully remove unna boot and call clinic. Keep all dressings clean & dry. Follow up visit: 1 week 01/03/24 at 2pm, 2 weeks 1/10/24 at 3pm     Supplies:     Duration of dressings: 30                     We have sent your supply order to the following company:  [employer] Phone # [phone]   If you don't receive the items you were expecting or don't know what the items are that you received, call the company where the order was sent. If you are unable to obtain wound supplies, continue to use the supplies you have available until you are able to reach us. It is most important to keep the wound covered at all times. It is YOUR responsibility to make sure that supplies are re-ordered before you run out. Re-order telephone numbers are included in each package. Keep next scheduled appointment. Please give 24 hour notice if unable to keep appointment.  [phone]     If you experience any of the following, please call the Wound Care

## 2023-12-27 NOTE — PLAN OF CARE
Problem: Wound:  Goal: Will show signs of wound healing; wound closure and no evidence of infection  Description: Will show signs of wound healing; wound closure and no evidence of infection  Outcome: Progressing  Note: Patient seen for right leg wound. Wound shows signs of proper closure and healing. Right anterior leg wound debrided today. No s/s of infection noted. Follow up in 1 weeks. Unna boot applied today. See AVS for order changes. Care plan reviewed with patient. Patient verbalize understanding of the plan of care and contribute to goal setting.

## 2024-01-03 ENCOUNTER — HOSPITAL ENCOUNTER (OUTPATIENT)
Dept: WOUND CARE | Age: 64
Discharge: HOME | End: 2024-01-03
Payer: MEDICARE

## 2024-01-03 VITALS
OXYGEN SATURATION: 99 % | TEMPERATURE: 97.3 F | HEART RATE: 64 BPM | SYSTOLIC BLOOD PRESSURE: 150 MMHG | RESPIRATION RATE: 18 BRPM | DIASTOLIC BLOOD PRESSURE: 71 MMHG

## 2024-01-03 DIAGNOSIS — I83.891 VENOUS STASIS ULCER OF RIGHT LOWER LEG WITH EDEMA OF RIGHT LOWER LEG (HCC): Primary | ICD-10-CM

## 2024-01-03 DIAGNOSIS — I83.019 VENOUS STASIS ULCER OF RIGHT LOWER LEG WITH EDEMA OF RIGHT LOWER LEG (HCC): Primary | ICD-10-CM

## 2024-01-03 DIAGNOSIS — R60.0 VENOUS STASIS ULCER OF RIGHT LOWER LEG WITH EDEMA OF RIGHT LOWER LEG (HCC): Primary | ICD-10-CM

## 2024-01-03 DIAGNOSIS — E78.00 HYPERCHOLESTEROLEMIA: ICD-10-CM

## 2024-01-03 DIAGNOSIS — I87.2 CHRONIC VENOUS STASIS DERMATITIS OF RIGHT LOWER EXTREMITY: ICD-10-CM

## 2024-01-03 DIAGNOSIS — L97.919 VENOUS STASIS ULCER OF RIGHT LOWER LEG WITH EDEMA OF RIGHT LOWER LEG (HCC): Primary | ICD-10-CM

## 2024-01-03 DIAGNOSIS — S81.801D WOUND OF RIGHT LOWER EXTREMITY, SUBSEQUENT ENCOUNTER: ICD-10-CM

## 2024-01-03 PROCEDURE — 99214 OFFICE O/P EST MOD 30 MIN: CPT | Performed by: NURSE PRACTITIONER

## 2024-01-03 PROCEDURE — 99213 OFFICE O/P EST LOW 20 MIN: CPT

## 2024-01-03 RX ORDER — SODIUM CHLOR/HYPOCHLOROUS ACID 0.033 %
SOLUTION, IRRIGATION IRRIGATION ONCE
OUTPATIENT
Start: 2024-01-03 | End: 2024-01-03

## 2024-01-03 RX ORDER — MELOXICAM 7.5 MG/1
7.5 TABLET ORAL DAILY
Qty: 90 TABLET | Refills: 0 | Status: SHIPPED | OUTPATIENT
Start: 2024-01-03 | End: 2024-04-02

## 2024-01-03 RX ORDER — LIDOCAINE HYDROCHLORIDE 20 MG/ML
JELLY TOPICAL ONCE
OUTPATIENT
Start: 2024-01-03 | End: 2024-01-03

## 2024-01-03 RX ORDER — ATORVASTATIN CALCIUM 20 MG/1
20 TABLET, FILM COATED ORAL DAILY
Qty: 90 TABLET | Refills: 0 | Status: SHIPPED | OUTPATIENT
Start: 2024-01-03 | End: 2024-04-02

## 2024-01-03 RX ORDER — CLOBETASOL PROPIONATE 0.5 MG/G
OINTMENT TOPICAL ONCE
OUTPATIENT
Start: 2024-01-03 | End: 2024-01-03

## 2024-01-03 RX ORDER — LIDOCAINE HYDROCHLORIDE 40 MG/ML
SOLUTION TOPICAL ONCE
OUTPATIENT
Start: 2024-01-03 | End: 2024-01-03

## 2024-01-03 RX ORDER — LIDOCAINE 50 MG/G
OINTMENT TOPICAL ONCE
OUTPATIENT
Start: 2024-01-03 | End: 2024-01-03

## 2024-01-03 RX ORDER — LIDOCAINE 40 MG/G
CREAM TOPICAL ONCE
OUTPATIENT
Start: 2024-01-03 | End: 2024-01-03

## 2024-01-03 RX ORDER — BETAMETHASONE DIPROPIONATE 0.05 %
OINTMENT (GRAM) TOPICAL ONCE
OUTPATIENT
Start: 2024-01-03 | End: 2024-01-03

## 2024-01-03 RX ORDER — GINSENG 100 MG
CAPSULE ORAL ONCE
OUTPATIENT
Start: 2024-01-03 | End: 2024-01-03

## 2024-01-03 RX ORDER — BACITRACIN ZINC AND POLYMYXIN B SULFATE 500; 1000 [USP'U]/G; [USP'U]/G
OINTMENT TOPICAL ONCE
OUTPATIENT
Start: 2024-01-03 | End: 2024-01-03

## 2024-01-03 RX ORDER — TRIAMCINOLONE ACETONIDE 1 MG/G
OINTMENT TOPICAL ONCE
OUTPATIENT
Start: 2024-01-03 | End: 2024-01-03

## 2024-01-03 RX ORDER — GENTAMICIN SULFATE 1 MG/G
OINTMENT TOPICAL ONCE
OUTPATIENT
Start: 2024-01-03 | End: 2024-01-03

## 2024-01-03 RX ORDER — IBUPROFEN 200 MG
TABLET ORAL ONCE
OUTPATIENT
Start: 2024-01-03 | End: 2024-01-03

## 2024-01-03 NOTE — PROGRESS NOTES
(l*w) 86.5 01/03/24 1404   Wound Volume (cm^3) 0.108 cm^3 01/03/24 1404   Wound Healing % 93 01/03/24 1404   Wound Assessment Slough;Graft;Pale granulation tissue 01/03/24 1404   Drainage Amount Small (< 25%) 01/03/24 1404   Drainage Description Serosanguinous;Yellow 01/03/24 1404   Odor None 01/03/24 1404   Maira-wound Assessment Intact;Dry/flaky;Hemosiderin staining (brown yellow) 01/03/24 1404   Margins Attached edges 01/03/24 1404   Wound Thickness Description not for Pressure Injury Full thickness 01/03/24 1404   Number of days: 125       Wound 08/31/23 Leg Right;Lower;Anterior (Active)   Wound Image   01/03/24 1404   Wound Etiology Venous 01/03/24 1404   Dressing Status Intact;Old drainage noted;New dressing applied 01/03/24 1404   Wound Cleansed Cleansed with saline 01/03/24 1404   Dressing/Treatment Foam;Moist to dry;Triad hydro/zinc oxide-based hydrophilic paste 01/03/24 1404   Offloading for Diabetic Foot Ulcers Offloading not required;Offloading not ordered 01/03/24 1404   Wound Length (cm) 1.8 cm 01/03/24 1404   Wound Width (cm) 1.2 cm 01/03/24 1404   Wound Depth (cm) 0.4 cm 01/03/24 1404   Wound Surface Area (cm^2) 2.16 cm^2 01/03/24 1404   Change in Wound Size % (l*w) 93.65 01/03/24 1404   Wound Volume (cm^3) 0.864 cm^3 01/03/24 1404   Wound Healing % 87 01/03/24 1404   Wound Assessment Slough;Granulation tissue 01/03/24 1404   Drainage Amount Moderate (25-50%) 01/03/24 1404   Drainage Description Serosanguinous;Yellow 01/03/24 1404   Odor None 01/03/24 1404   Maira-wound Assessment Blanchable erythema;Dry/flaky 01/03/24 1404   Margins Attached edges 01/03/24 1404   Wound Thickness Description not for Pressure Injury Full thickness 01/03/24 1404   Number of days: 125          Supplies Requested :        *DISPENSE AS WRITTEN*    WOUND #: 1   PRIMARY DRESSING:  Triad and guaze packing strip    Cover and Secure with: Other   Excel Daily SAP 3\" x 3\" - Dispense as written         FREQUENCY OF DRESSING 
alginate/hydrofiber, or antimicrobial solution moistened gauze/kerlex, or equivalent and cover with secondary dressing/foam    Compression Management needed for edema control, apply multilayer compression or tubular garment or equivalent    Offloading Management needed for pressure relief, apply offloading shoe/boot or equivalent     Standing Status:   Standing     Number of Occurrences:   1     I spent a total of 35 minutes with Damien Aguilar  on 1/3/2024.  Time spent includes review of previous progress notes, reviewing and discussing test results, education on plan of care for presenting diagnosis, answering questions, providing instructions on treatment and/or medications ordered, reviewing importance of compliance with outline treatment plan and any identifiable barriers to compliance and coordination of care based on plan and assessment as noted.     Patient Instructions   Visit Discharge/Physician Orders:  - wound debridement done today with guaze and saline   - Keep legs elevated, as much as you can.  -apply compression socks in the morning, take off at night.   -ok to shower, keep foam in place when showering, take off after and apply new dressing.       Wound Location: Right lower leg x 2       Dressing orders:       Right leg wound(inside leg)- apply triad paste around the wound than apply dakins moistened guaze to open wound cover with excel boarder foam, than put your compression sock on in th morning and off in the evening. Change dressing daily.   Right leg wound (outer leg) apply triad to the wound and cover with boarder foam. Change daily.         Keep all dressings clean & dry.     Follow up visit:  1 weeks 1/10/24 at 3pm     Supplies:     Duration of dressings: 30                     We have sent your supply order to the following company:  [employer] Phone # [phone]   If you don't receive the items you were expecting or don't know what the items are that you received, call the company where

## 2024-01-03 NOTE — PLAN OF CARE
Problem: Wound:  Goal: Will show signs of wound healing; wound closure and no evidence of infection  Description: Will show signs of wound healing; wound closure and no evidence of infection  Outcome: Progressing   Seen today for leg wounds. See AVS for discharge.  Care plan reviewed with patient.  Patient verbalize understanding of the plan of care and contribute to goal setting.

## 2024-01-03 NOTE — TELEPHONE ENCOUNTER
Damien Aguilar called requesting a refill on the following medications:  Requested Prescriptions     Pending Prescriptions Disp Refills    atorvastatin (LIPITOR) 20 MG tablet 90 tablet 0     Sig: Take 1 tablet by mouth daily    meloxicam (MOBIC) 7.5 MG tablet 30 tablet 3     Sig: Take 1 tablet by mouth daily       Date of last visit: 9/20/2023  Date of next visit (if applicable):3/20/2024  Date of last refill: 8/24/23  [facility]    Pt states he is taking the mobic   ThanksChas

## 2024-01-03 NOTE — PATIENT INSTRUCTIONS
Visit Discharge/Physician Orders:  - wound debridement done today with guaze and saline   - Keep legs elevated, as much as you can.  -apply compression socks in the morning, take off at night.   -ok to shower, keep foam in place when showering, take off after and apply new dressing.       Wound Location: Right lower leg x 2       Dressing orders:       Right leg wound(inside leg)- apply triad paste around the wound than apply dakins moistened guaze to open wound cover with excel boarder foam, than put your compression sock on in th morning and off in the evening. Change dressing daily.   Right leg wound (outer leg) apply triad to the wound and cover with boarder foam. Change daily.         Keep all dressings clean & dry.     Follow up visit:  1 weeks 1/10/24 at 3pm     Supplies:     Duration of dressings: 30                     We have sent your supply order to the following company:  [employer] Phone # [phone]   If you don't receive the items you were expecting or don't know what the items are that you received, call the company where the order was sent.   If you are unable to obtain wound supplies, continue to use the supplies you have available until you are able to reach us. It is most important to keep the wound covered at all times.  It is YOUR responsibility to make sure that supplies are re-ordered before you run out. Re-order telephone numbers are included in each package.      Keep next scheduled appointment. Please give 24 hour notice if unable to keep appointment. [phone]     If you experience any of the following, please call the Wound Care Service during business hours: Monday through Friday 8:00 am - 4:30 pm  ([phone]).              *Increase in pain              *Temperature over 101              *Increase in drainage from your wound or a foul odor              *Uncontrolled swelling              *Need for compression bandage changes due to slippage, breakthrough drainage     If you need

## 2024-01-05 DIAGNOSIS — R60.0 BILATERAL LOWER EXTREMITY EDEMA: ICD-10-CM

## 2024-01-05 RX ORDER — POTASSIUM CHLORIDE 1500 MG/1
20 TABLET, EXTENDED RELEASE ORAL 2 TIMES DAILY WITH MEALS
Qty: 180 TABLET | Refills: 0 | Status: SHIPPED | OUTPATIENT
Start: 2024-01-05 | End: 2024-04-04

## 2024-01-05 NOTE — TELEPHONE ENCOUNTER
Damien Aguilar called requesting a refill on the following medications:  Requested Prescriptions     Pending Prescriptions Disp Refills    potassium chloride (KLOR-CON M) 20 MEQ TBCR extended release tablet 180 tablet 1     Sig: Take 1 tablet by mouth 2 times daily (with meals)       Date of last visit: 9/20/2023  Date of next visit (if applicable):3/20/2024  Date of last refill: 7/20/23  [facility]: [facility]

## 2024-01-10 ENCOUNTER — HOSPITAL ENCOUNTER (OUTPATIENT)
Dept: WOUND CARE | Age: 64
Discharge: HOME | End: 2024-01-10
Payer: MEDICARE

## 2024-01-10 VITALS
OXYGEN SATURATION: 95 % | RESPIRATION RATE: 18 BRPM | TEMPERATURE: 97 F | SYSTOLIC BLOOD PRESSURE: 146 MMHG | HEART RATE: 84 BPM | DIASTOLIC BLOOD PRESSURE: 74 MMHG

## 2024-01-10 DIAGNOSIS — R60.0 VENOUS STASIS ULCER OF RIGHT LOWER LEG WITH EDEMA OF RIGHT LOWER LEG (HCC): Primary | ICD-10-CM

## 2024-01-10 DIAGNOSIS — L97.919 VENOUS STASIS ULCER OF RIGHT LOWER LEG WITH EDEMA OF RIGHT LOWER LEG (HCC): Primary | ICD-10-CM

## 2024-01-10 DIAGNOSIS — I87.2 CHRONIC VENOUS STASIS DERMATITIS OF RIGHT LOWER EXTREMITY: ICD-10-CM

## 2024-01-10 DIAGNOSIS — I83.891 VENOUS STASIS ULCER OF RIGHT LOWER LEG WITH EDEMA OF RIGHT LOWER LEG (HCC): Primary | ICD-10-CM

## 2024-01-10 DIAGNOSIS — I83.019 VENOUS STASIS ULCER OF RIGHT LOWER LEG WITH EDEMA OF RIGHT LOWER LEG (HCC): Primary | ICD-10-CM

## 2024-01-10 PROCEDURE — 15271 SKIN SUB GRAFT TRNK/ARM/LEG: CPT

## 2024-01-10 PROCEDURE — 6370000000 HC RX 637 (ALT 250 FOR IP): Performed by: NURSE PRACTITIONER

## 2024-01-10 RX ORDER — LIDOCAINE HYDROCHLORIDE 20 MG/ML
JELLY TOPICAL ONCE
Status: COMPLETED | OUTPATIENT
Start: 2024-01-10 | End: 2024-01-10

## 2024-01-10 RX ORDER — BETAMETHASONE DIPROPIONATE 0.05 %
OINTMENT (GRAM) TOPICAL ONCE
OUTPATIENT
Start: 2024-01-10 | End: 2024-01-10

## 2024-01-10 RX ORDER — IBUPROFEN 200 MG
TABLET ORAL ONCE
OUTPATIENT
Start: 2024-01-10 | End: 2024-01-10

## 2024-01-10 RX ORDER — LIDOCAINE 40 MG/G
CREAM TOPICAL ONCE
OUTPATIENT
Start: 2024-01-10 | End: 2024-01-10

## 2024-01-10 RX ORDER — GENTAMICIN SULFATE 1 MG/G
OINTMENT TOPICAL ONCE
OUTPATIENT
Start: 2024-01-10 | End: 2024-01-10

## 2024-01-10 RX ORDER — LIDOCAINE HYDROCHLORIDE 20 MG/ML
JELLY TOPICAL ONCE
OUTPATIENT
Start: 2024-01-10 | End: 2024-01-10

## 2024-01-10 RX ORDER — TRIAMCINOLONE ACETONIDE 1 MG/G
OINTMENT TOPICAL ONCE
OUTPATIENT
Start: 2024-01-10 | End: 2024-01-10

## 2024-01-10 RX ORDER — CLOBETASOL PROPIONATE 0.5 MG/G
OINTMENT TOPICAL ONCE
OUTPATIENT
Start: 2024-01-10 | End: 2024-01-10

## 2024-01-10 RX ORDER — LIDOCAINE 50 MG/G
OINTMENT TOPICAL ONCE
OUTPATIENT
Start: 2024-01-10 | End: 2024-01-10

## 2024-01-10 RX ORDER — SODIUM CHLOR/HYPOCHLOROUS ACID 0.033 %
SOLUTION, IRRIGATION IRRIGATION ONCE
OUTPATIENT
Start: 2024-01-10 | End: 2024-01-10

## 2024-01-10 RX ORDER — BACITRACIN ZINC AND POLYMYXIN B SULFATE 500; 1000 [USP'U]/G; [USP'U]/G
OINTMENT TOPICAL ONCE
OUTPATIENT
Start: 2024-01-10 | End: 2024-01-10

## 2024-01-10 RX ORDER — GINSENG 100 MG
CAPSULE ORAL ONCE
OUTPATIENT
Start: 2024-01-10 | End: 2024-01-10

## 2024-01-10 RX ORDER — LIDOCAINE HYDROCHLORIDE 40 MG/ML
SOLUTION TOPICAL ONCE
OUTPATIENT
Start: 2024-01-10 | End: 2024-01-10

## 2024-01-10 RX ADMIN — LIDOCAINE HYDROCHLORIDE: 20 JELLY TOPICAL at 15:17

## 2024-01-10 NOTE — PATIENT INSTRUCTIONS
Visit Discharge/Physician Orders:  - wound debridement done today with guaze and saline   -grafix #1 applied 1/10/24  - Keep legs elevated, as much as you can.    Wound Location: Right lower leg x 2       Dressing orders:       Right leg wound X2-  Graft applied today and covered with wound veil and steri strips. Leg wrapped with unna boot, ABD pad, kerlix, and coban. Leave in place until next visit.     If unna boot becomes too tight- raise/elevate legs above the level of the heart for 15-20 minutes if swelling does not go down then carefully cut off unna boot and call clinic or go to local ER or family physician. If unna boot becomes wet or starts to roll down then carefully remove unna boot and call clinic.      Keep all dressings clean & dry.     Follow up visit:  1 weeks 1/19/24 at 3pm  2 weeks 1/25/24 at 3pm     Supplies:     Duration of dressings: 30                     We have sent your supply order to the following company:  [employer] Phone # [phone]   If you don't receive the items you were expecting or don't know what the items are that you received, call the company where the order was sent.   If you are unable to obtain wound supplies, continue to use the supplies you have available until you are able to reach us. It is most important to keep the wound covered at all times.  It is YOUR responsibility to make sure that supplies are re-ordered before you run out. Re-order telephone numbers are included in each package.      Keep next scheduled appointment. Please give 24 hour notice if unable to keep appointment. [phone]     If you experience any of the following, please call the Wound Care Service during business hours: Monday through Friday 8:00 am - 4:30 pm  ([phone]).              *Increase in pain              *Temperature over 101              *Increase in drainage from your wound or a foul odor              *Uncontrolled swelling              *Need for compression bandage changes due

## 2024-01-10 NOTE — PLAN OF CARE
Problem: Wound:  Goal: Will show signs of wound healing; wound closure and no evidence of infection  Description: Will show signs of wound healing; wound closure and no evidence of infection  Outcome: Not Progressing   Pt. Seen today for right leg wound see AVS for new orders. Follow up in 1 week.  Care plan reviewed with patient.  Patient verbalize understanding of the plan of care and contribute to goal setting.

## 2024-01-11 NOTE — PROGRESS NOTES
Unna Boot Application   Below Knee    NAME:  Damien Aguilar  YOB: 1960  MEDICAL RECORD NUMBER:  [id number]  DATE:  1/10/2024    Unna boot: Appied primary and secondary dressing as ordered.  Applied Unna roll from toes to knee overlapping each time.   Applied ace wrap or coban from toes to below the knee.   Secured with tape and/or metal clips covered with tape.   Instructed patient/caregiver to keep dressing dry and intact. DO NOT REMOVE DRESSING.   Instructed pt/family/caregiver to report excessive draining, loose bandage, wet dressing, severe pain or tingling in toes.  Applied Unna Boot dressing below the knee to right lower leg.    Unna Boot(s) were applied per  Guidelines.     Electronically signed by Silvia Topete RN on 1/10/2024 at 4:23 PM     
Grafix PL Prime Treatment Note    NAME:  Damien Aguilar  YOB: 1960  MEDICAL RECORD NUMBER:  [id number]  DATE:  1/10/2024    Goal:  Patient will receive safe and proper application of skin substitute. Patient will comply with caring for dressing, and reporting complications.     The expiration date is checked immediately before use., The package was intact before use, and no damage was noted., Grafix PL is stored at room temperature., Grafix PL Prime was removed from protective sterile packaging by the provider and applied to the prepared ulcer bed., Provider removed the mesh applicators from both sides of graft and discarded  the two mesh pieces.  , Grafix PL Prime was hydrated with sterile normal saline per the provider., Grafix PL Prime was applied to right leg ., Grafix PL Prime was affixed with steri-strips by the provider. , Grafix PL Prime was covered with non-adherent ulcer dressing, Applied unna boot over non-adherent., Applied moisten saline gauze., Applied dry gauze and/or roll gauze., Applied appropriate off-loading device., The patient/caregiver was instructed not to remove the dressing and to keep it clean and dry., The patient/family/caregiver was instructed on the need for offloading and elevation of the affected extremity and on using the prescribed offloading device., and The patient/family/caregiver was instructed on signs and symptoms of complication to report, such as draining through dressing, dressing falling /slipping, getting wet, or severe pain or tingling.     Guidelines followed  Grafix PL Prime may only be used every 12 months per wound.      Date of first application of Grafix PL Prime for this current wound is January 10, 2024.    Application # 1 out of 10    Electronically signed by Silvia Topete RN on 1/11/2024 at 8:31 AM   
Vitamins-Minerals (MULTI COMPLETE PO) Take by mouth       No current facility-administered medications on file prior to encounter.       REVIEW OF SYSTEMS  A comprehensive review of systems was negative except for:  as noted I HPI    Objective:      BP (!) 146/74   Pulse 84   Temp 97 °F (36.1 °C) (Infrared)   Resp 18   SpO2 95%     Wt Readings from Last 3 Encounters:   12/18/23 126.8 kg (279 lb 9.6 oz)   09/20/23 122.5 kg (270 lb)   08/24/23 125.2 kg (276 lb)       PHYSICAL EXAM  General Appearance/Constitutional: alert and oriented to person, place and time,  and in no acute distress. Nontoxic.  Skin: warm and dry, no rash, positive wound per LDA documentation Head: normocephalic and atraumatic.  Eyes: extraocular eye movements intact, conjunctivae normal, and sclera anicteric.  ENT: hearing grossly normal bilaterally. Normal appearance.  Pulmonary/Chest: no chest wall tenderness and clear anteriorly. No respiratory distress.  Cardiovascular: normal rate and regular rhythm.  GI: abdomen soft, non-tender and non-distended.  Musculoskeletal: normal range of motion of joints. Nontender calves. No cyanosis. Nontender calves. Edema 1+ bilaterally, pedal and posterior tibial pulses +2.  Hemosiderin staining,  hyperkeratosis noted to right lower leg.  Neurologic: no gross cranial nerve deficit and speech normal. No focal deficits. Mental status normal.  Wound:  Right lateral leg wound bed granular with slough pre debridement.  Post debridement wound bed granular    Right lower anterior leg wound bed granular with slough pre debridement.  Post debridement wound bed granular with epithelialization.    Wound 08/31/23 Leg Right;Lateral;Lower (Active)   Wound Image   01/10/24 1504   Wound Etiology Venous 01/10/24 1504   Dressing Status Intact;Old drainage noted;New dressing applied 01/10/24 1504   Wound Cleansed Cleansed with saline 01/10/24 1504   Dressing/Treatment Foam;Triad hydro/zinc oxide-based hydrophilic paste

## 2024-01-18 DIAGNOSIS — R60.0 BILATERAL LOWER EXTREMITY EDEMA: ICD-10-CM

## 2024-01-18 DIAGNOSIS — I10 ESSENTIAL HYPERTENSION: ICD-10-CM

## 2024-01-18 RX ORDER — FUROSEMIDE 20 MG/1
20 TABLET ORAL 2 TIMES DAILY
Qty: 130 TABLET | Refills: 0 | Status: SHIPPED | OUTPATIENT
Start: 2024-01-18

## 2024-01-18 NOTE — TELEPHONE ENCOUNTER
Damien Aguilar called requesting a refill on the following medications:  Requested Prescriptions     Pending Prescriptions Disp Refills    furosemide (LASIX) 20 MG tablet [Pharmacy Med Name: FUROSEMIDE 20 MG TABLET] 180 tablet 1     Sig: TAKE 1 TABLET BY MOUTH TWICE A DAY       Date of last visit: 9/20/2023  Date of next visit (if applicable):3/20/2024  Date of last refill: 7/20/2023 180 with 1 refills  Pharmacy Name: [facility], [facility]

## 2024-01-19 ENCOUNTER — HOSPITAL ENCOUNTER (OUTPATIENT)
Dept: WOUND CARE | Age: 64
Discharge: HOME | End: 2024-01-19
Attending: NURSE PRACTITIONER
Payer: MEDICARE

## 2024-01-19 VITALS
TEMPERATURE: 97.1 F | HEART RATE: 58 BPM | SYSTOLIC BLOOD PRESSURE: 152 MMHG | DIASTOLIC BLOOD PRESSURE: 74 MMHG | RESPIRATION RATE: 18 BRPM | OXYGEN SATURATION: 98 %

## 2024-01-19 DIAGNOSIS — R60.0 VENOUS STASIS ULCER OF RIGHT LOWER LEG WITH EDEMA OF RIGHT LOWER LEG (HCC): Primary | ICD-10-CM

## 2024-01-19 DIAGNOSIS — I83.019 VENOUS STASIS ULCER OF RIGHT LOWER LEG WITH EDEMA OF RIGHT LOWER LEG (HCC): Primary | ICD-10-CM

## 2024-01-19 DIAGNOSIS — I83.891 VENOUS STASIS ULCER OF RIGHT LOWER LEG WITH EDEMA OF RIGHT LOWER LEG (HCC): Primary | ICD-10-CM

## 2024-01-19 DIAGNOSIS — I87.2 VENOUS INSUFFICIENCY OF BOTH LOWER EXTREMITIES: ICD-10-CM

## 2024-01-19 DIAGNOSIS — L97.919 VENOUS STASIS ULCER OF RIGHT LOWER LEG WITH EDEMA OF RIGHT LOWER LEG (HCC): Primary | ICD-10-CM

## 2024-01-19 DIAGNOSIS — I87.2 CHRONIC VENOUS STASIS DERMATITIS OF RIGHT LOWER EXTREMITY: ICD-10-CM

## 2024-01-19 PROCEDURE — 29580 STRAPPING UNNA BOOT: CPT

## 2024-01-19 PROCEDURE — 15271 SKIN SUB GRAFT TRNK/ARM/LEG: CPT

## 2024-01-19 RX ORDER — LIDOCAINE HYDROCHLORIDE 20 MG/ML
JELLY TOPICAL ONCE
OUTPATIENT
Start: 2024-01-19 | End: 2024-01-19

## 2024-01-19 RX ORDER — GENTAMICIN SULFATE 1 MG/G
OINTMENT TOPICAL ONCE
OUTPATIENT
Start: 2024-01-19 | End: 2024-01-19

## 2024-01-19 RX ORDER — BETAMETHASONE DIPROPIONATE 0.05 %
OINTMENT (GRAM) TOPICAL ONCE
OUTPATIENT
Start: 2024-01-19 | End: 2024-01-19

## 2024-01-19 RX ORDER — LIDOCAINE 50 MG/G
OINTMENT TOPICAL ONCE
OUTPATIENT
Start: 2024-01-19 | End: 2024-01-19

## 2024-01-19 RX ORDER — SODIUM CHLOR/HYPOCHLOROUS ACID 0.033 %
SOLUTION, IRRIGATION IRRIGATION ONCE
OUTPATIENT
Start: 2024-01-19 | End: 2024-01-19

## 2024-01-19 RX ORDER — LIDOCAINE HYDROCHLORIDE 40 MG/ML
SOLUTION TOPICAL ONCE
OUTPATIENT
Start: 2024-01-19 | End: 2024-01-19

## 2024-01-19 RX ORDER — LIDOCAINE 40 MG/G
CREAM TOPICAL ONCE
OUTPATIENT
Start: 2024-01-19 | End: 2024-01-19

## 2024-01-19 RX ORDER — IBUPROFEN 200 MG
TABLET ORAL ONCE
OUTPATIENT
Start: 2024-01-19 | End: 2024-01-19

## 2024-01-19 RX ORDER — CLOBETASOL PROPIONATE 0.5 MG/G
OINTMENT TOPICAL ONCE
OUTPATIENT
Start: 2024-01-19 | End: 2024-01-19

## 2024-01-19 RX ORDER — GINSENG 100 MG
CAPSULE ORAL ONCE
OUTPATIENT
Start: 2024-01-19 | End: 2024-01-19

## 2024-01-19 RX ORDER — BACITRACIN ZINC AND POLYMYXIN B SULFATE 500; 1000 [USP'U]/G; [USP'U]/G
OINTMENT TOPICAL ONCE
OUTPATIENT
Start: 2024-01-19 | End: 2024-01-19

## 2024-01-19 RX ORDER — TRIAMCINOLONE ACETONIDE 1 MG/G
OINTMENT TOPICAL ONCE
OUTPATIENT
Start: 2024-01-19 | End: 2024-01-19

## 2024-01-19 NOTE — PATIENT INSTRUCTIONS
Visit Discharge/Physician Orders:  - wound debridement done today with guaze and saline   -grafix #1 applied 1/10/24; grafix #2 applied 1/19/24  - Keep legs elevated, as much as you can.  -referral sent for vein care center to evaluate larger wound (ok to remove unna boot if the office request that, cover wounds with silicone boarder foam)    Wound Location: Right lower leg x 2       Dressing orders:       Right leg wound X2-  Graft applied today and covered with wound veil and steri strips, alginate applied. Leg wrapped with unna boot, ABD pad, kerlix, and coban. Leave in place until next visit.   Graft only applied to right medial wound      If unna boot becomes too tight- raise/elevate legs above the level of the heart for 15-20 minutes if swelling does not go down then carefully cut off unna boot and call clinic or go to local ER or family physician. If unna boot becomes wet or starts to roll down then carefully remove unna boot and call clinic.      Keep all dressings clean & dry.     Follow up visit:  1 week 1/25/24 at 3pm  2 weeks 2/1/24 at 3:00 pm      Supplies:     Duration of dressings: 30                     We have sent your supply order to the following company:  [employer] Phone # [phone]   If you don't receive the items you were expecting or don't know what the items are that you received, call the company where the order was sent.   If you are unable to obtain wound supplies, continue to use the supplies you have available until you are able to reach us. It is most important to keep the wound covered at all times.  It is YOUR responsibility to make sure that supplies are re-ordered before you run out. Re-order telephone numbers are included in each package.      Keep next scheduled appointment. Please give 24 hour notice if unable to keep appointment. [phone]     If you experience any of the following, please call the Wound Care Service during business hours: Monday through Friday 8:00 am -
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
## 2024-01-19 NOTE — PROGRESS NOTES
Grafix PL Prime Treatment Note    NAME:  Damien Aguilar  YOB: 1960  MEDICAL RECORD NUMBER:  [id number]  DATE:  1/19/2024    Goal:  Patient will receive safe and proper application of skin substitute. Patient will comply with caring for dressing, and reporting complications.     The expiration date is checked immediately before use., The package was intact before use, and no damage was noted., Grafix PL is stored at room temperature., Grafix PL Prime was removed from protective sterile packaging by the provider and applied to the prepared ulcer bed., Provider removed the mesh applicators from both sides of graft and discarded  the two mesh pieces.  , Grafix PL Prime was hydrated with sterile normal saline per the provider., Grafix PL Prime was applied to right leg ., Grafix PL Prime was affixed with steri-strips by the provider. , Grafix PL Prime was covered with non-adherent ulcer dressing, Applied steri-strips over non-adherent., Applied moisten saline gauze., Applied dry gauze and/or roll gauze., Applied appropriate off-loading device., The patient/caregiver was instructed not to remove the dressing and to keep it clean and dry., The patient/family/caregiver was instructed on the need for offloading and elevation of the affected extremity and on using the prescribed offloading device., and The patient/family/caregiver was instructed on signs and symptoms of complication to report, such as draining through dressing, dressing falling /slipping, getting wet, or severe pain or tingling.     Guidelines followed  Grafix PL Prime may only be used every 12 months per wound.      Date of first application of Grafix PL Prime for this current wound is January 10, 2024.    Application # 2 out of 10    Electronically signed by Lalita Lindsay RN on 1/19/2024 at 3:41 PM

## 2024-01-19 NOTE — PLAN OF CARE
Problem: Wound:  Goal: Will show signs of wound healing; wound closure and no evidence of infection  Description: Will show signs of wound healing; wound closure and no evidence of infection  Outcome: Progressing   Seen today for right leg wounds. See AVS for discharge   Care plan reviewed with patient.  Patient verbalize understanding of the plan of care and contribute to goal setting.

## 2024-01-19 NOTE — PROGRESS NOTES
Unna Boot Application   Below Knee    NAME:  Damien Aguilar  YOB: 1960  MEDICAL RECORD NUMBER:  [id number]  DATE:  1/19/2024    Unna boot: Applied moisturizing agent to dry skin as needed.   Appied primary and secondary dressing as ordered.  Applied Unna roll from toes to knee overlapping each time.   Applied ace wrap or coban from toes to below the knee.   Instructed patient/caregiver to keep dressing dry and intact. DO NOT REMOVE DRESSING.   Instructed pt/family/caregiver to report excessive draining, loose bandage, wet dressing, severe pain or tingling in toes.  Applied Unna Boot dressing below the knee to right lower leg.    Unna Boot(s) were applied per  Guidelines.     Electronically signed by Lalita Lindsay RN on 1/19/2024 at 3:26 PM

## 2024-01-25 ENCOUNTER — HOSPITAL ENCOUNTER (OUTPATIENT)
Dept: WOUND CARE | Age: 64
Discharge: HOME | End: 2024-01-25
Attending: NURSE PRACTITIONER
Payer: MEDICARE

## 2024-01-25 VITALS
HEART RATE: 56 BPM | SYSTOLIC BLOOD PRESSURE: 147 MMHG | RESPIRATION RATE: 18 BRPM | DIASTOLIC BLOOD PRESSURE: 68 MMHG | TEMPERATURE: 97.6 F | OXYGEN SATURATION: 98 %

## 2024-01-25 DIAGNOSIS — L97.919 VENOUS STASIS ULCER OF RIGHT LOWER LEG WITH EDEMA OF RIGHT LOWER LEG (HCC): Primary | ICD-10-CM

## 2024-01-25 DIAGNOSIS — R60.0 VENOUS STASIS ULCER OF RIGHT LOWER LEG WITH EDEMA OF RIGHT LOWER LEG (HCC): Primary | ICD-10-CM

## 2024-01-25 DIAGNOSIS — I87.2 CHRONIC VENOUS STASIS DERMATITIS OF RIGHT LOWER EXTREMITY: ICD-10-CM

## 2024-01-25 DIAGNOSIS — I83.891 VENOUS STASIS ULCER OF RIGHT LOWER LEG WITH EDEMA OF RIGHT LOWER LEG (HCC): Primary | ICD-10-CM

## 2024-01-25 DIAGNOSIS — I83.019 VENOUS STASIS ULCER OF RIGHT LOWER LEG WITH EDEMA OF RIGHT LOWER LEG (HCC): Primary | ICD-10-CM

## 2024-01-25 PROCEDURE — 99213 OFFICE O/P EST LOW 20 MIN: CPT | Performed by: NURSE PRACTITIONER

## 2024-01-25 PROCEDURE — 99213 OFFICE O/P EST LOW 20 MIN: CPT

## 2024-01-25 RX ORDER — SODIUM CHLOR/HYPOCHLOROUS ACID 0.033 %
SOLUTION, IRRIGATION IRRIGATION ONCE
OUTPATIENT
Start: 2024-01-25 | End: 2024-01-25

## 2024-01-25 RX ORDER — CLOBETASOL PROPIONATE 0.5 MG/G
OINTMENT TOPICAL ONCE
OUTPATIENT
Start: 2024-01-25 | End: 2024-01-25

## 2024-01-25 RX ORDER — BACITRACIN ZINC AND POLYMYXIN B SULFATE 500; 1000 [USP'U]/G; [USP'U]/G
OINTMENT TOPICAL ONCE
OUTPATIENT
Start: 2024-01-25 | End: 2024-01-25

## 2024-01-25 RX ORDER — LIDOCAINE HYDROCHLORIDE 20 MG/ML
JELLY TOPICAL ONCE
OUTPATIENT
Start: 2024-01-25 | End: 2024-01-25

## 2024-01-25 RX ORDER — BETAMETHASONE DIPROPIONATE 0.05 %
OINTMENT (GRAM) TOPICAL ONCE
OUTPATIENT
Start: 2024-01-25 | End: 2024-01-25

## 2024-01-25 RX ORDER — GINSENG 100 MG
CAPSULE ORAL ONCE
OUTPATIENT
Start: 2024-01-25 | End: 2024-01-25

## 2024-01-25 RX ORDER — IBUPROFEN 200 MG
TABLET ORAL ONCE
OUTPATIENT
Start: 2024-01-25 | End: 2024-01-25

## 2024-01-25 RX ORDER — LIDOCAINE 40 MG/G
CREAM TOPICAL ONCE
OUTPATIENT
Start: 2024-01-25 | End: 2024-01-25

## 2024-01-25 RX ORDER — LIDOCAINE HYDROCHLORIDE 40 MG/ML
SOLUTION TOPICAL ONCE
OUTPATIENT
Start: 2024-01-25 | End: 2024-01-25

## 2024-01-25 RX ORDER — LIDOCAINE 50 MG/G
OINTMENT TOPICAL ONCE
OUTPATIENT
Start: 2024-01-25 | End: 2024-01-25

## 2024-01-25 RX ORDER — TRIAMCINOLONE ACETONIDE 1 MG/G
OINTMENT TOPICAL ONCE
OUTPATIENT
Start: 2024-01-25 | End: 2024-01-25

## 2024-01-25 RX ORDER — GENTAMICIN SULFATE 1 MG/G
OINTMENT TOPICAL ONCE
OUTPATIENT
Start: 2024-01-25 | End: 2024-01-25

## 2024-01-25 NOTE — PLAN OF CARE
Problem: Wound:  Goal: Will show signs of wound healing; wound closure and no evidence of infection  Description: Will show signs of wound healing; wound closure and no evidence of infection  Outcome: Progressing     Patient presents to wound clinic for right leg wounds. No s/s of infection noted. See AVS for discharge instructions. Follow up visit: 2 weeks on Thursday February 8th at 3:00 pm     Care plan reviewed with patient.  Patient verbalize understanding of the plan of care and contribute to goal setting.

## 2024-01-25 NOTE — PATIENT INSTRUCTIONS
Visit Discharge/Physician Orders:  - wound debridement done today with guaze and saline   - grafix #1 applied 1/10/24; grafix #2 applied 1/19/24  - Keep legs elevated, as much as you can.    Wound Location: Right lower leg x 2       Dressing orders:       Right leg wound (side of leg)- Apply Triad to wound. Cover with Excel Daily SAP 4\" x 4\" Change daily. Wear your compression stockings daily. Apply compression in the morning and remove at bedtime.   Right leg wound (front of leg- larger wound on leg)- Apply lightly moistened Dakins gauze to wound. Cover with Excel Daily SAP 4\" x 4\" Change daily. Wear your compression stockings daily. Apply compression in the morning and remove at bedtime.     Keep all dressings clean & dry.     Follow up visit: 2 weeks on Thursday February 8th at 3:00 pm      Supplies:     Duration of dressings: 30                     We have sent your supply order to the following company:  [employer] Phone # [phone]   If you don't receive the items you were expecting or don't know what the items are that you received, call the company where the order was sent.   If you are unable to obtain wound supplies, continue to use the supplies you have available until you are able to reach us. It is most important to keep the wound covered at all times.  It is YOUR responsibility to make sure that supplies are re-ordered before you run out. Re-order telephone numbers are included in each package.      Keep next scheduled appointment. Please give 24 hour notice if unable to keep appointment. [phone]     If you experience any of the following, please call the Wound Care Service during business hours: Monday through Friday 8:00 am - 4:30 pm  ([phone]).              *Increase in pain              *Temperature over 101              *Increase in drainage from your wound or a foul odor              *Uncontrolled swelling              *Need for compression bandage changes due to slippage, breakthrough
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
## 2024-02-08 ENCOUNTER — HOSPITAL ENCOUNTER (OUTPATIENT)
Dept: WOUND CARE | Age: 64
Discharge: HOME | End: 2024-02-08
Attending: NURSE PRACTITIONER
Payer: MEDICARE

## 2024-02-08 VITALS
SYSTOLIC BLOOD PRESSURE: 171 MMHG | HEART RATE: 58 BPM | OXYGEN SATURATION: 96 % | RESPIRATION RATE: 18 BRPM | DIASTOLIC BLOOD PRESSURE: 77 MMHG | TEMPERATURE: 98.2 F

## 2024-02-08 DIAGNOSIS — I83.891 VENOUS STASIS ULCER OF RIGHT LOWER LEG WITH EDEMA OF RIGHT LOWER LEG (HCC): Primary | ICD-10-CM

## 2024-02-08 DIAGNOSIS — R60.0 VENOUS STASIS ULCER OF RIGHT LOWER LEG WITH EDEMA OF RIGHT LOWER LEG (HCC): Primary | ICD-10-CM

## 2024-02-08 DIAGNOSIS — I83.019 VENOUS STASIS ULCER OF RIGHT LOWER LEG WITH EDEMA OF RIGHT LOWER LEG (HCC): Primary | ICD-10-CM

## 2024-02-08 DIAGNOSIS — L97.919 VENOUS STASIS ULCER OF RIGHT LOWER LEG WITH EDEMA OF RIGHT LOWER LEG (HCC): Primary | ICD-10-CM

## 2024-02-08 PROCEDURE — 99213 OFFICE O/P EST LOW 20 MIN: CPT

## 2024-02-08 PROCEDURE — 99213 OFFICE O/P EST LOW 20 MIN: CPT | Performed by: NURSE PRACTITIONER

## 2024-02-08 RX ORDER — LIDOCAINE HYDROCHLORIDE 20 MG/ML
JELLY TOPICAL ONCE
OUTPATIENT
Start: 2024-02-08 | End: 2024-02-08

## 2024-02-08 RX ORDER — SODIUM CHLOR/HYPOCHLOROUS ACID 0.033 %
SOLUTION, IRRIGATION IRRIGATION ONCE
OUTPATIENT
Start: 2024-02-08 | End: 2024-02-08

## 2024-02-08 RX ORDER — LIDOCAINE HYDROCHLORIDE 40 MG/ML
SOLUTION TOPICAL ONCE
OUTPATIENT
Start: 2024-02-08 | End: 2024-02-08

## 2024-02-08 RX ORDER — BACITRACIN ZINC AND POLYMYXIN B SULFATE 500; 1000 [USP'U]/G; [USP'U]/G
OINTMENT TOPICAL ONCE
OUTPATIENT
Start: 2024-02-08 | End: 2024-02-08

## 2024-02-08 RX ORDER — CLOBETASOL PROPIONATE 0.5 MG/G
OINTMENT TOPICAL ONCE
OUTPATIENT
Start: 2024-02-08 | End: 2024-02-08

## 2024-02-08 RX ORDER — GINSENG 100 MG
CAPSULE ORAL ONCE
OUTPATIENT
Start: 2024-02-08 | End: 2024-02-08

## 2024-02-08 RX ORDER — IBUPROFEN 200 MG
TABLET ORAL ONCE
OUTPATIENT
Start: 2024-02-08 | End: 2024-02-08

## 2024-02-08 RX ORDER — LIDOCAINE 40 MG/G
CREAM TOPICAL ONCE
OUTPATIENT
Start: 2024-02-08 | End: 2024-02-08

## 2024-02-08 RX ORDER — BETAMETHASONE DIPROPIONATE 0.05 %
OINTMENT (GRAM) TOPICAL ONCE
OUTPATIENT
Start: 2024-02-08 | End: 2024-02-08

## 2024-02-08 RX ORDER — GENTAMICIN SULFATE 1 MG/G
OINTMENT TOPICAL ONCE
OUTPATIENT
Start: 2024-02-08 | End: 2024-02-08

## 2024-02-08 RX ORDER — TRIAMCINOLONE ACETONIDE 1 MG/G
OINTMENT TOPICAL ONCE
OUTPATIENT
Start: 2024-02-08 | End: 2024-02-08

## 2024-02-08 RX ORDER — LIDOCAINE 50 MG/G
OINTMENT TOPICAL ONCE
OUTPATIENT
Start: 2024-02-08 | End: 2024-02-08

## 2024-02-08 NOTE — PATIENT INSTRUCTIONS
Visit Discharge/Physician Orders:  - wound debridement done today with guaze and saline   - grafix #1 applied 1/10/24; grafix #2 applied 1/19/24  - Keep legs elevated, as much as you can.    Wound Location: Right lower leg x 2       Dressing orders:       Right leg wound (side of leg)- Apply Triad to wound. Cover with Excel Daily SAP 4\" x 4\" Change daily. Wear your compression stockings daily. Apply compression in the morning and remove at bedtime.   Right leg wound (front of leg- larger wound on leg)- Apply lightly moistened Dakins gauze to wound. Cover with Excel Daily SAP 4\" x 4\" Change daily. Wear your compression stockings daily. Apply compression in the morning and remove at bedtime.     Keep all dressings clean & dry.     Follow up visit: 3 weeks Thursday February 29 at 3:00 pm     Supplies:     Duration of dressings: 30                     We have sent your supply order to the following company:  [employer] Phone # [phone]   If you don't receive the items you were expecting or don't know what the items are that you received, call the company where the order was sent.   If you are unable to obtain wound supplies, continue to use the supplies you have available until you are able to reach us. It is most important to keep the wound covered at all times.  It is YOUR responsibility to make sure that supplies are re-ordered before you run out. Re-order telephone numbers are included in each package.      Keep next scheduled appointment. Please give 24 hour notice if unable to keep appointment. [phone]     If you experience any of the following, please call the Wound Care Service during business hours: Monday through Friday 8:00 am - 4:30 pm  ([phone]).              *Increase in pain              *Temperature over 101              *Increase in drainage from your wound or a foul odor              *Uncontrolled swelling              *Need for compression bandage changes due to slippage, breakthrough

## 2024-02-08 NOTE — PROGRESS NOTES
Wound Care Supplies      Supply Company:     [employer] [address] [place] [address] f: [phone] f: [phone] p: [phone] [email or web address]      Ordering Center:   [facility]  [address] [address]  [phone]  WOUND CARE Dept: [phone]   FAX NUMBER [phone]    Patient Information:      Rodriguez Johnston  [address] [facility] [address]  [facility] [address]   [phone]   : 1960  AGE: 63 y.o.     GENDER: male   EPISODE DATE: 2024    Insurance:      PRIMARY INSURANCE:  Plan: MEDICARE PART A AND B  Coverage: MEDICARE  Effective Date: 2023  Group Number: [unfilled]  Subscriber Number: [id number] - (Medicare)    Payer/Plan Subscr  Sex Relation Sub. Ins. ID Effective Group Num   1. AETNA - AETNA* RODRIGUEZ JOHNSTON 1960 Male Self [id number]                                    [address] [address]   2. [employer] - * RODRIGUEZ JOHNSTON 1960 Male Self [id number] [place]IX[place]                                   [address]       Patient Wound Information:      Problem List Items Addressed This Visit          Other    Venous stasis ulcer of right lower leg with edema of right lower leg (HCC) - Primary    Relevant Orders    Initiate Outpatient Wound Care Protocol       WOUNDS REQUIRING DRESSING SUPPLIES:     Wound 23 Leg Right;Lateral;Lower (Active)   Wound Image   24 1444   Wound Etiology Venous 24 1444   Dressing Status Intact;Old drainage noted;New dressing applied 24 1444   Wound Cleansed Cleansed with saline 24 1444   Dressing/Treatment Triad hydro/zinc oxide-based hydrophilic paste;Silicone border 24 1444   Offloading for Diabetic Foot Ulcers Offloading not required 24 1444   Wound Length (cm) 1.3 cm 24 1444   Wound Width (cm) 1 cm 24 1444   Wound Depth (cm) 0.1 cm 24 1444   Wound Surface Area (cm^2) 1.3 cm^2 24 1444   Change in Wound Size % (l*w) 83.75 
in the morning and remove at bedtime.     Keep all dressings clean & dry.     Follow up visit: 3 weeks Thursday February 29 at 3:00 pm     Supplies:     Duration of dressings: 30                     We have sent your supply order to the following company:  [employer] Phone # [phone]   If you don't receive the items you were expecting or don't know what the items are that you received, call the company where the order was sent.   If you are unable to obtain wound supplies, continue to use the supplies you have available until you are able to reach us. It is most important to keep the wound covered at all times.  It is YOUR responsibility to make sure that supplies are re-ordered before you run out. Re-order telephone numbers are included in each package.      Keep next scheduled appointment. Please give 24 hour notice if unable to keep appointment. [phone]     If you experience any of the following, please call the Wound Care Service during business hours: Monday through Friday 8:00 am - 4:30 pm  ([phone]).              *Increase in pain              *Temperature over 101              *Increase in drainage from your wound or a foul odor              *Uncontrolled swelling              *Need for compression bandage changes due to slippage, breakthrough drainage     If you need medical attention outside of business hours, please contact your Primary Care Doctor or go to the nearest emergency room.      Electronically signed by JAZZY Lopez CNP on 2/8/2024 at 3:52 PM

## 2024-02-08 NOTE — PLAN OF CARE
Problem: Wound:  Goal: Will show signs of wound healing; wound closure and no evidence of infection  Description: Will show signs of wound healing; wound closure and no evidence of infection  Outcome: Progressing   Seen today for right leg wounds. See AVS for discharge   Care plan reviewed with patient .  Patient  verbalize understanding of the plan of care and contribute to goal setting.

## 2024-02-29 ENCOUNTER — HOSPITAL ENCOUNTER (OUTPATIENT)
Dept: WOUND CARE | Age: 64
Discharge: HOME | End: 2024-02-29
Attending: NURSE PRACTITIONER
Payer: MEDICARE

## 2024-02-29 VITALS
HEART RATE: 57 BPM | OXYGEN SATURATION: 94 % | DIASTOLIC BLOOD PRESSURE: 57 MMHG | RESPIRATION RATE: 20 BRPM | SYSTOLIC BLOOD PRESSURE: 117 MMHG | TEMPERATURE: 97.7 F

## 2024-02-29 DIAGNOSIS — I87.2 CHRONIC VENOUS STASIS DERMATITIS OF RIGHT LOWER EXTREMITY: ICD-10-CM

## 2024-02-29 DIAGNOSIS — R60.0 VENOUS STASIS ULCER OF RIGHT LOWER LEG WITH EDEMA OF RIGHT LOWER LEG (HCC): Primary | ICD-10-CM

## 2024-02-29 DIAGNOSIS — L97.919 VENOUS STASIS ULCER OF RIGHT LOWER LEG WITH EDEMA OF RIGHT LOWER LEG (HCC): Primary | ICD-10-CM

## 2024-02-29 DIAGNOSIS — I83.019 VENOUS STASIS ULCER OF RIGHT LOWER LEG WITH EDEMA OF RIGHT LOWER LEG (HCC): Primary | ICD-10-CM

## 2024-02-29 DIAGNOSIS — I83.891 VENOUS STASIS ULCER OF RIGHT LOWER LEG WITH EDEMA OF RIGHT LOWER LEG (HCC): Primary | ICD-10-CM

## 2024-02-29 PROCEDURE — 6370000000 HC RX 637 (ALT 250 FOR IP): Performed by: NURSE PRACTITIONER

## 2024-02-29 PROCEDURE — 15271 SKIN SUB GRAFT TRNK/ARM/LEG: CPT

## 2024-02-29 PROCEDURE — 17250 CHEM CAUT OF GRANLTJ TISSUE: CPT

## 2024-02-29 RX ORDER — GENTAMICIN SULFATE 1 MG/G
OINTMENT TOPICAL ONCE
OUTPATIENT
Start: 2024-02-29 | End: 2024-02-29

## 2024-02-29 RX ORDER — LIDOCAINE 40 MG/G
CREAM TOPICAL ONCE
OUTPATIENT
Start: 2024-02-29 | End: 2024-02-29

## 2024-02-29 RX ORDER — BETAMETHASONE DIPROPIONATE 0.05 %
OINTMENT (GRAM) TOPICAL ONCE
OUTPATIENT
Start: 2024-02-29 | End: 2024-02-29

## 2024-02-29 RX ORDER — LIDOCAINE HYDROCHLORIDE 40 MG/ML
SOLUTION TOPICAL ONCE
OUTPATIENT
Start: 2024-02-29 | End: 2024-02-29

## 2024-02-29 RX ORDER — IBUPROFEN 200 MG
TABLET ORAL ONCE
OUTPATIENT
Start: 2024-02-29 | End: 2024-02-29

## 2024-02-29 RX ORDER — BACITRACIN ZINC AND POLYMYXIN B SULFATE 500; 1000 [USP'U]/G; [USP'U]/G
OINTMENT TOPICAL ONCE
OUTPATIENT
Start: 2024-02-29 | End: 2024-02-29

## 2024-02-29 RX ORDER — LIDOCAINE HYDROCHLORIDE 20 MG/ML
JELLY TOPICAL ONCE
Status: COMPLETED | OUTPATIENT
Start: 2024-02-29 | End: 2024-02-29

## 2024-02-29 RX ORDER — LIDOCAINE HYDROCHLORIDE 20 MG/ML
JELLY TOPICAL ONCE
OUTPATIENT
Start: 2024-02-29 | End: 2024-02-29

## 2024-02-29 RX ORDER — GINSENG 100 MG
CAPSULE ORAL ONCE
OUTPATIENT
Start: 2024-02-29 | End: 2024-02-29

## 2024-02-29 RX ORDER — LIDOCAINE 50 MG/G
OINTMENT TOPICAL ONCE
OUTPATIENT
Start: 2024-02-29 | End: 2024-02-29

## 2024-02-29 RX ORDER — SODIUM CHLOR/HYPOCHLOROUS ACID 0.033 %
SOLUTION, IRRIGATION IRRIGATION ONCE
OUTPATIENT
Start: 2024-02-29 | End: 2024-02-29

## 2024-02-29 RX ORDER — CLOBETASOL PROPIONATE 0.5 MG/G
OINTMENT TOPICAL ONCE
OUTPATIENT
Start: 2024-02-29 | End: 2024-02-29

## 2024-02-29 RX ORDER — TRIAMCINOLONE ACETONIDE 1 MG/G
OINTMENT TOPICAL ONCE
OUTPATIENT
Start: 2024-02-29 | End: 2024-02-29

## 2024-02-29 RX ADMIN — LIDOCAINE HYDROCHLORIDE: 20 JELLY TOPICAL at 15:13

## 2024-02-29 NOTE — PLAN OF CARE
Problem: Wound:  Goal: Will show signs of wound healing; wound closure and no evidence of infection  Description: Will show signs of wound healing; wound closure and no evidence of infection  Outcome: Progressing  Note: Patient seen for right leg wound. Wound shows signs of proper closure and healing. No s/s of infection noted. Grafix applied today to right leg medial wound.  Follow up in 1 weeks. See AVS for order changes.      Care plan reviewed with patient.  Patient verbalize understanding of the plan of care and contribute to goal setting.

## 2024-02-29 NOTE — PROGRESS NOTES
control of edema through compression, elevation of legs while at rest, and regular exercise in both healing of current wounds and prevention of reoccurrence of wounds in the future. Complications of nonhealing/not treated wounds include but are not limited to infection, pain, infection of bone, sepsis, loss of life and limb.      Application process discussed and demonstrated during visit today, written  instructions provided.  Patient verbalized comfort with process.  All questions and concerns addressed prior to completion of visit.    Encouraged patient to go to [facility] for follow up as scheduled.    No indications of infection noted at today's visit.  Education provided on signs and symptoms of infection.  Call clinic or seek emergency care should these occur.    Pertinent labs reviewed.     Review of medical records and external note (s) from other providers was done for this visit.     Follow up 1 week.  Call clinic with any needs or concerns prior to scheduled visit.    Written patient dismissal instructions given to patient and signed by patient or POA.  Patient voiced understanding that the importance of adherence to instructions is paramount to wound healing improvement or success.       Orders Placed This Encounter   Procedures    Initiate Outpatient Wound Care Protocol     Cleanse wound with saline    If wound contains bioburden or contamination cleanse with wound cleanser or antimicrobial solution     For normal periwound tissue without irritation nor maceration, apply topical skin protectant    For periwound tissue with irritation and/or maceration, apply zinc based product, topical steroid cream/ointment, or equivalent     For wounds with dry firm black eschar and/or without exudate, apply betadine and leave open to air      For wounds with scant/small to no exudate or drainage, apply wound gel, hydrocolloid, polymer, or equivalent and cover with secondary dressing/foam      For wounds with

## 2024-02-29 NOTE — PATIENT INSTRUCTIONS
to slippage, breakthrough drainage     If you need medical attention outside of business hours, please contact your Primary Care Doctor or go to the nearest emergency room.

## 2024-03-08 ENCOUNTER — HOSPITAL ENCOUNTER (OUTPATIENT)
Dept: WOUND CARE | Age: 64
Discharge: HOME | End: 2024-03-08
Attending: NURSE PRACTITIONER
Payer: MEDICARE

## 2024-03-08 DIAGNOSIS — I83.891 VENOUS STASIS ULCER OF RIGHT LOWER LEG WITH EDEMA OF RIGHT LOWER LEG (HCC): Primary | ICD-10-CM

## 2024-03-08 DIAGNOSIS — I83.019 VENOUS STASIS ULCER OF RIGHT LOWER LEG WITH EDEMA OF RIGHT LOWER LEG (HCC): Primary | ICD-10-CM

## 2024-03-08 DIAGNOSIS — R60.0 VENOUS STASIS ULCER OF RIGHT LOWER LEG WITH EDEMA OF RIGHT LOWER LEG (HCC): Primary | ICD-10-CM

## 2024-03-08 DIAGNOSIS — L97.919 VENOUS STASIS ULCER OF RIGHT LOWER LEG WITH EDEMA OF RIGHT LOWER LEG (HCC): Primary | ICD-10-CM

## 2024-03-08 PROCEDURE — 15271 SKIN SUB GRAFT TRNK/ARM/LEG: CPT

## 2024-03-08 PROCEDURE — 6370000000 HC RX 637 (ALT 250 FOR IP): Performed by: NURSE PRACTITIONER

## 2024-03-08 RX ORDER — SODIUM CHLOR/HYPOCHLOROUS ACID 0.033 %
SOLUTION, IRRIGATION IRRIGATION ONCE
OUTPATIENT
Start: 2024-03-08 | End: 2024-03-08

## 2024-03-08 RX ORDER — LIDOCAINE 40 MG/G
CREAM TOPICAL ONCE
OUTPATIENT
Start: 2024-03-08 | End: 2024-03-08

## 2024-03-08 RX ORDER — CLOBETASOL PROPIONATE 0.5 MG/G
OINTMENT TOPICAL ONCE
OUTPATIENT
Start: 2024-03-08 | End: 2024-03-08

## 2024-03-08 RX ORDER — LIDOCAINE 50 MG/G
OINTMENT TOPICAL ONCE
OUTPATIENT
Start: 2024-03-08 | End: 2024-03-08

## 2024-03-08 RX ORDER — IBUPROFEN 200 MG
TABLET ORAL ONCE
OUTPATIENT
Start: 2024-03-08 | End: 2024-03-08

## 2024-03-08 RX ORDER — LIDOCAINE HYDROCHLORIDE 20 MG/ML
JELLY TOPICAL ONCE
OUTPATIENT
Start: 2024-03-08 | End: 2024-03-08

## 2024-03-08 RX ORDER — GINSENG 100 MG
CAPSULE ORAL ONCE
OUTPATIENT
Start: 2024-03-08 | End: 2024-03-08

## 2024-03-08 RX ORDER — BETAMETHASONE DIPROPIONATE 0.05 %
OINTMENT (GRAM) TOPICAL ONCE
OUTPATIENT
Start: 2024-03-08 | End: 2024-03-08

## 2024-03-08 RX ORDER — BACITRACIN ZINC AND POLYMYXIN B SULFATE 500; 1000 [USP'U]/G; [USP'U]/G
OINTMENT TOPICAL ONCE
OUTPATIENT
Start: 2024-03-08 | End: 2024-03-08

## 2024-03-08 RX ORDER — TRIAMCINOLONE ACETONIDE 1 MG/G
OINTMENT TOPICAL ONCE
OUTPATIENT
Start: 2024-03-08 | End: 2024-03-08

## 2024-03-08 RX ORDER — LIDOCAINE HYDROCHLORIDE 20 MG/ML
JELLY TOPICAL ONCE
Status: COMPLETED | OUTPATIENT
Start: 2024-03-08 | End: 2024-03-08

## 2024-03-08 RX ORDER — LIDOCAINE HYDROCHLORIDE 40 MG/ML
SOLUTION TOPICAL ONCE
OUTPATIENT
Start: 2024-03-08 | End: 2024-03-08

## 2024-03-08 RX ORDER — GENTAMICIN SULFATE 1 MG/G
OINTMENT TOPICAL ONCE
OUTPATIENT
Start: 2024-03-08 | End: 2024-03-08

## 2024-03-08 RX ADMIN — LIDOCAINE HYDROCHLORIDE: 20 JELLY TOPICAL at 15:00

## 2024-03-08 NOTE — PATIENT INSTRUCTIONS
*Uncontrolled swelling              *Need for compression bandage changes due to slippage, breakthrough drainage     If you need medical attention outside of business hours, please contact your Primary Care Doctor or go to the nearest emergency room.

## 2024-03-08 NOTE — PROGRESS NOTES
Grafix PL Prime Treatment Note    NAME:  Damien Aguilar  YOB: 1960  MEDICAL RECORD NUMBER:  [id number]  DATE:  3/8/2024    Goal:  Patient will receive safe and proper application of skin substitute. Patient will comply with caring for dressing, and reporting complications.     The expiration date is checked immediately before use., The package was intact before use, and no damage was noted., Grafix PL is stored at room temperature., Grafix PL Prime was removed from protective sterile packaging by the provider and applied to the prepared ulcer bed., Provider removed the mesh applicators from both sides of graft and discarded  the two mesh pieces.  , Grafix PL Prime was applied to the right medial leg ., Grafix PL Prime was affixed with steri-strips by the provider. , Applied collagen over non-adherent., Applied moisten saline gauze., The patient/caregiver was instructed not to remove the dressing and to keep it clean and dry., The patient/family/caregiver was instructed on the need for offloading and elevation of the affected extremity and on using the prescribed offloading device., and The patient/family/caregiver was instructed on signs and symptoms of complication to report, such as draining through dressing, dressing falling /slipping, getting wet, or severe pain or tingling.     Guidelines followed  Grafix PL Prime may only be used every 12 months per wound.      Date of first application of Grafix PL Prime for this current wound is January 10, 2024.    Application # 3 out of 10    Electronically signed by Gladis Jackson RN on 3/8/2024 at 3:46 PM    
or drainage, apply wound gel, hydrocolloid, polymer, or equivalent and cover with secondary dressing/foam      For wounds with moderate/large exudate or drainage, apply alginate, hydrofiber, polymer, or equivalent and cover with secondary dressing/foam    For wounds with nonviable tissue requiring removal, apply chemical or mechanical debrider and cover with secondary dressing/foam    For wounds with tunneling, dead space, or cavity, fill or pack with strip/gauze/kerlex to fit and cover with secondary dressing/foam    For wounds with adequate granulation or epithelization, apply wound gel, hydrocolloid, polymer, collagen, or transparent film, and cover secondary dry dressing/foam    For wounds that need additional secondary dressing to help pad or control additional drainage/exudates, add foam, absorbent pad or hydrocolloid    For wounds with suspected or known infection, apply antimicrobial mesh and/or antimicrobial alginate/hydrofiber, or antimicrobial solution moistened gauze/kerlex, or equivalent and cover with secondary dressing/foam    Compression Management needed for edema control, apply multilayer compression or tubular garment or equivalent    Offloading Management needed for pressure relief, apply offloading shoe/boot or equivalent     Standing Status:   Standing     Number of Occurrences:   1       Patient Instructions   Visit Discharge/Physician Orders:  - Leave graft bandage in place until Sunday, then place collagen and foam.   - wound debridement done today with guaze and saline   - grafix #1 applied 1/10/24; grafix #2 applied 1/19/24 grafix #3 applied 3/8/24  - Keep legs elevated, as much as you can.    Wound Location: Right lower leg x 2       Dressing orders:       Right leg wound lateral (outside)- Apply Triad to wound. Cover with Excel Daily SAP 4\" x 4\" Change daily. Wear your compression stockings daily. Apply compression in the morning and remove at bedtime.   Right leg wound medial (medial)-

## 2024-03-14 ENCOUNTER — HOSPITAL ENCOUNTER (OUTPATIENT)
Dept: WOUND CARE | Age: 64
Discharge: HOME | End: 2024-03-14
Attending: NURSE PRACTITIONER
Payer: MEDICARE

## 2024-03-14 VITALS
DIASTOLIC BLOOD PRESSURE: 74 MMHG | SYSTOLIC BLOOD PRESSURE: 175 MMHG | RESPIRATION RATE: 20 BRPM | HEART RATE: 51 BPM | TEMPERATURE: 97.5 F

## 2024-03-14 DIAGNOSIS — I83.891 VENOUS STASIS ULCER OF RIGHT LOWER LEG WITH EDEMA OF RIGHT LOWER LEG (HCC): Primary | ICD-10-CM

## 2024-03-14 DIAGNOSIS — I83.019 VENOUS STASIS ULCER OF RIGHT LOWER LEG WITH EDEMA OF RIGHT LOWER LEG (HCC): Primary | ICD-10-CM

## 2024-03-14 DIAGNOSIS — L97.919 VENOUS STASIS ULCER OF RIGHT LOWER LEG WITH EDEMA OF RIGHT LOWER LEG (HCC): Primary | ICD-10-CM

## 2024-03-14 DIAGNOSIS — R60.0 VENOUS STASIS ULCER OF RIGHT LOWER LEG WITH EDEMA OF RIGHT LOWER LEG (HCC): Primary | ICD-10-CM

## 2024-03-14 PROCEDURE — 15271 SKIN SUB GRAFT TRNK/ARM/LEG: CPT

## 2024-03-14 PROCEDURE — 6370000000 HC RX 637 (ALT 250 FOR IP): Performed by: NURSE PRACTITIONER

## 2024-03-14 RX ORDER — TRIAMCINOLONE ACETONIDE 1 MG/G
OINTMENT TOPICAL ONCE
OUTPATIENT
Start: 2024-03-14 | End: 2024-03-14

## 2024-03-14 RX ORDER — LIDOCAINE HYDROCHLORIDE 40 MG/ML
SOLUTION TOPICAL ONCE
OUTPATIENT
Start: 2024-03-14 | End: 2024-03-14

## 2024-03-14 RX ORDER — SODIUM CHLOR/HYPOCHLOROUS ACID 0.033 %
SOLUTION, IRRIGATION IRRIGATION ONCE
OUTPATIENT
Start: 2024-03-14 | End: 2024-03-14

## 2024-03-14 RX ORDER — BACITRACIN ZINC AND POLYMYXIN B SULFATE 500; 1000 [USP'U]/G; [USP'U]/G
OINTMENT TOPICAL ONCE
OUTPATIENT
Start: 2024-03-14 | End: 2024-03-14

## 2024-03-14 RX ORDER — CLOBETASOL PROPIONATE 0.5 MG/G
OINTMENT TOPICAL ONCE
OUTPATIENT
Start: 2024-03-14 | End: 2024-03-14

## 2024-03-14 RX ORDER — GINSENG 100 MG
CAPSULE ORAL ONCE
OUTPATIENT
Start: 2024-03-14 | End: 2024-03-14

## 2024-03-14 RX ORDER — LIDOCAINE 40 MG/G
CREAM TOPICAL ONCE
OUTPATIENT
Start: 2024-03-14 | End: 2024-03-14

## 2024-03-14 RX ORDER — LIDOCAINE HYDROCHLORIDE 20 MG/ML
JELLY TOPICAL ONCE
OUTPATIENT
Start: 2024-03-14 | End: 2024-03-14

## 2024-03-14 RX ORDER — BETAMETHASONE DIPROPIONATE 0.05 %
OINTMENT (GRAM) TOPICAL ONCE
OUTPATIENT
Start: 2024-03-14 | End: 2024-03-14

## 2024-03-14 RX ORDER — GENTAMICIN SULFATE 1 MG/G
OINTMENT TOPICAL ONCE
OUTPATIENT
Start: 2024-03-14 | End: 2024-03-14

## 2024-03-14 RX ORDER — LIDOCAINE HYDROCHLORIDE 20 MG/ML
JELLY TOPICAL ONCE
Status: COMPLETED | OUTPATIENT
Start: 2024-03-14 | End: 2024-03-14

## 2024-03-14 RX ORDER — IBUPROFEN 200 MG
TABLET ORAL ONCE
OUTPATIENT
Start: 2024-03-14 | End: 2024-03-14

## 2024-03-14 RX ORDER — LIDOCAINE 50 MG/G
OINTMENT TOPICAL ONCE
OUTPATIENT
Start: 2024-03-14 | End: 2024-03-14

## 2024-03-14 RX ADMIN — LIDOCAINE HYDROCHLORIDE: 20 JELLY TOPICAL at 14:45

## 2024-03-14 ASSESSMENT — PAIN SCALES - GENERAL: PAINLEVEL_OUTOF10: 0

## 2024-03-14 NOTE — PLAN OF CARE
Problem: Wound:  Goal: Will show signs of wound healing; wound closure and no evidence of infection  Description: Will show signs of wound healing; wound closure and no evidence of infection  Outcome: Progressing     Patient was seen at the wound clinic today for evaluation of right leg wound, please see AVS. Care plan reviewed with patient.  Patient verbalizes understanding of the plan of care and contribute to goal setting.
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
## 2024-03-14 NOTE — PROGRESS NOTES
Grafix PL Prime Treatment Note    NAME:  Damien Aguilar  YOB: 1960  MEDICAL RECORD NUMBER:  [id number]  DATE:  3/14/2024    Goal:  Patient will receive safe and proper application of skin substitute. Patient will comply with caring for dressing, and reporting complications.     The expiration date is checked immediately before use., The package was intact before use, and no damage was noted., Grafix PL is stored at room temperature., Grafix PL Prime was removed from protective sterile packaging by the provider and applied to the prepared ulcer bed., Provider removed the mesh applicators from both sides of graft and discarded  the two mesh pieces.  , Grafix PL Prime was hydrated with sterile normal saline per the provider., Grafix PL Prime was applied to right medial leg ., Grafix PL Prime was affixed with steri-strips by the provider. , Grafix PL Prime was covered with non-adherent ulcer dressing, Applied bordered over non-adherent., Applied moisten saline gauze., The patient/caregiver was instructed not to remove the dressing and to keep it clean and dry., The patient/family/caregiver was instructed on the need for offloading and elevation of the affected extremity and on using the prescribed offloading device., and The patient/family/caregiver was instructed on signs and symptoms of complication to report, such as draining through dressing, dressing falling /slipping, getting wet, or severe pain or tingling.     Guidelines followed  Grafix PL Prime may only be used every 12 months per wound.      Date of first application of Grafix PL Prime for this current wound is January 10, 2024.    Application # 4 out of 10    Electronically signed by Gladis Jackson RN on 3/14/2024 at 4:06 PM

## 2024-03-14 NOTE — PATIENT INSTRUCTIONS
Visit Discharge/Physician Orders:  - Leave graft bandage in place until Sunday, then place collagen and foam.   - Wound debridement done today.  - grafix #1 applied 1/10/24; grafix #2 applied 1/19/24 grafix #3 applied 3/8/24, grafix #4 applied 3/14/24  - Keep legs elevated, as much as you can.    Wound Location: Right lower leg x 1       Dressing orders:   Right leg wound medial (medial)- Apply collagen to wound bed, lightly moisten with saline. Cover with Excel Daily SAP 4\" x 4\" Change daily. Wear your compression stockings daily. Apply compression in the morning and remove at bedtime.     Keep all dressings clean & dry.     Follow up visit: 1 weeks 3/21/24 @ 230 pm, 2 weeks 3/27/24 @ 3 pm, 3 weeks 4/2/24 @ 8 am   Supplies:     Duration of dressings: 30                     We have sent your supply order to the following company:  [facility] Phone # [phone]   If you don't receive the items you were expecting or don't know what the items are that you received, call the company where the order was sent.   If you are unable to obtain wound supplies, continue to use the supplies you have available until you are able to reach us. It is most important to keep the wound covered at all times.  It is YOUR responsibility to make sure that supplies are re-ordered before you run out. Re-order telephone numbers are included in each package.      Keep next scheduled appointment. Please give 24 hour notice if unable to keep appointment. [phone]     If you experience any of the following, please call the Wound Care Service during business hours: Monday through Friday 8:00 am - 4:30 pm  ([phone]).              *Increase in pain              *Temperature over 101              *Increase in drainage from your wound or a foul odor              *Uncontrolled swelling              *Need for compression bandage changes due to slippage, breakthrough drainage     If you need medical attention outside of business hours, please

## 2024-03-18 DIAGNOSIS — I10 ESSENTIAL HYPERTENSION: ICD-10-CM

## 2024-03-18 RX ORDER — VALSARTAN 160 MG/1
160 TABLET ORAL DAILY
Qty: 90 TABLET | Refills: 3 | OUTPATIENT
Start: 2024-03-18

## 2024-03-18 NOTE — TELEPHONE ENCOUNTER
Damien Aguilar called requesting a refill on the following medications:  Requested Prescriptions     Pending Prescriptions Disp Refills    metoprolol tartrate (LOPRESSOR) 25 MG tablet [Pharmacy Med Name: METOPROLOL TARTRATE 25 MG TAB] 180 tablet 3     Sig: TAKE 1 TABLET BY MOUTH TWICE A DAY    valsartan (DIOVAN) 160 MG tablet [Pharmacy Med Name: VALSARTAN 160 MG TABLET] 90 tablet 3     Sig: TAKE 1 TABLET BY MOUTH EVERY DAY       Date of last visit: 9/20/2023  Date of next visit (if applicable):3/20/2024  Date of last refill: 3/22/2023 1 year  Pharmacy Name: [facility]    Called and spoke with patient, he has enough medication to get thorugh to his appt on 3/20/2024. Denied scripts to pharmacy.     Thanks,  Obdulia Hendrix, CMA

## 2024-03-20 ENCOUNTER — OFFICE VISIT (OUTPATIENT)
Dept: FAMILY MEDICINE CLINIC | Age: 64
End: 2024-03-20
Payer: COMMERCIAL

## 2024-03-20 VITALS
RESPIRATION RATE: 14 BRPM | DIASTOLIC BLOOD PRESSURE: 72 MMHG | BODY MASS INDEX: 44.08 KG/M2 | WEIGHT: 269 LBS | SYSTOLIC BLOOD PRESSURE: 128 MMHG | HEART RATE: 80 BPM

## 2024-03-20 DIAGNOSIS — I10 ESSENTIAL HYPERTENSION: ICD-10-CM

## 2024-03-20 DIAGNOSIS — R60.0 BILATERAL LOWER EXTREMITY EDEMA: ICD-10-CM

## 2024-03-20 DIAGNOSIS — E78.00 HYPERCHOLESTEROLEMIA: ICD-10-CM

## 2024-03-20 PROCEDURE — G8417 CALC BMI ABV UP PARAM F/U: HCPCS | Performed by: NURSE PRACTITIONER

## 2024-03-20 PROCEDURE — G8484 FLU IMMUNIZE NO ADMIN: HCPCS | Performed by: NURSE PRACTITIONER

## 2024-03-20 PROCEDURE — 1124F ACP DISCUSS-NO DSCNMKR DOCD: CPT | Performed by: NURSE PRACTITIONER

## 2024-03-20 PROCEDURE — 3074F SYST BP LT 130 MM HG: CPT | Performed by: NURSE PRACTITIONER

## 2024-03-20 PROCEDURE — 99214 OFFICE O/P EST MOD 30 MIN: CPT | Performed by: NURSE PRACTITIONER

## 2024-03-20 PROCEDURE — 3078F DIAST BP <80 MM HG: CPT | Performed by: NURSE PRACTITIONER

## 2024-03-20 PROCEDURE — G8427 DOCREV CUR MEDS BY ELIG CLIN: HCPCS | Performed by: NURSE PRACTITIONER

## 2024-03-20 PROCEDURE — 3017F COLORECTAL CA SCREEN DOC REV: CPT | Performed by: NURSE PRACTITIONER

## 2024-03-20 PROCEDURE — 1036F TOBACCO NON-USER: CPT | Performed by: NURSE PRACTITIONER

## 2024-03-20 RX ORDER — MELOXICAM 7.5 MG/1
7.5 TABLET ORAL DAILY
Qty: 90 TABLET | Refills: 1 | Status: CANCELLED | OUTPATIENT
Start: 2024-03-20 | End: 2024-09-16

## 2024-03-20 RX ORDER — AMLODIPINE BESYLATE 10 MG/1
10 TABLET ORAL DAILY
Qty: 90 TABLET | Refills: 1 | Status: SHIPPED | OUTPATIENT
Start: 2024-03-20 | End: 2024-09-16

## 2024-03-20 RX ORDER — VALSARTAN 160 MG/1
160 TABLET ORAL DAILY
Qty: 90 TABLET | Refills: 1 | Status: SHIPPED | OUTPATIENT
Start: 2024-03-20 | End: 2024-09-16

## 2024-03-20 RX ORDER — FUROSEMIDE 20 MG/1
20 TABLET ORAL 2 TIMES DAILY
Qty: 180 TABLET | Refills: 1 | Status: SHIPPED | OUTPATIENT
Start: 2024-03-20 | End: 2024-09-16

## 2024-03-20 RX ORDER — ATORVASTATIN CALCIUM 20 MG/1
20 TABLET, FILM COATED ORAL DAILY
Qty: 90 TABLET | Refills: 1 | Status: SHIPPED | OUTPATIENT
Start: 2024-03-20 | End: 2024-09-16

## 2024-03-20 RX ORDER — METOPROLOL SUCCINATE 50 MG/1
50 TABLET, EXTENDED RELEASE ORAL DAILY
Qty: 90 TABLET | Refills: 1 | Status: SHIPPED | OUTPATIENT
Start: 2024-03-20 | End: 2024-09-16

## 2024-03-20 RX ORDER — POTASSIUM CHLORIDE 1500 MG/1
20 TABLET, EXTENDED RELEASE ORAL DAILY
Qty: 90 TABLET | Refills: 1 | Status: SHIPPED | OUTPATIENT
Start: 2024-03-20 | End: 2024-09-16

## 2024-03-20 ASSESSMENT — ENCOUNTER SYMPTOMS
SHORTNESS OF BREATH: 0
BLURRED VISION: 0

## 2024-03-20 ASSESSMENT — PATIENT HEALTH QUESTIONNAIRE - PHQ9
SUM OF ALL RESPONSES TO PHQ QUESTIONS 1-9: 0
2. FEELING DOWN, DEPRESSED OR HOPELESS: NOT AT ALL
1. LITTLE INTEREST OR PLEASURE IN DOING THINGS: NOT AT ALL
SUM OF ALL RESPONSES TO PHQ QUESTIONS 1-9: 0
SUM OF ALL RESPONSES TO PHQ9 QUESTIONS 1 & 2: 0

## 2024-03-20 NOTE — PROGRESS NOTES
Damien Aguilar (:  1960) is a 63 y.o. male,Established patient, here for evaluation of the following chief complaint(s):  Follow-up         ASSESSMENT/PLAN:  1. Essential hypertension  - Chronic, controlled  - Continue valsartan, metoprolol and furosemide  - DASH diet, aerobic exercise 3-4x per week for 30-40 minutes at a time encouraged  -     furosemide (LASIX) 20 MG tablet; Take 1 tablet by mouth 2 times daily, Disp-180 tablet, R-1Normal  -     valsartan (DIOVAN) 160 MG tablet; Take 1 tablet by mouth daily, Disp-90 tablet, R-1Normal  -     amLODIPine (NORVASC) 10 MG tablet; Take 1 tablet by mouth daily, Disp-90 tablet, R-1Normal    2. Hypercholesterolemia  - Chronic, controlled  - Continue atorvastatin  - Update blood work  - Focus on avoiding deep fried foods, red meats, whole-fat dairy, and some plant products, such as coconut and palm oils. These foods contain saturated and trans fats. Focus on eating fresh fruits and vegetables as well as whole grains. Exercise regularly (3-4x per week) for 30-40 minutes at a time. Walking, biking, elliptical and swimming are all good examples of cardiovascular exercises.  -     atorvastatin (LIPITOR) 20 MG tablet; Take 1 tablet by mouth daily, Disp-90 tablet, R-1Normal    3. Bilateral lower extremity edema  -     furosemide (LASIX) 20 MG tablet; Take 1 tablet by mouth 2 times daily, Disp-180 tablet, R-1Normal  -     potassium chloride (K-TAB) 20 MEQ TBCR extended release tablet; Take 1 tablet by mouth daily, Disp-90 tablet, R-1Normal      Return in about 6 months (around 2024) for Annual Well Visit.         Subjective   SUBJECTIVE/OBJECTIVE:  Patient presents for a 6 month follow up. Continues with the wound clinic for the leg wound. Now also seeing the vascular center. Symptoms improving.    Hypertension  This is a chronic problem. The current episode started more than 1 year ago. The problem is unchanged. The problem is controlled. Associated symptoms

## 2024-03-21 ENCOUNTER — HOSPITAL ENCOUNTER (OUTPATIENT)
Dept: WOUND CARE | Age: 64
Discharge: HOME | End: 2024-03-21
Attending: NURSE PRACTITIONER
Payer: MEDICARE

## 2024-03-21 ENCOUNTER — NURSE ONLY (OUTPATIENT)
Dept: FAMILY MEDICINE CLINIC | Age: 64
End: 2024-03-21
Payer: COMMERCIAL

## 2024-03-21 VITALS
DIASTOLIC BLOOD PRESSURE: 62 MMHG | SYSTOLIC BLOOD PRESSURE: 133 MMHG | HEART RATE: 56 BPM | TEMPERATURE: 97.7 F | OXYGEN SATURATION: 96 % | RESPIRATION RATE: 16 BRPM

## 2024-03-21 DIAGNOSIS — I83.891 VENOUS STASIS ULCER OF RIGHT LOWER LEG WITH EDEMA OF RIGHT LOWER LEG (HCC): Primary | ICD-10-CM

## 2024-03-21 DIAGNOSIS — L97.919 VENOUS STASIS ULCER OF RIGHT LOWER LEG WITH EDEMA OF RIGHT LOWER LEG (HCC): Primary | ICD-10-CM

## 2024-03-21 DIAGNOSIS — R60.0 VENOUS STASIS ULCER OF RIGHT LOWER LEG WITH EDEMA OF RIGHT LOWER LEG (HCC): Primary | ICD-10-CM

## 2024-03-21 DIAGNOSIS — I87.2 CHRONIC VENOUS STASIS DERMATITIS OF RIGHT LOWER EXTREMITY: ICD-10-CM

## 2024-03-21 DIAGNOSIS — I83.019 VENOUS STASIS ULCER OF RIGHT LOWER LEG WITH EDEMA OF RIGHT LOWER LEG (HCC): Primary | ICD-10-CM

## 2024-03-21 DIAGNOSIS — I87.2 VENOUS INSUFFICIENCY OF BOTH LOWER EXTREMITIES: ICD-10-CM

## 2024-03-21 DIAGNOSIS — E78.00 HYPERCHOLESTEROLEMIA: ICD-10-CM

## 2024-03-21 PROCEDURE — 6370000000 HC RX 637 (ALT 250 FOR IP): Performed by: NURSE PRACTITIONER

## 2024-03-21 PROCEDURE — 36415 COLL VENOUS BLD VENIPUNCTURE: CPT | Performed by: NURSE PRACTITIONER

## 2024-03-21 PROCEDURE — 15271 SKIN SUB GRAFT TRNK/ARM/LEG: CPT

## 2024-03-21 RX ORDER — LIDOCAINE 40 MG/G
CREAM TOPICAL ONCE
OUTPATIENT
Start: 2024-03-21 | End: 2024-03-21

## 2024-03-21 RX ORDER — BACITRACIN ZINC AND POLYMYXIN B SULFATE 500; 1000 [USP'U]/G; [USP'U]/G
OINTMENT TOPICAL ONCE
OUTPATIENT
Start: 2024-03-21 | End: 2024-03-21

## 2024-03-21 RX ORDER — SODIUM CHLOR/HYPOCHLOROUS ACID 0.033 %
SOLUTION, IRRIGATION IRRIGATION ONCE
OUTPATIENT
Start: 2024-03-21 | End: 2024-03-21

## 2024-03-21 RX ORDER — LIDOCAINE HYDROCHLORIDE 20 MG/ML
JELLY TOPICAL ONCE
OUTPATIENT
Start: 2024-03-21 | End: 2024-03-21

## 2024-03-21 RX ORDER — BETAMETHASONE DIPROPIONATE 0.05 %
OINTMENT (GRAM) TOPICAL ONCE
OUTPATIENT
Start: 2024-03-21 | End: 2024-03-21

## 2024-03-21 RX ORDER — IBUPROFEN 200 MG
TABLET ORAL ONCE
OUTPATIENT
Start: 2024-03-21 | End: 2024-03-21

## 2024-03-21 RX ORDER — GINSENG 100 MG
CAPSULE ORAL ONCE
OUTPATIENT
Start: 2024-03-21 | End: 2024-03-21

## 2024-03-21 RX ORDER — LIDOCAINE HYDROCHLORIDE 40 MG/ML
SOLUTION TOPICAL ONCE
OUTPATIENT
Start: 2024-03-21 | End: 2024-03-21

## 2024-03-21 RX ORDER — TRIAMCINOLONE ACETONIDE 1 MG/G
OINTMENT TOPICAL ONCE
OUTPATIENT
Start: 2024-03-21 | End: 2024-03-21

## 2024-03-21 RX ORDER — CLOBETASOL PROPIONATE 0.5 MG/G
OINTMENT TOPICAL ONCE
OUTPATIENT
Start: 2024-03-21 | End: 2024-03-21

## 2024-03-21 RX ORDER — GENTAMICIN SULFATE 1 MG/G
OINTMENT TOPICAL ONCE
OUTPATIENT
Start: 2024-03-21 | End: 2024-03-21

## 2024-03-21 RX ORDER — LIDOCAINE 50 MG/G
OINTMENT TOPICAL ONCE
OUTPATIENT
Start: 2024-03-21 | End: 2024-03-21

## 2024-03-21 RX ORDER — LIDOCAINE HYDROCHLORIDE 20 MG/ML
JELLY TOPICAL ONCE
Status: COMPLETED | OUTPATIENT
Start: 2024-03-21 | End: 2024-03-21

## 2024-03-21 RX ADMIN — LIDOCAINE HYDROCHLORIDE: 20 JELLY TOPICAL at 14:39

## 2024-03-21 NOTE — PROGRESS NOTES
control, apply multilayer compression or tubular garment or equivalent    Offloading Management needed for pressure relief, apply offloading shoe/boot or equivalent     Standing Status:   Standing     Number of Occurrences:   1       Patient Instructions   Visit Discharge/Physician Orders:  - Leave graft bandage in place until Sunday, then place collagen and foam.   - Wound debridement done today.  - grafix #1 applied 1/10/24; grafix #2 applied 1/19/24 grafix #3 applied 3/8/24, grafix #4 applied 3/14/24 grafix #5 3/27/24  - Keep legs elevated, as much as you can.    Wound Location: Right lower leg x 1       Dressing orders:   Right leg wound medial (medial)- Apply collagen to wound bed, lightly moisten with saline. Cover with Excel Daily SAP 4\" x 4\" Change daily. Wear your compression stockings daily. Apply compression in the morning and remove at bedtime.     Keep all dressings clean & dry.     Follow up visit: 1 week 3/27/24 @ 3 pm, 2 weeks 4/2/24 @ 8 am   Supplies:     Duration of dressings: 30                     We have sent your supply order to the following company:  [employer] Phone # [phone]   If you don't receive the items you were expecting or don't know what the items are that you received, call the company where the order was sent.   If you are unable to obtain wound supplies, continue to use the supplies you have available until you are able to reach us. It is most important to keep the wound covered at all times.  It is YOUR responsibility to make sure that supplies are re-ordered before you run out. Re-order telephone numbers are included in each package.      Keep next scheduled appointment. Please give 24 hour notice if unable to keep appointment. [phone]     If you experience any of the following, please call the Wound Care Service during business hours: Monday through Friday 8:00 am - 4:30 pm  ([phone]).              *Increase in pain              *Temperature over 101

## 2024-03-21 NOTE — PATIENT INSTRUCTIONS
Visit Discharge/Physician Orders:  - Leave graft bandage in place until Sunday, then place collagen and foam.   - Wound debridement done today.  - grafix #1 applied 1/10/24; grafix #2 applied 1/19/24 grafix #3 applied 3/8/24, grafix #4 applied 3/14/24 grafix #5 3/27/24  - Keep legs elevated, as much as you can.    Wound Location: Right lower leg x 1       Dressing orders:   Right leg wound medial (medial)- Apply collagen to wound bed, lightly moisten with saline. Cover with Excel Daily SAP 4\" x 4\" Change daily. Wear your compression stockings daily. Apply compression in the morning and remove at bedtime.     Keep all dressings clean & dry.     Follow up visit: 1 week 3/27/24 @ 3 pm, 2 weeks 4/2/24 @ 8 am   Supplies:     Duration of dressings: 30                     We have sent your supply order to the following company:  [employer] Phone # [phone]   If you don't receive the items you were expecting or don't know what the items are that you received, call the company where the order was sent.   If you are unable to obtain wound supplies, continue to use the supplies you have available until you are able to reach us. It is most important to keep the wound covered at all times.  It is YOUR responsibility to make sure that supplies are re-ordered before you run out. Re-order telephone numbers are included in each package.      Keep next scheduled appointment. Please give 24 hour notice if unable to keep appointment. [phone]     If you experience any of the following, please call the Wound Care Service during business hours: Monday through Friday 8:00 am - 4:30 pm  ([phone]).              *Increase in pain              *Temperature over 101              *Increase in drainage from your wound or a foul odor              *Uncontrolled swelling              *Need for compression bandage changes due to slippage, breakthrough drainage     If you need medical attention outside of business hours, please contact your

## 2024-03-21 NOTE — PLAN OF CARE
Problem: Wound:  Goal: Will show signs of wound healing; wound closure and no evidence of infection  Description: Will show signs of wound healing; wound closure and no evidence of infection  Outcome: Progressing   Pt. Seen today for right leg wound see AVS for new orders. Follow up in 1 weeks.  Care plan reviewed with patient.  Patient verbalize understanding of the plan of care and contribute to goal setting.

## 2024-03-22 ENCOUNTER — TELEPHONE (OUTPATIENT)
Dept: FAMILY MEDICINE CLINIC | Age: 64
End: 2024-03-22

## 2024-03-22 LAB
ALT SERPL W/O P-5'-P-CCNC: 15 U/L (ref 11–66)
CHOLEST SERPL-MCNC: 130 MG/DL (ref 100–199)
HDLC SERPL-MCNC: 51 MG/DL
LDLC SERPL CALC-MCNC: 69 MG/DL
TRIGL SERPL-MCNC: 48 MG/DL (ref 0–199)

## 2024-03-22 NOTE — TELEPHONE ENCOUNTER
----- Message from JAZZY Hoffman CNP sent at 3/22/2024  7:42 AM EDT -----  Cholesterol numbers significantly improved. Continue current treatments.    The 10-year ASCVD risk score (Anne CONDE, et al., 2019) is: 11.6%    Values used to calculate the score:      Age: 63 years      Sex: Male      Is Non- : No      Diabetic: No      Tobacco smoker: No      Systolic Blood Pressure: 133 mmHg      Is BP treated: Yes      HDL Cholesterol: 51 mg/dL      Total Cholesterol: 130 mg/dL

## 2024-03-30 DIAGNOSIS — S81.801D WOUND OF RIGHT LOWER EXTREMITY, SUBSEQUENT ENCOUNTER: ICD-10-CM

## 2024-04-01 RX ORDER — MELOXICAM 7.5 MG/1
7.5 TABLET ORAL DAILY
Qty: 90 TABLET | Refills: 0 | Status: SHIPPED | OUTPATIENT
Start: 2024-04-01 | End: 2024-04-02

## 2024-04-01 NOTE — TELEPHONE ENCOUNTER
Damien Aguilar called requesting a refill on the following medications:  Requested Prescriptions     Pending Prescriptions Disp Refills    meloxicam (MOBIC) 7.5 MG tablet [Pharmacy Med Name: MELOXICAM 7.5 MG TABLET] 90 tablet 0     Sig: TAKE 1 TABLET BY MOUTH EVERY DAY       Date of last visit: 3/20/2024  Date of next visit (if applicable):Visit date not found  Date of last refill: 1/3/24  Pharmacy Name: [facility]    Med was d/c by you at last appt. Is patient to continue medication?      Thanks,  Lynn Wasserman

## 2024-04-02 ENCOUNTER — HOSPITAL ENCOUNTER (OUTPATIENT)
Dept: WOUND CARE | Age: 64
Discharge: HOME | End: 2024-04-02
Attending: NURSE PRACTITIONER
Payer: MEDICARE

## 2024-04-02 VITALS
RESPIRATION RATE: 16 BRPM | DIASTOLIC BLOOD PRESSURE: 70 MMHG | HEART RATE: 51 BPM | SYSTOLIC BLOOD PRESSURE: 149 MMHG | OXYGEN SATURATION: 99 % | TEMPERATURE: 97.7 F

## 2024-04-02 DIAGNOSIS — I83.891 VENOUS STASIS ULCER OF RIGHT LOWER LEG WITH EDEMA OF RIGHT LOWER LEG (HCC): Primary | ICD-10-CM

## 2024-04-02 DIAGNOSIS — R60.0 VENOUS STASIS ULCER OF RIGHT LOWER LEG WITH EDEMA OF RIGHT LOWER LEG (HCC): Primary | ICD-10-CM

## 2024-04-02 DIAGNOSIS — I83.019 VENOUS STASIS ULCER OF RIGHT LOWER LEG WITH EDEMA OF RIGHT LOWER LEG (HCC): Primary | ICD-10-CM

## 2024-04-02 DIAGNOSIS — L97.919 VENOUS STASIS ULCER OF RIGHT LOWER LEG WITH EDEMA OF RIGHT LOWER LEG (HCC): Primary | ICD-10-CM

## 2024-04-02 PROCEDURE — 15271 SKIN SUB GRAFT TRNK/ARM/LEG: CPT

## 2024-04-02 PROCEDURE — 15271 SKIN SUB GRAFT TRNK/ARM/LEG: CPT | Performed by: NURSE PRACTITIONER

## 2024-04-02 RX ORDER — LIDOCAINE 40 MG/G
CREAM TOPICAL ONCE
OUTPATIENT
Start: 2024-04-02 | End: 2024-04-02

## 2024-04-02 RX ORDER — GENTAMICIN SULFATE 1 MG/G
OINTMENT TOPICAL ONCE
OUTPATIENT
Start: 2024-04-02 | End: 2024-04-02

## 2024-04-02 RX ORDER — TRIAMCINOLONE ACETONIDE 1 MG/G
OINTMENT TOPICAL ONCE
OUTPATIENT
Start: 2024-04-02 | End: 2024-04-02

## 2024-04-02 RX ORDER — LIDOCAINE HYDROCHLORIDE 20 MG/ML
JELLY TOPICAL ONCE
OUTPATIENT
Start: 2024-04-02 | End: 2024-04-02

## 2024-04-02 RX ORDER — LIDOCAINE 50 MG/G
OINTMENT TOPICAL ONCE
OUTPATIENT
Start: 2024-04-02 | End: 2024-04-02

## 2024-04-02 RX ORDER — SODIUM CHLOR/HYPOCHLOROUS ACID 0.033 %
SOLUTION, IRRIGATION IRRIGATION ONCE
OUTPATIENT
Start: 2024-04-02 | End: 2024-04-02

## 2024-04-02 RX ORDER — CLOBETASOL PROPIONATE 0.5 MG/G
OINTMENT TOPICAL ONCE
OUTPATIENT
Start: 2024-04-02 | End: 2024-04-02

## 2024-04-02 RX ORDER — IBUPROFEN 200 MG
TABLET ORAL ONCE
OUTPATIENT
Start: 2024-04-02 | End: 2024-04-02

## 2024-04-02 RX ORDER — BACITRACIN ZINC AND POLYMYXIN B SULFATE 500; 1000 [USP'U]/G; [USP'U]/G
OINTMENT TOPICAL ONCE
OUTPATIENT
Start: 2024-04-02 | End: 2024-04-02

## 2024-04-02 RX ORDER — BETAMETHASONE DIPROPIONATE 0.05 %
OINTMENT (GRAM) TOPICAL ONCE
OUTPATIENT
Start: 2024-04-02 | End: 2024-04-02

## 2024-04-02 RX ORDER — LIDOCAINE HYDROCHLORIDE 40 MG/ML
SOLUTION TOPICAL ONCE
OUTPATIENT
Start: 2024-04-02 | End: 2024-04-02

## 2024-04-02 RX ORDER — GINSENG 100 MG
CAPSULE ORAL ONCE
OUTPATIENT
Start: 2024-04-02 | End: 2024-04-02

## 2024-04-02 NOTE — PROGRESS NOTES
Wound Care Supplies      Supply Company:     [employer] [address] [place] [address] f: [phone] f: [phone] p: [phone] [email or web address]      Ordering Center:   [facility]  [address] [address]  [phone]  WOUND CARE Dept: [phone]   FAX NUMBER [phone]    Patient Information:      Damien Johnston  [address] [address]  [facility] [address]   [phone]   : 1960  AGE: 63 y.o.     GENDER: male   EPISODE DATE: 2024    Insurance:      PRIMARY INSURANCE:  Plan: MEDICARE PART A AND B  Coverage: MEDICARE  Effective Date: 2023  Group Number: [unfilled]  Subscriber Number: [id number] - (Medicare)    Payer/Plan Subscr  Sex Relation Sub. Ins. ID Effective Group Num   1. AETNA - AETNA* ADRIANA JOHNSTONCATRACHITA CALDERÓN 1960 Male Self [id number]                                   [address] [address]   2. MEDICARE - ME* DAMIEN JOHNSTON STEPHANE 1960 Male Self [id number]                                    [address] [address]       Patient Wound Information:      Problem List Items Addressed This Visit          Other    * (Principal) Venous stasis ulcer of right lower leg with edema of right lower leg (HCC) - Primary    Relevant Orders    Initiate Outpatient Wound Care Protocol       WOUNDS REQUIRING DRESSING SUPPLIES:     Wound 23 Leg Right;Lower;Medial (Active)   Wound Image   24 08   Wound Etiology Venous 24 08   Dressing Status Intact;New dressing applied 24 0813   Wound Cleansed Cleansed with saline 24 08   Dressing/Treatment Other (comment);Collagen;Steri-strips;Foam;Silicone border 24 08   Offloading for Diabetic Foot Ulcers Offloading not required 24 0813   Wound Length (cm) 1.4 cm 24 08   Wound Width (cm) 0.8 cm 24 08   Wound Depth (cm) 0.4 cm 24 08   Wound Surface Area (cm^2) 1.12 cm^2 24 0813   Change in Wound Size % (l*w)

## 2024-04-02 NOTE — PLAN OF CARE
Problem: Wound:  Goal: Will show signs of wound healing; wound closure and no evidence of infection  Description: Will show signs of wound healing; wound closure and no evidence of infection  Outcome: Progressing   Patient presents to wound clinic for follow up of right leg wound. Discharge instructions/dressing orders per AVS. Follow up appointment scheduled in 2 weeks.     Care plan reviewed with patient.  Patient verbalize understanding of the plan of care and contribute to goal setting.

## 2024-04-02 NOTE — PROGRESS NOTES
Grafix PL Prime Treatment Note    NAME:  Damien Aguilar  YOB: 1960  MEDICAL RECORD NUMBER:  [id number]  DATE:  4/2/2024    Goal:  Patient will receive safe and proper application of skin substitute. Patient will comply with caring for dressing, and reporting complications.     The expiration date is checked immediately before use., The package was intact before use, and no damage was noted., Grafix PL is stored at room temperature., Grafix PL Prime was removed from protective sterile packaging by the provider and applied to the prepared ulcer bed., Provider removed the mesh applicators from both sides of graft and discarded  the two mesh pieces.  , Grafix PL Prime was hydrated with sterile normal saline per the provider., Grafix PL Prime was applied to right leg wound ., Grafix PL Prime was affixed with steri-strips by the provider. , Grafix PL Prime was covered with non-adherent ulcer dressing, Applied silicone bordered foam dressing over non-adherent., The patient/caregiver was instructed not to remove the dressing and to keep it clean and dry., and The patient/family/caregiver was instructed on signs and symptoms of complication to report, such as draining through dressing, dressing falling /slipping, getting wet, or severe pain or tingling.     Guidelines followed  Grafix PL Prime may only be used every 12 months per wound.      Date of first application of Grafix PL Prime for this current wound is January 10, 2024.    Application # 6 out of 10    Electronically signed by Heidy Marcus RN on 4/2/2024 at 10:38 AM

## 2024-04-02 NOTE — PATIENT INSTRUCTIONS
Visit Discharge/Physician Orders:  - Leave graft bandage in place until Friday, then resume collagen and foam dressing.   - Wound debridement done today.  - Dressing supplies will be ordered today.  - Grafix #1 - 1/10/24, grafix #2 - 1/19/24, grafix #3 - 3/8/24, grafix #4 - 3/14/24, grafix #5 - 3/27/24, grafix #6 - 4/2/24.  - Keep legs elevated, as much as you can.    Wound Location: Right lower leg x 1       Dressing orders:     Right leg- Apply collagen to wound bed, lightly moisten with saline. Cover with Excel Daily SAP 4\" x 4\". Change daily.     -Wear your compression stockings daily. Apply compression in the morning and remove at bedtime.     Keep all dressings clean & dry.     Follow up visit: 2-3 weeks - 4/18/24 Thursday at 3:00 pm     Supplies:     Duration of dressings: 30                     We have sent your supply order to the following company:  [employer] # [phone]   If you don't receive the items you were expecting or don't know what the items are that you received, call the company where the order was sent.   If you are unable to obtain wound supplies, continue to use the supplies you have available until you are able to reach us. It is most important to keep the wound covered at all times.  It is YOUR responsibility to make sure that supplies are re-ordered before you run out. Re-order telephone numbers are included in each package.      Keep next scheduled appointment. Please give 24 hour notice if unable to keep appointment. [phone]     If you experience any of the following, please call the Wound Care Service during business hours: Monday through Friday 8:00 am - 4:30 pm  ([phone]).              *Increase in pain              *Temperature over 101              *Increase in drainage from your wound or a foul odor              *Uncontrolled swelling              *Need for compression bandage changes due to slippage, breakthrough drainage     If you need medical attention outside
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
## 2024-04-18 ENCOUNTER — HOSPITAL ENCOUNTER (OUTPATIENT)
Dept: WOUND CARE | Age: 64
Discharge: HOME | End: 2024-04-18
Attending: NURSE PRACTITIONER
Payer: MEDICARE

## 2024-04-18 VITALS
OXYGEN SATURATION: 94 % | HEART RATE: 66 BPM | DIASTOLIC BLOOD PRESSURE: 67 MMHG | TEMPERATURE: 98 F | SYSTOLIC BLOOD PRESSURE: 145 MMHG

## 2024-04-18 DIAGNOSIS — I83.019 VENOUS STASIS ULCER OF RIGHT LOWER LEG WITH EDEMA OF RIGHT LOWER LEG (HCC): Primary | ICD-10-CM

## 2024-04-18 DIAGNOSIS — R60.0 VENOUS STASIS ULCER OF RIGHT LOWER LEG WITH EDEMA OF RIGHT LOWER LEG (HCC): Primary | ICD-10-CM

## 2024-04-18 DIAGNOSIS — L97.919 VENOUS STASIS ULCER OF RIGHT LOWER LEG WITH EDEMA OF RIGHT LOWER LEG (HCC): Primary | ICD-10-CM

## 2024-04-18 DIAGNOSIS — I83.891 VENOUS STASIS ULCER OF RIGHT LOWER LEG WITH EDEMA OF RIGHT LOWER LEG (HCC): Primary | ICD-10-CM

## 2024-04-18 PROCEDURE — 29580 STRAPPING UNNA BOOT: CPT

## 2024-04-18 PROCEDURE — 99213 OFFICE O/P EST LOW 20 MIN: CPT | Performed by: NURSE PRACTITIONER

## 2024-04-18 RX ORDER — SODIUM CHLOR/HYPOCHLOROUS ACID 0.033 %
SOLUTION, IRRIGATION IRRIGATION ONCE
OUTPATIENT
Start: 2024-04-18 | End: 2024-04-18

## 2024-04-18 RX ORDER — LIDOCAINE 50 MG/G
OINTMENT TOPICAL ONCE
OUTPATIENT
Start: 2024-04-18 | End: 2024-04-18

## 2024-04-18 RX ORDER — GENTAMICIN SULFATE 1 MG/G
OINTMENT TOPICAL ONCE
OUTPATIENT
Start: 2024-04-18 | End: 2024-04-18

## 2024-04-18 RX ORDER — TRIAMCINOLONE ACETONIDE 1 MG/G
OINTMENT TOPICAL ONCE
OUTPATIENT
Start: 2024-04-18 | End: 2024-04-18

## 2024-04-18 RX ORDER — LIDOCAINE HYDROCHLORIDE 20 MG/ML
JELLY TOPICAL ONCE
OUTPATIENT
Start: 2024-04-18 | End: 2024-04-18

## 2024-04-18 RX ORDER — LIDOCAINE 40 MG/G
CREAM TOPICAL ONCE
OUTPATIENT
Start: 2024-04-18 | End: 2024-04-18

## 2024-04-18 RX ORDER — GINSENG 100 MG
CAPSULE ORAL ONCE
OUTPATIENT
Start: 2024-04-18 | End: 2024-04-18

## 2024-04-18 RX ORDER — IBUPROFEN 200 MG
TABLET ORAL ONCE
OUTPATIENT
Start: 2024-04-18 | End: 2024-04-18

## 2024-04-18 RX ORDER — BETAMETHASONE DIPROPIONATE 0.05 %
OINTMENT (GRAM) TOPICAL ONCE
OUTPATIENT
Start: 2024-04-18 | End: 2024-04-18

## 2024-04-18 RX ORDER — LIDOCAINE HYDROCHLORIDE 40 MG/ML
SOLUTION TOPICAL ONCE
OUTPATIENT
Start: 2024-04-18 | End: 2024-04-18

## 2024-04-18 RX ORDER — BACITRACIN ZINC AND POLYMYXIN B SULFATE 500; 1000 [USP'U]/G; [USP'U]/G
OINTMENT TOPICAL ONCE
OUTPATIENT
Start: 2024-04-18 | End: 2024-04-18

## 2024-04-18 RX ORDER — CLOBETASOL PROPIONATE 0.5 MG/G
OINTMENT TOPICAL ONCE
OUTPATIENT
Start: 2024-04-18 | End: 2024-04-18

## 2024-04-18 ASSESSMENT — PAIN DESCRIPTION - DESCRIPTORS: DESCRIPTORS: ACHING

## 2024-04-18 ASSESSMENT — PAIN SCALES - GENERAL: PAINLEVEL_OUTOF10: 2

## 2024-04-18 ASSESSMENT — PAIN DESCRIPTION - ORIENTATION: ORIENTATION: RIGHT

## 2024-04-18 ASSESSMENT — PAIN - FUNCTIONAL ASSESSMENT: PAIN_FUNCTIONAL_ASSESSMENT: ACTIVITIES ARE NOT PREVENTED

## 2024-04-18 ASSESSMENT — PAIN DESCRIPTION - LOCATION: LOCATION: LEG

## 2024-04-18 NOTE — PATIENT INSTRUCTIONS
Visit Discharge/Physician Orders:  - Grafix #1 - 1/10/24, grafix #2 - 1/19/24, grafix #3 - 3/8/24, grafix #4 - 3/14/24, grafix #5 - 3/27/24, grafix #6 - 4/2/24.  - Keep legs elevated, as much as you can.    Wound Location: Right lower leg x 1       Dressing orders:     Right leg- unna boot, alginate, kerlix and coban applied today. Leave in place until appointment on Tuesday     If unna boot becomes too tight- raise/elevate legs above the level of the heart for 15-20 minutes if swelling does not go down then carefully cut off unna boot and call clinic or go to local ER or family physician. If unna boot becomes wet or starts to roll down then carefully remove unna boot and call clinic.      Keep all dressings clean & dry.     Follow up visit: 4/23/24 at 8:15 am with Dr. Rivas    Supplies:     Duration of dressings: 30                     We have sent your supply order to the following company:  [employer] Phone # [phone]   If you don't receive the items you were expecting or don't know what the items are that you received, call the company where the order was sent.   If you are unable to obtain wound supplies, continue to use the supplies you have available until you are able to reach us. It is most important to keep the wound covered at all times.  It is YOUR responsibility to make sure that supplies are re-ordered before you run out. Re-order telephone numbers are included in each package.      Keep next scheduled appointment. Please give 24 hour notice if unable to keep appointment. [phone]     If you experience any of the following, please call the Wound Care Service during business hours: Monday through Friday 8:00 am - 4:30 pm  ([phone]).              *Increase in pain              *Temperature over 101              *Increase in drainage from your wound or a foul odor              *Uncontrolled swelling              *Need for compression bandage changes due to slippage, breakthrough drainage     If

## 2024-04-18 NOTE — PLAN OF CARE
Problem: Wound:  Goal: Will show signs of wound healing; wound closure and no evidence of infection  Description: Will show signs of wound healing; wound closure and no evidence of infection  Outcome: Not Progressing   Pt. Seen today for right leg wound see AVS for new orders. Follow up with Dr. Rivas next week.  Care plan reviewed with patient.  Patient verbalize understanding of the plan of care and contribute to goal setting.

## 2024-04-18 NOTE — PROGRESS NOTES
Unna Boot Application   Below Knee    NAME:  Damien Aguilar  YOB: 1960  MEDICAL RECORD NUMBER:  [id number]  DATE:  4/18/2024    Unna boot: Appied primary and secondary dressing as ordered.  Applied Unna roll from toes to knee overlapping each time.   Applied ace wrap or coban from toes to below the knee.   Secured with tape and/or metal clips covered with tape.   Instructed patient/caregiver to keep dressing dry and intact. DO NOT REMOVE DRESSING.   Instructed pt/family/caregiver to report excessive draining, loose bandage, wet dressing, severe pain or tingling in toes.  Applied Unna Boot dressing below the knee to right lower leg.    Unna Boot(s) were applied per  Guidelines.     Electronically signed by Silvia Topete RN on 4/18/2024 at 3:31 PM     
add foam, absorbent pad or hydrocolloid    For wounds with suspected or known infection, apply antimicrobial mesh and/or antimicrobial alginate/hydrofiber, or antimicrobial solution moistened gauze/kerlex, or equivalent and cover with secondary dressing/foam    Compression Management needed for edema control, apply multilayer compression or tubular garment or equivalent    Offloading Management needed for pressure relief, apply offloading shoe/boot or equivalent     Standing Status:   Standing     Number of Occurrences:   1     I spent a total of 20 minutes with Damien Aguilar  on 4/18/2024.  Time spent includes review of previous progress notes, reviewing and discussing test results, education on plan of care for presenting diagnosis, answering questions, providing instructions on treatment and/or medications ordered, reviewing importance of compliance with outline treatment plan and any identifiable barriers to compliance and coordination of care based on plan and assessment as noted.     Patient Instructions   Visit Discharge/Physician Orders:  - Grafix #1 - 1/10/24, grafix #2 - 1/19/24, grafix #3 - 3/8/24, grafix #4 - 3/14/24, grafix #5 - 3/27/24, grafix #6 - 4/2/24.  - Keep legs elevated, as much as you can.    Wound Location: Right lower leg x 1       Dressing orders:     Right leg- unna boot, alginate, kerlix and coban applied today. Leave in place until appointment on Tuesday     If unna boot becomes too tight- raise/elevate legs above the level of the heart for 15-20 minutes if swelling does not go down then carefully cut off unna boot and call clinic or go to local ER or family physician. If unna boot becomes wet or starts to roll down then carefully remove unna boot and call clinic.      Keep all dressings clean & dry.     Follow up visit: 4/23/24 at 8:15 am with Dr. Rivas    Supplies:     Duration of dressings: 30                     We have sent your supply order to the following company:  [employer]

## 2024-04-23 ENCOUNTER — HOSPITAL ENCOUNTER (OUTPATIENT)
Dept: WOUND CARE | Age: 64
Discharge: HOME | End: 2024-04-23
Attending: NURSE PRACTITIONER
Payer: MEDICARE

## 2024-04-23 VITALS
TEMPERATURE: 98.2 F | DIASTOLIC BLOOD PRESSURE: 82 MMHG | OXYGEN SATURATION: 97 % | SYSTOLIC BLOOD PRESSURE: 171 MMHG | HEART RATE: 65 BPM | RESPIRATION RATE: 16 BRPM

## 2024-04-23 DIAGNOSIS — I83.019 VENOUS STASIS ULCER OF RIGHT LOWER LEG WITH EDEMA OF RIGHT LOWER LEG (HCC): Primary | ICD-10-CM

## 2024-04-23 DIAGNOSIS — I83.891 VENOUS STASIS ULCER OF RIGHT LOWER LEG WITH EDEMA OF RIGHT LOWER LEG (HCC): Primary | ICD-10-CM

## 2024-04-23 DIAGNOSIS — L97.919 VENOUS STASIS ULCER OF RIGHT LOWER LEG WITH EDEMA OF RIGHT LOWER LEG (HCC): Primary | ICD-10-CM

## 2024-04-23 DIAGNOSIS — R60.0 VENOUS STASIS ULCER OF RIGHT LOWER LEG WITH EDEMA OF RIGHT LOWER LEG (HCC): Primary | ICD-10-CM

## 2024-04-23 PROCEDURE — 29580 STRAPPING UNNA BOOT: CPT

## 2024-04-23 NOTE — PATIENT INSTRUCTIONS
If you need medical attention outside of business hours, please contact your Primary Care Doctor or go to the nearest emergency room.
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
## 2024-04-23 NOTE — PROGRESS NOTES
Unna Boot Application   Below Knee    NAME:  Damien Aguilar  YOB: 1960  MEDICAL RECORD NUMBER:  [id number]  DATE:  4/23/2024    Unna boot: Appied primary and secondary dressing as ordered.  Applied Unna roll from toes to knee overlapping each time.   Applied ace wrap or coban from toes to below the knee.   Secured with tape and/or metal clips covered with tape.   Instructed patient/caregiver to keep dressing dry and intact. DO NOT REMOVE DRESSING.   Instructed pt/family/caregiver to report excessive draining, loose bandage, wet dressing, severe pain or tingling in toes.  Applied Unna Boot dressing below the knee to right lower leg.    Unna Boot(s) were applied per  Guidelines.     Electronically signed by Silvia Topete RN on 4/23/2024 at 1:10 PM

## 2024-04-25 ENCOUNTER — HOSPITAL ENCOUNTER (OUTPATIENT)
Dept: GENERAL RADIOLOGY | Age: 64
Discharge: HOME | End: 2024-04-25
Payer: MEDICARE

## 2024-04-25 ENCOUNTER — HOSPITAL ENCOUNTER (OUTPATIENT)
Age: 64
Discharge: HOME | End: 2024-04-25
Payer: MEDICARE

## 2024-04-25 DIAGNOSIS — R60.0 VENOUS STASIS ULCER OF RIGHT LOWER LEG WITH EDEMA OF RIGHT LOWER LEG (HCC): ICD-10-CM

## 2024-04-25 DIAGNOSIS — I83.019 VENOUS STASIS ULCER OF RIGHT LOWER LEG WITH EDEMA OF RIGHT LOWER LEG (HCC): ICD-10-CM

## 2024-04-25 DIAGNOSIS — I83.891 VENOUS STASIS ULCER OF RIGHT LOWER LEG WITH EDEMA OF RIGHT LOWER LEG (HCC): ICD-10-CM

## 2024-04-25 DIAGNOSIS — L97.919 VENOUS STASIS ULCER OF RIGHT LOWER LEG WITH EDEMA OF RIGHT LOWER LEG (HCC): ICD-10-CM

## 2024-04-25 LAB
25(OH)D3 SERPL-MCNC: 24 NG/ML (ref 30–100)
ANION GAP SERPL CALC-SCNC: 12 MEQ/L (ref 8–16)
BASOPHILS ABSOLUTE: 0 THOU/MM3 (ref 0–0.1)
BASOPHILS NFR BLD AUTO: 1.1 %
BUN SERPL-MCNC: 12 MG/DL (ref 7–22)
CALCIUM SERPL-MCNC: 9.3 MG/DL (ref 8.5–10.5)
CHLORIDE SERPL-SCNC: 102 MEQ/L (ref 98–111)
CO2 SERPL-SCNC: 28 MEQ/L (ref 23–33)
CREAT SERPL-MCNC: 0.7 MG/DL (ref 0.4–1.2)
DEPRECATED RDW RBC AUTO: 45.9 FL (ref 35–45)
EKG ATRIAL RATE: 52 BPM
EKG P AXIS: 53 DEGREES
EKG P-R INTERVAL: 224 MS
EKG Q-T INTERVAL: 476 MS
EKG QRS DURATION: 140 MS
EKG QTC CALCULATION (BAZETT): 442 MS
EKG R AXIS: 14 DEGREES
EKG T AXIS: 38 DEGREES
EKG VENTRICULAR RATE: 52 BPM
EOSINOPHIL NFR BLD AUTO: 4.1 %
EOSINOPHILS ABSOLUTE: 0.2 THOU/MM3 (ref 0–0.4)
ERYTHROCYTE [DISTWIDTH] IN BLOOD BY AUTOMATED COUNT: 13.2 % (ref 11.5–14.5)
GFR SERPL CREATININE-BSD FRML MDRD: > 90 ML/MIN/1.73M2
GLUCOSE SERPL-MCNC: 101 MG/DL (ref 70–108)
HCT VFR BLD AUTO: 41.7 % (ref 42–52)
HGB BLD-MCNC: 13.8 GM/DL (ref 14–18)
IMM GRANULOCYTES # BLD AUTO: 0.01 THOU/MM3 (ref 0–0.07)
IMM GRANULOCYTES NFR BLD AUTO: 0.3 %
LYMPHOCYTES ABSOLUTE: 0.7 THOU/MM3 (ref 1–4.8)
LYMPHOCYTES NFR BLD AUTO: 18.8 %
MCH RBC QN AUTO: 31.6 PG (ref 26–33)
MCHC RBC AUTO-ENTMCNC: 33.1 GM/DL (ref 32.2–35.5)
MCV RBC AUTO: 95.4 FL (ref 80–94)
MONOCYTES ABSOLUTE: 0.4 THOU/MM3 (ref 0.4–1.3)
MONOCYTES NFR BLD AUTO: 9.5 %
NEUTROPHILS NFR BLD AUTO: 66.2 %
NRBC BLD AUTO-RTO: 0 /100 WBC
PLATELET # BLD AUTO: 240 THOU/MM3 (ref 130–400)
PMV BLD AUTO: 9.5 FL (ref 9.4–12.4)
POTASSIUM SERPL-SCNC: 4.4 MEQ/L (ref 3.5–5.2)
PREALB SERPL-MCNC: 23.3 MG/DL (ref 20–40)
RBC # BLD AUTO: 4.37 MILL/MM3 (ref 4.7–6.1)
SEGMENTED NEUTROPHILS ABSOLUTE COUNT: 2.4 THOU/MM3 (ref 1.8–7.7)
SODIUM SERPL-SCNC: 142 MEQ/L (ref 135–145)
WBC # BLD AUTO: 3.7 THOU/MM3 (ref 4.8–10.8)

## 2024-04-25 PROCEDURE — 36415 COLL VENOUS BLD VENIPUNCTURE: CPT

## 2024-04-25 PROCEDURE — 85025 COMPLETE CBC W/AUTO DIFF WBC: CPT

## 2024-04-25 PROCEDURE — 93005 ELECTROCARDIOGRAM TRACING: CPT | Performed by: PODIATRIST

## 2024-04-25 PROCEDURE — 80048 BASIC METABOLIC PNL TOTAL CA: CPT

## 2024-04-25 PROCEDURE — 71045 X-RAY EXAM CHEST 1 VIEW: CPT

## 2024-04-25 PROCEDURE — 73590 X-RAY EXAM OF LOWER LEG: CPT

## 2024-04-25 PROCEDURE — 82306 VITAMIN D 25 HYDROXY: CPT

## 2024-04-25 PROCEDURE — 84134 ASSAY OF PREALBUMIN: CPT

## 2024-04-30 ENCOUNTER — OFFICE VISIT (OUTPATIENT)
Dept: FAMILY MEDICINE CLINIC | Age: 64
End: 2024-04-30
Payer: MEDICARE

## 2024-04-30 ENCOUNTER — HOSPITAL ENCOUNTER (OUTPATIENT)
Dept: WOUND CARE | Age: 64
Discharge: HOME | End: 2024-04-30
Attending: NURSE PRACTITIONER
Payer: MEDICARE

## 2024-04-30 VITALS
HEART RATE: 58 BPM | TEMPERATURE: 97.9 F | SYSTOLIC BLOOD PRESSURE: 148 MMHG | OXYGEN SATURATION: 98 % | DIASTOLIC BLOOD PRESSURE: 69 MMHG | RESPIRATION RATE: 18 BRPM

## 2024-04-30 VITALS
HEART RATE: 70 BPM | DIASTOLIC BLOOD PRESSURE: 86 MMHG | BODY MASS INDEX: 45.07 KG/M2 | SYSTOLIC BLOOD PRESSURE: 130 MMHG | WEIGHT: 275 LBS | RESPIRATION RATE: 20 BRPM

## 2024-04-30 DIAGNOSIS — I83.019 VENOUS STASIS ULCER OF RIGHT LOWER LEG WITH EDEMA OF RIGHT LOWER LEG (HCC): Primary | ICD-10-CM

## 2024-04-30 DIAGNOSIS — Z01.818 PRE-OPERATIVE EXAMINATION: Primary | ICD-10-CM

## 2024-04-30 DIAGNOSIS — L97.919 VENOUS STASIS ULCER OF RIGHT LOWER LEG WITH EDEMA OF RIGHT LOWER LEG (HCC): Primary | ICD-10-CM

## 2024-04-30 DIAGNOSIS — I83.891 VENOUS STASIS ULCER OF RIGHT LOWER LEG WITH EDEMA OF RIGHT LOWER LEG (HCC): Primary | ICD-10-CM

## 2024-04-30 DIAGNOSIS — R60.0 VENOUS STASIS ULCER OF RIGHT LOWER LEG WITH EDEMA OF RIGHT LOWER LEG (HCC): Primary | ICD-10-CM

## 2024-04-30 DIAGNOSIS — I10 ESSENTIAL HYPERTENSION: ICD-10-CM

## 2024-04-30 DIAGNOSIS — I87.2 VENOUS INSUFFICIENCY OF BOTH LOWER EXTREMITIES: ICD-10-CM

## 2024-04-30 PROCEDURE — 99213 OFFICE O/P EST LOW 20 MIN: CPT

## 2024-04-30 PROCEDURE — 1036F TOBACCO NON-USER: CPT | Performed by: NURSE PRACTITIONER

## 2024-04-30 PROCEDURE — 1124F ACP DISCUSS-NO DSCNMKR DOCD: CPT | Performed by: NURSE PRACTITIONER

## 2024-04-30 PROCEDURE — 3017F COLORECTAL CA SCREEN DOC REV: CPT | Performed by: NURSE PRACTITIONER

## 2024-04-30 PROCEDURE — 99214 OFFICE O/P EST MOD 30 MIN: CPT | Performed by: NURSE PRACTITIONER

## 2024-04-30 PROCEDURE — G8427 DOCREV CUR MEDS BY ELIG CLIN: HCPCS | Performed by: NURSE PRACTITIONER

## 2024-04-30 PROCEDURE — G8417 CALC BMI ABV UP PARAM F/U: HCPCS | Performed by: NURSE PRACTITIONER

## 2024-04-30 PROCEDURE — 3075F SYST BP GE 130 - 139MM HG: CPT | Performed by: NURSE PRACTITIONER

## 2024-04-30 PROCEDURE — 3079F DIAST BP 80-89 MM HG: CPT | Performed by: NURSE PRACTITIONER

## 2024-04-30 SDOH — ECONOMIC STABILITY: FOOD INSECURITY: WITHIN THE PAST 12 MONTHS, THE FOOD YOU BOUGHT JUST DIDN'T LAST AND YOU DIDN'T HAVE MONEY TO GET MORE.: NEVER TRUE

## 2024-04-30 SDOH — ECONOMIC STABILITY: FOOD INSECURITY: WITHIN THE PAST 12 MONTHS, YOU WORRIED THAT YOUR FOOD WOULD RUN OUT BEFORE YOU GOT MONEY TO BUY MORE.: NEVER TRUE

## 2024-04-30 ASSESSMENT — ENCOUNTER SYMPTOMS: SHORTNESS OF BREATH: 0

## 2024-04-30 ASSESSMENT — PAIN SCALES - GENERAL: PAINLEVEL_OUTOF10: 2

## 2024-04-30 NOTE — PROGRESS NOTES
[facility]  [address]  [facility] [address]  Dept: [phone]  Dept Fax: [phone]  Loc: [phone]     Visit Date:  4/30/2024    Provider: JAZZY WILEY CNP        Patient:  Damien Aguilar  YOB: 1960    HPI:       Damien Aguilar is a 63 y.o.male      Pt presents for preoperative physical examination for planned skin flap of the right lower leg. Surgery is scheduled for 5/16/2024 with Dr. Rivas. General anesthesia is planned. Current symptoms include ulcer, pain . Previous therapy tried includes: wound clinic      Current medical problems include   Patient Active Problem List   Diagnosis    Osteoarthritis    Essential hypertension    Status post total replacement of left hip    NELL (obstructive sleep apnea)    Venous stasis ulcer of right lower leg with edema of right lower leg (HCC)    Chronic venous stasis dermatitis of right lower extremity    Venous insufficiency of both lower extremities         Past Medical History:   Diagnosis Date    Morbid obesity (HCC)     Osteoarthritis     Snoring     possible apnea - per wife, better since lost 20# recently.  BMI 41.97, neck circ 17.5 cm, no daytime somnolence, no am headaches.       Past Surgical History:   Procedure Laterality Date    HERNIA REPAIR  1982    left inguinal    JOINT REPLACEMENT Left 05/06/2020    left hip    OTHER SURGICAL HISTORY      right leg vein stripping    TONSILLECTOMY AND ADENOIDECTOMY      WISDOM TOOTH EXTRACTION  1974       Allergies   Allergen Reactions    Ace Inhibitors      cough    Olmesartan      Cough         Current Outpatient Medications on File Prior to Visit   Medication Sig Dispense Refill    atorvastatin (LIPITOR) 20 MG tablet Take 1 tablet by mouth daily 90 tablet 1    furosemide (LASIX) 20 MG tablet Take 1 tablet by mouth 2 times daily 180 tablet 1    potassium chloride (K-TAB) 20 MEQ TBCR extended release tablet Take 1

## 2024-04-30 NOTE — PLAN OF CARE
Problem: Wound:  Goal: Will show signs of wound healing; wound closure and no evidence of infection  Description: Will show signs of wound healing; wound closure and no evidence of infection  Outcome: Progressing   Seen today for right leg wound. See AVS for discharge.  Care plan reviewed with patient.  Patient verbalize understanding of the plan of care and contribute to goal setting.

## 2024-04-30 NOTE — PATIENT INSTRUCTIONS
Visit Discharge/Physician Orders:   -Keep legs elevated, as much as you can.  -get an appointment with your family doctor prior to surgery   -take vitamin C (500-1000), D (7512-4279 units) and a multivitamin daily   -surgery is May 16 -bring wound vac and cpap machine with you  -waterproof band aid over wound when you shower  -call us with any issues     Wound Location: Right lower leg x 1       Dressing orders:     Right leg- apply foam over wound, change every 3-4 days. Apply compression sock in the morning and remove at night.      Keep all dressings clean & dry.     Follow up visit: post op May 21st at 1:00 pm bring wound vac supplies with you     Supplies:     Duration of dressings: 30                     We have sent your supply order to the following company:  [employer] Phone # [phone]   If you don't receive the items you were expecting or don't know what the items are that you received, call the company where the order was sent.   If you are unable to obtain wound supplies, continue to use the supplies you have available until you are able to reach us. It is most important to keep the wound covered at all times.  It is YOUR responsibility to make sure that supplies are re-ordered before you run out. Re-order telephone numbers are included in each package.      Keep next scheduled appointment. Please give 24 hour notice if unable to keep appointment. [phone]     If you experience any of the following, please call the Wound Care Service during business hours: Monday through Friday 8:00 am - 4:30 pm  ([phone]).              *Increase in pain              *Temperature over 101              *Increase in drainage from your wound or a foul odor              *Uncontrolled swelling              *Need for compression bandage changes due to slippage, breakthrough drainage     If you need medical attention outside of business hours, please contact your Primary Care Doctor or go to the nearest emergency

## 2024-04-30 NOTE — H&P
Pressure Ulcers or Wound:  \"N/A    Patient Active Problem List   Diagnosis Code    Osteoarthritis M19.90    Essential hypertension I10    Status post total replacement of left hip Z96.642    NELL (obstructive sleep apnea) G47.33    Venous stasis ulcer of right lower leg with edema of right lower leg (HCC) I83.019, I83.891, L97.919, R60.0    Chronic venous stasis dermatitis of right lower extremity I87.2    Venous insufficiency of both lower extremities I87.2       Procedure Note  Indications:  Based on my examination of this patient's wound(s)/ulcer(s) today, debridement is not required to promote healing and evaluate the extent healing.      Plan:     Patient examined and evaluated and I discussed at great length in detail the proposed plan to excise the wound and do a medial Jorge L soleus muscle flap.  The patient understands as I have gone through it I have answered his questions he does have risk factors we discussed different risks and complicationsPatient understands the possible risks and complications to the procedure(s)  including and not limited to: Pain that may involve CRPS(explained), infection or continued infection needing additional amputation of antibiotics, slow healing or nonhealing to the skin or the bone, bone infection, implant problems or failure, DVT, PE (blood clots), loss of digit, limb, anesthesia risk of numbness, burning, nerve injury(neuropraxia), respiration and heart problems like ARDS or CP with MI, CHF.  Patient understands and consents.    I have reviewed the paperwork and the patient has signed everything.  The patient is alessandro go see his cardiologist as well as his primary care physician.  We discussed swelling control and how important this is to see.  Prepares for surgery and he will keep the wound clean and dry and intact.  The patient will call with any problems or complications but at this point in time he has no contraindications and we will proceed with
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
## 2024-05-01 ENCOUNTER — TELEPHONE (OUTPATIENT)
Dept: CARDIOLOGY CLINIC | Age: 64
End: 2024-05-01

## 2024-05-01 NOTE — TELEPHONE ENCOUNTER
Pt last seen by Dr. Santos 2-8-22.  Pt is needing clearance for Right Leg Excision and Debridement of Wound, Right Medial Soleus Muscle Flap Right Skin Substitute Graft, Application of Wound Vac with Dr. Rivas on 5-16-24.    Currently no openings with Dr. Santos prior to surgery date.  Will keep an eye out for cancellations.  Dr. Santos do you have any room in your schedule to squeeze this pt in?

## 2024-05-07 NOTE — PROGRESS NOTES
PAT call attempted, patient unavailable, left message to please call us back at your earliest convenience; [phone]

## 2024-05-10 ENCOUNTER — TELEPHONE (OUTPATIENT)
Dept: CARDIOLOGY CLINIC | Age: 64
End: 2024-05-10

## 2024-05-10 ENCOUNTER — OFFICE VISIT (OUTPATIENT)
Dept: CARDIOLOGY CLINIC | Age: 64
End: 2024-05-10
Payer: MEDICARE

## 2024-05-10 VITALS
DIASTOLIC BLOOD PRESSURE: 68 MMHG | SYSTOLIC BLOOD PRESSURE: 136 MMHG | WEIGHT: 269 LBS | BODY MASS INDEX: 43.23 KG/M2 | HEART RATE: 64 BPM | HEIGHT: 66 IN

## 2024-05-10 DIAGNOSIS — Z01.811 PRE-OP CHEST EXAM: ICD-10-CM

## 2024-05-10 DIAGNOSIS — I10 PRIMARY HYPERTENSION: Primary | ICD-10-CM

## 2024-05-10 PROCEDURE — 3078F DIAST BP <80 MM HG: CPT | Performed by: NUCLEAR MEDICINE

## 2024-05-10 PROCEDURE — G8417 CALC BMI ABV UP PARAM F/U: HCPCS | Performed by: NUCLEAR MEDICINE

## 2024-05-10 PROCEDURE — 1036F TOBACCO NON-USER: CPT | Performed by: NUCLEAR MEDICINE

## 2024-05-10 PROCEDURE — 99213 OFFICE O/P EST LOW 20 MIN: CPT | Performed by: NUCLEAR MEDICINE

## 2024-05-10 PROCEDURE — 1124F ACP DISCUSS-NO DSCNMKR DOCD: CPT | Performed by: NUCLEAR MEDICINE

## 2024-05-10 PROCEDURE — 3075F SYST BP GE 130 - 139MM HG: CPT | Performed by: NUCLEAR MEDICINE

## 2024-05-10 PROCEDURE — 3017F COLORECTAL CA SCREEN DOC REV: CPT | Performed by: NUCLEAR MEDICINE

## 2024-05-10 PROCEDURE — G8427 DOCREV CUR MEDS BY ELIG CLIN: HCPCS | Performed by: NUCLEAR MEDICINE

## 2024-05-10 NOTE — PROGRESS NOTES
[facility] [place] [facility]  [facility] CARDIOLOGY  [address]  [address]  [facility] [address]  Dept: [phone]  Dept Fax: [phone]  Loc: [phone]    Visit Date: 5/10/2024    Damien Aguilar is a 63 y.o. male who presents todayfor:  Chief Complaint   Patient presents with   • Cardiac Clearance     Pre- Op: Dr. Rivas-Skin Flap   • Hypertension     Not seen in two year   Going for foot surgery   Denies chest pain   No changes in breathing  He is active  No new symptoms  BP is stable  No dizziness  No syncope      HPI:  HPI  Past Medical History:   Diagnosis Date   • Morbid obesity (HCC)    • Osteoarthritis    • Snoring     possible apnea - per wife, better since lost 20# recently.  BMI 41.97, neck circ 17.5 cm, no daytime somnolence, no am headaches.      Past Surgical History:   Procedure Laterality Date   • HERNIA REPAIR  1982    left inguinal   • JOINT REPLACEMENT Left 05/06/2020    left hip   • OTHER SURGICAL HISTORY      right leg vein stripping   • TONSILLECTOMY AND ADENOIDECTOMY     • WISDOM TOOTH EXTRACTION  1974     Family History   Problem Relation Age of Onset   • Cancer Mother    • Hypertension Father      Social History     Tobacco Use   • Smoking status: Never     Passive exposure: Past   • Smokeless tobacco: Never   • Tobacco comments:     Never smoker    Substance Use Topics   • Alcohol use: Yes     Alcohol/week: 24.0 - 30.0 standard drinks of alcohol     Types: 24 - 30 Cans of beer per week     Comment: case in one week       Current Outpatient Medications   Medication Sig Dispense Refill   • atorvastatin (LIPITOR) 20 MG tablet Take 1 tablet by mouth daily 90 tablet 1   • furosemide (LASIX) 20 MG tablet Take 1 tablet by mouth 2 times daily 180 tablet 1   • potassium chloride (K-TAB) 20 MEQ TBCR extended release tablet Take 1 tablet by mouth daily 90 tablet 1   • valsartan (DIOVAN) 160 MG tablet Take 1 tablet by mouth daily 90 tablet 1   • amLODIPine (NORVASC) 10 MG

## 2024-05-16 ENCOUNTER — ANESTHESIA EVENT (OUTPATIENT)
Dept: OPERATING ROOM | Age: 64
End: 2024-05-16
Payer: MEDICARE

## 2024-05-16 ENCOUNTER — ANESTHESIA (OUTPATIENT)
Dept: OPERATING ROOM | Age: 64
End: 2024-05-16
Payer: MEDICARE

## 2024-05-16 ENCOUNTER — HOSPITAL ENCOUNTER (OUTPATIENT)
Age: 64
Setting detail: OUTPATIENT SURGERY
Discharge: HOME | End: 2024-05-16
Attending: PODIATRIST | Admitting: PODIATRIST
Payer: MEDICARE

## 2024-05-16 VITALS
SYSTOLIC BLOOD PRESSURE: 138 MMHG | DIASTOLIC BLOOD PRESSURE: 61 MMHG | HEIGHT: 67 IN | OXYGEN SATURATION: 94 % | WEIGHT: 269.4 LBS | BODY MASS INDEX: 42.28 KG/M2 | HEART RATE: 80 BPM | TEMPERATURE: 97 F | RESPIRATION RATE: 18 BRPM

## 2024-05-16 DIAGNOSIS — G89.18 POST-OP PAIN: Primary | ICD-10-CM

## 2024-05-16 LAB — POTASSIUM SERPL-SCNC: 5 MEQ/L (ref 3.5–5.2)

## 2024-05-16 PROCEDURE — 3700000000 HC ANESTHESIA ATTENDED CARE: Performed by: PODIATRIST

## 2024-05-16 PROCEDURE — 6360000002 HC RX W HCPCS

## 2024-05-16 PROCEDURE — 7100000001 HC PACU RECOVERY - ADDTL 15 MIN: Performed by: PODIATRIST

## 2024-05-16 PROCEDURE — 6360000002 HC RX W HCPCS: Performed by: REGISTERED NURSE

## 2024-05-16 PROCEDURE — 7100000011 HC PHASE II RECOVERY - ADDTL 15 MIN: Performed by: PODIATRIST

## 2024-05-16 PROCEDURE — 7100000000 HC PACU RECOVERY - FIRST 15 MIN: Performed by: PODIATRIST

## 2024-05-16 PROCEDURE — 2580000003 HC RX 258

## 2024-05-16 PROCEDURE — 2709999900 HC NON-CHARGEABLE SUPPLY: Performed by: PODIATRIST

## 2024-05-16 PROCEDURE — 6360000002 HC RX W HCPCS: Performed by: ANESTHESIOLOGY

## 2024-05-16 PROCEDURE — 7100000010 HC PHASE II RECOVERY - FIRST 15 MIN: Performed by: PODIATRIST

## 2024-05-16 PROCEDURE — 2500000003 HC RX 250 WO HCPCS: Performed by: REGISTERED NURSE

## 2024-05-16 PROCEDURE — C9363 INTEGRA MESHED BIL WOUND MAT: HCPCS | Performed by: PODIATRIST

## 2024-05-16 PROCEDURE — 6370000000 HC RX 637 (ALT 250 FOR IP)

## 2024-05-16 PROCEDURE — 3600000014 HC SURGERY LEVEL 4 ADDTL 15MIN: Performed by: PODIATRIST

## 2024-05-16 PROCEDURE — 6360000002 HC RX W HCPCS: Performed by: PODIATRIST

## 2024-05-16 PROCEDURE — 2720000010 HC SURG SUPPLY STERILE: Performed by: PODIATRIST

## 2024-05-16 PROCEDURE — 3700000001 HC ADD 15 MINUTES (ANESTHESIA): Performed by: PODIATRIST

## 2024-05-16 PROCEDURE — 84132 ASSAY OF SERUM POTASSIUM: CPT

## 2024-05-16 PROCEDURE — 36415 COLL VENOUS BLD VENIPUNCTURE: CPT

## 2024-05-16 PROCEDURE — 3600000004 HC SURGERY LEVEL 4 BASE: Performed by: PODIATRIST

## 2024-05-16 DEVICE — POWDER SURG CELLERATE RX 1 GM HYDROL COLLEGEN: Type: IMPLANTABLE DEVICE | Status: FUNCTIONAL

## 2024-05-16 DEVICE — INTEGRA® MESHED BILAYER WOUND MATRIX 2 IN*2 IN (5 CM*5 CM)
Type: IMPLANTABLE DEVICE | Status: FUNCTIONAL
Brand: INTEGRA®

## 2024-05-16 RX ORDER — ONDANSETRON 2 MG/ML
4 INJECTION INTRAMUSCULAR; INTRAVENOUS EVERY 6 HOURS PRN
Status: CANCELLED | OUTPATIENT
Start: 2024-05-16

## 2024-05-16 RX ORDER — SODIUM CHLORIDE 0.9 % (FLUSH) 0.9 %
5-40 SYRINGE (ML) INJECTION EVERY 12 HOURS SCHEDULED
Status: DISCONTINUED | OUTPATIENT
Start: 2024-05-16 | End: 2024-05-16 | Stop reason: HOSPADM

## 2024-05-16 RX ORDER — SODIUM CHLORIDE 9 MG/ML
INJECTION, SOLUTION INTRAVENOUS PRN
Status: CANCELLED | OUTPATIENT
Start: 2024-05-16

## 2024-05-16 RX ORDER — SODIUM CHLORIDE 0.9 % (FLUSH) 0.9 %
5-40 SYRINGE (ML) INJECTION PRN
Status: DISCONTINUED | OUTPATIENT
Start: 2024-05-16 | End: 2024-05-16 | Stop reason: HOSPADM

## 2024-05-16 RX ORDER — FENTANYL CITRATE 50 UG/ML
50 INJECTION, SOLUTION INTRAMUSCULAR; INTRAVENOUS EVERY 5 MIN PRN
Status: DISCONTINUED | OUTPATIENT
Start: 2024-05-16 | End: 2024-05-16 | Stop reason: HOSPADM

## 2024-05-16 RX ORDER — SODIUM CHLORIDE 9 MG/ML
INJECTION, SOLUTION INTRAVENOUS PRN
Status: DISCONTINUED | OUTPATIENT
Start: 2024-05-16 | End: 2024-05-16 | Stop reason: HOSPADM

## 2024-05-16 RX ORDER — ROCURONIUM BROMIDE 10 MG/ML
INJECTION, SOLUTION INTRAVENOUS PRN
Status: DISCONTINUED | OUTPATIENT
Start: 2024-05-16 | End: 2024-05-16 | Stop reason: SDUPTHER

## 2024-05-16 RX ORDER — HYDROMORPHONE HYDROCHLORIDE 2 MG/ML
INJECTION, SOLUTION INTRAMUSCULAR; INTRAVENOUS; SUBCUTANEOUS PRN
Status: DISCONTINUED | OUTPATIENT
Start: 2024-05-16 | End: 2024-05-16 | Stop reason: SDUPTHER

## 2024-05-16 RX ORDER — SODIUM CHLORIDE 0.9 % (FLUSH) 0.9 %
5-40 SYRINGE (ML) INJECTION EVERY 12 HOURS SCHEDULED
Status: CANCELLED | OUTPATIENT
Start: 2024-05-16

## 2024-05-16 RX ORDER — OXYCODONE HYDROCHLORIDE AND ACETAMINOPHEN 5; 325 MG/1; MG/1
1 TABLET ORAL EVERY 6 HOURS PRN
Qty: 28 TABLET | Refills: 0 | Status: ON HOLD | OUTPATIENT
Start: 2024-05-16 | End: 2024-05-26

## 2024-05-16 RX ORDER — BUPIVACAINE HYDROCHLORIDE 5 MG/ML
INJECTION, SOLUTION PERINEURAL PRN
Status: DISCONTINUED | OUTPATIENT
Start: 2024-05-16 | End: 2024-05-16 | Stop reason: ALTCHOICE

## 2024-05-16 RX ORDER — MORPHINE SULFATE 2 MG/ML
2 INJECTION, SOLUTION INTRAMUSCULAR; INTRAVENOUS
Status: DISCONTINUED | OUTPATIENT
Start: 2024-05-16 | End: 2024-05-16 | Stop reason: HOSPADM

## 2024-05-16 RX ORDER — SODIUM CHLORIDE 0.9 % (FLUSH) 0.9 %
5-40 SYRINGE (ML) INJECTION PRN
Status: CANCELLED | OUTPATIENT
Start: 2024-05-16

## 2024-05-16 RX ORDER — KETOROLAC TROMETHAMINE 10 MG/1
10 TABLET, FILM COATED ORAL EVERY 6 HOURS PRN
Qty: 20 TABLET | Refills: 0 | Status: ON HOLD | OUTPATIENT
Start: 2024-05-16 | End: 2024-05-21

## 2024-05-16 RX ORDER — SODIUM CHLORIDE 9 MG/ML
INJECTION, SOLUTION INTRAVENOUS CONTINUOUS
Status: CANCELLED | OUTPATIENT
Start: 2024-05-16

## 2024-05-16 RX ORDER — MORPHINE SULFATE 2 MG/ML
INJECTION, SOLUTION INTRAMUSCULAR; INTRAVENOUS
Status: COMPLETED
Start: 2024-05-16 | End: 2024-05-16

## 2024-05-16 RX ORDER — OXYCODONE HYDROCHLORIDE 5 MG/1
5 TABLET ORAL EVERY 4 HOURS PRN
Status: DISCONTINUED | OUTPATIENT
Start: 2024-05-16 | End: 2024-05-16 | Stop reason: HOSPADM

## 2024-05-16 RX ORDER — MORPHINE SULFATE 2 MG/ML
2 INJECTION, SOLUTION INTRAMUSCULAR; INTRAVENOUS EVERY 5 MIN PRN
Status: DISCONTINUED | OUTPATIENT
Start: 2024-05-16 | End: 2024-05-16 | Stop reason: HOSPADM

## 2024-05-16 RX ORDER — OXYCODONE HYDROCHLORIDE 5 MG/1
10 TABLET ORAL EVERY 4 HOURS PRN
Status: DISCONTINUED | OUTPATIENT
Start: 2024-05-16 | End: 2024-05-16 | Stop reason: HOSPADM

## 2024-05-16 RX ORDER — PROPOFOL 10 MG/ML
INJECTION, EMULSION INTRAVENOUS PRN
Status: DISCONTINUED | OUTPATIENT
Start: 2024-05-16 | End: 2024-05-16 | Stop reason: SDUPTHER

## 2024-05-16 RX ORDER — FENTANYL CITRATE 50 UG/ML
INJECTION, SOLUTION INTRAMUSCULAR; INTRAVENOUS PRN
Status: DISCONTINUED | OUTPATIENT
Start: 2024-05-16 | End: 2024-05-16 | Stop reason: SDUPTHER

## 2024-05-16 RX ORDER — MIDAZOLAM HYDROCHLORIDE 1 MG/ML
INJECTION INTRAMUSCULAR; INTRAVENOUS PRN
Status: DISCONTINUED | OUTPATIENT
Start: 2024-05-16 | End: 2024-05-16 | Stop reason: SDUPTHER

## 2024-05-16 RX ORDER — MORPHINE SULFATE 4 MG/ML
4 INJECTION, SOLUTION INTRAMUSCULAR; INTRAVENOUS
Status: DISCONTINUED | OUTPATIENT
Start: 2024-05-16 | End: 2024-05-16 | Stop reason: HOSPADM

## 2024-05-16 RX ORDER — LIDOCAINE HCL/PF 100 MG/5ML
SYRINGE (ML) INJECTION PRN
Status: DISCONTINUED | OUTPATIENT
Start: 2024-05-16 | End: 2024-05-16 | Stop reason: SDUPTHER

## 2024-05-16 RX ORDER — DEXAMETHASONE SODIUM PHOSPHATE 10 MG/ML
INJECTION, EMULSION INTRAMUSCULAR; INTRAVENOUS PRN
Status: DISCONTINUED | OUTPATIENT
Start: 2024-05-16 | End: 2024-05-16 | Stop reason: SDUPTHER

## 2024-05-16 RX ORDER — ASCORBIC ACID 500 MG
500 TABLET ORAL DAILY
COMMUNITY

## 2024-05-16 RX ORDER — NALOXONE HYDROCHLORIDE 0.4 MG/ML
INJECTION, SOLUTION INTRAMUSCULAR; INTRAVENOUS; SUBCUTANEOUS PRN
Status: DISCONTINUED | OUTPATIENT
Start: 2024-05-16 | End: 2024-05-16 | Stop reason: HOSPADM

## 2024-05-16 RX ORDER — SODIUM CHLORIDE 9 MG/ML
INJECTION, SOLUTION INTRAVENOUS CONTINUOUS
Status: DISCONTINUED | OUTPATIENT
Start: 2024-05-16 | End: 2024-05-16 | Stop reason: HOSPADM

## 2024-05-16 RX ORDER — ONDANSETRON 2 MG/ML
INJECTION INTRAMUSCULAR; INTRAVENOUS PRN
Status: DISCONTINUED | OUTPATIENT
Start: 2024-05-16 | End: 2024-05-16 | Stop reason: SDUPTHER

## 2024-05-16 RX ORDER — ONDANSETRON 4 MG/1
4 TABLET, ORALLY DISINTEGRATING ORAL EVERY 8 HOURS PRN
Status: CANCELLED | OUTPATIENT
Start: 2024-05-16

## 2024-05-16 RX ADMIN — OXYCODONE 10 MG: 5 TABLET ORAL at 19:35

## 2024-05-16 RX ADMIN — SUGAMMADEX 200 MG: 100 INJECTION, SOLUTION INTRAVENOUS at 18:54

## 2024-05-16 RX ADMIN — SODIUM CHLORIDE: 9 INJECTION, SOLUTION INTRAVENOUS at 11:07

## 2024-05-16 RX ADMIN — ONDANSETRON 4 MG: 2 INJECTION INTRAMUSCULAR; INTRAVENOUS at 16:14

## 2024-05-16 RX ADMIN — PROPOFOL 150 MG: 10 INJECTION, EMULSION INTRAVENOUS at 16:09

## 2024-05-16 RX ADMIN — ROCURONIUM BROMIDE 30 MG: 10 INJECTION INTRAVENOUS at 17:32

## 2024-05-16 RX ADMIN — ROCURONIUM BROMIDE 50 MG: 10 INJECTION INTRAVENOUS at 16:09

## 2024-05-16 RX ADMIN — DEXAMETHASONE SODIUM PHOSPHATE 8 MG: 10 INJECTION, EMULSION INTRAMUSCULAR; INTRAVENOUS at 16:14

## 2024-05-16 RX ADMIN — MORPHINE SULFATE 2 MG: 2 INJECTION, SOLUTION INTRAMUSCULAR; INTRAVENOUS at 19:20

## 2024-05-16 RX ADMIN — PROPOFOL 50 MG: 10 INJECTION, EMULSION INTRAVENOUS at 17:27

## 2024-05-16 RX ADMIN — MIDAZOLAM 2 MG: 1 INJECTION INTRAMUSCULAR; INTRAVENOUS at 16:03

## 2024-05-16 RX ADMIN — MORPHINE SULFATE 2 MG: 2 INJECTION, SOLUTION INTRAMUSCULAR; INTRAVENOUS at 19:15

## 2024-05-16 RX ADMIN — HYDROMORPHONE HYDROCHLORIDE 1 MG: 2 INJECTION INTRAMUSCULAR; INTRAVENOUS; SUBCUTANEOUS at 17:49

## 2024-05-16 RX ADMIN — Medication 3000 MG: at 16:13

## 2024-05-16 RX ADMIN — Medication 60 MG: at 16:09

## 2024-05-16 RX ADMIN — FENTANYL CITRATE 100 MCG: 50 INJECTION, SOLUTION INTRAMUSCULAR; INTRAVENOUS at 16:17

## 2024-05-16 ASSESSMENT — PAIN - FUNCTIONAL ASSESSMENT: PAIN_FUNCTIONAL_ASSESSMENT: NONE - DENIES PAIN

## 2024-05-16 ASSESSMENT — PAIN DESCRIPTION - DESCRIPTORS: DESCRIPTORS: ACHING;NAGGING

## 2024-05-16 ASSESSMENT — PAIN DESCRIPTION - LOCATION: LOCATION: LEG

## 2024-05-16 ASSESSMENT — PAIN DESCRIPTION - ORIENTATION: ORIENTATION: RIGHT

## 2024-05-16 ASSESSMENT — PAIN SCALES - GENERAL
PAINLEVEL_OUTOF10: 6
PAINLEVEL_OUTOF10: 0

## 2024-05-16 NOTE — ANESTHESIA POSTPROCEDURE EVALUATION
Department of Anesthesiology  Postprocedure Note    Patient: Damien Aguilar  MRN: [id number]  YOB: 1960  Date of evaluation: 5/16/2024    Procedure Summary       Date: 05/16/24 Room / Location[facility] 06 [facility]    Anesthesia Start: 1603 Anesthesia Stop:     Procedure: Right Leg Excision and Debridement of Wound, Right Medial Soleus Muscle Flap Right Skin Substitute Graft, Application of Wound Vac (Right: Leg Lower) Diagnosis:       Chronic wound      Chronic skin ulcer, unspecified ulcer stage (HCC)      (Chronic wound [T14.8XXA])      (Chronic skin ulcer, unspecified ulcer stage (HCC) [L98.499])    Surgeons: Ja Rivas DPM Responsible Provider: Rafi Ritchie DO    Anesthesia Type: general ASA Status: 3            Anesthesia Type: No value filed.    Lillian Phase I: Lillian Score: 9    Lillian Phase II:      Anesthesia Post Evaluation    Patient location during evaluation: PACU  Patient participation: complete - patient participated  Level of consciousness: awake and alert  Pain score: 3  Airway patency: patent  Nausea & Vomiting: no nausea and no vomiting  Cardiovascular status: hemodynamically stable and blood pressure returned to baseline  Respiratory status: spontaneous ventilation, acceptable and nasal cannula  Hydration status: stable  Pain management: adequate and satisfactory to patient    No notable events documented.

## 2024-05-16 NOTE — BRIEF OP NOTE
Brief Postoperative Note      Patient: Damien Aguilar  YOB: 1960  MRN: [id number]    Date of Procedure: 5/16/2024    Pre-Op Diagnosis Codes:     * Chronic wound [T14.8XXA]     * Chronic skin ulcer, unspecified ulcer stage (HCC) [L98.499]    Post-Op Diagnosis: Same       Procedure(s):  Right Leg 3cm Excision and Debridement of Wound  2. Right Medial hemisoleus Muscle Flap   3. Vein ligation  4. Right Skin Substitute Graft, integra 1mdy7fs   5. Application of Wound Vac    Surgeon(s):  Ja Rivas DPM    Assistant:  Resident: Lynn Gaona, PGY3   Student: Aamir Herzog, MS4    Anesthesia: General    Estimated Blood Loss (mL): less than 100     Complications: None    Hemostasis: thigh tourniquet at 300 mmHg    Injectables: 30 cc of 0.5% marcaine plain     Materials: 2-0 Nylon, 4-0 monocryl, 3-0 vicryl      Specimens:   * No specimens in log *    Implants:  Implant Name Type Inv. Item Serial No.  Lot No. LRB No. Used Action   POWDER SURG CELLERATE RX 1 [facility] - [id number]  POWDER SURG CELLERATE RX 1 GM [facility]  [employer]WD  Right 1 Implanted   DRESSING BIO [id number] BOV TEND CLLGN MESHED GLYCOSAMINOGLYCAN - [id number]  DRESSING BIO [id number] BOV TEND CLLGN MESHED GLYCOSAMINOGLYCAN  [employer] MANUEL[employer]WD [id number] Right 1 Implanted         Drains: * No LDAs found *    Findings:  Infection Present At Time Of Surgery (PATOS) (choose all levels that have infection present):  No infection present  Other Findings: Same as above    Electronically signed by Lynn Gaona DPM on 5/16/2024 at 7:09 PM

## 2024-05-16 NOTE — PROGRESS NOTES
Patient oriented to Same Day department and admitted to Same Day Surgery room 10.   Patient verbalized approval for first name, last initial with physician name on unit whiteboard.     Plan of care reviewed with patient.   Patient room whiteboard filled out and discussed with patient and responsible adult.   Patient and responsible adult offered Same Day Welcome Packet to review.    Call light in reach.   Bed in lowest position, locked, with one bed rail up.   SCDs and warming blanket in place.  Appropriate arm bands on patient.   Bathroom offered.   All questions and concerns of patient addressed.    Meds to Beds:   Patient informed of [facility] program during admission. Patient is agreeable to program.   Contact information for the pharmacy and the Meds to Beds program:   Name: Padmini   Relationship to patient:spouse/significant other   Phone number: [phone]

## 2024-05-16 NOTE — ANESTHESIA PRE PROCEDURE
24.0 - 30.0 standard drinks of alcohol     Types: 24 - 30 Cans of beer per week     Comment: Beer occasionally                                Counseling given: Not Answered  Tobacco comments: Never smoker       Vital Signs (Current):   Vitals:    05/16/24 1029   BP: (!) 145/66   Pulse: 52   Resp: 18   Temp: 97.8 °F (36.6 °C)   TempSrc: Temporal   SpO2: 99%   Weight: 122.2 kg (269 lb 6.4 oz)   Height: 1.702 m (5' 7\")                                              BP Readings from Last 3 Encounters:   05/16/24 (!) 145/66   05/10/24 136/68   04/30/24 (!) 148/69       NPO Status: Time of last liquid consumption: 0800 (Sip with am medication)                        Time of last solid consumption: 2100                        Date of last liquid consumption: 05/16/24                        Date of last solid food consumption: 05/15/24    BMI:   Wt Readings from Last 3 Encounters:   05/16/24 122.2 kg (269 lb 6.4 oz)   05/10/24 122 kg (269 lb)   04/30/24 124.7 kg (275 lb)     Body mass index is 42.19 kg/m².    CBC:   Lab Results   Component Value Date/Time    WBC 3.7 04/25/2024 09:00 AM    RBC 4.37 04/25/2024 09:00 AM    HGB 13.8 04/25/2024 09:00 AM    HCT 41.7 04/25/2024 09:00 AM    MCV 95.4 04/25/2024 09:00 AM     04/25/2024 09:00 AM       CMP:   Lab Results   Component Value Date/Time     04/25/2024 09:00 AM    K 5.0 05/16/2024 10:32 AM     04/25/2024 09:00 AM    CO2 28 04/25/2024 09:00 AM    BUN 12 04/25/2024 09:00 AM    CREATININE 0.7 04/25/2024 09:00 AM    LABGLOM >90 04/25/2024 09:00 AM    GLUCOSE 101 04/25/2024 09:00 AM    CALCIUM 9.3 04/25/2024 09:00 AM    BILITOT 1.2 09/28/2022 08:13 AM    ALKPHOS 96 09/28/2022 08:13 AM    AST 23 09/28/2022 08:13 AM    ALT 15 03/21/2024 08:01 AM       POC Tests: No results for input(s): \"POCGLU\", \"POCNA\", \"POCK\", \"POCCL\", \"POCBUN\", \"POCHEMO\", \"POCHCT\" in the last 72 hours.    Coags: No results found for: \"PROTIME\", \"INR\", \"APTT\"    HCG (If Applicable): No results

## 2024-05-16 NOTE — OP NOTE
Operative Note      Patient: Damien Aguilar  YOB: 1960  MRN: [id number]    Date of Procedure: 5/16/2024    Pre-Op Diagnosis Codes:     * Chronic wound [T14.8XXA]     * Chronic skin ulcer, unspecified ulcer stage (HCC) [L98.499]    Post-Op Diagnosis: Same       Procedure(s):  1.Right Leg 3cm Excision and Debridement of Wound  2. Right Medial hemisoleus Muscle Flap, 5cm x 5cm  3. Vein ligation, right leg  4. Right Skin Substitute Graft, integra [id number]   5. Application of Wound Vac, right leg    Surgeon(s):  Ja Rivas DPM    Assistant:  Resident: Lynn Gaona, PGY3   Student: Aamir Herzog MS4    Anesthesia: General    Estimated Blood Loss (mL): less than 100     Complications: None    Hemostasis: thigh tourniquet at 300 mmHg    Injectables: 30 cc of 0.5% marcaine plain     Materials: 2-0 Nylon, 4-0 monocryl, 3-0 vicryl      Specimens:   * No specimens in log *    Implants:  Implant Name Type Inv. Item Serial No.  Lot No. LRB No. Used Action   POWDER SURG CELLERATE RX 1 GM [employer]LE[employer] - [id number]  POWDER SURG CELLERATE RX 1 GM HYDROL COLLEGEN  [employer]  Right 1 Implanted   DRESSING BIO [id number] BOV TEND CLLGN MESHED GLYCOSAMINOGLYCAN - [id number]  DRESSING BIO [id number] BOV TEND CLLGN MESHED GLYCOSAMINOGLYCAN  [employer]WD [id number] Right 1 Implanted         Drains: * No LDAs found *    Findings:  Infection Present At Time Of Surgery (PATOS) (choose all levels that have infection present):  No infection present  Other Findings: Same as above    Detailed Description of Procedure:     Indications: Patient is a 63 y.o. male with a chief complaint of non healing chronic right leg wound who is being seen by Dr. Rivas and being treated for said chief complaint.  At this time all conservative options have been exhausted and surgical intervention is necessary.  The procedure has been explained to the patient and they understand the risks, benefits and possible

## 2024-05-16 NOTE — DISCHARGE INSTRUCTIONS
Post-Operative Instructions    Diet: Drink liquids first; if tolerated add soft foods and increase diet as tolerated  Activity: Adults do NOT drive today. Due to anesthesia and medication your reflexes are slower. If you are wearing a surgical shoe or walking boot you may not drive until Dr. Rivas releases you to do so.  Ambulate: Non weightbearing in posterior splint  Leave the bandage in place. Do not remove it. Keep it dry and clean.  Rest by staying off your feet as much as possible. (Rent movies, read a book, etc.)  Wear surgical shoe/boot at all times when you are up on your foot/feet.  Elevate the involved foot. Proper elevation is when the involved foot/feet are at the same level as the heart or slightly higher.  If the foot has some throbbing you may apply ice to the top of the ankle area or behind the knee, 20 min on 20 min off.  Take medications as prescribed   If there should be any excess bleeding to the bandage (wet blood larger than the size of a quarter), uncontrolled pain, questions or concerns please contact:   Office between 8:00 a.m. - 4:00P.M.    [phone]              Please return to office for a post-op dressing change and elevation of healing. Post-operative appointment is at previously scheduled date.     ADONIS Manriquez DPM, PGY3  Podiatric Surgical Resident  05/16/24  7:11 PM

## 2024-05-16 NOTE — H&P
[facility]  History and Physical Update    Pt Name: Damien Aguilar  MRN: [id number]  YOB: 1960  Date of evaluation: 5/16/2024    I have examined the patient and reviewed the H&P/Consult and there are no changes to the patient or plans.      Ja Rivas DPM,FACFAS  Electronically signed 5/16/2024 at 3:58 PM

## 2024-05-16 NOTE — PROGRESS NOTES
RN assessed patient. Pt continues to do well. Rn spoke to CRNA about having patient hold valstartan medication was due at 1230. Per CRNA continue to have patient not take. This RN notified patient and family.

## 2024-05-16 NOTE — PROGRESS NOTES
1909 Awake and oriented on arrival to PACU , HOB elevated , pt c/o # 5 - 6 Rt lower leg pain   1915 medicated with morphine 2 mg IV   1920 pain unchanged , medicated with Morphine 2 mg IV   1935 states pain about the same , medicated with Roxicodone 10 mg   1950 resting resp easy   2000 Pt states pain tolerable    2003 Meets criteria for discharge , transported to [facility]

## 2024-05-17 ENCOUNTER — HOSPITAL ENCOUNTER (EMERGENCY)
Age: 64
Discharge: HOME | End: 2024-05-17
Payer: MEDICARE

## 2024-05-17 VITALS
TEMPERATURE: 98.4 F | WEIGHT: 269.4 LBS | OXYGEN SATURATION: 97 % | HEART RATE: 57 BPM | BODY MASS INDEX: 42.28 KG/M2 | DIASTOLIC BLOOD PRESSURE: 60 MMHG | RESPIRATION RATE: 16 BRPM | SYSTOLIC BLOOD PRESSURE: 113 MMHG | HEIGHT: 67 IN

## 2024-05-17 DIAGNOSIS — Z51.89 VISIT FOR WOUND CARE: Primary | ICD-10-CM

## 2024-05-17 PROCEDURE — 99282 EMERGENCY DEPT VISIT SF MDM: CPT

## 2024-05-17 ASSESSMENT — PAIN - FUNCTIONAL ASSESSMENT: PAIN_FUNCTIONAL_ASSESSMENT: NONE - DENIES PAIN

## 2024-05-17 NOTE — ED NOTES
Daughter at bedside states podiatry residents exchanged wound vac and redressed wound. States  Rivas would be down in about 20-30 minutes to see him still. Pt taking prescribed home Valsartan. Patient resting in bed. Respirations easy and unlabored. No distress noted. Call light within reach.

## 2024-05-17 NOTE — PROGRESS NOTES
Pt returned to [facility] room 10. Vitals and assessment as charted. 0.9 infusing, @450ml to count from PACU. Pt has crackers and water. Family at the bedside. Pt and family verbalized understanding of discharge criteria and call light use. Call light in reach.

## 2024-05-17 NOTE — OP NOTE
Operative Note      Patient: Damien Aguilar  YOB: 1960  MRN: [id number]    Date of Procedure: 5/16/2024    Pre-Op Diagnosis Codes:     * Chronic wound [T14.8XXA]     * Chronic skin ulcer, unspecified ulcer stage (HCC) [L98.499]    Post-Op Diagnosis: Same and chronic venous hypertension with torturous varicose veins deep       Procedure(s):  Right Leg Excision and Debridement of Wound, Right Medial Soleus Muscle Flap Right Skin Substitute Graft, Application of Wound Vac, vein ligation deep to superficial, Excision of wound x 3 cm    Surgeon(s):  Ja Rivas DPM, FACFAS    Assistant:   Lynn Gaona D.P.M., PGY 3    Anesthesia: General    Estimated Blood Loss (mL): less than 50     Complications: Bleeding, Other: Varicosities    Specimens:   Ulceration    Implants:  Implant Name Type Inv. Item Serial No.  Lot No. LRB No. Used Action   POWDER SURG CELLERATE RX 1 [facility] - [id number]  POWDER SURG CELLERATE RX 1 [facility]  [employer]-WD  Right 1 Implanted   DRESSING BIO [id number] BOV TEND CLLGN MESHED GLYCOSAMINOGLYCAN - [id number]  DRESSING BIO [id number] BOV TEND CLLGN MESHED GLYCOSAMINOGLYCAN  [employer] MANUEL[employer]-WD [id number] Right 1 Implanted         Drains: Not needed bleeding controlled    Findings:  Infection Present At Time Of Surgery (PATOS) (choose all levels that have infection present):  No infection present  Other Findings: Significant varicosities deep off of the deep venous, cannot is into the deep and the superficial posterior compartment of the leg.  Soft tissues were very hard to thick and non-elastic  Indications: Patient is a 63-year-old male who has had an ulceration for several years to the anterior leg that will not heal and its overlying the tibia.  The patient continued to have problems with healing he has tried all conservative care through the wound clinic with a nurse practitioner and he continues to struggle gaining wound healing it  carried down to level the subcutaneous tissue but please note that that the tissues were very thick and scarred and fibrous in nature secondary to the chronic venous hypertension as we moved down to the level through the Yolie's fascia into the deep fascial layers we encountered the saphenous nerve and the saphenous vein which was retracted and cauterized appropriately.  Once we entered deep to this into the fascial layers we made a a deep compartment superficially fasciotomy in the leg.  This was taken from the ulceration all the way to the proximal aspect of the leg at the flare metaphysis of the tibia.  As we went deeper we were able to identify the muscle structures there is a soleus muscle and then we were able to separate the soleus from the gastrocnemius muscle.  Please note that as we carried this fasciotomy distal toward the ulceration we encountered this significant number of large torturous veins that went from the deep deep vein structure in the deep compartment of the leg and penetrates through these fascial layers all the way to the skin.  So at this time there was approximately 6 of these traversing and these were all identified and dissected free and then using a vascular clip we double clipped both sides of these veins and they were ligated.  These all these ligated veins  the deep compartment identifying the medial Jorge L soleus muscle and at this time the medial Jorge L soleus muscle was began to be dissected from proximal to distal going from super superficial medial to deep.  As we did so we encountered a very proximal  this  was identified clipped and it was freed and then cut.  As we continue to move down through the medial Jorge L soleus muscle we were able to identify there was good thick muscle perfusion and we continued to dissect along the fascial layers  the medial Jorge L soleus muscle down to approximately 12 cm from the ankle being careful not to violate  closed we had a 5 x 5 cm muscle protruding at the anterior medial aspect of the tibia.  And at this time after we noted the muscle was very prominent and bleeding we went ahead and took an Integra bilayer skin substitute and we placed it over top of the muscle flap and secured in place with 4 point 0 Monocryl.  Following this then the incision and the muscle flap had a negative wound pressure therapy sponge placed over top of this and it was set at 80 mmHg pressure continuous.  There was some bleeding and it was functioning very well and made excellent seal.  The patient had the tourniquet released bleeding was controlled prior to closing the wound and the patient had good capillary fill time.  His pain was controlled and he was taken recovery room with vital signs stable.  The patient will follow-up next week he will continue with the negative wound pressure therapy and he will call with any problems or complications patient was given Percocet and Vistaril for pain.    Electronically signed by Ja Rivas DPM, FACFAS on 5/17/2024 at 7:39 AM

## 2024-05-17 NOTE — CONSULTS
VAC was reapplied today. After application of the wound VAC, the wound VAC was powered on and was properly functioning at 80 mmHg with good seal noted and no leaks detected.   - Nonweightbearing to the right lower extremity  - Discussed and educated with patient to remain nonweightbearing to the right lower extremity   - Discussed and educated with patient to leave the current wound VAC, dressing, and splint intact until his follow-up with Dr. Rivas in 4 days  - Patient verbalized understanding and agreement with the treatment plan as stated.  All of their questions were answered to their satisfaction.  - Podiatry will continue to follow patient    DISPO: Patient is okay to be discharged from a podiatry standpoint.  Patient was instructed to keep the current wound VAC, dressing, and posterior splint intact until his follow-up with Dr. Rivas.     Thank you for the consultation allowing podiatry to assist in the medical welfare of this patient.     Romeo Galvan DPM -PGY1  5/17/2024 12:12 PM

## 2024-05-17 NOTE — ED TRIAGE NOTES
Pt to ED through the lobby c/o post-op problem and wound check. Pt states he had a flap done by Dr Rivas (podiatry) yesterday. Pt has a wound vac placed and is having difficulty with it. Pt states he contacted Dr Rivas and was advised to come to the ER and he would see pt after he get out of surgery. Pt denies pain. Pt has pain controlled at this time with prescribed Toradol and Percocet.

## 2024-05-17 NOTE — PROGRESS NOTES
Pt has met discharge criteria and states he is ready for discharge to home. IV removed, gauze and tape applied. Dressed in own clothes and personal belongings gathered. Discharge instructions (with opioid medication education information) given to pt and family; pt and family verbalized understanding of discharge instructions, prescriptions and follow up appointments. Pt transported to discharge lobby by [facility] staff.

## 2024-05-21 ENCOUNTER — HOSPITAL ENCOUNTER (OUTPATIENT)
Dept: WOUND CARE | Age: 64
Discharge: HOME | End: 2024-05-21
Attending: NURSE PRACTITIONER
Payer: MEDICARE

## 2024-05-21 ENCOUNTER — HOSPITAL ENCOUNTER (INPATIENT)
Age: 64
LOS: 5 days | Discharge: HOME | DRG: 580 | End: 2024-05-26
Attending: PODIATRIST | Admitting: PODIATRIST
Payer: MEDICARE

## 2024-05-21 VITALS
SYSTOLIC BLOOD PRESSURE: 143 MMHG | OXYGEN SATURATION: 96 % | HEART RATE: 54 BPM | RESPIRATION RATE: 16 BRPM | TEMPERATURE: 97.7 F | DIASTOLIC BLOOD PRESSURE: 65 MMHG

## 2024-05-21 DIAGNOSIS — G89.18 POST-OP PAIN: ICD-10-CM

## 2024-05-21 DIAGNOSIS — S81.801D WOUND OF RIGHT LEG, SUBSEQUENT ENCOUNTER: Primary | ICD-10-CM

## 2024-05-21 DIAGNOSIS — T86.821 FAILURE OF FLAP GRAFT: ICD-10-CM

## 2024-05-21 PROBLEM — L03.115 CELLULITIS OF RIGHT LEG: Status: ACTIVE | Noted: 2024-05-21

## 2024-05-21 PROBLEM — L02.415 CELLULITIS AND ABSCESS OF RIGHT LEG: Status: ACTIVE | Noted: 2024-05-21

## 2024-05-21 PROCEDURE — 6360000002 HC RX W HCPCS: Performed by: PODIATRIST

## 2024-05-21 PROCEDURE — 87070 CULTURE OTHR SPECIMN AEROBIC: CPT

## 2024-05-21 PROCEDURE — 87077 CULTURE AEROBIC IDENTIFY: CPT

## 2024-05-21 PROCEDURE — 11042 DBRDMT SUBQ TIS 1ST 20SQCM/<: CPT

## 2024-05-21 PROCEDURE — 6370000000 HC RX 637 (ALT 250 FOR IP)

## 2024-05-21 PROCEDURE — 0KBV0ZZ EXCISION OF RIGHT FOOT MUSCLE, OPEN APPROACH: ICD-10-PCS | Performed by: PODIATRIST

## 2024-05-21 PROCEDURE — 87186 SC STD MICRODIL/AGAR DIL: CPT

## 2024-05-21 PROCEDURE — 1200000000 HC SEMI PRIVATE

## 2024-05-21 PROCEDURE — 99222 1ST HOSP IP/OBS MODERATE 55: CPT | Performed by: PHYSICIAN ASSISTANT

## 2024-05-21 PROCEDURE — 2580000003 HC RX 258: Performed by: PODIATRIST

## 2024-05-21 PROCEDURE — 87205 SMEAR GRAM STAIN: CPT

## 2024-05-21 PROCEDURE — 6370000000 HC RX 637 (ALT 250 FOR IP): Performed by: PODIATRIST

## 2024-05-21 PROCEDURE — 0JBN0ZZ EXCISION OF RIGHT LOWER LEG SUBCUTANEOUS TISSUE AND FASCIA, OPEN APPROACH: ICD-10-PCS | Performed by: ORTHOPAEDIC SURGERY

## 2024-05-21 RX ORDER — VALSARTAN 160 MG/1
160 TABLET ORAL DAILY
Status: DISCONTINUED | OUTPATIENT
Start: 2024-05-22 | End: 2024-05-26 | Stop reason: HOSPADM

## 2024-05-21 RX ORDER — SILDENAFIL CITRATE 20 MG/1
20 TABLET ORAL 3 TIMES DAILY
Status: DISCONTINUED | OUTPATIENT
Start: 2024-05-21 | End: 2024-05-26 | Stop reason: HOSPADM

## 2024-05-21 RX ORDER — SODIUM CHLORIDE 0.9 % (FLUSH) 0.9 %
5-40 SYRINGE (ML) INJECTION EVERY 12 HOURS SCHEDULED
Status: DISCONTINUED | OUTPATIENT
Start: 2024-05-21 | End: 2024-05-26 | Stop reason: HOSPADM

## 2024-05-21 RX ORDER — METOPROLOL SUCCINATE 50 MG/1
50 TABLET, EXTENDED RELEASE ORAL DAILY
Status: DISCONTINUED | OUTPATIENT
Start: 2024-05-22 | End: 2024-05-26 | Stop reason: HOSPADM

## 2024-05-21 RX ORDER — OXYCODONE HYDROCHLORIDE AND ACETAMINOPHEN 5; 325 MG/1; MG/1
1 TABLET ORAL EVERY 6 HOURS PRN
Status: DISCONTINUED | OUTPATIENT
Start: 2024-05-21 | End: 2024-05-22

## 2024-05-21 RX ORDER — ACETAMINOPHEN 325 MG/1
650 TABLET ORAL EVERY 6 HOURS PRN
Status: DISCONTINUED | OUTPATIENT
Start: 2024-05-21 | End: 2024-05-26 | Stop reason: HOSPADM

## 2024-05-21 RX ORDER — PENTOXIFYLLINE 400 MG/1
400 TABLET, EXTENDED RELEASE ORAL
Status: DISCONTINUED | OUTPATIENT
Start: 2024-05-21 | End: 2024-05-26 | Stop reason: HOSPADM

## 2024-05-21 RX ORDER — AMLODIPINE BESYLATE 10 MG/1
10 TABLET ORAL DAILY
Status: DISCONTINUED | OUTPATIENT
Start: 2024-05-22 | End: 2024-05-26 | Stop reason: HOSPADM

## 2024-05-21 RX ORDER — ASCORBIC ACID 500 MG
500 TABLET ORAL DAILY
Status: DISCONTINUED | OUTPATIENT
Start: 2024-05-22 | End: 2024-05-26 | Stop reason: HOSPADM

## 2024-05-21 RX ORDER — ONDANSETRON 2 MG/ML
4 INJECTION INTRAMUSCULAR; INTRAVENOUS EVERY 6 HOURS PRN
Status: DISCONTINUED | OUTPATIENT
Start: 2024-05-21 | End: 2024-05-26 | Stop reason: HOSPADM

## 2024-05-21 RX ORDER — POTASSIUM CHLORIDE 750 MG/1
20 TABLET, FILM COATED, EXTENDED RELEASE ORAL DAILY
Status: DISCONTINUED | OUTPATIENT
Start: 2024-05-22 | End: 2024-05-26 | Stop reason: HOSPADM

## 2024-05-21 RX ORDER — SODIUM CHLORIDE 0.9 % (FLUSH) 0.9 %
5-40 SYRINGE (ML) INJECTION PRN
Status: DISCONTINUED | OUTPATIENT
Start: 2024-05-21 | End: 2024-05-26 | Stop reason: HOSPADM

## 2024-05-21 RX ORDER — FUROSEMIDE 20 MG/1
20 TABLET ORAL 2 TIMES DAILY
Status: DISCONTINUED | OUTPATIENT
Start: 2024-05-21 | End: 2024-05-26 | Stop reason: HOSPADM

## 2024-05-21 RX ORDER — ENOXAPARIN SODIUM 100 MG/ML
30 INJECTION SUBCUTANEOUS 2 TIMES DAILY
Status: DISCONTINUED | OUTPATIENT
Start: 2024-05-21 | End: 2024-05-26 | Stop reason: HOSPADM

## 2024-05-21 RX ORDER — ACETAMINOPHEN 650 MG/1
650 SUPPOSITORY RECTAL EVERY 6 HOURS PRN
Status: DISCONTINUED | OUTPATIENT
Start: 2024-05-21 | End: 2024-05-26 | Stop reason: HOSPADM

## 2024-05-21 RX ORDER — MULTIVITAMIN WITH IRON
1 TABLET ORAL DAILY
Status: DISCONTINUED | OUTPATIENT
Start: 2024-05-22 | End: 2024-05-26 | Stop reason: HOSPADM

## 2024-05-21 RX ORDER — ONDANSETRON 4 MG/1
4 TABLET, ORALLY DISINTEGRATING ORAL EVERY 8 HOURS PRN
Status: DISCONTINUED | OUTPATIENT
Start: 2024-05-21 | End: 2024-05-26 | Stop reason: HOSPADM

## 2024-05-21 RX ORDER — SODIUM CHLORIDE 9 MG/ML
INJECTION, SOLUTION INTRAVENOUS PRN
Status: DISCONTINUED | OUTPATIENT
Start: 2024-05-21 | End: 2024-05-26 | Stop reason: HOSPADM

## 2024-05-21 RX ORDER — SODIUM CHLORIDE 9 MG/ML
INJECTION, SOLUTION INTRAVENOUS CONTINUOUS
Status: DISCONTINUED | OUTPATIENT
Start: 2024-05-21 | End: 2024-05-26 | Stop reason: HOSPADM

## 2024-05-21 RX ORDER — VITAMIN B COMPLEX
3000 TABLET ORAL DAILY
Status: DISCONTINUED | OUTPATIENT
Start: 2024-05-22 | End: 2024-05-26 | Stop reason: HOSPADM

## 2024-05-21 RX ORDER — ATORVASTATIN CALCIUM 20 MG/1
20 TABLET, FILM COATED ORAL DAILY
Status: DISCONTINUED | OUTPATIENT
Start: 2024-05-22 | End: 2024-05-26 | Stop reason: HOSPADM

## 2024-05-21 RX ADMIN — FUROSEMIDE 20 MG: 20 TABLET ORAL at 19:48

## 2024-05-21 RX ADMIN — OXYCODONE HYDROCHLORIDE AND ACETAMINOPHEN 1 TABLET: 5; 325 TABLET ORAL at 23:39

## 2024-05-21 RX ADMIN — PIPERACILLIN AND TAZOBACTAM 4500 MG: 4; .5 INJECTION, POWDER, LYOPHILIZED, FOR SOLUTION INTRAVENOUS at 23:40

## 2024-05-21 RX ADMIN — SILDENAFIL 20 MG: 20 TABLET ORAL at 16:52

## 2024-05-21 RX ADMIN — OXYCODONE HYDROCHLORIDE AND ACETAMINOPHEN 1 TABLET: 5; 325 TABLET ORAL at 16:33

## 2024-05-21 RX ADMIN — SILDENAFIL 20 MG: 20 TABLET ORAL at 19:48

## 2024-05-21 RX ADMIN — SODIUM CHLORIDE, PRESERVATIVE FREE 10 ML: 5 INJECTION INTRAVENOUS at 19:48

## 2024-05-21 RX ADMIN — ENOXAPARIN SODIUM 30 MG: 100 INJECTION SUBCUTANEOUS at 19:50

## 2024-05-21 RX ADMIN — PENTOXIFYLLINE 400 MG: 400 TABLET, EXTENDED RELEASE ORAL at 16:34

## 2024-05-21 RX ADMIN — SODIUM CHLORIDE: 9 INJECTION, SOLUTION INTRAVENOUS at 16:50

## 2024-05-21 RX ADMIN — PIPERACILLIN AND TAZOBACTAM 4500 MG: 4; .5 INJECTION, POWDER, LYOPHILIZED, FOR SOLUTION INTRAVENOUS at 16:49

## 2024-05-21 ASSESSMENT — PAIN DESCRIPTION - ORIENTATION
ORIENTATION: RIGHT

## 2024-05-21 ASSESSMENT — PAIN - FUNCTIONAL ASSESSMENT
PAIN_FUNCTIONAL_ASSESSMENT: ACTIVITIES ARE NOT PREVENTED

## 2024-05-21 ASSESSMENT — PAIN DESCRIPTION - FREQUENCY: FREQUENCY: INTERMITTENT

## 2024-05-21 ASSESSMENT — PAIN SCALES - GENERAL
PAINLEVEL_OUTOF10: 0
PAINLEVEL_OUTOF10: 7
PAINLEVEL_OUTOF10: 7

## 2024-05-21 ASSESSMENT — LIFESTYLE VARIABLES
HOW OFTEN DO YOU HAVE A DRINK CONTAINING ALCOHOL: 4 OR MORE TIMES A WEEK
HOW MANY STANDARD DRINKS CONTAINING ALCOHOL DO YOU HAVE ON A TYPICAL DAY: 7 TO 9

## 2024-05-21 ASSESSMENT — PAIN DESCRIPTION - ONSET: ONSET: GRADUAL

## 2024-05-21 ASSESSMENT — PAIN DESCRIPTION - LOCATION
LOCATION: LEG

## 2024-05-21 ASSESSMENT — PAIN DESCRIPTION - PAIN TYPE: TYPE: ACUTE PAIN;SURGICAL PAIN

## 2024-05-21 ASSESSMENT — PAIN SCALES - WONG BAKER: WONGBAKER_NUMERICALRESPONSE: NO HURT

## 2024-05-21 ASSESSMENT — PAIN DESCRIPTION - DESCRIPTORS
DESCRIPTORS: ACHING;DISCOMFORT
DESCRIPTORS: DISCOMFORT
DESCRIPTORS: ACHING;DISCOMFORT

## 2024-05-21 NOTE — PLAN OF CARE
Problem: Discharge Planning  Goal: Discharge to home or other facility with appropriate resources  Outcome: Progressing  Flowsheets (Taken 5/21/2024 6106 by Ursula Guaman RN)  Discharge to home or other facility with appropriate resources:   Identify barriers to discharge with patient and caregiver   Identify discharge learning needs (meds, wound care, etc)   Refer to discharge planning if patient needs post-hospital services based on physician order or complex needs related to functional status, cognitive ability or social support system     Problem: Safety - Adult  Goal: Free from fall injury  Outcome: Progressing     Problem: ABCDS Injury Assessment  Goal: Absence of physical injury  Outcome: Progressing

## 2024-05-21 NOTE — PLAN OF CARE
Problem: Wound:  Goal: Will show signs of wound healing; wound closure and no evidence of infection  Description: Will show signs of wound healing; wound closure and no evidence of infection  Outcome: Progressing     Patient presents to wound clinic for right leg wound. See AVS for discharge instructions. Admit to hospital.Follow up- 1 week on May 28th at 10:15 am     Care plan reviewed with patient and wife.  Patient and wife verbalize understanding of the plan of care and contribute to goal setting.

## 2024-05-21 NOTE — H&P
[facility]       Admit Note             Procedure Note      Damien Aguilar  MEDICAL RECORD NUMBER:  [id number]  AGE: 63 y.o.   GENDER: male  : 1960  EPISODE DATE:  2024    Subjective:     Koby is a 63-year-old male who last Thursday had a excision of wound and debridement with a medial Jorge L soleus muscle flap presents to the wound clinic today with a significant blood clot to the muscle flap with flap death and infection.     HISTORY of PRESENT ILLNESS HPI     Damien Aguilar is a 63 y.o. male Established patient referred by Rivas, who is being admitted today for significant clotting to the muscle flap with flap necrosis at the distal tip and malodor with abscess to the muscle flap needing to be admitted for close evaluation and management IV antibiotics and negative wound pressure therapy.    Wound history: Patient has had a chronic wound to his leg secondary to trauma many years ago and struggles with chronic venous hypertension with an open wound on the shaft of the tibia distal tibia for approximately 4 years.    Interval History:   Patient failed conservative care and the decision was made to do a surgical consult as I saw the patient we recommended a muscle flap for closure.  Intraoperatively the patient had a significant amount of chronic venous hypertension fibrosis to the skin and the deep tissue there was also large torturous deep to superficial venous structures in the leg itself.  The patient's flap surgery went very well there was good bleeding and good Doppler augmentation of the flap at the time of surgery.  The patient had negative wound pressure therapy placed this was an off brand that did not seem to work quite as well however it was placed it had to be replaced 1 day later when the patient came into the emergency department with full of blood.  When it was replaced it seemed to work okay however there was a lot of bleeding and blood in the canister as well as the patient  continues to ooze and he is being seen today but has no pain or tenderness the patient has redness cellulitis and does have a lot of drainage and malodor.        PAST MEDICAL HISTORY        Diagnosis Date    Morbid obesity (HCC)     Osteoarthritis     Snoring     possible apnea - per wife, better since lost 20# recently.  BMI 41.97, neck circ 17.5 cm, no daytime somnolence, no am headaches.       PAST SURGICAL HISTORY    Past Surgical History:   Procedure Laterality Date    HERNIA REPAIR  1982    left inguinal    JOINT REPLACEMENT Left 05/06/2020    left hip    OTHER SURGICAL HISTORY      right leg vein stripping    PRESSURE ULCER DEBRIDEMENT Right 5/16/2024    Right Leg Excision and Debridement of Wound, Right Medial Soleus Muscle Flap Right Skin Substitute Graft, Application of Wound Vac performed by Ja Rivas DPM at [facility]    TONSILLECTOMY AND ADENOIDECTOMY      WISDOM TOOTH EXTRACTION  1974       FAMILY HISTORY    Family History   Problem Relation Age of Onset    Cancer Mother     Hypertension Father        SOCIAL HISTORY    Social History     Tobacco Use    Smoking status: Never     Passive exposure: Past    Smokeless tobacco: Never    Tobacco comments:     Never smoker    Vaping Use    Vaping Use: Never used   Substance Use Topics    Alcohol use: Yes     Alcohol/week: 24.0 - 30.0 standard drinks of alcohol     Types: 24 - 30 Cans of beer per week     Comment: Beer occasionally    Drug use: Never       ALLERGIES    Allergies   Allergen Reactions    Ace Inhibitors      cough    Olmesartan      Cough         MEDICATIONS    No current facility-administered medications on file prior to encounter.     Current Outpatient Medications on File Prior to Encounter   Medication Sig Dispense Refill    VITAMIN D, CHOLECALCIFEROL, PO Take 3 capsules by mouth daily 61764 IU units (takes 3 capsules)      vitamin C (ASCORBIC ACID) 500 MG tablet Take 1 tablet by mouth daily      oxyCODONE-acetaminophen (PERCOCET) 5-325 MG

## 2024-05-21 NOTE — PROGRESS NOTES
Pharmacy Note - Extended Infusion Beta-Lactam Adjustment    Piperacillin/Tazobactam 3375mg Q8h for treatment of Skin and soft tissue infection. Per [facility] Extended Infusion Beta-Lactam Policy, piperacillin/tazobactam will be changed to 4500mg loading dose followed by 4500mg Q8h extended infusion    Estimated Creatinine Clearance: Estimated Creatinine Clearance: 131 mL/min (based on SCr of 0.7 mg/dL).    BMI: Body mass index is 41.75 kg/m².    Please call with any questions.    Thank you,    Citlaly Hylton, [facility]

## 2024-05-21 NOTE — CONSULTS
5/16/2024    Right Leg Excision and Debridement of Wound, Right Medial Soleus Muscle Flap Right Skin Substitute Graft, Application of Wound Vac performed by Ja Rivas DPM at [facility] OR    TONSILLECTOMY AND ADENOIDECTOMY      WISDOM TOOTH EXTRACTION  1974       Home Medications:   No current facility-administered medications on file prior to encounter.     Current Outpatient Medications on File Prior to Encounter   Medication Sig Dispense Refill    VITAMIN D, CHOLECALCIFEROL, PO Take 3 capsules by mouth daily 27576 IU units (takes 3 capsules)      vitamin C (ASCORBIC ACID) 500 MG tablet Take 1 tablet by mouth daily      oxyCODONE-acetaminophen (PERCOCET) 5-325 MG per tablet Take 1 tablet by mouth every 6 hours as needed for Pain for up to 7 days. Intended supply: 7 days. Take lowest dose possible to manage pain Max Daily Amount: 4 tablets 28 tablet 0    atorvastatin (LIPITOR) 20 MG tablet Take 1 tablet by mouth daily 90 tablet 1    furosemide (LASIX) 20 MG tablet Take 1 tablet by mouth 2 times daily 180 tablet 1    potassium chloride (K-TAB) 20 MEQ TBCR extended release tablet Take 1 tablet by mouth daily 90 tablet 1    valsartan (DIOVAN) 160 MG tablet Take 1 tablet by mouth daily 90 tablet 1    amLODIPine (NORVASC) 10 MG tablet Take 1 tablet by mouth daily 90 tablet 1    metoprolol succinate (TOPROL XL) 50 MG extended release tablet Take 1 tablet by mouth daily 90 tablet 1    Misc. Devices (CPAP MACHINE) MISC by Does not apply route      Misc. Devices MISC 1 pair of above the knee compression stockings. Strength of 30 mmHg. (Patient not taking: Reported on 5/16/2024) 2 each 0    Multiple Vitamins-Minerals (MULTI COMPLETE PO) Take by mouth         Allergies:    Ace inhibitors and Olmesartan    Social History:    reports that he has never smoked. He has been exposed to tobacco smoke. He has never used smokeless tobacco. He reports current alcohol use of about 24.0 - 30.0 standard drinks of alcohol per week. He  reports that he does not use drugs.    Family History:       Problem Relation Age of Onset    Cancer Mother     Hypertension Father        Diet:  ADULT DIET; Regular; 3 carb choices (45 gm/meal); Low Fat/Low Chol/High Fiber/RADHA; Low Sodium (2 gm); 60 to 80 gm      PHYSICAL EXAM:  /63   Pulse 55   Temp 98.7 °F (37.1 °C) (Oral)   Resp 20   Ht 1.702 m (5' 7\")   Wt 120.9 kg (266 lb 8.6 oz)   SpO2 100%   BMI 41.75 kg/m²   General appearance:  No apparent distress, appears stated age and cooperative.  HEENT: Normal cephalic, atraumatic without obvious deformity. Pupils equal, round, and reactive to light.  Extra ocular muscles intact. Conjunctivae/corneas clear.  Neck: Supple, with full range of motion. No jugular venous distention. Trachea midline.  Respiratory:  Normal respiratory effort. Clear to auscultation, bilaterally without Rales/Wheezes/Rhonchi.  Cardiovascular: Regular rate and rhythm with normal S1/S2 without murmurs, rubs or gallops.  Abdomen: Soft, non-tender, non-distended with normal bowel sounds.  Musculoskeletal:  No clubbing, cyanosis or edema bilaterally.   Skin: Skin color, texture, turgor normal.  No rashes or lesions. RLE dressing CDI  Neurologic:  Neurovascularly intact without any focal sensory/motor deficits. Cranial nerves: II-XII intact, grossly non-focal.  Psychiatric: Alert and oriented, thought content appropriate, normal insight  Capillary Refill: Brisk,< 3 seconds   Peripheral Pulses: +2 palpable, equal bilaterally     Labs:   No results for input(s): \"WBC\", \"HGB\", \"HCT\", \"PLT\" in the last 72 hours.  No results for input(s): \"NA\", \"K\", \"CL\", \"CO2\", \"BUN\", \"CREATININE\", \"CALCIUM\", \"PHOS\" in the last 72 hours.    Invalid input(s): \"MAGNES\"  No results for input(s): \"AST\", \"ALT\", \"BILIDIR\", \"BILITOT\", \"ALKPHOS\" in the last 72 hours.  No results for input(s): \"INR\" in the last 72 hours.  No results for input(s): \"CKTOTAL\", \"TROPONINI\" in the last 72 hours.    Urinalysis:    No

## 2024-05-21 NOTE — PATIENT INSTRUCTIONS
Visit Discharge/Physician Orders:   -Admit to hospital. Report to main radiology.   -Culture taken today. Will follow results.   -Debridement done today.   - Irrisept used to wound today.   - Send current wound vac back.   - Keep legs elevated, as much as you can.  - take vitamin C (500-1000), D (7507-3404 units) and a multivitamin daily     Wound Location: Right lower leg x 1       Dressing orders:    Right leg- per inpatient orders from Dr Rvias   Wound packed with mesalt today in clinic. Covered with ABD kerlix and ace wrap. Wrapped with cast padding, splint applied and secured with ace wrap.     Follow up- 1 week on May 28th at 10:15 am   Keep next scheduled appointment. Please give 24 hour notice if unable to keep appointment. [phone]     If you experience any of the following, please call the Wound Care Service during business hours: Monday through Friday 8:00 am - 4:30 pm  ([phone]).              *Increase in pain              *Temperature over 101              *Increase in drainage from your wound or a foul odor              *Uncontrolled swelling              *Need for compression bandage changes due to slippage, breakthrough drainage     If you need medical attention outside of business hours, please contact your Primary Care Doctor or go to the nearest emergency room.

## 2024-05-22 LAB
ANION GAP SERPL CALC-SCNC: 11 MEQ/L (ref 8–16)
BASOPHILS ABSOLUTE: 0 THOU/MM3 (ref 0–0.1)
BASOPHILS NFR BLD AUTO: 0.4 %
BUN SERPL-MCNC: 12 MG/DL (ref 7–22)
CALCIUM SERPL-MCNC: 9.1 MG/DL (ref 8.5–10.5)
CHLORIDE SERPL-SCNC: 100 MEQ/L (ref 98–111)
CO2 SERPL-SCNC: 27 MEQ/L (ref 23–33)
CREAT SERPL-MCNC: 0.8 MG/DL (ref 0.4–1.2)
DEPRECATED RDW RBC AUTO: 48.2 FL (ref 35–45)
EOSINOPHIL NFR BLD AUTO: 3.1 %
EOSINOPHILS ABSOLUTE: 0.2 THOU/MM3 (ref 0–0.4)
ERYTHROCYTE [DISTWIDTH] IN BLOOD BY AUTOMATED COUNT: 13.4 % (ref 11.5–14.5)
GFR SERPL CREATININE-BSD FRML MDRD: > 90 ML/MIN/1.73M2
GLUCOSE SERPL-MCNC: 108 MG/DL (ref 70–108)
HCT VFR BLD AUTO: 35.6 % (ref 42–52)
HGB BLD-MCNC: 11.8 GM/DL (ref 14–18)
IMM GRANULOCYTES # BLD AUTO: 0.02 THOU/MM3 (ref 0–0.07)
IMM GRANULOCYTES NFR BLD AUTO: 0.3 %
INR PPP: 1.08 (ref 0.85–1.13)
LYMPHOCYTES ABSOLUTE: 0.5 THOU/MM3 (ref 1–4.8)
LYMPHOCYTES NFR BLD AUTO: 6.9 %
MCH RBC QN AUTO: 32.2 PG (ref 26–33)
MCHC RBC AUTO-ENTMCNC: 33.1 GM/DL (ref 32.2–35.5)
MCV RBC AUTO: 97.3 FL (ref 80–94)
MONOCYTES ABSOLUTE: 0.6 THOU/MM3 (ref 0.4–1.3)
MONOCYTES NFR BLD AUTO: 8 %
NEUTROPHILS ABSOLUTE: 5.7 THOU/MM3 (ref 1.8–7.7)
NEUTROPHILS NFR BLD AUTO: 81.3 %
NRBC BLD AUTO-RTO: 0 /100 WBC
PLATELET # BLD AUTO: 242 THOU/MM3 (ref 130–400)
PMV BLD AUTO: 10.1 FL (ref 9.4–12.4)
POTASSIUM SERPL-SCNC: 4.6 MEQ/L (ref 3.5–5.2)
RBC # BLD AUTO: 3.66 MILL/MM3 (ref 4.7–6.1)
SODIUM SERPL-SCNC: 138 MEQ/L (ref 135–145)
WBC # BLD AUTO: 7 THOU/MM3 (ref 4.8–10.8)

## 2024-05-22 PROCEDURE — 6360000002 HC RX W HCPCS: Performed by: PODIATRIST

## 2024-05-22 PROCEDURE — 6370000000 HC RX 637 (ALT 250 FOR IP): Performed by: PHYSICIAN ASSISTANT

## 2024-05-22 PROCEDURE — 6370000000 HC RX 637 (ALT 250 FOR IP)

## 2024-05-22 PROCEDURE — 36415 COLL VENOUS BLD VENIPUNCTURE: CPT

## 2024-05-22 PROCEDURE — 85610 PROTHROMBIN TIME: CPT

## 2024-05-22 PROCEDURE — 99232 SBSQ HOSP IP/OBS MODERATE 35: CPT | Performed by: PHYSICIAN ASSISTANT

## 2024-05-22 PROCEDURE — 85025 COMPLETE CBC W/AUTO DIFF WBC: CPT

## 2024-05-22 PROCEDURE — 2580000003 HC RX 258: Performed by: PODIATRIST

## 2024-05-22 PROCEDURE — 6370000000 HC RX 637 (ALT 250 FOR IP): Performed by: PODIATRIST

## 2024-05-22 PROCEDURE — 1200000000 HC SEMI PRIVATE

## 2024-05-22 PROCEDURE — 80048 BASIC METABOLIC PNL TOTAL CA: CPT

## 2024-05-22 PROCEDURE — 2580000003 HC RX 258

## 2024-05-22 PROCEDURE — 6360000002 HC RX W HCPCS

## 2024-05-22 RX ORDER — MORPHINE SULFATE 2 MG/ML
2 INJECTION, SOLUTION INTRAMUSCULAR; INTRAVENOUS ONCE
Status: COMPLETED | OUTPATIENT
Start: 2024-05-22 | End: 2024-05-22

## 2024-05-22 RX ORDER — MORPHINE SULFATE 2 MG/ML
2 INJECTION, SOLUTION INTRAMUSCULAR; INTRAVENOUS
Status: DISCONTINUED | OUTPATIENT
Start: 2024-05-22 | End: 2024-05-26 | Stop reason: HOSPADM

## 2024-05-22 RX ORDER — SODIUM HYPOCHLORITE 1.25 MG/ML
SOLUTION TOPICAL DAILY
Status: DISCONTINUED | OUTPATIENT
Start: 2024-05-22 | End: 2024-05-26 | Stop reason: HOSPADM

## 2024-05-22 RX ORDER — OXYCODONE HYDROCHLORIDE 5 MG/1
10 TABLET ORAL EVERY 4 HOURS PRN
Status: DISCONTINUED | OUTPATIENT
Start: 2024-05-22 | End: 2024-05-26 | Stop reason: HOSPADM

## 2024-05-22 RX ORDER — GABAPENTIN 300 MG/1
300 CAPSULE ORAL 3 TIMES DAILY
Status: DISCONTINUED | OUTPATIENT
Start: 2024-05-22 | End: 2024-05-26 | Stop reason: HOSPADM

## 2024-05-22 RX ORDER — GABAPENTIN 100 MG/1
100 CAPSULE ORAL 3 TIMES DAILY
Status: DISCONTINUED | OUTPATIENT
Start: 2024-05-22 | End: 2024-05-22

## 2024-05-22 RX ORDER — MORPHINE SULFATE 4 MG/ML
4 INJECTION, SOLUTION INTRAMUSCULAR; INTRAVENOUS
Status: DISCONTINUED | OUTPATIENT
Start: 2024-05-22 | End: 2024-05-26 | Stop reason: HOSPADM

## 2024-05-22 RX ORDER — OXYCODONE HYDROCHLORIDE 5 MG/1
5 TABLET ORAL EVERY 4 HOURS PRN
Status: DISCONTINUED | OUTPATIENT
Start: 2024-05-22 | End: 2024-05-26 | Stop reason: HOSPADM

## 2024-05-22 RX ADMIN — PIPERACILLIN AND TAZOBACTAM 4500 MG: 4; .5 INJECTION, POWDER, LYOPHILIZED, FOR SOLUTION INTRAVENOUS at 05:24

## 2024-05-22 RX ADMIN — METOPROLOL SUCCINATE 50 MG: 50 TABLET, EXTENDED RELEASE ORAL at 08:23

## 2024-05-22 RX ADMIN — ENOXAPARIN SODIUM 30 MG: 100 INJECTION SUBCUTANEOUS at 20:35

## 2024-05-22 RX ADMIN — MORPHINE SULFATE 2 MG: 2 INJECTION, SOLUTION INTRAMUSCULAR; INTRAVENOUS at 03:05

## 2024-05-22 RX ADMIN — OXYCODONE HYDROCHLORIDE 10 MG: 5 TABLET ORAL at 17:38

## 2024-05-22 RX ADMIN — OXYCODONE HYDROCHLORIDE AND ACETAMINOPHEN 1 TABLET: 5; 325 TABLET ORAL at 11:41

## 2024-05-22 RX ADMIN — PIPERACILLIN AND TAZOBACTAM 4500 MG: 4; .5 INJECTION, POWDER, LYOPHILIZED, FOR SOLUTION INTRAVENOUS at 22:50

## 2024-05-22 RX ADMIN — OXYCODONE HYDROCHLORIDE AND ACETAMINOPHEN 1 TABLET: 5; 325 TABLET ORAL at 05:22

## 2024-05-22 RX ADMIN — OXYCODONE HYDROCHLORIDE AND ACETAMINOPHEN 500 MG: 500 TABLET ORAL at 08:23

## 2024-05-22 RX ADMIN — ENOXAPARIN SODIUM 30 MG: 100 INJECTION SUBCUTANEOUS at 08:23

## 2024-05-22 RX ADMIN — SILDENAFIL 20 MG: 20 TABLET ORAL at 08:23

## 2024-05-22 RX ADMIN — PENTOXIFYLLINE 400 MG: 400 TABLET, EXTENDED RELEASE ORAL at 17:38

## 2024-05-22 RX ADMIN — GABAPENTIN 300 MG: 300 CAPSULE ORAL at 14:19

## 2024-05-22 RX ADMIN — GABAPENTIN 100 MG: 100 CAPSULE ORAL at 08:23

## 2024-05-22 RX ADMIN — GABAPENTIN 300 MG: 300 CAPSULE ORAL at 20:35

## 2024-05-22 RX ADMIN — FUROSEMIDE 20 MG: 20 TABLET ORAL at 08:23

## 2024-05-22 RX ADMIN — AMLODIPINE BESYLATE 10 MG: 10 TABLET ORAL at 08:23

## 2024-05-22 RX ADMIN — Medication 3000 UNITS: at 08:22

## 2024-05-22 RX ADMIN — PIPERACILLIN AND TAZOBACTAM 4500 MG: 4; .5 INJECTION, POWDER, LYOPHILIZED, FOR SOLUTION INTRAVENOUS at 14:24

## 2024-05-22 RX ADMIN — Medication 1 TABLET: at 08:23

## 2024-05-22 RX ADMIN — GABAPENTIN 100 MG: 100 CAPSULE ORAL at 03:05

## 2024-05-22 RX ADMIN — SILDENAFIL 20 MG: 20 TABLET ORAL at 14:20

## 2024-05-22 RX ADMIN — PENTOXIFYLLINE 400 MG: 400 TABLET, EXTENDED RELEASE ORAL at 08:23

## 2024-05-22 RX ADMIN — PENTOXIFYLLINE 400 MG: 400 TABLET, EXTENDED RELEASE ORAL at 11:41

## 2024-05-22 RX ADMIN — SILDENAFIL 20 MG: 20 TABLET ORAL at 20:35

## 2024-05-22 RX ADMIN — VANCOMYCIN HYDROCHLORIDE 2500 MG: 1 INJECTION, POWDER, LYOPHILIZED, FOR SOLUTION INTRAVENOUS at 17:11

## 2024-05-22 RX ADMIN — SODIUM CHLORIDE: 9 INJECTION, SOLUTION INTRAVENOUS at 12:33

## 2024-05-22 RX ADMIN — POTASSIUM CHLORIDE 20 MEQ: 750 TABLET, FILM COATED, EXTENDED RELEASE ORAL at 08:23

## 2024-05-22 RX ADMIN — ATORVASTATIN CALCIUM 20 MG: 20 TABLET, FILM COATED ORAL at 08:23

## 2024-05-22 RX ADMIN — DAKIN'S SOLUTION 0.125% (QUARTER STRENGTH): 0.12 SOLUTION at 14:39

## 2024-05-22 ASSESSMENT — PAIN DESCRIPTION - DESCRIPTORS
DESCRIPTORS: PINS AND NEEDLES
DESCRIPTORS: PINS AND NEEDLES;SORE
DESCRIPTORS: PINS AND NEEDLES
DESCRIPTORS: PINS AND NEEDLES;SHOOTING

## 2024-05-22 ASSESSMENT — PAIN SCALES - GENERAL
PAINLEVEL_OUTOF10: 5
PAINLEVEL_OUTOF10: 3
PAINLEVEL_OUTOF10: 8
PAINLEVEL_OUTOF10: 8
PAINLEVEL_OUTOF10: 4
PAINLEVEL_OUTOF10: 4
PAINLEVEL_OUTOF10: 8
PAINLEVEL_OUTOF10: 4
PAINLEVEL_OUTOF10: 5
PAINLEVEL_OUTOF10: 7
PAINLEVEL_OUTOF10: 7
PAINLEVEL_OUTOF10: 0

## 2024-05-22 ASSESSMENT — PAIN - FUNCTIONAL ASSESSMENT
PAIN_FUNCTIONAL_ASSESSMENT: ACTIVITIES ARE NOT PREVENTED

## 2024-05-22 ASSESSMENT — PAIN SCALES - WONG BAKER

## 2024-05-22 ASSESSMENT — PAIN DESCRIPTION - LOCATION
LOCATION: FOOT
LOCATION: LEG
LOCATION: FOOT
LOCATION: FOOT
LOCATION: LEG
LOCATION: FOOT

## 2024-05-22 ASSESSMENT — PAIN DESCRIPTION - FREQUENCY
FREQUENCY: INTERMITTENT
FREQUENCY: INTERMITTENT
FREQUENCY: CONTINUOUS

## 2024-05-22 ASSESSMENT — PAIN DESCRIPTION - PAIN TYPE
TYPE: ACUTE PAIN

## 2024-05-22 ASSESSMENT — PAIN DESCRIPTION - ONSET
ONSET: ON-GOING
ONSET: GRADUAL
ONSET: GRADUAL

## 2024-05-22 ASSESSMENT — PAIN DESCRIPTION - ORIENTATION
ORIENTATION: RIGHT

## 2024-05-22 NOTE — PROGRESS NOTES
[facility]   Pharmacy Pharmacokinetic Monitoring Service - Vancomycin     Damien Aguilar is a 63 y.o. male starting on vancomycin therapy for cellulitis. Pharmacy consulted by Dr. Gaona for monitoring and adjustment.    Target Concentration: Goal AUC/PRASHANTH 400-600 mg*hr/L    Additional Antimicrobials: Zosyn    Pertinent Laboratory Values:   Wt Readings from Last 1 Encounters:   05/21/24 120.9 kg (266 lb 8.6 oz)     Temp Readings from Last 1 Encounters:   05/22/24 98.1 °F (36.7 °C) (Oral)     Estimated Creatinine Clearance: 115 mL/min (based on SCr of 0.8 mg/dL).  Recent Labs     05/22/24  0524   CREATININE 0.8   BUN 12   WBC 7.0     Pertinent Cultures:  Date Source Results   5/21/24 Right leg swab cx sent   MRSA Nasal Swab: N/A. Non-respiratory infection.    Plan:  Dosing recommendations based on Bayesian software  Start vancomycin 2500 mg x 1, then 1000 mg q12h  Anticipated AUC of 468 and trough concentration of 13.4 at steady state  Renal labs as indicated   Vancomycin concentration not ordered at this time   Pharmacy will continue to monitor patient and adjust therapy as indicated    Thank you for the consult,  Carlos A Swan, PharmD  5/22/2024 1:44 PM

## 2024-05-22 NOTE — PROGRESS NOTES
Hospitalist Progress Note    Patient:  Damien Aguilar    YOB: 1960  Unit/Bed:8B-38/038-A  Date of Admission: 5/21/2024  Code Status: Full Code      Assessment/Plan:    Chronic, nonhealing right lower extremity wound: Management per primary, podiatry.     Essential hypertension: Home amlodipine, metoprolol, valsartan, Lasix resumed.  Monitor BP  - BP stable.  Parameters added to medications.     HLD: Statin     Vitamin D deficiency: On replacement     Morbid obesity: BMI 41.75      Chief Complaint: Nonhealing right lower extremity wound  Reason for consult: hypertension     HPI / Hospital Course: Damien Aguilar 63 y.o. male who we are asked to see/evaluate by Ja Rivas DPM for medical management of hypertension.  Patient reports history of hypertension, states it is well-controlled.  He denies history of CAD or heart failure.  Follows with Dr. Santos.  Denies history of diabetes.  Patient admitted by podiatry from the wound clinic.  On 5/16 he had excision of wound and debridement with a medical Jorge L soleus muscle flap, presented to wound clinic today with significant blood clot to the muscle flap with flap necrosis and infection.  Patient was admitted to podiatry for IV antibiotics and wound care.  Hospitalist will follow.      Subjective (past 24 hours): Pt doing well. Reports some discomfort in LE. Denies f/c, sob, cp, abd pain. BP stable.        Diet: ADULT DIET; Regular; 3 carb choices (45 gm/meal); Low Fat/Low Chol/High Fiber/RADHA; Low Sodium (2 gm); 60 to 80 gm  ROS: Pertinent positives as noted in HPI. All other systems reviewed and negative.    Past medical history, family history, social history and allergies reviewed again and is unchanged since admission.    Exam:  BP (!) 93/56   Pulse 54   Temp 97.3 °F (36.3 °C) (Oral)   Resp 18   Ht 1.702 m (5' 7\")   Wt 120.9 kg (266 lb 8.6 oz)   SpO2 94%   BMI 41.75 kg/m²   General:   Pleasant male. NAD.   HEENT:  normocephalic and  atraumatic.  No scleral icterus. PERR.  Neck: supple.  No JVD. No thyromegaly.  Lungs: clear to auscultation.  No retractions  Cardiac: RRR without murmur.  Abdomen: soft.  Nontender.  Bowel sounds positive.  Extremities:  No clubbing, cyanosis, or edema x 4.    Vasculature: capillary refill < 3 seconds. Palpable LE pulses bilaterally.  Skin:  warm and dry.  Psych:  Alert and oriented x3.  Affect appropriate  Lymph:  No supraclavicular adenopathy.  Neurologic:  No focal deficit.  No seizures.      Data: (All radiographs, tracings, PFTs, and imaging are personally viewed and interpreted unless otherwise noted)  Labs:   Recent Labs     05/22/24 0524   WBC 7.0   HGB 11.8*   HCT 35.6*        Recent Labs     05/22/24 0524      K 4.6      CO2 27   BUN 12   CREATININE 0.8   CALCIUM 9.1     No results for input(s): \"AST\", \"ALT\", \"BILIDIR\", \"BILITOT\", \"ALKPHOS\" in the last 72 hours.  Recent Labs     05/22/24 0524   INR 1.08     No results for input(s): \"CKTOTAL\", \"TROPONINI\" in the last 72 hours.  Urinalysis:   No results found for: \"NITRU\", \"WBCUA\", \"BACTERIA\", \"RBCUA\", \"BLOODU\", \"SPECGRAV\", \"GLUCOSEU\"  Urine culture: No results found for: \"LABURIN\"  Micro:   Blood culture #1: No results found for: \"BC\"  Blood culture #2:No results found for: \"BLOODCULT2\"  Organism:  Lab Results   Component Value Date/Time    ORG Staphylococcus aureus 07/10/2023 01:12 PM         Lab Results   Component Value Date/Time    LABGRAM  05/21/2024 01:27 PM     Rare segmented neutrophils observed. Rare epithelial cells observed. Many gram positive cocci occurring singly and in pairs. Many gram negative bacilli.     MRSA culture only:No results found for: \"MRSAC\"  Respiratory culture: No results found for: \"CULTRESP\"  Aerobic and Anaerobic :  Lab Results   Component Value Date/Time    LABAERO  07/10/2023 01:12 PM     Culture yielded moderate growth of Staphylococcus (coagulase negative) species.    LABAERO  07/10/2023 01:12 PM      heavy growth Moxifloxacin, regardless of in vitro sensitivity, should not be used for staphylococcal infections.     No results found for: \"LABANAE\"    Radiology Reports:  No orders to display     No results found.    Electronically signed by Rosey Ambrose PA-C on 5/22/2024 at 3:21 PM

## 2024-05-22 NOTE — PROGRESS NOTES
Podiatric Progress Note  Damien Aguilar  Subjective :     5/22  Patient seen bedside today on behalf of Dr. Rivas. Patient appeared pleasant, was oriented to person, place and time and in no acute distress. The patient relates that he is having pain to the right leg and foot since about 2pm yesterday, after debridement of the wound in [facility]. He is curious if the wound vac will be placed back on the wound. Patient denies any N/V/F/C/SOB or CP. Patient has no further pedal concerns at this point in time.       5/21 [facility]    Koby is a 63-year-old male who last Thursday had a excision of wound and debridement with a medial Jorge L soleus muscle flap presents to the wound clinic today with a significant blood clot to the muscle flap with flap death and infection.     Damien Aguilar is a 63 y.o. male Established patient referred by Rob, who is being admitted today for significant clotting to the muscle flap with flap necrosis at the distal tip and malodor with abscess to the muscle flap needing to be admitted for close evaluation and management IV antibiotics and negative wound pressure therapy.        Current Medications:    Current Facility-Administered Medications: gabapentin (NEURONTIN) capsule 100 mg, 100 mg, Oral, TID  sodium hypochlorite (DAKINS) 0.125 % external solution, , Irrigation, Daily  0.9 % sodium chloride infusion, , IntraVENous, Continuous  sodium chloride flush 0.9 % injection 5-40 mL, 5-40 mL, IntraVENous, 2 times per day  sodium chloride flush 0.9 % injection 5-40 mL, 5-40 mL, IntraVENous, PRN  0.9 % sodium chloride infusion, , IntraVENous, PRN  enoxaparin Sodium (LOVENOX) injection 30 mg, 30 mg, SubCUTAneous, BID  ondansetron (ZOFRAN-ODT) disintegrating tablet 4 mg, 4 mg, Oral, Q8H PRN **OR** ondansetron (ZOFRAN) injection 4 mg, 4 mg, IntraVENous, Q6H PRN  acetaminophen (TYLENOL) tablet 650 mg, 650 mg, Oral, Q6H PRN **OR** acetaminophen (TYLENOL) suppository 650 mg, 650 mg, Rectal, Q6H

## 2024-05-22 NOTE — PROGRESS NOTES
Pt was in bed and alone at the time of th visit. He was dealing with cellulitis and abscess of right leg. He was encouraged   05/22/24 1708   Encounter Summary   Encounter Overview/Reason Initial Encounter   Service Provided For Patient   Referral/Consult From [facility]   Support System Family members   Last Encounter  05/22/24  (Anointed.)   Complexity of Encounter Low   Begin Time 1545   End Time  1552   Total Time Calculated 7 min   Spiritual/Emotional needs   Type Spiritual Support   Rituals, Rites and Sacraments   Type Anointing   Assessment/Intervention/Outcome   Assessment Calm   Intervention Active listening;Empowerment   Outcome Encouraged      and anointed.

## 2024-05-22 NOTE — CARE COORDINATION
Case Management Assessment Initial Evaluation    Date/Time of Evaluation: 5/22/2024 9:54 AM  Assessment Completed by: Yue Hensley RN    If patient is discharged prior to next notation, then this note serves as note for discharge by case management.    Patient Name: Damien Aguilar                   YOB: 1960  Diagnosis: Cellulitis of right leg [L03.115]  Wound of right leg, subsequent encounter [S81.801D]                   Date / Time: 5/21/2024  2:42 PM  Location: [address] [place] [address]     Patient Admission Status: Inpatient   Readmission Risk Low 0-14, Mod 15-19), High > 20: Readmission Risk Score: 10.4    Current PCP: Chad Mishra, APRN - CNP    Additional Case Management Notes: Admitted from Wound Clinic. Excision/Debridement of wound and muscle flap last week. In clinic wound found with blood clot, flap death and infection.     Procedures: No.    Imaging: No    Patient Goals/Plan/Treatment Preferences: Met with pt. Very pleasant and cooperative. He resides with spouse and a son. He is typically independent. He has a PCP and is insured. He uses a CPAP. Advised pt that family member should bring this in for him to use here.        05/22/24 6814   Service Assessment   Patient Orientation Alert and Oriented   Cognition Alert   History Provided By Patient   Primary Caregiver Self   Support Systems Spouse/Significant Other;Children;Friends/Neighbors   Patient's Healthcare Decision Maker is: Legal Next of Kin   PCP Verified by CM Yes   Last Visit to PCP Within last 3 months   Prior Functional Level Independent in ADLs/IADLs   Current Functional Level Independent in ADLs/IADLs   Can patient return to prior living arrangement Yes   Ability to make needs known: Good   Family able to assist with home care needs: Yes   Would you like for me to discuss the discharge plan with any other family members/significant others, and if so, who? No   Financial Resources Medicare;Other (Comment)  (and BCBS)   Community

## 2024-05-22 NOTE — PROGRESS NOTES
[facility]  PHYSICAL THERAPY MISSED TREATMENT NOTE  [facility] MED SURG 8AB    Date: 2024  Patient Name: Damien Aguilar        MRN: [id number]   : 1960  (63 y.o.)  Gender: male                REASON FOR MISSED TREATMENT:  Hold treatment per nursing request.      Communication with RN, Miroslava, to see pt this date. Pt has active bedrest order due to decreased BP. Therapy held. PT to reattempt at a later date/time.

## 2024-05-23 ENCOUNTER — TELEPHONE (OUTPATIENT)
Dept: WOUND CARE | Age: 64
End: 2024-05-23

## 2024-05-23 PROBLEM — E66.01 MORBID OBESITY (HCC): Status: ACTIVE | Noted: 2024-05-23

## 2024-05-23 LAB — MRSA DNA SPEC QL NAA+PROBE: NEGATIVE

## 2024-05-23 PROCEDURE — 6370000000 HC RX 637 (ALT 250 FOR IP): Performed by: PHYSICIAN ASSISTANT

## 2024-05-23 PROCEDURE — 6360000002 HC RX W HCPCS: Performed by: PODIATRIST

## 2024-05-23 PROCEDURE — 2580000003 HC RX 258: Performed by: PODIATRIST

## 2024-05-23 PROCEDURE — 6370000000 HC RX 637 (ALT 250 FOR IP)

## 2024-05-23 PROCEDURE — 1200000000 HC SEMI PRIVATE

## 2024-05-23 PROCEDURE — 0JBN0ZZ EXCISION OF RIGHT LOWER LEG SUBCUTANEOUS TISSUE AND FASCIA, OPEN APPROACH: ICD-10-PCS | Performed by: PODIATRIST

## 2024-05-23 PROCEDURE — 6370000000 HC RX 637 (ALT 250 FOR IP): Performed by: PODIATRIST

## 2024-05-23 PROCEDURE — 6360000002 HC RX W HCPCS

## 2024-05-23 PROCEDURE — 97605 NEG PRS WND THER DME<=50SQCM: CPT

## 2024-05-23 PROCEDURE — 2580000003 HC RX 258

## 2024-05-23 PROCEDURE — 99232 SBSQ HOSP IP/OBS MODERATE 35: CPT | Performed by: INTERNAL MEDICINE

## 2024-05-23 PROCEDURE — 87641 MR-STAPH DNA AMP PROBE: CPT

## 2024-05-23 RX ADMIN — POTASSIUM CHLORIDE 20 MEQ: 750 TABLET, FILM COATED, EXTENDED RELEASE ORAL at 09:27

## 2024-05-23 RX ADMIN — ENOXAPARIN SODIUM 30 MG: 100 INJECTION SUBCUTANEOUS at 09:27

## 2024-05-23 RX ADMIN — ENOXAPARIN SODIUM 30 MG: 100 INJECTION SUBCUTANEOUS at 20:53

## 2024-05-23 RX ADMIN — AMLODIPINE BESYLATE 10 MG: 10 TABLET ORAL at 09:27

## 2024-05-23 RX ADMIN — FUROSEMIDE 20 MG: 20 TABLET ORAL at 20:42

## 2024-05-23 RX ADMIN — METOPROLOL SUCCINATE 50 MG: 50 TABLET, EXTENDED RELEASE ORAL at 09:27

## 2024-05-23 RX ADMIN — OXYCODONE HYDROCHLORIDE 5 MG: 5 TABLET ORAL at 09:28

## 2024-05-23 RX ADMIN — DAKIN'S SOLUTION 0.125% (QUARTER STRENGTH): 0.12 SOLUTION at 09:35

## 2024-05-23 RX ADMIN — ATORVASTATIN CALCIUM 20 MG: 20 TABLET, FILM COATED ORAL at 09:27

## 2024-05-23 RX ADMIN — PIPERACILLIN AND TAZOBACTAM 4500 MG: 4; .5 INJECTION, POWDER, LYOPHILIZED, FOR SOLUTION INTRAVENOUS at 05:20

## 2024-05-23 RX ADMIN — PENTOXIFYLLINE 400 MG: 400 TABLET, EXTENDED RELEASE ORAL at 09:27

## 2024-05-23 RX ADMIN — PIPERACILLIN AND TAZOBACTAM 4500 MG: 4; .5 INJECTION, POWDER, LYOPHILIZED, FOR SOLUTION INTRAVENOUS at 13:27

## 2024-05-23 RX ADMIN — OXYCODONE HYDROCHLORIDE AND ACETAMINOPHEN 500 MG: 500 TABLET ORAL at 09:27

## 2024-05-23 RX ADMIN — PENTOXIFYLLINE 400 MG: 400 TABLET, EXTENDED RELEASE ORAL at 12:07

## 2024-05-23 RX ADMIN — Medication 1 TABLET: at 09:27

## 2024-05-23 RX ADMIN — VANCOMYCIN HYDROCHLORIDE 1000 MG: 1 INJECTION, POWDER, LYOPHILIZED, FOR SOLUTION INTRAVENOUS at 04:14

## 2024-05-23 RX ADMIN — GABAPENTIN 300 MG: 300 CAPSULE ORAL at 20:42

## 2024-05-23 RX ADMIN — VANCOMYCIN HYDROCHLORIDE 1000 MG: 1 INJECTION, POWDER, LYOPHILIZED, FOR SOLUTION INTRAVENOUS at 16:53

## 2024-05-23 RX ADMIN — Medication 3000 UNITS: at 09:27

## 2024-05-23 RX ADMIN — GABAPENTIN 300 MG: 300 CAPSULE ORAL at 13:21

## 2024-05-23 RX ADMIN — PIPERACILLIN AND TAZOBACTAM 4500 MG: 4; .5 INJECTION, POWDER, LYOPHILIZED, FOR SOLUTION INTRAVENOUS at 23:18

## 2024-05-23 RX ADMIN — GABAPENTIN 300 MG: 300 CAPSULE ORAL at 09:27

## 2024-05-23 RX ADMIN — SODIUM CHLORIDE: 9 INJECTION, SOLUTION INTRAVENOUS at 09:26

## 2024-05-23 RX ADMIN — OXYCODONE HYDROCHLORIDE 5 MG: 5 TABLET ORAL at 20:49

## 2024-05-23 RX ADMIN — PENTOXIFYLLINE 400 MG: 400 TABLET, EXTENDED RELEASE ORAL at 16:54

## 2024-05-23 RX ADMIN — FUROSEMIDE 20 MG: 20 TABLET ORAL at 09:27

## 2024-05-23 RX ADMIN — SILDENAFIL 20 MG: 20 TABLET ORAL at 20:42

## 2024-05-23 RX ADMIN — SILDENAFIL 20 MG: 20 TABLET ORAL at 13:22

## 2024-05-23 RX ADMIN — SILDENAFIL 20 MG: 20 TABLET ORAL at 09:27

## 2024-05-23 ASSESSMENT — PAIN DESCRIPTION - DESCRIPTORS
DESCRIPTORS: DULL;ACHING
DESCRIPTORS: DULL

## 2024-05-23 ASSESSMENT — PAIN DESCRIPTION - LOCATION
LOCATION: LEG
LOCATION: LEG

## 2024-05-23 ASSESSMENT — PAIN DESCRIPTION - ONSET: ONSET: ON-GOING

## 2024-05-23 ASSESSMENT — PAIN - FUNCTIONAL ASSESSMENT
PAIN_FUNCTIONAL_ASSESSMENT: ACTIVITIES ARE NOT PREVENTED
PAIN_FUNCTIONAL_ASSESSMENT: ACTIVITIES ARE NOT PREVENTED

## 2024-05-23 ASSESSMENT — PAIN SCALES - GENERAL
PAINLEVEL_OUTOF10: 0
PAINLEVEL_OUTOF10: 3
PAINLEVEL_OUTOF10: 4
PAINLEVEL_OUTOF10: 0

## 2024-05-23 ASSESSMENT — PAIN DESCRIPTION - ORIENTATION
ORIENTATION: RIGHT
ORIENTATION: RIGHT

## 2024-05-23 ASSESSMENT — PAIN DESCRIPTION - FREQUENCY: FREQUENCY: CONTINUOUS

## 2024-05-23 ASSESSMENT — PAIN DESCRIPTION - PAIN TYPE: TYPE: ACUTE PAIN

## 2024-05-23 NOTE — PROGRESS NOTES
[facility] Contin[facility] Nurse  Progress Note       NAME:  Damien Aguilar  MEDICAL RECORD NUMBER:  [id number]  AGE: 63 y.o.   GENDER: male  : 1960  TODAY'S DATE:  2024    Subjective   Reason for WOCN Evaluation and Assessment: LLE wound vac application      Damien Aguilar is a 63 y.o. male referred by:   [x] Physician/ PA/ APRN-CNP  [] Nursing  [] Other:     Wound Identification:  Wound Type: non-healing surgical  Wound Location: LLE  Modifying factors: venous stasis    Patient Goal of Care:  [x] Wound Healing  [x] Drainage Management   [] Odor Control  [] Palliative Care  [] Pain Control         Objective     Lino Risk Score: Lino Scale Score: 20    Assessment     Encounter: Wound ostomy present to room for application of wound vac therapy to LLE per order from Dr. Rivas. Patient in bed upon arrival.  Removed old wound vac dressing. Wound photo and assessment to follow. Cleansed wound with normal saline and gauze. Applied skin prep to johnny wound. Applied stoma wafer to johnny wound to protect good skin. Applied powdered hydrolysate collagen Cellerate Rx to base of wound and wound edges as instructed per Dr. Rivas. Covered wound bed with cuticerin. Applied cuticerin over incision. Applied snaked black foam x 1 piece to wound bed and another piece over incision followed by clear drape. Cut quarter size hole in center of drape over sponge area and apply vac suction. Wound vac settings at 125 mmHg continuous suction. Staff to apply extra clear drape as needed if leaks noted and to change canister as needed when full. Wound ostomy to change wound vac 3 times weekly. Will continue to follow patient. Bed in low, call light in reach.    Photo:     Wound type: Left lower medial leg surgical wound  Wound size: 7cm x 4.2cm x 2.2cm  Undermining or Tunneling: none  Wound assessment/color: red, yellow  Drainage amount: moderate  Drainage description: serosanguinous  Odor: none  Margins: attached  Johnny

## 2024-05-23 NOTE — PROGRESS NOTES
Podiatric Progress Note  Damien Aguilar  Subjective :   5/23  Patient seen bedside today alongside of Dr. Rivas. Patient appeared pleasant, was oriented to person, place and time and in no acute distress. He states the pain to his heel and leg has resolved after loosening the outer ACE wrap. He is curious if the wound vac will be placed back on the wound. Patient denies any N/V/F/C/SOB or CP. Patient has no further pedal concerns at this point in time.    5/22  Patient seen bedside today on behalf of Dr. Rivas. Patient appeared pleasant, was oriented to person, place and time and in no acute distress. The patient relates that he is having pain to the right leg and foot since about 2pm yesterday, after debridement of the wound in [facility]. He is curious if the wound vac will be placed back on the wound. Patient denies any N/V/F/C/SOB or CP. Patient has no further pedal concerns at this point in time.       5/21 MANISH Whalen is a 63-year-old male who last Thursday had a excision of wound and debridement with a medial Jorge L soleus muscle flap presents to the wound clinic today with a significant blood clot to the muscle flap with flap death and infection.     Damien Aguilar is a 63 y.o. male Established patient referred by Rob, who is being admitted today for significant clotting to the muscle flap with flap necrosis at the distal tip and malodor with abscess to the muscle flap needing to be admitted for close evaluation and management IV antibiotics and negative wound pressure therapy.        Current Medications:    Current Facility-Administered Medications: sodium hypochlorite (DAKINS) 0.125 % external solution, , Irrigation, Daily  gabapentin (NEURONTIN) capsule 300 mg, 300 mg, Oral, TID  vancomycin (VANCOCIN) intermittent dosing (placeholder), , Other, RX Placeholder  [COMPLETED] vancomycin (VANCOCIN) 2,500 mg in sodium chloride 0.9 % 500 mL IVPB, 2,500 mg, IntraVENous, Once **FOLLOWED BY** vancomycin (VANCOCIN) 1,000

## 2024-05-23 NOTE — CARE COORDINATION
5/23/24, 3:26 PM EDT    DISCHARGE ON GOING EVALUATION    Damien CALDERÓN [facility] day: 2  Location: -38/038-A Reason for admit: Cellulitis of right leg [L03.115]  Wound of right leg, subsequent encounter [S83.263Q]     Procedures:   5/23 Wound VAC placement    Imaging since last note: none    Barriers to Discharge: Podiatry, Hospitalist and ID following. Wound vac placed today. ID planning for HBOT outpatient. IVF. Roxicodone prn. IV zosyn q8h. IV vancomycin q12h. Wound cultures pending.     PCP: Chad Mishra APRN - CNP  Readmission Risk Score: 11    Patient Goals/Plan/Treatment Preferences: From home w/ wife and son. Wound vac order forms placed on chart- message sent to podiatry requesting they fill out forms so CM can order.     Dispo unknown; pending IV abx plan. Medicare- no home infusion benefits; self pay, likely need OP infusion vs SNF.  Unsure if can go to OP with weight-bearing and pending IV abx frequency. If OP IV, unable to get HH for wound care and would need to follow with Wound Clinic for VAC. Unsure if can go to SNF w/ planned HBOT. SW to look into tomorrow.     Doesn't qualify for LTACH- would need 3 midnights in stepdown/ICU.

## 2024-05-23 NOTE — PROGRESS NOTES
\"RBCUA\", \"WBCUA\", \"BACTERIA\", \"NITRU\", \"GLUCOSEU\", \"BILIRUBINUR\", \"UROBILINOGEN\", \"KETUA\", \"LABCAST\", \"LABCASTTY\", \"AMORPHOS\" in the last 72 hours.    Invalid input(s): \"CRYSTALS\"  Micro: No results found for: \"BC\"      Recent Labs     05/22/24 0524   INR 1.08   No results found for: \"DDIMER\"No results found for: \"BNP\"  troponinsNo results found for: \"TROPONINI\"      D Dimer: No results for input(s): \"DDIMER\" in the last 72 hours.  Troponin T No results for input(s): \"TROPONINT\" in the last 72 hours.  ProBNP   No results found for requested labs within last 30 days.     ProBNP Invalid input(s): \"PRO-BNP\"  Lactic acid:Invalid input(s): \"LACTIC ACID\"      PT/INR:   Recent Labs     05/22/24 0524   INR 1.08     APTT: No results for input(s): \"APTT\" in the last 72 hours.      ESR: No results found for: \"SEDRATE\"  CRP: No results found for: \"CRP\"  Folate and B12: No results found for: \"FCBGLEQZ16\", No results found for: \"FOLATE\"  Thyroid Studies: No results found for: \"TSH\", \"A1QXHSS\", \"L8JVKMY\", \"THYROIDAB\"  D-Dimer: No results found for: \"DDIMER\"    Lactic acid: No components found for: \"LACT\"     Troponin T No results for input(s): \"TROPHS\" in the last 72 hours.    Troponins:     No components found for: \"TROP\"    No results found for: \"TROPONINI\"    No results found for requested labs within last 30 days.    Cardiac Enzymes:    No results found for requested labs within last 30 days.    No results found for: \"CKMB\"    ProBNP:    No components found for: \"NTBNP\"      Urine Sodium:   No components found for: \"FABIEN\"  Urine Potassium:  No results found for: \"KUR\"  Urine Chloride:  No results found for: \"CLUR\"  Urine Osmolarity: No results found for: \"OSMOU\"  Urine Protein:   No results found for: \"TPU\"  Urine Creatinine:   No results found for: \"LABCREA\"  Urine Eosinophils:  No components found for: \"UEOS\"    Thyroid Studies:   No results found for: \"T4\"  No results found for: \"T3\"  No results found for: \"TSH\"    No  anteriorly and posteriorly.     Soft tissue swelling. No acute bony abnormality. **This report has been created using voice recognition software.  It may contain minor errors which are inherent in voice recognition technology.** Final report electronically signed by Dr. Uziel Zuniga on 4/25/2024 3:42 PM    XR CHEST (SINGLE VIEW FRONTAL)    Result Date: 4/25/2024  PROCEDURE: XR CHEST (SINGLE VIEW FRONTAL) CLINICAL INFORMATION: Venous stasis ulcer of right lower leg with edema of right lower leg (HCC), Venous stasis ulcer of right lower leg with edema of right lower leg (HCC), Venous stasis ulcer of right lower leg with edema of right lower leg (HCC), Venous stasis ulcer o COMPARISON: No prior study. TECHNIQUE: A single PA view of the chest was obtained.     1. Mild cardiomegaly. Small 4 mm noncalcified nodule right upper lobe, likely a poorly calcified granuloma 2. No effusion is seen. No infiltrates seen. Final report electronically signed by Dr. Uziel Zuniga on 4/25/2024 10:35 AM      This note was dictated using M*Modal Fluency Direct so please excuse any grammatical or syntax errors as no guarantees can be provided that every mistake has been identified and corrected by editing.      Electronically signed by Nickolas Guzman MD on 5/23/2024 at 7:55 AM

## 2024-05-23 NOTE — PROGRESS NOTES
Teaching Note:    I was present with the resident during the visit.  Excisional debridement performed, consult for wound VAC placement, hyperbaric oxygen consultation.  I discussed the case with the resident and agree with the findings and the plan as documented in the residents note.    Ja Rivas DPM, FACFAS  Podiatric Medicine & Surgery       [facility]

## 2024-05-23 NOTE — PLAN OF CARE
Problem: Discharge Planning  Goal: Discharge to home or other facility with appropriate resources  Outcome: Progressing  Note: Pt will discharge home when appropriate.       Problem: Safety - Adult  Goal: Free from fall injury  Outcome: Progressing  Note: Fall precaution in place. Bed alarm/chair alarm. Bed locked in lowest position. Fall band applied if applicable. Call light and overhead table within reach. Will continue to monitor.       Problem: ABCDS Injury Assessment  Goal: Absence of physical injury  Outcome: Progressing  Note: Absence of physical injury.       Problem: Pain  Goal: Verbalizes/displays adequate comfort level or baseline comfort level  Outcome: Progressing  Note: Pt denies pain on my shift. Non pharmaceutical interventions like ice and repositioning offered before pain medications. Will continue to assess.     Pt verbalizes understanding of care plan. Pt contributes to goal settings.

## 2024-05-23 NOTE — DISCHARGE INSTRUCTIONS
Left lower leg wound vac instructions: Remove old wound vac dressing. Cleanse wound with normal saline and gauze. Apply skin prep to johnny wound. Apply stoma wafer to johnny wound to protect good skin. Apply powdered collagen Cellerate Rx to base of wound and wound edges. Cover wound bed with cuticerin. Apply cuticerin over incision. Apply black foam x 1 piece to wound bed and another piece over incision followed by clear drape. Cut quarter size hole in center of drape over sponge area and apply vac suction. Wound vac settings at 125 mmHg continuous suction. Change canister as needed when full. Change wound vac 3 times weekly.

## 2024-05-23 NOTE — CONSULTS
CONSULTATION NOTE :ID       Patient - Damien Aguilar,  Age - 63 y.o.    - 1960      Room Number - 8B-38/038-A   MRN -  [id number]   [facility] # - [id number]  Date of Admission -  2024  2:42 PM  Patient's PCP: Chad Mishra APRN - CNP     Requesting Physician: Ja Rivas DPM    REASON FOR CONSULTATION   Right lower leg wound infection with flap necrosis  CHIEF COMPLAINT   Necrotic wound    HISTORY OF PRESENT ILLNESS       This is a very pleasant 63 y.o. male who was admitted to the hospital with a chief complaints of necrotic muscle flap.  A wk ago he had a flap over the right lower leg wound. Unfortunately developed a hematoma and the flap became necrotic.  He was admitted, had debridement of the wound. Started on iv antibiotic. ID service was asked to see patient to assist with antibiotic and evaluated for HBOT. Over 20 yrs ago he had injury to the right tibia and required surgical intervention. Had wounds in the past on the same leg. He reports of a small wound over the right shin and requiredI and D with subsequent flap. He denies any fever or chills. He has hx of OA, no hx of diabetes no hx of PAD  PAST MEDICAL  HISTORY       Past Medical History:   Diagnosis Date    Morbid obesity (HCC)     Osteoarthritis     Snoring     possible apnea - per wife, better since lost 20# recently.  BMI 41.97, neck circ 17.5 cm, no daytime somnolence, no am headaches.       PAST SURGICAL HISTORY     Past Surgical History:   Procedure Laterality Date    HERNIA REPAIR      left inguinal    JOINT REPLACEMENT Left 2020    left hip    OTHER SURGICAL HISTORY      right leg vein stripping    PRESSURE ULCER DEBRIDEMENT Right 2024    Right Leg Excision and Debridement of Wound, Right Medial Soleus Muscle Flap Right Skin Substitute Graft, Application of Wound Vac performed by Ja Rivas DPM at [facility] OR    TONSILLECTOMY AND ADENOIDECTOMY      WISDOM TOOTH EXTRACTION

## 2024-05-24 ENCOUNTER — TELEPHONE (OUTPATIENT)
Dept: WOUND CARE | Age: 64
End: 2024-05-24

## 2024-05-24 DIAGNOSIS — R60.0 VENOUS STASIS ULCER OF RIGHT LOWER LEG WITH EDEMA OF RIGHT LOWER LEG (HCC): Primary | ICD-10-CM

## 2024-05-24 DIAGNOSIS — L97.919 VENOUS STASIS ULCER OF RIGHT LOWER LEG WITH EDEMA OF RIGHT LOWER LEG (HCC): Primary | ICD-10-CM

## 2024-05-24 DIAGNOSIS — I83.891 VENOUS STASIS ULCER OF RIGHT LOWER LEG WITH EDEMA OF RIGHT LOWER LEG (HCC): Primary | ICD-10-CM

## 2024-05-24 DIAGNOSIS — I83.019 VENOUS STASIS ULCER OF RIGHT LOWER LEG WITH EDEMA OF RIGHT LOWER LEG (HCC): Primary | ICD-10-CM

## 2024-05-24 LAB
CREAT SERPL-MCNC: 0.9 MG/DL (ref 0.4–1.2)
GFR SERPL CREATININE-BSD FRML MDRD: > 90 ML/MIN/1.73M2
VANCOMYCIN SERPL-MCNC: 13.9 UG/ML (ref 0.1–39.9)

## 2024-05-24 PROCEDURE — 2580000003 HC RX 258

## 2024-05-24 PROCEDURE — 6370000000 HC RX 637 (ALT 250 FOR IP): Performed by: PHYSICIAN ASSISTANT

## 2024-05-24 PROCEDURE — 6360000002 HC RX W HCPCS: Performed by: PODIATRIST

## 2024-05-24 PROCEDURE — 82565 ASSAY OF CREATININE: CPT

## 2024-05-24 PROCEDURE — 6370000000 HC RX 637 (ALT 250 FOR IP)

## 2024-05-24 PROCEDURE — 97530 THERAPEUTIC ACTIVITIES: CPT

## 2024-05-24 PROCEDURE — 80202 ASSAY OF VANCOMYCIN: CPT

## 2024-05-24 PROCEDURE — 97166 OT EVAL MOD COMPLEX 45 MIN: CPT

## 2024-05-24 PROCEDURE — 6370000000 HC RX 637 (ALT 250 FOR IP): Performed by: PODIATRIST

## 2024-05-24 PROCEDURE — 2580000003 HC RX 258: Performed by: PODIATRIST

## 2024-05-24 PROCEDURE — 97162 PT EVAL MOD COMPLEX 30 MIN: CPT

## 2024-05-24 PROCEDURE — 6360000002 HC RX W HCPCS

## 2024-05-24 PROCEDURE — 36415 COLL VENOUS BLD VENIPUNCTURE: CPT

## 2024-05-24 PROCEDURE — 99232 SBSQ HOSP IP/OBS MODERATE 35: CPT | Performed by: INTERNAL MEDICINE

## 2024-05-24 PROCEDURE — 1200000000 HC SEMI PRIVATE

## 2024-05-24 RX ADMIN — SILDENAFIL 20 MG: 20 TABLET ORAL at 10:06

## 2024-05-24 RX ADMIN — GABAPENTIN 300 MG: 300 CAPSULE ORAL at 10:07

## 2024-05-24 RX ADMIN — Medication 3000 UNITS: at 10:06

## 2024-05-24 RX ADMIN — PIPERACILLIN AND TAZOBACTAM 4500 MG: 4; .5 INJECTION, POWDER, LYOPHILIZED, FOR SOLUTION INTRAVENOUS at 15:30

## 2024-05-24 RX ADMIN — SILDENAFIL 20 MG: 20 TABLET ORAL at 21:00

## 2024-05-24 RX ADMIN — VANCOMYCIN HYDROCHLORIDE 1000 MG: 1 INJECTION, POWDER, LYOPHILIZED, FOR SOLUTION INTRAVENOUS at 04:01

## 2024-05-24 RX ADMIN — AMLODIPINE BESYLATE 10 MG: 10 TABLET ORAL at 10:08

## 2024-05-24 RX ADMIN — SILDENAFIL 20 MG: 20 TABLET ORAL at 14:22

## 2024-05-24 RX ADMIN — SODIUM CHLORIDE 20 ML: 9 INJECTION, SOLUTION INTRAVENOUS at 06:07

## 2024-05-24 RX ADMIN — PIPERACILLIN AND TAZOBACTAM 4500 MG: 4; .5 INJECTION, POWDER, LYOPHILIZED, FOR SOLUTION INTRAVENOUS at 06:07

## 2024-05-24 RX ADMIN — GABAPENTIN 300 MG: 300 CAPSULE ORAL at 21:00

## 2024-05-24 RX ADMIN — PENTOXIFYLLINE 400 MG: 400 TABLET, EXTENDED RELEASE ORAL at 12:52

## 2024-05-24 RX ADMIN — OXYCODONE HYDROCHLORIDE AND ACETAMINOPHEN 500 MG: 500 TABLET ORAL at 10:09

## 2024-05-24 RX ADMIN — ENOXAPARIN SODIUM 30 MG: 100 INJECTION SUBCUTANEOUS at 10:09

## 2024-05-24 RX ADMIN — PIPERACILLIN AND TAZOBACTAM 4500 MG: 4; .5 INJECTION, POWDER, LYOPHILIZED, FOR SOLUTION INTRAVENOUS at 21:55

## 2024-05-24 RX ADMIN — ENOXAPARIN SODIUM 30 MG: 100 INJECTION SUBCUTANEOUS at 21:00

## 2024-05-24 RX ADMIN — PENTOXIFYLLINE 400 MG: 400 TABLET, EXTENDED RELEASE ORAL at 10:08

## 2024-05-24 RX ADMIN — GABAPENTIN 300 MG: 300 CAPSULE ORAL at 15:27

## 2024-05-24 RX ADMIN — POTASSIUM CHLORIDE 20 MEQ: 750 TABLET, FILM COATED, EXTENDED RELEASE ORAL at 10:07

## 2024-05-24 RX ADMIN — ATORVASTATIN CALCIUM 20 MG: 20 TABLET, FILM COATED ORAL at 10:09

## 2024-05-24 RX ADMIN — Medication 1 TABLET: at 10:07

## 2024-05-24 RX ADMIN — PENTOXIFYLLINE 400 MG: 400 TABLET, EXTENDED RELEASE ORAL at 17:24

## 2024-05-24 RX ADMIN — FUROSEMIDE 20 MG: 20 TABLET ORAL at 21:00

## 2024-05-24 RX ADMIN — FUROSEMIDE 20 MG: 20 TABLET ORAL at 10:09

## 2024-05-24 NOTE — CARE COORDINATION
DISCHARGE PLANNING EVALUATION  5/24/24, 3:32 PM EDT    Reason for Referral: HH for wound vac  Decision Maker: Patient makes decisions  Current Services: none  New Services Requested: HH, nursing for wound vac  Family/ Social/ Home environment: Spoke with patient and wife, patient lives at home with wife.  Patient has been independent with ADL's.  Plan is home with wife and new P HH (preference) for wound vac.  Payment Source:Medicare and [facility]  Transportation at Discharge: wife  Post-acute (PAC) provider list was provided to patient. Patient was informed of their freedom to choose PAC provider. Discussed and offered to show the patient the relevant PAC Providers quality and resource use measures on Medicare Compare web site via computer based on patient's goals of care and treatment preferences. Questions regarding selection process were answered.      Teach Back Method used with patient regarding care plan and discharge planning.  Patient and wife verbalized understanding of the plan of care and contribute to goal setting.       Patient preferences and discharge plan: Plan home wit wife and new [facility].    Spoke with Keturah from [facility] referral made.  Advised patient will probably not discharge over the weekend.  First wound vac change should be next Thursday 5/30.    Electronically signed by FABIAN Siddiqi on 5/24/2024 at 3:32 PM

## 2024-05-24 NOTE — PROGRESS NOTES
6 [facility]--HOSPITALIST GROUP    Hospitalist PROGRESS NOTE dictated by Nickolas Guzman MD on 2024    Patient ID: Damien Aguilar is 63 y.o. and presently in room 8B-38/038-A  : 1960 MRN: [id number]    Admit date: 2024  Primary Care Physician: Chad Mishra APRN - CNP   Patient Care Team:  Chad Mishra APRN - CNP as PCP - General (Nurse Practitioner)  Chad Mishra APRN - CNP as PCP - Empaneled Provider  Radha Tolentino MD as Cardiologist (Cardiology)  Tel: [phone]  IP CONSULT TO HOSPITALIST  PHARMACY TO DOSE MEDICATION  IP CONSULT TO INFECTIOUS DISEASES  IP CONSULT TO SOCIAL WORK  Admitting Physician: Ja Rivas DPM   Code Status:  Full Code    Damien Aguilar is a 63 y.o.  male who presented with No chief complaint on file.    Admit date: 2024  Room: [facility]38/038-A      2024: Patient with right lower extremity muscle flap failure with infection debrided by podiatry today. On Zosyn and vancomycin.    Denies chest pain shortness of breath or cough.  No complaints.  ---    2024: Right lower extremity wound status post debridement by podiatry on Zosyn and vancomycin and being followed by ID.    Patient denies chest pain shortness of breath or cough.  No complaints.    Wound culture 2024: Revealed high growth of Proteus mirabilis, Citrobacter Baumannii and E. coli.    Discussed with infectious disease and will continue with the Zosyn but discontinue IV vancomycin.    Uses [facility] pharmacy in [place].    Assessment:    Principal Problem:    Cellulitis and abscess of right leg  Active Problems:    NELL (obstructive sleep apnea)    Essential hypertension    Failure of flap graft    Wound of right leg, subsequent encounter    Morbid obesity (HCC)  Resolved Problems:    * No resolved hospital problems. *  Right lower extremity muscle flap failure with infection status post excisional debridement 2024.  Negative MRSA  5/23/2024.        Plan:    Continue Zosyn and discontinue vancomycin.  Wound care.  Per ID HBOT as out patient post authorization for treatment.  Podiatry following.[place] wet to dry, BRANDON, ACE. NWB RLE. Wound vac this week prior to d/c   Consulted ID.  Continuous Infusions:    sodium chloride 50 mL/hr at 05/23/24 0926    sodium chloride 20 mL (05/24/24 0607)     sodium hypochlorite, , Daily  gabapentin, 300 mg, TID  sodium chloride flush, 5-40 mL, 2 times per day  enoxaparin, 30 mg, BID  pentoxifylline, 400 mg, TID WC  sildenafil, 20 mg, TID  piperacillin-tazobactam, 4,500 mg, Q8H  amLODIPine, 10 mg, Daily  atorvastatin, 20 mg, Daily  furosemide, 20 mg, BID  metoprolol succinate, 50 mg, Daily  multivitamin, 1 tablet, Daily  potassium chloride, 20 mEq, Daily  valsartan, 160 mg, Daily  vitamin C, 500 mg, Daily  Vitamin D, 3,000 Units, Daily      ADULT DIET; Regular; 3 carb choices (45 gm/meal); Low Fat/Low Chol/High Fiber/RADHA; Low Sodium (2 gm); 60 to 80 gm  Code Status:  Full Code  Lovenox sc for DVT prophylaxis.      Anticipated Disposition upon discharge:      [] Home  [] Home with HHC (Home Health Care) [] SNF (Skilled Nursing Facility) [] Assisted living facility [] LTAC (Long-Term Acute Care Hospital)      /O (24Hr):    Intake/Output Summary (Last 24 hours) at 5/24/2024 1433  Last data filed at 5/24/2024 1249  Gross per 24 hour   Intake 1720 ml   Output 3750 ml   Net -2030 ml         Patient Vitals for the past 96 hrs (Last 3 readings):   Weight   05/21/24 1457 120.9 kg (266 lb 8.6 oz)     Admission weight: 120.9 kg (266 lb 8.6 oz)  Wt Readings from Last 3 Encounters:   05/21/24 120.9 kg (266 lb 8.6 oz)   05/17/24 122.2 kg (269 lb 6.4 oz)   05/16/24 122.2 kg (269 lb 6.4 oz)    (encounters)    /74   Pulse 71   Temp 98 °F (36.7 °C) (Oral)   Resp 18   Ht 1.702 m (5' 7\")   Wt 120.9 kg (266 lb 8.6 oz)   SpO2 95%   BMI 41.75 kg/m²   Vitals:    05/24/24 0406 05/24/24 0745 05/24/24 1000 05/24/24 1116   BP:

## 2024-05-24 NOTE — TELEPHONE ENCOUNTER
Spoke with [facility] care coordinator regarding patients discharge plan. Plan to start HBOT on Tuesday 5/28/24 at 8:30am as long as patient is discharged by Monday. Patient made aware and instructed to eat breakfast and take medication prior to treatment. Patient instructed to not apply any lotion, deodorant, cologne, body spray, hair products prior to treatment. Patient's treatment plan is to dive M-F for a total of 40 treatments. Patient will need home health set up to change wound vac dressings. Patient aware and agrees to treatment.

## 2024-05-24 NOTE — PROGRESS NOTES
Podiatric Progress Note  Damien STEPHANE Aguilar  Subjective :   5/24  Patient seen bedside today on behalf of Dr. Rivas. Patient appeared pleasant, was oriented to person, place and time and in no acute distress. He is aware we are awaiting final culture results as well as home VAC approval for discharge. Patient denies any N/V/F/C/SOB or CP. Patient has no further pedal concerns at this point in time.    5/23  Patient seen bedside today alongside of Dr. Rivas. Patient appeared pleasant, was oriented to person, place and time and in no acute distress. He states the pain to his heel and leg has resolved after loosening the outer ACE wrap. He is curious if the wound vac will be placed back on the wound. Patient denies any N/V/F/C/SOB or CP. Patient has no further pedal concerns at this point in time.    5/22  Patient seen bedside today on behalf of Dr. Rivas. Patient appeared pleasant, was oriented to person, place and time and in no acute distress. The patient relates that he is having pain to the right leg and foot since about 2pm yesterday, after debridement of the wound in [facility]. He is curious if the wound vac will be placed back on the wound. Patient denies any N/V/F/C/SOB or CP. Patient has no further pedal concerns at this point in time.       5/21 MANISH Whalen is a 63-year-old male who last Thursday had a excision of wound and debridement with a medial Jorge L soleus muscle flap presents to the wound clinic today with a significant blood clot to the muscle flap with flap death and infection.     Damien STEPHANE Aguilar is a 63 y.o. male Established patient referred by Rob, who is being admitted today for significant clotting to the muscle flap with flap necrosis at the distal tip and malodor with abscess to the muscle flap needing to be admitted for close evaluation and management IV antibiotics and negative wound pressure therapy.        Current Medications:    Current Facility-Administered Medications: [COMPLETED] vancomycin

## 2024-05-24 NOTE — PROGRESS NOTES
[facility]  INPATIENT OCCUPATIONAL THERAPY  STRZ MED SURG 8AB  EVALUATION    Time:   Time In: 925  Time Out: 957  Timed Code Treatment Minutes: 23 Minutes  Minutes: 32          Date: 2024  Patient Name: Damien Aguilar,   Gender: male      MRN: [id number]  : 1960  (63 y.o.)  Referring Practitioner: Ja Rivas DPM  Diagnosis: callulitis and abscess of right leg  Additional Pertinent Hx: Damien Aguilar 63 y.o. male admitted by podiatry from the wound clinic.  On  he had excision of wound and debridement with a edical Jorge L soleus muscle flap, presented to wound clinic today with significant blood clot to the muscle flap with flap necrosis and infection.  Patient was admitted to podiatry for IV antibiotics and wound care.    Restrictions/Precautions:  Restrictions/Precautions: Fall Risk, Weight Bearing, Up as Tolerated  Right Lower Extremity Weight Bearing: Non Weight Bearing  Position Activity Restriction  Other position/activity restrictions: Per podiatry, pt to be WBAT once he gets his CAM boot \"[facility] Therapist perfect Lew Pelletier MD to see if CAM boot can be dispensed prior to DC to allow pt time to practice walking prior to DC. CAM boot coming from Dr. Munoz office. If it arrives pt may be WBAT with boot. Has wound vac    Subjective  Chart Reviewed: Yes, Orders, Progress Notes, History and Physical    Subjective: Nurse approved OT eval. Pt in bed on OT arrival. Pleasant and cooperative.    Pain: does not c/o pain     Vitals: Vitals not assessed per clinical judgement, see nursing flowsheet    Social/Functional History:  Lives With: Spouse, Son  Type of Home: House  Home Layout: Two level, Able to Live on Main level with bedroom/bathroom  Home Access: Ramped entrance  Home Equipment: Crutches, Walker - Standard   Bathroom Shower/Tub:  (sponge bathing)  Bathroom Toilet: Bedside commode  Bathroom Equipment: Safety frame       ADL Assistance:  return to prior level of living.      Performance deficits / Impairments: Decreased functional mobility , Decreased strength, Decreased endurance, Decreased ADL status, Decreased high-level IADLs, Decreased balance  Prognosis: Good  REQUIRES OT FOLLOW-UP: Yes  Decision Making: Medium Complexity    Treatment Initiated: Treatment and education initiated within context of evaluation.  Evaluation time included review of current medical information, gathering information related to past medical, social and functional history, completion of standardized testing, formal and informal observation of tasks, assessment of data and development of plan of care and goals.  Treatment time included skilled education and facilitation of tasks to increase safety and independence with ADL's for improved functional independence and quality of life.    Discharge Recommendations:  Home with assist PRN    Patient Education:          Patient Education  Education Given To: Patient  Education Provided: Role of Therapy;Plan of Care;Transfer Training;ADL Adaptive Strategies  Education Method: Demonstration  Barriers to Learning: None  Education Outcome: Verbalized understanding;Demonstrated understanding    Equipment Recommendations:  Equipment Needed: Yes  Mobility Devices: Walker  Other: Pt should have CAM boot at time of discharge and will be WBAT with Boot. If he does not go home with boot he will still be NWB and will need a lightweight manual wc.    Plan:  Times Per Week: 5x  Times Per Day: Once a day  Current Treatment Recommendations: Strengthening, Balance training, Functional mobility training, Endurance training, Cognitive reorientation, Neuromuscular re-education, Safety education & training, Equipment evaluation, education, & procurement, Self-Care / ADL.  See long-term goal time frame for expected duration of plan of care.  If no long-term goals established, a short length of stay is anticipated.    Goals:  Patient goals : To

## 2024-05-24 NOTE — PLAN OF CARE
Problem: Safety - Adult  Goal: Free from fall injury  Outcome: Progressing       Pt had no falls during shift; proper fall regimen in place at all times. Pt's wound dressing saturated during shift as wound vac was not running<staff reached out to wound nurse and dressing has been changed and vac sealed in place and working properly now.  Pt seen by multiple team members today at bedside & plan of care  discussed<see team notes. No other  significant events during shift.

## 2024-05-24 NOTE — PROGRESS NOTES
[facility]   Pharmacy Pharmacokinetic Monitoring Service - Vancomycin    Indication: SSTI  Target Concentration: Goal AUC/PRASHANTH 400-600 mg*hr/L  Day of Therapy: 3  Additional Antimicrobials: zosyn    Pertinent Laboratory Values:   Wt Readings from Last 1 Encounters:   05/21/24 120.9 kg (266 lb 8.6 oz)     Temp Readings from Last 1 Encounters:   05/24/24 97.9 °F (36.6 °C) (Oral)     Estimated Creatinine Clearance: 115 mL/min (based on SCr of 0.8 mg/dL).  Recent Labs     05/22/24 0524   CREATININE 0.8   BUN 12   WBC 7.0     Pertinent Cultures:  Date Source Results   05/21/24 R leg swab cx GNB, many GPC   MRSA Nasal Swab: N/A. Non-respiratory infection.    Recent vancomycin administrations                     vancomycin (VANCOCIN) 1,000 mg in sodium chloride 0.9 % 250 mL IVPB ([id number]) (mg) 1,000 mg New Bag 05/24/24 0401     1,000 mg New Bag 05/23/24 1653     1,000 mg New Bag  0414    vancomycin (VANCOCIN) 2,500 mg in sodium chloride 0.9 % 500 mL IVPB (mg) 2,500 mg New Bag 05/22/24 1711                    Assessment:  Date/Time Current Dose Concentration Timing of Concentration (h) AUC C trough,ss   05/24/24 @0738 1000 mg q12h 13.9 3h 37 min 432 16   Note: Serum concentrations collected for AUC dosing may appear elevated if collected in close proximity to the dose administered, this is not necessarily an indication of toxicity    Plan:  Current dosing regimen is  borderline subtherapeutic  Increase dose to 1250 mg q12h; anticipated   Repeat vancomycin concentration not ordered at this time  Pharmacy will continue to monitor patient and adjust therapy as indicated    Thank you for the consult,  Carlos A Swan, PharmD  5/24/2024 8:50 AM

## 2024-05-24 NOTE — PROGRESS NOTES
Progress note: Infectious diseases    Patient - Damien Aguilar,  Age - 63 y.o.    - 1960      Room Number - 8B-38/038-A   MRN -  [id number]   [facility] # - [id number]  Date of Admission -  2024  2:42 PM    SUBJECTIVE:   He has no new complaints. Wound cx report not finalized  OBJECTIVE   VITALS    height is 1.702 m (5' 7\") and weight is 120.9 kg (266 lb 8.6 oz). His oral temperature is 97.9 °F (36.6 °C). His blood pressure is 129/62 and his pulse is 60. His respiration is 18 and oxygen saturation is 98%.       Wt Readings from Last 3 Encounters:   24 120.9 kg (266 lb 8.6 oz)   24 122.2 kg (269 lb 6.4 oz)   24 122.2 kg (269 lb 6.4 oz)       I/O (24 Hours)    Intake/Output Summary (Last 24 hours) at 2024 0804  Last data filed at 2024 0506  Gross per 24 hour   Intake 2560 ml   Output 3400 ml   Net -840 ml       General Appearance  Awake, alert, oriented,  not  In acute distress  HEENT - normocephalic, atraumatic, pink conjunctiva,  anicteric sclera  Neck - Supple, no mass  Lungs -  Bilateral  air entry, no rhonchi, no wheeze  Cardiovascular - Heart sounds are normal.    Abdomen - soft, not distended, nontender,   Neurologic -oriented  Skin - No bruising or bleeding  Extremities - wound vac over the right lower leg    MEDICATIONS:      sodium hypochlorite   Irrigation Daily    gabapentin  300 mg Oral TID    vancomycin (VANCOCIN) intermittent dosing (placeholder)   Other RX Placeholder    vancomycin  1,000 mg IntraVENous Q12H    sodium chloride flush  5-40 mL IntraVENous 2 times per day    enoxaparin  30 mg SubCUTAneous BID    pentoxifylline  400 mg Oral TID WC    sildenafil  20 mg Oral TID    piperacillin-tazobactam  4,500 mg IntraVENous Q8H    amLODIPine  10 mg Oral Daily    atorvastatin  20 mg Oral Daily    furosemide  20 mg Oral BID    metoprolol succinate  50 mg Oral Daily    multivitamin  treatment      Gokul Yen MD, 5/24/2024 8:04 AM

## 2024-05-24 NOTE — PROGRESS NOTES
[facility]  INPATIENT PHYSICAL THERAPY  EVALUATION  [facility] MED SURG 8AB - 8B-38/038-A    Time In: 1413  Time Out: 1448  Timed Code Treatment Minutes: 25 Minutes  Minutes: 35          Date: 2024  Patient Name: Damien Aguilar,  Gender:  male        MRN: [id number]  : 1960  (63 y.o.)      Referring Practitioner: Ja Rivas DPM  Diagnosis: Cellulitis of right leg  Additional Pertinent Hx: Per H&P: \"Damien Aguilar is a 63 y.o. male Established patient referred by Rob, who is being admitted today for significant clotting to the muscle flap with flap necrosis at the distal tip and malodor with abscess to the muscle flap needing to be admitted for close evaluation and management IV antibiotics and negative wound pressure therapy. Wound history: Patient has had a chronic wound to his leg secondary to trauma many years ago and struggles with chronic venous hypertension with an open wound on the shaft of the tibia distal tibia for approximately 4 years.\" Admitted to podiatry for IV antibiotics (cellulitis on RLE) and wound care.     Restrictions/Precautions:  Restrictions/Precautions: Fall Risk, Weight Bearing, Up as Tolerated  Right Lower Extremity Weight Bearing: Weight Bearing As Tolerated  Partial Weight Bearing Percentage Or Pounds: WBAT with donned CAM Boot  Position Activity Restriction  Other position/activity restrictions: Written clearance from Abad Hackett MD via perfect serve to WBAT on RLE when OOB with donned CAM boot.    Subjective:  Chart Reviewed: Yes  Patient assessed for rehabilitation services?: Yes  Family / Caregiver Present: No  Subjective: Clearance from RNJo Ann, to see pt this date. Pt was semi-supine in bed when PT arrived. Pt agreeable to PT evaluation.    General:  Overall Orientation Status: Within Normal Limits  Orientation Level: Oriented X4  Overall Cognitive Status: WNL  Vision: Impaired  Vision Exceptions: Wears glasses for reading, Wears glasses for  Walker  Gait Deviations:  Decreased Step Length on Left, Decreased Weight Shift Right, Decreased Gait Speed, Decreased Heel Strike on Left, Decreased Foot Clearance, and Unsteady Gait    Exercise:  Not performed this date    Functional Outcome Measures: Completed  AM-PAC Inpatient Mobility Raw Score : 16  AM-PAC Inpatient T-Scale Score : 40.78  Modified Ora Scale:  Not Applicable    ASSESSMENT:  Activity Tolerance:  Patient tolerance of  treatment: good. Pt tolerated interventions well and interventions performed without incident.       Treatment Initiated: Treatment and education initiated within context of evaluation.  Evaluation time included review of current medical information, gathering information related to past medical, social and functional history, completion of standardized testing, formal and informal observation of tasks, assessment of data and development of plan of care and goals.  Treatment time included skilled education and facilitation of tasks to increase safety and independence with functional mobility for improved independence and quality of life.    Assessment:  Body Structures, Functions, Activity Limitations Requiring Skilled Therapeutic Intervention: Decreased functional mobility , Decreased strength, Decreased balance, Increased pain, Decreased body mechanics  Assessment: Pt is a 62 y/o M addmited for management of RLE wound. See above for details. Pt lives with his wife and son in a mulilevel home with a ramped entrance. He reports that prior to admission he was IND in all ADLs, IADLs and functional mobility tasks. Upon PT evaluation, pt is SBA to modA for bed mobility, CGA for Anastasia for STSs and transfers and Anastasia for ambulation for stabilizing with use of RW. Impairments include healing wound with donned wound vac, WBAT with donned CAM boot, decreased strength, bed mobility deficits, gait deficits, transfer deficits, and is funcitoning below baseline. He would benefit from skilled

## 2024-05-24 NOTE — CARE COORDINATION
5/24/24, 11:40 AM EDT    DISCHARGE ON GOING EVALUATION    Damien CALDERÓN [facility] day: 3  Location: [facility]38/038-A Reason for admit: Cellulitis of right leg [L03.115]  Wound of right leg, subsequent encounter [Y08.489S]     Procedures: No    Imaging since last note: No    Barriers to Discharge: Wound Vac ordered per Dr. Rivas. This CM faxed docuements to [facility] rep Alin Teran ( transmission confirmed) and called him with referral. Podiatry requests CAM boot.  CM phoned distribution and boot will be delivered to pt room. CM sent Perfect Serve message to Dr. Yen inquiring of antibiotic plan. Dr. Yen states the plan will be for po antibiotics at discharge. Still awaiting final cultures however.     Received call from Alin that wound vac is approved. Proof of Delivery signed per pt and fax returned to Alin per fax.    PCP: Chad Mishra, JAZZY - CNP  Readmission Risk Score: 11.1    Patient Goals/Plan/Treatment Preferences: Home with spouse. New wound vac pending approval. New CAM boot.  Hyperbaric treatment.  HH will be needed for vac changes.  Pt has list of providers already.

## 2024-05-24 NOTE — PROGRESS NOTES
[facility] Contin[facility] Nurse  Progress Note       Damien Aguilar  AGE: 63 y.o.   GENDER: male  : 1960  UNIT: 8B-38/038-A  TODAY'S DATE:  2024  ADMISSION DATE: 2024  2:42 PM    Summary:    Wound Ostomy notified of wound vac leaking. Present to patient room. Patient in bed upon arrival. Removed Ace wraps and roll gauze. Ace wraps did have drainage but there was no evidence of leaking from vac dressing. Reinforced dressing with extra drape over trac pad and re-wrapped with roll gauze and ace. If wound vac starts leaking again or stops working over the weekend/holiday contact podiatry.

## 2024-05-24 NOTE — PROGRESS NOTES
RN HYPERBARIC OXYGEN THERAPY RISK ASSESSMENT TOOL   [facility]     Damien Aguilar  MEDICAL RECORD NUMBER:  [id number]  AGE: 63 y.o.   GENDER: male  : 1960  EPISODE DATE:  2024       PAST MEDICAL HISTORY      Diagnosis Date    Morbid obesity (HCC)     Osteoarthritis     Snoring     possible apnea - per wife, better since lost 20# recently.  BMI 41.97, neck circ 17.5 cm, no daytime somnolence, no am headaches.       PAST SURGICAL HISTORY  Past Surgical History:   Procedure Laterality Date    HERNIA REPAIR      left inguinal    JOINT REPLACEMENT Left 2020    left hip    OTHER SURGICAL HISTORY      right leg vein stripping    PRESSURE ULCER DEBRIDEMENT Right 2024    Right Leg Excision and Debridement of Wound, Right Medial Soleus Muscle Flap Right Skin Substitute Graft, Application of Wound Vac performed by Ja Rivas DPM at [facility] OR    TONSILLECTOMY AND ADENOIDECTOMY      WISDOM TOOTH EXTRACTION  1974       FAMILY HISTORY  Family History   Problem Relation Age of Onset    Cancer Mother     Hypertension Father        SOCIAL HISTORY  Social History     Tobacco Use    Smoking status: Never     Passive exposure: Past    Smokeless tobacco: Never    Tobacco comments:     Never smoker    Vaping Use    Vaping Use: Never used   Substance Use Topics    Alcohol use: Yes     Alcohol/week: 24.0 - 30.0 standard drinks of alcohol     Types: 24 - 30 Cans of beer per week     Comment: Beer occasionally    Drug use: Never       ALLERGIES  Allergies   Allergen Reactions    Ace Inhibitors      cough    Olmesartan      Cough         MEDICATIONS  No current facility-administered medications on file prior to encounter.     Current Outpatient Medications on File Prior to Encounter   Medication Sig Dispense Refill    VITAMIN D, CHOLECALCIFEROL, PO Take 3 capsules by mouth daily 24693 IU units (takes 3 capsules)      vitamin C (ASCORBIC ACID) 500 MG tablet Take 1 tablet by

## 2024-05-25 LAB
BACTERIA SPEC AEROBE CULT: ABNORMAL
BASOPHILS ABSOLUTE: 0 THOU/MM3 (ref 0–0.1)
BASOPHILS NFR BLD AUTO: 0.6 %
DEPRECATED RDW RBC AUTO: 47.8 FL (ref 35–45)
EOSINOPHIL NFR BLD AUTO: 4.3 %
EOSINOPHILS ABSOLUTE: 0.2 THOU/MM3 (ref 0–0.4)
ERYTHROCYTE [DISTWIDTH] IN BLOOD BY AUTOMATED COUNT: 13.4 % (ref 11.5–14.5)
GRAM STN SPEC: ABNORMAL
HCT VFR BLD AUTO: 32.8 % (ref 42–52)
HGB BLD-MCNC: 10.7 GM/DL (ref 14–18)
IMM GRANULOCYTES # BLD AUTO: 0.04 THOU/MM3 (ref 0–0.07)
IMM GRANULOCYTES NFR BLD AUTO: 0.7 %
LYMPHOCYTES ABSOLUTE: 0.8 THOU/MM3 (ref 1–4.8)
LYMPHOCYTES NFR BLD AUTO: 13.9 %
MCH RBC QN AUTO: 31.6 PG (ref 26–33)
MCHC RBC AUTO-ENTMCNC: 32.6 GM/DL (ref 32.2–35.5)
MCV RBC AUTO: 96.8 FL (ref 80–94)
MONOCYTES ABSOLUTE: 0.6 THOU/MM3 (ref 0.4–1.3)
MONOCYTES NFR BLD AUTO: 10.2 %
NEUTROPHILS ABSOLUTE: 3.8 THOU/MM3 (ref 1.8–7.7)
NEUTROPHILS NFR BLD AUTO: 70.3 %
NRBC BLD AUTO-RTO: 0 /100 WBC
ORGANISM: ABNORMAL
PLATELET # BLD AUTO: 307 THOU/MM3 (ref 130–400)
PMV BLD AUTO: 9.2 FL (ref 9.4–12.4)
RBC # BLD AUTO: 3.39 MILL/MM3 (ref 4.7–6.1)
WBC # BLD AUTO: 5.4 THOU/MM3 (ref 4.8–10.8)

## 2024-05-25 PROCEDURE — 6360000002 HC RX W HCPCS: Performed by: INTERNAL MEDICINE

## 2024-05-25 PROCEDURE — 6370000000 HC RX 637 (ALT 250 FOR IP)

## 2024-05-25 PROCEDURE — 6370000000 HC RX 637 (ALT 250 FOR IP): Performed by: PHYSICIAN ASSISTANT

## 2024-05-25 PROCEDURE — 6360000002 HC RX W HCPCS: Performed by: PODIATRIST

## 2024-05-25 PROCEDURE — 6370000000 HC RX 637 (ALT 250 FOR IP): Performed by: PODIATRIST

## 2024-05-25 PROCEDURE — 97110 THERAPEUTIC EXERCISES: CPT

## 2024-05-25 PROCEDURE — 2580000003 HC RX 258: Performed by: PODIATRIST

## 2024-05-25 PROCEDURE — 1200000000 HC SEMI PRIVATE

## 2024-05-25 PROCEDURE — 99232 SBSQ HOSP IP/OBS MODERATE 35: CPT | Performed by: INTERNAL MEDICINE

## 2024-05-25 PROCEDURE — 97116 GAIT TRAINING THERAPY: CPT

## 2024-05-25 PROCEDURE — 85025 COMPLETE CBC W/AUTO DIFF WBC: CPT

## 2024-05-25 RX ORDER — LEVOFLOXACIN 5 MG/ML
750 INJECTION, SOLUTION INTRAVENOUS EVERY 24 HOURS
Status: DISCONTINUED | OUTPATIENT
Start: 2024-05-25 | End: 2024-05-26 | Stop reason: HOSPADM

## 2024-05-25 RX ADMIN — VALSARTAN 160 MG: 160 TABLET ORAL at 11:30

## 2024-05-25 RX ADMIN — ATORVASTATIN CALCIUM 20 MG: 20 TABLET, FILM COATED ORAL at 11:06

## 2024-05-25 RX ADMIN — SODIUM CHLORIDE: 9 INJECTION, SOLUTION INTRAVENOUS at 02:57

## 2024-05-25 RX ADMIN — Medication 3000 UNITS: at 11:05

## 2024-05-25 RX ADMIN — SILDENAFIL 20 MG: 20 TABLET ORAL at 13:50

## 2024-05-25 RX ADMIN — SILDENAFIL 20 MG: 20 TABLET ORAL at 11:05

## 2024-05-25 RX ADMIN — PENTOXIFYLLINE 400 MG: 400 TABLET, EXTENDED RELEASE ORAL at 11:05

## 2024-05-25 RX ADMIN — LEVOFLOXACIN 750 MG: 5 INJECTION, SOLUTION INTRAVENOUS at 12:32

## 2024-05-25 RX ADMIN — SODIUM CHLORIDE, PRESERVATIVE FREE 10 ML: 5 INJECTION INTRAVENOUS at 11:08

## 2024-05-25 RX ADMIN — SILDENAFIL 20 MG: 20 TABLET ORAL at 21:42

## 2024-05-25 RX ADMIN — AMLODIPINE BESYLATE 10 MG: 10 TABLET ORAL at 11:05

## 2024-05-25 RX ADMIN — ENOXAPARIN SODIUM 30 MG: 100 INJECTION SUBCUTANEOUS at 11:08

## 2024-05-25 RX ADMIN — FUROSEMIDE 20 MG: 20 TABLET ORAL at 11:08

## 2024-05-25 RX ADMIN — POTASSIUM CHLORIDE 20 MEQ: 750 TABLET, FILM COATED, EXTENDED RELEASE ORAL at 11:08

## 2024-05-25 RX ADMIN — OXYCODONE HYDROCHLORIDE AND ACETAMINOPHEN 500 MG: 500 TABLET ORAL at 11:06

## 2024-05-25 RX ADMIN — PENTOXIFYLLINE 400 MG: 400 TABLET, EXTENDED RELEASE ORAL at 18:05

## 2024-05-25 RX ADMIN — METOPROLOL SUCCINATE 50 MG: 50 TABLET, EXTENDED RELEASE ORAL at 11:08

## 2024-05-25 RX ADMIN — DAKIN'S SOLUTION 0.125% (QUARTER STRENGTH): 0.12 SOLUTION at 11:08

## 2024-05-25 RX ADMIN — GABAPENTIN 300 MG: 300 CAPSULE ORAL at 21:42

## 2024-05-25 RX ADMIN — PIPERACILLIN AND TAZOBACTAM 4500 MG: 4; .5 INJECTION, POWDER, LYOPHILIZED, FOR SOLUTION INTRAVENOUS at 05:48

## 2024-05-25 RX ADMIN — FUROSEMIDE 20 MG: 20 TABLET ORAL at 21:41

## 2024-05-25 RX ADMIN — GABAPENTIN 300 MG: 300 CAPSULE ORAL at 13:50

## 2024-05-25 RX ADMIN — Medication 1 TABLET: at 11:05

## 2024-05-25 RX ADMIN — GABAPENTIN 300 MG: 300 CAPSULE ORAL at 11:05

## 2024-05-25 RX ADMIN — ENOXAPARIN SODIUM 30 MG: 100 INJECTION SUBCUTANEOUS at 21:41

## 2024-05-25 ASSESSMENT — PAIN SCALES - GENERAL: PAINLEVEL_OUTOF10: 0

## 2024-05-25 NOTE — PROGRESS NOTES
[facility]  INPATIENT PHYSICAL THERAPY  DAILY NOTE  STRZ MED SURG 8AB - 8B-38/038-A    Time In: 1412  Time Out: 1445  Timed Code Treatment Minutes: 33 Minutes  Minutes: 33          Date: 2024  Patient Name: Damien Aguilar,  Gender:  male        MRN: [id number]  : 1960  (63 y.o.)     Referring Practitioner: Ja Rivas DPM  Diagnosis: Cellulitis of right leg  Additional Pertinent Hx: Per H&P: \"Damien Aguilar is a 63 y.o. male Established patient referred by Rob, who is being admitted today for significant clotting to the muscle flap with flap necrosis at the distal tip and malodor with abscess to the muscle flap needing to be admitted for close evaluation and management IV antibiotics and negative wound pressure therapy. Wound history: Patient has had a chronic wound to his leg secondary to trauma many years ago and struggles with chronic venous hypertension with an open wound on the shaft of the tibia distal tibia for approximately 4 years.\" Admitted to podiatry for IV antibiotics (cellulitis on RLE) and wound care.     Prior Level of Function:  Lives With: Spouse, Son  Type of Home: House  Home Layout: Two level, Able to Live on Main level with bedroom/bathroom  Home Access: Ramped entrance  Home Equipment: Crutches, Walker - Standard   Bathroom Shower/Tub:  (sponge bathing)  Bathroom Toilet: Bedside commode  Bathroom Equipment: Safety frame    ADL Assistance: Independent  Homemaking Assistance: Needs assistance  Ambulation Assistance: Independent  Transfer Assistance: Independent  Active : Yes  Additional Comments: Pt typically ambulates wimichael AD. Pt reports he was sent home from OP surgery with crutches that he was unable to use and his son had to carry him up their steps to get into the house. He then used a computer chair to get around the house. He has since gotten a ramp for at home.    Restrictions/Precautions:  Restrictions/Precautions: Fall Risk, Weight Bearing,  patient left in safe position with all fall risk precautions in place.

## 2024-05-25 NOTE — PROGRESS NOTES
[facility]--HOSPITALIST GROUP    Hospitalist PROGRESS NOTE dictated by Nickolas Guzman MD on 2024    Patient ID: Damien Aguilar is 63 y.o. and presently in room 8B-38/038-A  : 1960 MRN: [id number]    Admit date: 2024  Primary Care Physician: Chad Mishra APRN - CNP   Patient Care Team:  Chad Mishra APRN - CNP as PCP - General (Nurse Practitioner)  Chad Mishra APRN - CNP as PCP - Empaneled Provider  Radha Tolentino MD as Cardiologist (Cardiology)  Tel: [phone]  IP CONSULT TO HOSPITALIST  PHARMACY TO DOSE MEDICATION  IP CONSULT TO INFECTIOUS DISEASES  IP CONSULT TO SOCIAL WORK  Admitting Physician: Ja Rivas DPM   Code Status:  Full Code    Damien Aguilar is a 63 y.o.  male who presented with No chief complaint on file.    Admit date: 2024  Room: [facility]38/038-A    HPI: Damien Aguilar is a 63 y.o. male Established patient referred by Rivas, who is being admitted today for significant clotting to the muscle flap with flap necrosis at the distal tip and malodor with abscess to the muscle flap needing to be admitted for close evaluation and management IV antibiotics and negative wound pressure therapy.     Wound history: Patient has had a chronic wound to his leg secondary to trauma many years ago and struggles with chronic venous hypertension with an open wound on the shaft of the tibia distal tibia for approximately 4 years.    Interval History:   Patient failed conservative care and the decision was made to do a surgical consult as I saw the patient we recommended a muscle flap for closure.  Intraoperatively the patient had a significant amount of chronic venous hypertension fibrosis to the skin and the deep tissue there was also large torturous deep to superficial venous structures in the leg itself.  The patient's flap surgery went very well there was good bleeding and good Doppler augmentation of the flap at the time of surgery.   results found for: \"CLUR\"  Urine Osmolarity: No results found for: \"OSMOU\"  Urine Protein:   No results found for: \"TPU\"  Urine Creatinine:   No results found for: \"LABCREA\"  Urine Eosinophils:  No components found for: \"UEOS\"    Thyroid Studies:   No results found for: \"T4\"  No results found for: \"T3\"  No results found for: \"TSH\"    No results found for: \"TSH\", \"N6KOMKQ\", \"B0QJDNV\", \"THYROIDAB\"    HbA1c  No components found for: \"HA1CC\"    Lipids:  Lab Results   Component Value Date    HDL 51 03/21/2024    LDL 69 03/21/2024         CRP: No results found for: \"CRP\"  ESR: No results found for: \"SEDRATE\"    Folate and B12: No results found for: \"BDMCSMLA18\", No results found for: \"FOLATE\"    Blood Culture:   No results for input(s): \"BC\", \"BLOODCULT2\", \"ORG\" in the last 72 hours.    GRAM STAIN  No results for input(s): \"LABGRAM\", \"LABANAE\", \"ORG\", \"WNDABS\" in the last 72 hours.    Resp Culture Brief : No results found for: \"CULTRESP\"    Body Fluid : No results found for: \"BFCX\"    MRSA : No results found for: \"MRSAC\"    Urine Culture Brief : No results found for: \"LABURIN\"     Organism Brief :   Lab Results   Component Value Date/Time    ORG Proteus mirabilis 05/21/2024 01:27 PM    ORG Acinetobacter baumannii 05/21/2024 01:27 PM    ORG Escherichia coli 05/21/2024 01:27 PM       XR TIBIA FIBULA RIGHT (2 VIEWS)    Result Date: 4/25/2024  PROCEDURE: XR TIBIA FIBULA RIGHT (2 VIEWS) CLINICAL INFORMATION: Venous stasis ulcer of right lower leg with edema of right lower leg (HCC), Venous stasis ulcer of right lower leg with edema of right lower leg (HCC), Venous stasis ulcer of right lower leg with edema of right lower leg (HCC), Venous stasis ulcer o COMPARISON: No prior study. TECHNIQUE: PA and lateral views of the right tibia and fibula were obtained FINDINGS: There appears to be a compression stocking in place. There is moderate swelling/ soft fullness overlying the medial and lateral malleoli and extending anteriorly as  well as along the dorsal aspect of the foot and anterior aspect distal tibia. No definite soft tissue wound is seen. No bone destruction is seen. No fracture. Mild degenerative changes of the knee and there is a large plantar calcaneal spur. There is also mild spurring of the distal tibia anteriorly and posteriorly.     Soft tissue swelling. No acute bony abnormality. **This report has been created using voice recognition software.  It may contain minor errors which are inherent in voice recognition technology.** Final report electronically signed by Dr. Uziel Znuiga on 4/25/2024 3:42 PM    XR CHEST (SINGLE VIEW FRONTAL)    Result Date: 4/25/2024  PROCEDURE: XR CHEST (SINGLE VIEW FRONTAL) CLINICAL INFORMATION: Venous stasis ulcer of right lower leg with edema of right lower leg (HCC), Venous stasis ulcer of right lower leg with edema of right lower leg (HCC), Venous stasis ulcer of right lower leg with edema of right lower leg (HCC), Venous stasis ulcer o COMPARISON: No prior study. TECHNIQUE: A single PA view of the chest was obtained.     1. Mild cardiomegaly. Small 4 mm noncalcified nodule right upper lobe, likely a poorly calcified granuloma 2. No effusion is seen. No infiltrates seen. Final report electronically signed by Dr. Uziel Zuniga on 4/25/2024 10:35 AM      This note was dictated using M*Modal Fluency Direct so please excuse any grammatical or syntax errors as no guarantees can be provided that every mistake has been identified and corrected by editing.      Electronically signed by Nickolas Guzman MD on 5/25/2024 at 10:47 AM

## 2024-05-25 NOTE — PLAN OF CARE
Problem: Discharge Planning  Goal: Discharge to home or other facility with appropriate resources  5/25/2024 0347 by Dylan Mims RN  Outcome: Progressing  Flowsheets (Taken 5/25/2024 0024)  Discharge to home or other facility with appropriate resources:   Identify barriers to discharge with patient and caregiver   Arrange for needed discharge resources and transportation as appropriate   Identify discharge learning needs (meds, wound care, etc)   Refer to discharge planning if patient needs post-hospital services based on physician order or complex needs related to functional status, cognitive ability or social support system   Discharge plan is home.Awaiting for further discharge instructions and goals, .     Problem: Safety - Adult  Goal: Free from fall injury  Outcome: Progressing   Fall assessment completed. Patient using call light appropriately to call for assistance with ambulation to bathroom.  Personal items within reach. Patient is also compliant with use of non-skid slippers.      Problem: ABCDS Injury Assessment  Goal: Absence of physical injury  Outcome: Progressing   Patient using call light appropriately to call for assistance with ambulation to bathroom.  Personal items within reach. Patient is also compliant with use of non-skid slippers.      Problem: Pain  Goal: Verbalizes/displays adequate comfort level or baseline comfort level  Outcome: Progressing  Flowsheets (Taken 5/24/2024 2045)  Verbalizes/displays adequate comfort level or baseline comfort level:   Encourage patient to monitor pain and request assistance   Assess pain using appropriate pain scale   Administer analgesics based on type and severity of pain and evaluate response   Implement non-pharmacological measures as appropriate and evaluate response   Notify Licensed Independent Practitioner if interventions unsuccessful or patient reports new pain     Problem: Neurosensory - Adult  Goal: Achieves maximal functionality and  self care  Outcome: Progressing  Flowsheets (Taken 5/25/2024 0024)  Achieves maximal functionality and self care:   Monitor swallowing and airway patency with patient fatigue and changes in neurological status   Encourage and assist patient to increase activity and self care with guidance from physical therapy/occupational therapy   Encourage visually impaired, hearing impaired and aphasic patients to use assistive/communication devices     Problem: Skin/Tissue Integrity - Adult  Goal: Incisions, wounds, or drain sites healing without S/S of infection  Outcome: Progressing  Flowsheets (Taken 5/25/2024 0024)  Incisions, Wounds, or Drain Sites Healing Without Sign and Symptoms of Infection:   TWICE DAILY: Assess and document skin integrity   TWICE DAILY: Assess and document dressing/incision, wound bed, drain sites and surrounding tissue   Implement wound care per orders   Initiate isolation precautions as appropriate   Initiate pressure ulcer prevention bundle as indicated     Problem: Musculoskeletal - Adult  Goal: Return mobility to safest level of function  Outcome: Progressing  Flowsheets (Taken 5/25/2024 0024)  Return Mobility to Safest Level of Function:   Assess patient stability and activity tolerance for standing, transferring and ambulating with or without assistive devices   Assist with transfers and ambulation using safe patient handling equipment as needed   Ensure adequate protection for wounds/incisions during mobilization   Obtain physical therapy/occupational therapy consults as needed   Apply continuous passive motion per provider or physical therapy orders to increase flexion toward goal   Instruct patient/family in ordered activity level.    Care plan reviewed with patient and family.  Patient and family verbalizes understanding of the plan of care and contribute to goal setting.

## 2024-05-25 NOTE — PROGRESS NOTES
Podiatric Progress Note  Damien Aguilar  Subjective :   5/25  Patient seen bedside today on behalf of Dr. Rivas. Patient appeared pleasant, was oriented to person, place and time and in no acute distress. He is aware we are awaiting final culture results as well as home VAC approval for discharge. Patient denies any N/V/F/C/SOB or CP. Patient has no further pedal concerns at this point in time.    5/24  Patient seen bedside today on behalf of Dr. Rivas. Patient appeared pleasant, was oriented to person, place and time and in no acute distress. He is aware we are awaiting final culture results as well as home VAC approval for discharge. Patient denies any N/V/F/C/SOB or CP. Patient has no further pedal concerns at this point in time.    5/23  Patient seen bedside today alongside of Dr. Rivas. Patient appeared pleasant, was oriented to person, place and time and in no acute distress. He states the pain to his heel and leg has resolved after loosening the outer ACE wrap. He is curious if the wound vac will be placed back on the wound. Patient denies any N/V/F/C/SOB or CP. Patient has no further pedal concerns at this point in time.    5/22  Patient seen bedside today on behalf of Dr. Rivas. Patient appeared pleasant, was oriented to person, place and time and in no acute distress. The patient relates that he is having pain to the right leg and foot since about 2pm yesterday, after debridement of the wound in [facility]. He is curious if the wound vac will be placed back on the wound. Patient denies any N/V/F/C/SOB or CP. Patient has no further pedal concerns at this point in time.       5/21 hospitals    Koby is a 63-year-old male who last Thursday had a excision of wound and debridement with a medial Jorge L soleus muscle flap presents to the wound clinic today with a significant blood clot to the muscle flap with flap death and infection.     Damien STEPHANE Aguilar is a 63 y.o. male Established patient referred by Rob, who is being  with:  Principle  Muscle flap failure, RLE  Infection, RLE    Chronic  Patient Active Problem List   Diagnosis    Osteoarthritis    Essential hypertension    Status post total replacement of left hip    NELL (obstructive sleep apnea)    Venous stasis ulcer of right lower leg with edema of right lower leg (HCC)    Chronic venous stasis dermatitis of right lower extremity    Venous insufficiency of both lower extremities    Body mass index (BMI) 45.0-49.9, adult (HCC)    Cellulitis and abscess of right leg    Failure of flap graft    Wound of right leg, subsequent encounter    Morbid obesity ([facility])   Selective Debridement Procedure Note    Performed by: Ja Rivas DPM    Consent obtained:  Yes    Time out taken:  Yes     Pain Control:       Debridement: Non-Excisional Debridement    Devitalized Tissue Debrided: slough, necrotic/eschar, and muscle       Instruments Used:  scissors and forceps     Pre Debridement Measurements:  5.5 x 4.0 cm x 1.0 com  Wound #: 1     Post  Debridement Measurements:   5.5cm x 4.0cm x 1.0cm    Percent of Wound Debrided:  50%    Total Surface Area Debrided:  2 sq cm    Bleeding:  Minimal    Hemostasis Achieved:  not needed    Irrigation: Dakins and then Saline    Procedural Pain: 1  / 10     Post Procedural Pain:  0 / 10     Stoma waffer and the VAC sponge and NWPT placed set at 125mmHg continuous until Tuesday [facility] visit      PLAN:     - Patient initially examined and evaluated  - WBC 5.4; Afebrile  - Culture: preliminary result: many gram positive cocci, many gram negative bacilli, awaiting final sensitivity  - ID on board, appreciate recs  - Continue Zosyn and Vancomycin antibiotics  - Continue wound VAC, plan for change Monday  - Continue Gabapentin to 300mg TID  - Full weight bearing as tolerated with CAM walker boot to RLE  - Non weight bearing when not in CAM boot  - Outpatient approval for wound VAC pending  - Plan for VAC change tomorrow, will likely DC patient at that point  - All

## 2024-05-25 NOTE — PROGRESS NOTES
Progress note: Infectious diseases    Patient - Damien Aguilar,  Age - 63 y.o.    - 1960      Room Number - 8B-38/038-A   MRN -  [id number]   [facility] # - [id number]  Date of Admission -  2024  2:42 PM    SUBJECTIVE:   No new issues. Wound cx report noted  OBJECTIVE   VITALS    height is 1.702 m (5' 7\") and weight is 120.9 kg (266 lb 8.6 oz). His oral temperature is 98.6 °F (37 °C). His blood pressure is 110/68 and his pulse is 55. His respiration is 16 and oxygen saturation is 99%.       Wt Readings from Last 3 Encounters:   24 120.9 kg (266 lb 8.6 oz)   24 122.2 kg (269 lb 6.4 oz)   24 122.2 kg (269 lb 6.4 oz)       I/O (24 Hours)    Intake/Output Summary (Last 24 hours) at 2024 1013  Last data filed at 2024 0955  Gross per 24 hour   Intake 7550.39 ml   Output 5100 ml   Net 2450.39 ml       General Appearance  Awake, alert, oriented,  not  In acute distress  HEENT - normocephalic, atraumatic, pink conjunctiva,  anicteric sclera  Neck - Supple, no mass  Lungs -  Bilateral good air entry, no rhonchi, no wheeze  Cardiovascular - Heart sounds are normal.     Abdomen - soft, not distended, nontender,   Neurologic -oriented  Skin - No bruising or bleeding  Extremities - No edema, no cyanosis, clubbing   Right lower leg on wound vac    MEDICATIONS:      levofloxacin  750 mg IntraVENous Q24H    sodium hypochlorite   Irrigation Daily    gabapentin  300 mg Oral TID    sodium chloride flush  5-40 mL IntraVENous 2 times per day    enoxaparin  30 mg SubCUTAneous BID    pentoxifylline  400 mg Oral TID WC    sildenafil  20 mg Oral TID    amLODIPine  10 mg Oral Daily    atorvastatin  20 mg Oral Daily    furosemide  20 mg Oral BID    metoprolol succinate  50 mg Oral Daily    multivitamin  1 tablet Oral Daily    potassium chloride  20 mEq Oral Daily    valsartan  160 mg Oral Daily    vitamin C  500 mg

## 2024-05-26 VITALS
HEIGHT: 67 IN | HEART RATE: 67 BPM | WEIGHT: 266.54 LBS | DIASTOLIC BLOOD PRESSURE: 65 MMHG | RESPIRATION RATE: 16 BRPM | SYSTOLIC BLOOD PRESSURE: 142 MMHG | BODY MASS INDEX: 41.83 KG/M2 | OXYGEN SATURATION: 97 % | TEMPERATURE: 97.8 F

## 2024-05-26 PROBLEM — L02.415 CELLULITIS AND ABSCESS OF RIGHT LEG: Status: RESOLVED | Noted: 2024-05-21 | Resolved: 2024-05-26

## 2024-05-26 PROBLEM — L03.115 CELLULITIS AND ABSCESS OF RIGHT LEG: Status: RESOLVED | Noted: 2024-05-21 | Resolved: 2024-05-26

## 2024-05-26 PROCEDURE — 6370000000 HC RX 637 (ALT 250 FOR IP)

## 2024-05-26 PROCEDURE — 6370000000 HC RX 637 (ALT 250 FOR IP): Performed by: PHYSICIAN ASSISTANT

## 2024-05-26 PROCEDURE — 6370000000 HC RX 637 (ALT 250 FOR IP): Performed by: PODIATRIST

## 2024-05-26 PROCEDURE — 6360000002 HC RX W HCPCS: Performed by: PODIATRIST

## 2024-05-26 RX ORDER — LEVOFLOXACIN 500 MG/1
500 TABLET, FILM COATED ORAL DAILY
Qty: 7 TABLET | Refills: 0 | Status: SHIPPED | OUTPATIENT
Start: 2024-05-26 | End: 2024-06-02

## 2024-05-26 RX ORDER — GABAPENTIN 300 MG/1
300 CAPSULE ORAL 3 TIMES DAILY
Qty: 90 CAPSULE | Refills: 0 | Status: SHIPPED | OUTPATIENT
Start: 2024-05-26 | End: 2024-06-26 | Stop reason: ALTCHOICE

## 2024-05-26 RX ORDER — HYDROCODONE BITARTRATE AND ACETAMINOPHEN 5; 325 MG/1; MG/1
1 TABLET ORAL EVERY 6 HOURS PRN
Qty: 28 TABLET | Refills: 0 | Status: SHIPPED | OUTPATIENT
Start: 2024-05-26 | End: 2024-06-02

## 2024-05-26 RX ORDER — OXYCODONE HYDROCHLORIDE AND ACETAMINOPHEN 5; 325 MG/1; MG/1
1 TABLET ORAL EVERY 6 HOURS PRN
Qty: 28 TABLET | Refills: 0 | Status: SHIPPED | OUTPATIENT
Start: 2024-05-26 | End: 2024-06-02

## 2024-05-26 RX ADMIN — POTASSIUM CHLORIDE 20 MEQ: 750 TABLET, FILM COATED, EXTENDED RELEASE ORAL at 07:58

## 2024-05-26 RX ADMIN — METOPROLOL SUCCINATE 50 MG: 50 TABLET, EXTENDED RELEASE ORAL at 07:53

## 2024-05-26 RX ADMIN — OXYCODONE HYDROCHLORIDE AND ACETAMINOPHEN 500 MG: 500 TABLET ORAL at 07:53

## 2024-05-26 RX ADMIN — FUROSEMIDE 20 MG: 20 TABLET ORAL at 07:53

## 2024-05-26 RX ADMIN — AMLODIPINE BESYLATE 10 MG: 10 TABLET ORAL at 07:53

## 2024-05-26 RX ADMIN — GABAPENTIN 300 MG: 300 CAPSULE ORAL at 07:53

## 2024-05-26 RX ADMIN — Medication 3000 UNITS: at 07:53

## 2024-05-26 RX ADMIN — ATORVASTATIN CALCIUM 20 MG: 20 TABLET, FILM COATED ORAL at 07:58

## 2024-05-26 RX ADMIN — SILDENAFIL 20 MG: 20 TABLET ORAL at 07:58

## 2024-05-26 RX ADMIN — PENTOXIFYLLINE 400 MG: 400 TABLET, EXTENDED RELEASE ORAL at 07:53

## 2024-05-26 RX ADMIN — DAKIN'S SOLUTION 0.125% (QUARTER STRENGTH): 0.12 SOLUTION at 07:58

## 2024-05-26 RX ADMIN — Medication 1 TABLET: at 07:53

## 2024-05-26 RX ADMIN — ENOXAPARIN SODIUM 30 MG: 100 INJECTION SUBCUTANEOUS at 07:58

## 2024-05-26 ASSESSMENT — PAIN SCALES - GENERAL: PAINLEVEL_OUTOF10: 0

## 2024-05-26 NOTE — PLAN OF CARE
Problem: Discharge Planning  Goal: Discharge to home or other facility with appropriate resources  Outcome: Progressing  Flowsheets (Taken 5/25/2024 2000)  Discharge to home or other facility with appropriate resources: Identify barriers to discharge with patient and caregiver     Problem: Safety - Adult  Goal: Free from fall injury  Outcome: Progressing  Flowsheets (Taken 5/25/2024 2000)  Free From Fall Injury: Instruct family/caregiver on patient safety     Problem: ABCDS Injury Assessment  Goal: Absence of physical injury  Outcome: Progressing  Flowsheets (Taken 5/25/2024 2000)  Absence of Physical Injury: Implement safety measures based on patient assessment     Problem: Pain  Goal: Verbalizes/displays adequate comfort level or baseline comfort level  Outcome: Progressing  Flowsheets (Taken 5/25/2024 2000)  Verbalizes/displays adequate comfort level or baseline comfort level: Encourage patient to monitor pain and request assistance     Problem: Neurosensory - Adult  Goal: Achieves maximal functionality and self care  Outcome: Progressing  Flowsheets (Taken 5/25/2024 2000)  Achieves maximal functionality and self care: Encourage and assist patient to increase activity and self care with guidance from physical therapy/occupational therapy     Problem: Skin/Tissue Integrity - Adult  Goal: Incisions, wounds, or drain sites healing without S/S of infection  Outcome: Progressing  Flowsheets (Taken 5/25/2024 2000)  Incisions, Wounds, or Drain Sites Healing Without Sign and Symptoms of Infection: Implement wound care per orders     Problem: Musculoskeletal - Adult  Goal: Return mobility to safest level of function  Outcome: Progressing

## 2024-05-26 NOTE — PROGRESS NOTES
Patient is being discharge to home with wound Vac, this nurse spoke with Podiatry, Dr. Hackett via perfect serve requesting HH orders be placed for wound Vac.  This nurse was advised that Home Health would be set up for patient on his Tuesday appt. With wound care clinic, May 28, 2024.  This information was relayed to patient.

## 2024-05-26 NOTE — DISCHARGE SUMMARY
Physician Discharge Summary     Patient ID:  Damein Aguilar  [id number]    Physician: Ja Rivas DPM     Admission date: 5/21/2024    Discharge date: No discharge date for patient encounter. \    Date of Surgery: 5/17/24    Admission Diagnoses: Cellulitis of right leg [L03.115]  Wound of right leg, subsequent encounter [S81.801D]    Discharge Diagnoses: Same    Procedures: 1.Right Leg 3cm Excision and Debridement of Wound  2. Right Medial hemisoleus Muscle Flap, 5cm x 5cm  3. Vein ligation, right leg  4. Right Skin Substitute Graft, integra [id number]   5. Application of Wound Vac, right leg    History, Physical Exam:       Subjective :   5/26  Patient seen bedside today alongside of Dr. Rivas. Patient appeared pleasant, was oriented to person, place and time and in no acute distress. Patient denies any N/V/F/C/SOB or CP. Patient has no further pedal concerns at this point in time.    5/25  Patient seen bedside today on behalf of Dr. Rivas. Patient appeared pleasant, was oriented to person, place and time and in no acute distress. He is aware we are awaiting final culture results as well as home VAC approval for discharge. Patient denies any N/V/F/C/SOB or CP. Patient has no further pedal concerns at this point in time.     5/24  Patient seen bedside today on behalf of Dr. Rivas. Patient appeared pleasant, was oriented to person, place and time and in no acute distress. He is aware we are awaiting final culture results as well as home VAC approval for discharge. Patient denies any N/V/F/C/SOB or CP. Patient has no further pedal concerns at this point in time.     5/23  Patient seen bedside today alongside of Dr. Rivas. Patient appeared pleasant, was oriented to person, place and time and in no acute distress. He states the pain to his heel and leg has resolved after loosening the outer ACE wrap. He is curious if the wound vac will be placed back on the wound. Patient denies any N/V/F/C/SOB or CP. Patient has no further

## 2024-05-26 NOTE — PROGRESS NOTES
Pt. Discharge to home with spouse. Discharge instructions with medication prescriptions and follow up recommendations reviewed with patient and spouse.  IV removed, cath intact, dressing applied.  Pt. Verbalized understanding.

## 2024-05-28 ENCOUNTER — CARE COORDINATION (OUTPATIENT)
Dept: CASE MANAGEMENT | Age: 64
End: 2024-05-28

## 2024-05-28 ENCOUNTER — HOSPITAL ENCOUNTER (OUTPATIENT)
Dept: HYPERBARIC MEDICINE | Age: 64
Discharge: HOME | End: 2024-05-28
Attending: NURSE PRACTITIONER
Payer: MEDICARE

## 2024-05-28 ENCOUNTER — HOSPITAL ENCOUNTER (OUTPATIENT)
Dept: WOUND CARE | Age: 64
Discharge: HOME | End: 2024-05-28
Attending: NURSE PRACTITIONER
Payer: MEDICARE

## 2024-05-28 VITALS
RESPIRATION RATE: 16 BRPM | SYSTOLIC BLOOD PRESSURE: 126 MMHG | HEART RATE: 52 BPM | TEMPERATURE: 97.5 F | OXYGEN SATURATION: 100 % | DIASTOLIC BLOOD PRESSURE: 59 MMHG

## 2024-05-28 DIAGNOSIS — L97.919 VENOUS STASIS ULCER OF RIGHT LOWER LEG WITH EDEMA OF RIGHT LOWER LEG (HCC): Primary | ICD-10-CM

## 2024-05-28 DIAGNOSIS — T86.821 FAILURE OF FLAP GRAFT: Primary | ICD-10-CM

## 2024-05-28 DIAGNOSIS — I83.019 VENOUS STASIS ULCER OF RIGHT LOWER LEG WITH EDEMA OF RIGHT LOWER LEG (HCC): Primary | ICD-10-CM

## 2024-05-28 DIAGNOSIS — I83.891 VENOUS STASIS ULCER OF RIGHT LOWER LEG WITH EDEMA OF RIGHT LOWER LEG (HCC): Primary | ICD-10-CM

## 2024-05-28 DIAGNOSIS — R60.0 VENOUS STASIS ULCER OF RIGHT LOWER LEG WITH EDEMA OF RIGHT LOWER LEG (HCC): Primary | ICD-10-CM

## 2024-05-28 PROCEDURE — 97605 NEG PRS WND THER DME<=50SQCM: CPT

## 2024-05-28 PROCEDURE — 97597 DBRDMT OPN WND 1ST 20 CM/<: CPT

## 2024-05-28 PROCEDURE — 1111F DSCHRG MED/CURRENT MED MERGE: CPT | Performed by: NURSE PRACTITIONER

## 2024-05-28 PROCEDURE — G0277 HBOT, FULL BODY CHAMBER, 30M: HCPCS

## 2024-05-28 NOTE — DISCHARGE INSTRUCTIONS
system allows you to speak with the , family members or the physician.  You will also be able to watch and listen to television during treatment.  The monoplace chamber administers 100% oxygen at a physician prescribed pressure.    Because you will be in an oxygen environment, a detailed list of safety precautions and guidelines will be strictly enforced for your safety.    TREATMENT SCHEDULE  You will need to arrange for your own transportation.  If there is difficulty, we will try to assist in making the necessary arrangements with an appropriate agency.    Hyperbaric treatments are given daily, Monday through Friday.  Each treatment will last almost two (2) hours.  Be prepared to spend approximately three (3) hours at our facility.  This allows time for preparing you for your treatment “dive” and the actual time in the chamber.    It is essential that you try to make every scheduled treatment “dive” possible so that the effects of the hyperbaric oxygen will continue.  Any long interruption could cause the healing enhancement to slow or even stop.    If at any time you have chills, fever, nausea, vomiting, diarrhea or any problem with your glucose levels, please inform the physician or the nurse. You will be assessed to see if you should be in the chamber that day.    You will receive a complete briefing by a staff member. Necessary forms and informed consents (permits) will need to be signed before treatments begin.  You will also be given a tour of the area .      VISITORS  Visitors are welcome and we encourage patients to bring their families.  We ask that once your family has seen the facility that they remain in the waiting room to ensure your privacy, and the privacy of the other patients.  Visitors are not allowed in the treatment area unless their presence is needed by the physician.    Again, we welcome you to our facility.  Please let us know if there is anything that we can do to assist you

## 2024-05-28 NOTE — CARE COORDINATION
5/28/24, 9:49 AM EDT  Late Entry  Patient goals/plan/ treatment preferences discussed by  and .  Patient goals/plan/ treatment preferences reviewed with patient/ family.  Patient/ family verbalize understanding of discharge plan and are in agreement with goal/plan/treatment preferences.  Understanding was demonstrated using the teach back method.  AVS provided by RN at time of discharge, which includes all necessary medical information pertaining to the patients current course of illness, treatment, post-discharge goals of care, and treatment preferences.     Services At/After Discharge: Outpatient     Patient discharged on 5/26/2024 to home with spouse.  Patient scheduled for wound care clinic and hyperbaric treatment.  Referral was made to [facility] for wound vac care,. Podiatrist did not put in H orders, stated that they would take care of orders are wound care clinic appointment on 5/58. Notified Eliana at [facility].

## 2024-05-28 NOTE — PROGRESS NOTES
Negative Pressure Wound Therapy    NAME:  Damien Aguilar  YOB: 1960  MEDICAL RECORD NUMBER:  [id number]  DATE:  5/28/2024    Applied Negative Pressure to right leg wound(s)/ulcer(s).  [x] Applied skin barrier prep to johnny-wound.   [x] Cut strips of plastic drape to picture frame wound so that johnny-wound is     covered with the drape.   [] If bridging dressing to less prominent site, cover any intact skin that will come in contact with the Negative Pressure Therapy sponge, gauze or channel drain with plastic drape. The sponge should never touch intact skin.   [x] Cut sponge, gauze or channel drain to size which will fit into the wound/ulcer bed without being forced.   [x] Be sure the sponge is large enough to hold the entire round plastic flange which is attached to the tubing. Never allow flange to be larger than the sponge or it will produce suction damaging intact skin.  Total number of individual pieces of foam used within the wound bed: 1    [] If bridging the dressing away from the primary site, be sure the bridge leads to a piece of sponge large enough to hold the entire flange without allowing any of the flange to overlap onto intact skin.   [x] Covered sponge, gauze or channel drain with plastic drape.   [x] Cut a hole in this plastic drape directly over the sponge the same size as the plastic drain tubing.   [x] Removed plastic liner from flange and apply it directly over the hole you cut.   [x] Removed the plastic cover from the flange.   [x] Attached the tubing to the wound/ulcer Negative Pressure Therapy and turn it on to be sure a vacuum is created and that there are no leaks.   [x] If air leaks occur, use plastic drape to patch them.   [x] Secured Negative Pressure Therapy dressing with ace wrap loosely if located on an extremity. Maintain tubing outside of ace wrap. Tubing must not exert pressure on intact skin.    Applied per  Guidelines      Electronically signed by 
of Wound, Right Medial Soleus Muscle Flap Right Skin Substitute Graft, Application of Wound Vac performed by Ja Rivas DPM at [facility]    TONSILLECTOMY AND ADENOIDECTOMY      WISDOM TOOTH EXTRACTION  1974       FAMILY HISTORY    Family History   Problem Relation Age of Onset    Cancer Mother     Hypertension Father        SOCIAL HISTORY    Social History     Tobacco Use    Smoking status: Never     Passive exposure: Past    Smokeless tobacco: Never    Tobacco comments:     Never smoker    Vaping Use    Vaping Use: Never used   Substance Use Topics    Alcohol use: Yes     Alcohol/week: 24.0 - 30.0 standard drinks of alcohol     Types: 24 - 30 Cans of beer per week     Comment: Beer occasionally    Drug use: Never       ALLERGIES    Allergies   Allergen Reactions    Ace Inhibitors      cough    Olmesartan      Cough         MEDICATIONS    Current Outpatient Medications on File Prior to Encounter   Medication Sig Dispense Refill    oxyCODONE-acetaminophen (PERCOCET) 5-325 MG per tablet Take 1 tablet by mouth every 6 hours as needed for Pain for up to 7 days. Intended supply: 7 days. Take lowest dose possible to manage pain Max Daily Amount: 4 tablets 28 tablet 0    gabapentin (NEURONTIN) 300 MG capsule Take 1 capsule by mouth 3 times daily for 30 days. 90 capsule 0    levoFLOXacin (LEVAQUIN) 500 MG tablet Take 1 tablet by mouth daily for 7 days 7 tablet 0    HYDROcodone-acetaminophen (NORCO) 5-325 MG per tablet Take 1 tablet by mouth every 6 hours as needed for Pain for up to 7 days. Intended supply: 7 days. Take lowest dose possible to manage pain Max Daily Amount: 4 tablets 28 tablet 0    VITAMIN D, CHOLECALCIFEROL, PO Take 3 capsules by mouth daily 31384 IU units (takes 3 capsules)      vitamin C (ASCORBIC ACID) 500 MG tablet Take 1 tablet by mouth daily      atorvastatin (LIPITOR) 20 MG tablet Take 1 tablet by mouth daily 90 tablet 1    furosemide (LASIX) 20 MG tablet Take 1 tablet by mouth 2 times daily 180 
preparing you for your treatment “dive” and the actual time in the chamber.    It is essential that you try to make every scheduled treatment “dive” possible so that the effects of the hyperbaric oxygen will continue.  Any long interruption could cause the healing enhancement to slow or even stop.    If at any time you have chills, fever, nausea, vomiting, diarrhea or any problem with your glucose levels, please inform the physician or the nurse. You will be assessed to see if you should be in the chamber that day.    You will receive a complete briefing by a staff member. Necessary forms and informed consents (permits) will need to be signed before treatments begin.  You will also be given a tour of the area .      VISITORS  Visitors are welcome and we encourage patients to bring their families.  We ask that once your family has seen the facility that they remain in the waiting room to ensure your privacy, and the privacy of the other patients.  Visitors are not allowed in the treatment area unless their presence is needed by the physician.    Again, we welcome you to our facility.  Please let us know if there is anything that we can do to assist you during your time with us.         GENERAL INFORMATION REVIEWED: Yes      You will need to arrive 30 minutes prior to your visit.     Please call us if you are not feeling well the day of your visit, so that we can determine if it will be necessary to reschedule your treatment (before you arrive).      Each visit will begin with a staff member asking you a series of questions.  These questions may sound repetitive, but please understand, for safety reasons, that we must ask the same questions each and every visit.      Only chamber approved clothing may be worn during treatment, therefore special clothing is provided for you at each visit.      For safety reasons, chamber operators are required to ask about prohibited items each and every visit.      Vital signs (blood

## 2024-05-28 NOTE — PROGRESS NOTES
[facility]  Hyperbaric Oxygen Therapy   Progress Note      NAME: Damien Aguilar  MEDICAL RECORD NUMBER:  [id number]  AGE: 63 y.o.   GENDER: male  : 1960  EPISODE DATE:  2024     Subjective     HBO Treatment Number: 1 out of Total Treatments: 40    HBO Diagnosis:                    Safety checks performed prior to treatment.  See doc flowsheets for documentation.    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    NELL (obstructive sleep apnea) G47.33    Venous stasis ulcer of right lower leg with edema of right lower leg (HCC) I83.019, I83.891, L97.919, R60.0    Chronic venous stasis dermatitis of right lower extremity I87.2    Venous insufficiency of both lower extremities I87.2    Body mass index (BMI) 45.0-49.9, adult ([facility]) Z68.42    Failure of flap graft T86.821    Wound of right leg, subsequent encounter S81.801D    Morbid obesity ([facility]) E66.01          No results for input(s): \"POCGLU\" in the last 72 hours.    Pre treatment Vital Signs       Temp: 97.7 °F (36.5 °C)     Pulse: 55     Respirations: 18     BP: (!) 141/76       Post treatment Vital Signs  Temp: 97.5 °F (36.4 °C)  Pulse: 52  Respirations: 16  BP: (!) 126/59      Assessment        Physical Exam:  General Appearance:  alert and oriented to person, place and time, well-developed and well-nourished, in no acute distress    Pre Tympanic Membrane Assessment:  tympanic membranes intact bilaterally    Post Tympanic Membrane Assessment:  Right: Normal  Left: Normal    Pulmonary/Chest:  clear to auscultation bilaterally- no wheezes, rales or rhonchi, normal air movement, no respiratory distress    Cardiovascular:  normal       Treatment Start Time: 923     Pressure Reached Time: 933  JANNETH : 2  Treatment Status: No Air break      Decompression Time: 1103   Treatment End Time: 1113    Symptoms Noted During

## 2024-05-28 NOTE — PLAN OF CARE
Problem: Physical Regulation:  Goal: Tolerate HBO therapy and barotrauma will be prevented  Description: Tolerate HBO therapy and barotrauma will be prevented  Outcome: Adequate for Discharge   No s/s of seizure noted. No s/s of barotrauma noted. Pt demonstrated equalization of ears during pressurization.   Care plan reviewed with patient.  Patient verbalizes understanding of the plan of care and contribute to goal setting.

## 2024-05-28 NOTE — PATIENT INSTRUCTIONS
blood vessel growth into the wound and second, to improve the ability of your white blood cells to kill germs.  It is important to know that hyperbaric oxygen is an additional (adjunctive) therapy in addition to the current medical or surgical care you are receiving.  It is also essential that you understand that this is not a “cure-all” but a part of your total medical care.     THE STAFF  The [facility] staff consists of fully trained physicians, nursing staff, and chamber operators.  There will always be a physician, as well as other staff members with you in the department while you are having your hyperbaric oxygen therapy treatment. Please feel free to ask for help from any of the staff members while you are here.    THE CHAMBER  The hyperbaric chamber located at the [facility] is a monoplace, seamless acrylic pressure chamber designed to treat one patient at a time.  You will be lying down with your head slightly elevated for your treatments.  A communication system allows you to speak with the , family members or the physician.  You will also be able to watch and listen to television during treatment.  The monoplace chamber administers 100% oxygen at a physician prescribed pressure.    Because you will be in an oxygen environment, a detailed list of safety precautions and guidelines will be strictly enforced for your safety.    TREATMENT SCHEDULE  You will need to arrange for your own transportation.  If there is difficulty, we will try to assist in making the necessary arrangements with an appropriate agency.    Hyperbaric treatments are given daily, Monday through Friday.  Each treatment will last almost two (2) hours.  Be prepared to spend approximately three (3) hours at our facility.  This allows time for preparing you for your treatment “dive” and the actual time in the chamber.    It is essential that you try to make every scheduled treatment “dive” possible so that the

## 2024-05-28 NOTE — CARE COORDINATION
Care Transitions Note    Initial Call - Call within 2 business days of discharge: Yes     Patient Current Location:  Home: [address] [place] [address] [address]  [facility] [address]    Care Transition Nurse contacted the patient by telephone to perform post hospital discharge assessment, verified name and  as identifiers. Provided introduction to self, and explanation of the Care Transition Nurse role.     Patient: Damien Aguilar    Patient : 1960   MRN: [id number]    Reason for Admission: Wound RLE  Discharge Date: 24  RURS: Readmission Risk Score: 11.4      Last Discharge Facility       Date Complaint Diagnosis Description Type Department Provider    24  Wound of right leg, subsequent encounter ... Admission (Discharged) [facility] Rivas, Ja STRICKLAND DPM            Was this an external facility discharge? No    Additional needs identified to be addressed with provider   No needs identified             Method of communication with provider: none.    Patients top risk factors for readmission: medical condition-Wound RLE, Chronic venous stasis dermatitis of RLE    Interventions to address risk factors:   Home Health: [facility]      Care Summary Note: Contacted pt for initial care transition follow up.  Koby states he is doing pretty good.  Had appt for hyperbaric chamber this morning then had follow up with podiatry-Dr. Ja Rivas.  Pt will be doing for 40 treatments.  Reports minimal pain.  Has percocet as needed.  Denies having any fever or chills.  Reports mobility has been pretty good.  Using his walker when ambulating.  He informed CTN that provider told him that he can bear \"a little more weight\" on right side.  Pt has wound vac.  Reports home health nurse will be making a visit every Saturday.  He is eating and drinking fluids w/o issues.  Denies having any urinary or bowel issues currently.  Reviewed medication list.  He confirmed he has the Gabapentin and Levaquin.  Restarted his other medications.  He is aware

## 2024-05-29 ENCOUNTER — HOSPITAL ENCOUNTER (OUTPATIENT)
Dept: HYPERBARIC MEDICINE | Age: 64
Discharge: HOME | End: 2024-05-29
Attending: NURSE PRACTITIONER
Payer: MEDICARE

## 2024-05-29 VITALS
TEMPERATURE: 97.1 F | SYSTOLIC BLOOD PRESSURE: 122 MMHG | OXYGEN SATURATION: 99 % | DIASTOLIC BLOOD PRESSURE: 58 MMHG | HEART RATE: 52 BPM | RESPIRATION RATE: 16 BRPM

## 2024-05-29 DIAGNOSIS — T86.821 FAILURE OF FLAP GRAFT: Primary | ICD-10-CM

## 2024-05-29 PROCEDURE — G0277 HBOT, FULL BODY CHAMBER, 30M: HCPCS

## 2024-05-29 NOTE — PROGRESS NOTES
[facility]  Hyperbaric Oxygen Therapy   Progress Note      NAME: Damien Aguilar  MEDICAL RECORD NUMBER:  [id number]  AGE: 63 y.o.   GENDER: male  : 1960  EPISODE DATE:  2024     Subjective     HBO Treatment Number: 2 out of Total Treatments: 40    HBO Diagnosis:               Indications: Compromised/Failed Flap/Graft to ___(site) (Right leg)    Safety checks performed prior to treatment.  See doc flowsheets for documentation.    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    Pre treatment Vital Signs       Temp: 97.9 °F (36.6 °C)     Pulse: 55     Respirations: 18     BP: 129/60       Post treatment Vital Signs  Temp: 97.1 °F (36.2 °C)  Pulse: 52  Respirations: 16  BP: (!) 122/58      Assessment        Physical Exam:  General Appearance:  alert and oriented to person, place and time, well-developed and well-nourished, in no acute distress    Pre Tympanic Membrane Assessment:  tympanic membranes intact bilaterally    Post Tympanic Membrane Assessment:  Right: Normal  Left: Normal    Pulmonary/Chest:  clear to auscultation bilaterally- no wheezes, rales or rhonchi, normal air movement, no respiratory distress    Cardiovascular:  normal       Treatment Start Time: 0840     Pressure Reached Time: 0850  JANNETH : 2  Treatment Status: No Air break      Decompression

## 2024-05-30 ENCOUNTER — HOSPITAL ENCOUNTER (OUTPATIENT)
Dept: HYPERBARIC MEDICINE | Age: 64
Discharge: HOME | End: 2024-05-30
Payer: MEDICARE

## 2024-05-30 VITALS
OXYGEN SATURATION: 100 % | TEMPERATURE: 97.3 F | DIASTOLIC BLOOD PRESSURE: 66 MMHG | SYSTOLIC BLOOD PRESSURE: 110 MMHG | RESPIRATION RATE: 16 BRPM | HEART RATE: 48 BPM

## 2024-05-30 DIAGNOSIS — T86.821 FAILURE OF FLAP GRAFT: Primary | ICD-10-CM

## 2024-05-30 PROCEDURE — 99183 HYPERBARIC OXYGEN THERAPY: CPT | Performed by: NURSE PRACTITIONER

## 2024-05-30 PROCEDURE — G0277 HBOT, FULL BODY CHAMBER, 30M: HCPCS

## 2024-05-30 NOTE — PROGRESS NOTES
HBO PROGRESS NOTE      NAME: Damien Aguilar  MEDICAL RECORD NUMBER:  [id number]  AGE: 63 y.o.   GENDER: male  : 1960  EPISODE DATE:  2024     Subjective     HBO Treatment Number: 3 out of Total Treatments: 40    HBO Diagnosis:             Indications: Compromised/Failed Flap/Graft to ___(site) (right leg)    Safety checks performed prior to treatment.  See doc flowsheets for documentation.    Objective        No results for input(s): \"POCGLU\" in the last 72 hours.  Pre treatment Vital Signs       Temp: 97.2 °F (36.2 °C)     Pulse: 54     Respirations: 16     BP: (!) 116/59       Post treatment Vital Signs  Temp: 97.3 °F (36.3 °C)  Pulse: (!) 48  Respirations: 16  BP: 110/66    Assessment        HBO Diagnosis:   Problem List Items Addressed This Visit          Other    Failure of flap graft - Primary    Relevant Orders    Notify physician (specify)    Hyperbaric Oxygen Therapy       Physical Exam        General Appearance:  alert and oriented to person, place and time, well-developed and well-nourished, in no acute distress    Pre Tympanic Membrane Assessment:  Right: Normal  Left: Normal    Post Tympanic Membrane Assessment:  Left: Normal  Right: Normal    Pulmonary/Chest:  clear to auscultation bilaterally- no wheezes, rales or rhonchi, normal air movement, no respiratory distress    Cardiovascular:  normal    Plan        Patient placed in a full body Monoplace Chamber #: 3  Treatment Start Time: 0840     Pressure Reached Time: 0850  JANNETH : 2  Number of Air Breaks:  Treatment Status: No Air break     Decompression Time: 1020   Treatment End Time: 1030  Length of Treatment: 120 Minutes  Symptoms Noted During Treatment: None  Total Treatment Time (min): 110    Treatment Completion Status: Treatment completed without issue    I was present on these premises and immediately available to furnish assistance & direction throughout the HBO Treatment.     Damien Aguilar is a 63 y.o. male  did successfully

## 2024-05-31 ENCOUNTER — HOSPITAL ENCOUNTER (OUTPATIENT)
Dept: HYPERBARIC MEDICINE | Age: 64
Discharge: HOME | End: 2024-05-31
Payer: MEDICARE

## 2024-05-31 VITALS
SYSTOLIC BLOOD PRESSURE: 118 MMHG | RESPIRATION RATE: 16 BRPM | DIASTOLIC BLOOD PRESSURE: 57 MMHG | OXYGEN SATURATION: 99 % | TEMPERATURE: 97.7 F | HEART RATE: 51 BPM

## 2024-05-31 DIAGNOSIS — T86.821 FAILURE OF FLAP GRAFT: Primary | ICD-10-CM

## 2024-05-31 PROCEDURE — 99183 HYPERBARIC OXYGEN THERAPY: CPT | Performed by: NURSE PRACTITIONER

## 2024-05-31 PROCEDURE — G0277 HBOT, FULL BODY CHAMBER, 30M: HCPCS

## 2024-05-31 NOTE — PROGRESS NOTES
HBO PROGRESS NOTE      NAME: Damien Aguilar  MEDICAL RECORD NUMBER:  [id number]  AGE: 63 y.o.   GENDER: male  : 1960  EPISODE DATE:  2024     Subjective     HBO Treatment Number: 4 out of Total Treatments: 40    HBO Diagnosis:             Indications: Compromised/Failed Flap/Graft to ___(site) (right leg)    Safety checks performed prior to treatment.  See doc flowsheets for documentation.    Objective        No results for input(s): \"POCGLU\" in the last 72 hours.  Pre treatment Vital Signs       Temp: 97.2 °F (36.2 °C)     Pulse: 53     Respirations: 18     BP: 125/74       Post treatment Vital Signs  Temp: 97.7 °F (36.5 °C)  Pulse: 51  Respirations: 16  BP: (!) 118/57    Assessment        HBO Diagnosis:   Problem List Items Addressed This Visit          Other    Failure of flap graft - Primary    Relevant Orders    Notify physician (specify)    Hyperbaric Oxygen Therapy       Physical Exam        General Appearance:  alert and oriented to person, place and time, well-developed and well-nourished, in no acute distress    Pre Tympanic Membrane Assessment:  Right: Normal  Left: Normal    Post Tympanic Membrane Assessment:  Left: Normal  Right: Normal    Pulmonary/Chest:  clear to auscultation bilaterally- no wheezes, rales or rhonchi, normal air movement, no respiratory distress    Cardiovascular:  normal    Plan        Patient placed in a full body Monoplace Chamber #: 3  Treatment Start Time: 0835     Pressure Reached Time: 0845  JANNETH : 2  Number of Air Breaks:  Treatment Status: No Air break     Decompression Time: 1015   Treatment End Time: 1025  Length of Treatment: 120 Minutes  Symptoms Noted During Treatment: None  Total Treatment Time (min): 110    Treatment Completion Status: Treatment completed without issue    I was present on these premises and immediately available to furnish assistance & direction throughout the HBO Treatment.     Damien Aguilar is a 63 y.o. male  did successfully

## 2024-06-03 ENCOUNTER — HOSPITAL ENCOUNTER (OUTPATIENT)
Dept: HYPERBARIC MEDICINE | Age: 64
Discharge: HOME | End: 2024-06-03
Payer: MEDICARE

## 2024-06-03 ENCOUNTER — CARE COORDINATION (OUTPATIENT)
Dept: CASE MANAGEMENT | Age: 64
End: 2024-06-03

## 2024-06-03 DIAGNOSIS — T86.821 FAILURE OF FLAP GRAFT: Primary | ICD-10-CM

## 2024-06-03 PROCEDURE — 99183 HYPERBARIC OXYGEN THERAPY: CPT | Performed by: NURSE PRACTITIONER

## 2024-06-03 PROCEDURE — G0277 HBOT, FULL BODY CHAMBER, 30M: HCPCS

## 2024-06-03 ASSESSMENT — PAIN DESCRIPTION - DESCRIPTORS: DESCRIPTORS: ACHING

## 2024-06-03 ASSESSMENT — PAIN SCALES - GENERAL: PAINLEVEL_OUTOF10: 2

## 2024-06-03 ASSESSMENT — PAIN DESCRIPTION - ORIENTATION: ORIENTATION: RIGHT

## 2024-06-03 ASSESSMENT — PAIN DESCRIPTION - LOCATION: LOCATION: LEG

## 2024-06-03 NOTE — CARE COORDINATION
Care Transitions Follow Up Call    Patient Current Location:  Home: [address] [place] [address] [address]  [facility] [address]    Care Transition Nurse contacted the patient by telephone to follow up after admission on 24.  Verified name and  with patient as identifiers.    Patient: Damien Aguilar  Patient : 1960   MRN: [id number]  Reason for Admission: R LE wound failed flap  Discharge Date: 24 RARS: Readmission Risk Score: 11.4      Needs to be reviewed by the provider   Additional needs identified to be addressed with provider: No  none             Method of communication with provider: none.    CTN call to Koby today and he says he is feeling pretty good.  Denies fever, chills, n/v/d, sob, chest pain, dizziness, malaise.  RLE wound healing. Dressing/wound vac changed per [facility] weekly.  Hyperbaric tx continue tomorrow etc.  DPM 24  Denies need for transportation.  CI=859/59  Pain level 2/10 taking pain med q12h w/ relief. Pain more at the top of incision.  Wound vac draining red fluid.  Says he wants to RTW 24 even if he has to ride a scooter.  Using walker to ambulate. Not putting full wt. On RLE.  Discussed benefits of RPM, declines prefers to self monitor.  Discussed ACP docs, declines.  Eating, drinking & sleeping well. Denies problems w/ urination/bowels. No other concerns voiced at this time. Will continue to follow.      Addressed changes since last contact:  home health care-[facility] active  Discussed follow-up appointments. If no appointment was previously scheduled, appointment scheduling offered: Yes.   Is follow up appointment scheduled within 7 days of discharge? No.    Follow Up  Future Appointments   Date Time Provider Department Center   2024  8:30 AM [facility] HYPERBARIC [facility] 1 [facility]   2024 12:30 PM Ja Rivas, ADONIS [facility] WOUND [facility]   2024  8:30 AM [facility] HYPERBARIC [facility] 1 [facility]   2024  8:30 AM [facility] HYPERBARIC [facility]AM[facility] 1 [facility] [place]

## 2024-06-03 NOTE — PLAN OF CARE
Problem: Physical Regulation:  Goal: Tolerate HBO therapy and barotrauma will be prevented  Description: Tolerate HBO therapy and barotrauma will be prevented  Outcome: Adequate for Discharge   No s/s of seizure noted. No s/s of barotrauma noted. Pt demonstrated equalization of ears during pressurization.

## 2024-06-03 NOTE — PROGRESS NOTES
HBO PROGRESS NOTE      NAME: Damien Aguilar  MEDICAL RECORD NUMBER:  [id number]  AGE: 63 y.o.   GENDER: male  : 1960  EPISODE DATE:  6/3/2024     Subjective     HBO Treatment Number: 5 out of Total Treatments: 40    HBO Diagnosis:             Indications: Compromised/Failed Flap/Graft to ___(site) (right leg)    Safety checks performed prior to treatment.  See doc flowsheets for documentation.    Objective        No results for input(s): \"POCGLU\" in the last 72 hours.  Pre treatment Vital Signs       Temp: 97.6 °F (36.4 °C)     Pulse: 50     Respirations: 16     BP: (!) 109/54       Post treatment Vital Signs  Temp: 97.6 °F (36.4 °C)  Pulse: 50  Respirations: 16  BP: (!) 155/63    Assessment        HBO Diagnosis:   Problem List Items Addressed This Visit          Other    Failure of flap graft - Primary    Relevant Orders    Notify physician (specify)    Hyperbaric Oxygen Therapy       Physical Exam        General Appearance:  alert and oriented to person, place and time, well-developed and well-nourished, in no acute distress    Pre Tympanic Membrane Assessment:  Right: Normal  Left: Normal    Post Tympanic Membrane Assessment:  Left: Normal  Right: Normal    Pulmonary/Chest:  clear to auscultation bilaterally- no wheezes, rales or rhonchi, normal air movement, no respiratory distress    Cardiovascular:  normal    Plan        Patient placed in a full body Monoplace Chamber #: 3  Treatment Start Time: 0919     Pressure Reached Time: 0929  JANNETH : 2  Number of Air Breaks:  Treatment Status: No Air break     Decompression Time: 1059   Treatment End Time: 1109  Length of Treatment: 120 Minutes  Symptoms Noted During Treatment: None  Total Treatment Time (min): 110    Treatment Completion Status: Treatment completed without issue    I was present on these premises and immediately available to furnish assistance & direction throughout the HBO Treatment.     Damien Aguilar is a 63 y.o. male  did successfully

## 2024-06-04 ENCOUNTER — HOSPITAL ENCOUNTER (OUTPATIENT)
Dept: HYPERBARIC MEDICINE | Age: 64
Discharge: HOME | End: 2024-06-04
Payer: MEDICARE

## 2024-06-04 ENCOUNTER — HOSPITAL ENCOUNTER (OUTPATIENT)
Dept: WOUND CARE | Age: 64
Discharge: HOME | End: 2024-06-04
Attending: PODIATRIST
Payer: MEDICARE

## 2024-06-04 VITALS
TEMPERATURE: 97.5 F | RESPIRATION RATE: 16 BRPM | SYSTOLIC BLOOD PRESSURE: 139 MMHG | DIASTOLIC BLOOD PRESSURE: 61 MMHG | HEART RATE: 52 BPM | OXYGEN SATURATION: 100 %

## 2024-06-04 DIAGNOSIS — L25.9 CONTACT DERMATITIS OF LOWER LEG: Primary | ICD-10-CM

## 2024-06-04 DIAGNOSIS — T86.821 FAILURE OF FLAP GRAFT: Primary | ICD-10-CM

## 2024-06-04 PROCEDURE — 6370000000 HC RX 637 (ALT 250 FOR IP): Performed by: PODIATRIST

## 2024-06-04 PROCEDURE — 97597 DBRDMT OPN WND 1ST 20 CM/<: CPT

## 2024-06-04 PROCEDURE — G0277 HBOT, FULL BODY CHAMBER, 30M: HCPCS

## 2024-06-04 RX ORDER — CLOTRIMAZOLE AND BETAMETHASONE DIPROPIONATE 10; .64 MG/G; MG/G
CREAM TOPICAL ONCE
Status: DISCONTINUED | OUTPATIENT
Start: 2024-06-04 | End: 2024-06-04

## 2024-06-04 RX ORDER — BETAMETHASONE DIPROPIONATE 0.5 MG/G
CREAM TOPICAL 2 TIMES DAILY
Status: DISCONTINUED | OUTPATIENT
Start: 2024-06-04 | End: 2024-06-05 | Stop reason: HOSPADM

## 2024-06-04 RX ORDER — BETAMETHASONE DIPROPIONATE 0.5 MG/G
CREAM TOPICAL ONCE
Status: DISCONTINUED | OUTPATIENT
Start: 2024-06-04 | End: 2024-06-04

## 2024-06-04 RX ADMIN — Medication: at 10:49

## 2024-06-04 ASSESSMENT — PAIN DESCRIPTION - PAIN TYPE
TYPE: SURGICAL PAIN
TYPE: SURGICAL PAIN

## 2024-06-04 ASSESSMENT — PAIN DESCRIPTION - ORIENTATION
ORIENTATION: RIGHT
ORIENTATION: RIGHT

## 2024-06-04 ASSESSMENT — PAIN DESCRIPTION - LOCATION
LOCATION: LEG
LOCATION: LEG

## 2024-06-04 ASSESSMENT — PAIN SCALES - GENERAL
PAINLEVEL_OUTOF10: 2
PAINLEVEL_OUTOF10: 2

## 2024-06-04 NOTE — PROGRESS NOTES
[facility] Wound and Ostomy care  [address]  [address]   [place], [place] [address]  Telephone: [phone]     FAX [phone]      Patient Instructions   Visit Discharge/Physician Orders:  - Debridement was completed today   Continue to wear offloading boot    Home Care: [facility]    Wound Location: right leg    Dressing orders:     1) Gather wound care supplies and arrange on clean table.     2) Wash your hands with soap and water or use alcohol based hand  for 20 seconds (sing \"Happy Birthday\" twice).    3) Apply collagen and adaptic to the wound bed, steroid cream to the skin around and cover with a saline moistened gauze, ABD pad,roll gauze and ace wrap. Change daily for the next 2 days at HBO.  Thursday wound vac will be reapplied after HBO.    Cleanse wounds with normal saline or wound cleanser and gauze. Pat dry with clean gauze.  Cleanse wound with normal saline or wound cleanser and gauze. Pat dry with clean gauze. Apply skin prep to johnny wound. Cut foam to fit into wound bed. Apply clear dressing or stoma wafer to johnny wound to protect good skin. Apply collagen to the wound then foam to wound bed followed by clear drape. Cut quarter size hole in center of drape over sponge area and apply vac suction. Run wound vac at 125 mmHg continuous suction. Apply extra clear drape as needed if leaks noted. Change canister every 7 days and as needed when full. Change wound vac three times weekly.  Be sure to pad the tubing to prevent any pressure areas  If the wound vac is leaking or not functioning properly vac sponge/dressing must be removed and a wet to dry dressing applied until reapplication can be done. Wound vac can not be in place non functional for more then 2 hours  If your canister becomes full of bright red drainage within 1 hour or less, turn off the wound vac and go to the nearest ER or urgent care  Recommend charging wound vac in the evenings while sleeping, take charge with 
slippage, breakthrough drainage    If you need medical attention outside of business hours, please contact your Primary Care Doctor or go to the nearest emergency room.                        Electronically signed by Ja Rivas DPM, FAROOQ on 6/4/2024 at 1:42 PM

## 2024-06-04 NOTE — PATIENT INSTRUCTIONS
Visit Discharge/Physician Orders:  - Debridement was completed today   Continue to wear offloading boot    [facility]    Wound Location: right leg    Dressing orders:     1) Gather wound care supplies and arrange on clean table.     2) Wash your hands with soap and water or use alcohol based hand  for 20 seconds (sing \"Happy Birthday\" twice).    3) Apply collagen and adaptic to the wound bed, steroid cream to the skin around and cover with a saline moistened gauze, ABD pad,roll gauze and ace wrap. Change daily for the next 2 days at [facility].  Thursday wound vac will be reapplied after HBO.    Cleanse wounds with normal saline or wound cleanser and gauze. Pat dry with clean gauze.  Cleanse wound with normal saline or wound cleanser and gauze. Pat dry with clean gauze. Apply skin prep to johnny wound. Cut foam to fit into wound bed. Apply clear dressing or stoma wafer to johnny wound to protect good skin. Apply collagen to the wound then foam to wound bed followed by clear drape. Cut quarter size hole in center of drape over sponge area and apply vac suction. Run wound vac at 125 mmHg continuous suction. Apply extra clear drape as needed if leaks noted. Change canister every 7 days and as needed when full. Change wound vac three times weekly.  Be sure to pad the tubing to prevent any pressure areas  If the wound vac is leaking or not functioning properly vac sponge/dressing must be removed and a wet to dry dressing applied until reapplication can be done. Wound vac can not be in place non functional for more then 2 hours  If your canister becomes full of bright red drainage within 1 hour or less, turn off the wound vac and go to the nearest ER or urgent care  Recommend charging wound vac in the evenings while sleeping, take charge with you for long trips  Bring canister and extra wound vac dressing kit to each visit     Keep all dressings clean & dry.    Follow up visit:   1 Week 6/11/24 at

## 2024-06-04 NOTE — PLAN OF CARE
Problem: Wound:  Goal: Will show signs of wound healing; wound closure and no evidence of infection  Description: Will show signs of wound healing; wound closure and no evidence of infection  Outcome: Progressing   Pt. Seen for right leg wound see AVS for new orders. Follow up in 1 week.  Care plan reviewed with patient.  Patient verbalizes understanding of the plan of care and contribute to goal setting.

## 2024-06-05 ENCOUNTER — HOSPITAL ENCOUNTER (OUTPATIENT)
Dept: HYPERBARIC MEDICINE | Age: 64
Discharge: HOME | End: 2024-06-05
Payer: MEDICARE

## 2024-06-05 VITALS
TEMPERATURE: 97.1 F | DIASTOLIC BLOOD PRESSURE: 63 MMHG | OXYGEN SATURATION: 100 % | RESPIRATION RATE: 16 BRPM | HEART RATE: 49 BPM | SYSTOLIC BLOOD PRESSURE: 135 MMHG

## 2024-06-05 DIAGNOSIS — T86.821 FAILURE OF FLAP GRAFT: Primary | ICD-10-CM

## 2024-06-05 PROCEDURE — G0277 HBOT, FULL BODY CHAMBER, 30M: HCPCS

## 2024-06-05 ASSESSMENT — PAIN DESCRIPTION - DESCRIPTORS: DESCRIPTORS: ACHING

## 2024-06-05 ASSESSMENT — PAIN DESCRIPTION - ORIENTATION: ORIENTATION: RIGHT

## 2024-06-05 ASSESSMENT — PAIN SCALES - GENERAL: PAINLEVEL_OUTOF10: 2

## 2024-06-05 ASSESSMENT — PAIN DESCRIPTION - LOCATION: LOCATION: LEG

## 2024-06-05 NOTE — PROGRESS NOTES
Physician Progress Note      PATIENT:               RODRIGUEZ JOHNSTON  CSN #:                  [id number]  :                       1960  ADMIT DATE:       2024 2:42 PM  DISCH DATE:        2024 11:54 AM  RESPONDING  PROVIDER #:        MIKE MORALES          QUERY TEXT:    Patient underwent recent debridement with right medial hemisoleus muscle flap   and was noted to have Cellulitis of right leg. Patient was treated with   excisional debridement and IV Zosyn. After further review, was the cellulitis   directly related to the procedure.    The medical record reflects the following:  Risk Factors: Cellulitis of right leg  Clinical Indicators:  recent debridement with right medial hemisoleus muscle   flap  Treatment: excisional debridement and IV Zosyn    Thank you.  Angeline Keen RN, Clinical Documentation Integrity, [employer], [facility], [facility]  Options provided:  -- Cellulitis of right leg is due to the procedure  -- Cellulitis of right leg is not due to the procedure  -- Other - I will add my own diagnosis  -- Disagree - Not applicable / Not valid  -- Disagree - Clinically unable to determine / Unknown  -- Refer to Clinical Documentation Reviewer    PROVIDER RESPONSE TEXT:    Cellulitis of right leg is not due to the procedure.    Query created by: Angeline Keen on 2024 8:09 AM      Electronically signed by:  MIKE MORALES 2024 7:52 AM

## 2024-06-05 NOTE — PROGRESS NOTES
[facility]  Hyperbaric Oxygen Therapy   Progress Note      NAME: Damien Aguilar  MEDICAL RECORD NUMBER:  [id number]  AGE: 63 y.o.   GENDER: male  : 1960  EPISODE DATE:  2024     Subjective     HBO Treatment Number: 6 out of Total Treatments: 40    HBO Diagnosis:               Indications: Compromised/Failed Flap/Graft to ___(site) (right leg)    Safety checks performed prior to treatment.  See doc flowsheets for documentation.    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    Pre treatment Vital Signs       Temp: 97.7 °F (36.5 °C)     Pulse: 52     Respirations: 16     BP: (!) 143/86       Post treatment Vital Signs  Temp: 97.5 °F (36.4 °C)  Pulse: 52  Respirations: 16  BP: 139/61      Assessment        Physical Exam:  General Appearance:  alert and oriented to person, place and time, well-developed and well-nourished, in no acute distress    Pre Tympanic Membrane Assessment:  tympanic membranes intact bilaterally    Post Tympanic Membrane Assessment:  Right: Normal  Left: Normal    Pulmonary/Chest:  clear to auscultation bilaterally- no wheezes, rales or rhonchi, normal air movement, no respiratory distress    Cardiovascular:  normal       Treatment Start Time: 0839     Pressure Reached Time: 0849  JANNETH : 2  Treatment

## 2024-06-06 ENCOUNTER — HOSPITAL ENCOUNTER (OUTPATIENT)
Dept: HYPERBARIC MEDICINE | Age: 64
Discharge: HOME | End: 2024-06-06
Payer: MEDICARE

## 2024-06-06 ENCOUNTER — HOSPITAL ENCOUNTER (OUTPATIENT)
Dept: WOUND CARE | Age: 64
Discharge: HOME | End: 2024-06-06
Attending: PODIATRIST
Payer: MEDICARE

## 2024-06-06 VITALS
DIASTOLIC BLOOD PRESSURE: 63 MMHG | SYSTOLIC BLOOD PRESSURE: 155 MMHG | HEART RATE: 50 BPM | OXYGEN SATURATION: 99 % | TEMPERATURE: 97.2 F | RESPIRATION RATE: 16 BRPM

## 2024-06-06 VITALS
SYSTOLIC BLOOD PRESSURE: 122 MMHG | HEART RATE: 51 BPM | DIASTOLIC BLOOD PRESSURE: 58 MMHG | RESPIRATION RATE: 16 BRPM | TEMPERATURE: 97.5 F | OXYGEN SATURATION: 100 %

## 2024-06-06 DIAGNOSIS — T86.821 FAILURE OF FLAP GRAFT: Primary | ICD-10-CM

## 2024-06-06 PROCEDURE — 97597 DBRDMT OPN WND 1ST 20 CM/<: CPT

## 2024-06-06 PROCEDURE — 99183 HYPERBARIC OXYGEN THERAPY: CPT | Performed by: NURSE PRACTITIONER

## 2024-06-06 PROCEDURE — G0277 HBOT, FULL BODY CHAMBER, 30M: HCPCS

## 2024-06-06 PROCEDURE — 97605 NEG PRS WND THER DME<=50SQCM: CPT

## 2024-06-06 NOTE — PROGRESS NOTES
HBO PROGRESS NOTE      NAME: Damien Aguilar  MEDICAL RECORD NUMBER:  [id number]  AGE: 63 y.o.   GENDER: male  : 1960  EPISODE DATE:  2024     Subjective     HBO Treatment Number: 8 out of Total Treatments: 40    HBO Diagnosis:             Indications: Compromised/Failed Flap/Graft to ___(site) (right leg)    Safety checks performed prior to treatment.  See doc flowsheets for documentation.    Objective        No results for input(s): \"POCGLU\" in the last 72 hours.  Pre treatment Vital Signs       Temp: 97.7 °F (36.5 °C)     Pulse: (!) 49     Respirations: 16     BP: (!) 107/55       Post treatment Vital Signs  Temp: 97.5 °F (36.4 °C)  Pulse: 51  Respirations: 16  BP: (!) 122/58    Assessment        HBO Diagnosis:   Problem List Items Addressed This Visit          Other    Failure of flap graft - Primary    Relevant Orders    Notify physician (specify)    Hyperbaric Oxygen Therapy       Physical Exam        General Appearance:  alert and oriented to person, place and time, well-developed and well-nourished, in no acute distress    Pre Tympanic Membrane Assessment:  Right: Normal  Left: Normal    Post Tympanic Membrane Assessment:  Left: Normal  Right: Normal    Pulmonary/Chest:  clear to auscultation bilaterally- no wheezes, rales or rhonchi, normal air movement, no respiratory distress    Cardiovascular:  normal    Plan        Patient placed in a full body Monoplace Chamber #: 3  Treatment Start Time: 0848     Pressure Reached Time: 0848  JANNETH : 2  Number of Air Breaks:  Treatment Status: No Air break     Decompression Time: 1028   Treatment End Time: 1038  Length of Treatment: 120 Minutes     Total Treatment Time (min): 110    Treatment Completion Status: Treatment completed without issue    I was present on these premises and immediately available to furnish assistance & direction throughout the HBO Treatment.     Damien Aguilar is a 63 y.o. male  did successfully complete today's hyperbaric

## 2024-06-06 NOTE — PLAN OF CARE
Problem: Wound:  Goal: Will show signs of wound healing; wound closure and no evidence of infection  Description: Will show signs of wound healing; wound closure and no evidence of infection  Outcome: Progressing     Patient presents to wound clinic for right leg wound. No s/s of infection noted. See AVS for discharge instructions. Follow up visit:   1 Week 6/11/24 at 10:45am     Care plan reviewed with patient.  Patient verbalizes understanding of the plan of care and contribute to goal setting.

## 2024-06-06 NOTE — PROGRESS NOTES
[facility]  Hyperbaric Oxygen Therapy   Progress Note      NAME: Damien Aguilar  MEDICAL RECORD NUMBER:  [id number]  AGE: 63 y.o.   GENDER: male  : 1960  EPISODE DATE:  2024     Subjective     HBO Treatment Number: 7 out of Total Treatments: 40    HBO Diagnosis:               Indications: Compromised/Failed Flap/Graft to ___(site) (right leg)    Safety checks performed prior to treatment.  See doc flowsheets for documentation.    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    Pre treatment Vital Signs       Temp: 98 °F (36.7 °C)     Pulse: (!) 49     Respirations: 16     BP: (!) 111/56       Post treatment Vital Signs  Temp: 97.1 °F (36.2 °C)  Pulse: (!) 49  Respirations: 16  BP: 135/63      Assessment        Physical Exam:  General Appearance:  alert and oriented to person, place and time, well-developed and well-nourished, in no acute distress    Pre Tympanic Membrane Assessment:  tympanic membranes intact bilaterally    Post Tympanic Membrane Assessment:  Right: Normal  Left: Normal    Pulmonary/Chest:  clear to auscultation bilaterally- no wheezes, rales or rhonchi, normal air movement, no respiratory distress    Cardiovascular:  normal       Treatment Start Time: 0832     Pressure Reached Time: 0842  JANNETH :

## 2024-06-06 NOTE — PROGRESS NOTES
[facility]  [address]   [place], [place] [address]  Telephone: [phone]     FAX [phone]    [facility]       Patient Instructions   Visit Discharge/Physician Orders:  - Debridement was completed today   Continue to wear offloading boot    Home Care: [facility]    Wound Location: right leg    Dressing orders:     1) Gather wound care supplies and arrange on clean table.     2) Wash your hands with soap and water or use alcohol based hand  for 20 seconds (sing \"Happy Birthday\" twice).    3) Right leg- Collagen to wound. Lightly moisten with saline.  Wound Vac reapplied today. Wrapped with kerlix and ace wrap. Leave wound vac intact until Monday, at which time Dr Rivas will reassess. If patient begins to feel skin irritation, like previously, home health to remove wound vac and use collagen to wound follow by ABD kerlix and ace wrap until follow up visit on Monday.     If the wound vac is leaking or not functioning properly vac sponge/dressing must be removed and a wet to dry dressing applied until reapplication can be done. Wound vac can not be in place non functional for more then 2 hours  If your canister becomes full of bright red drainage within 1 hour or less, turn off the wound vac and go to the nearest ER or urgent care  Recommend charging wound vac in the evenings while sleeping, take charge with you for long trips  Bring canister and extra wound vac dressing kit to each visit     Keep all dressings clean & dry.    Follow up visit:   1 Week 6/11/24 at 10:45am    Supplies:    Duration of dressings: 30 days    We have sent your supply order to the following company:  [employer]  If you don't receive the items you were expecting or don't know what the items are that you received, call the company where the order was sent.   If you are unable to obtain wound supplies, continue to use the supplies you have available until you are able to reach us. It is 
offloading boot    Home Care: [facility]    Wound Location: right leg    Dressing orders:     1) Gather wound care supplies and arrange on clean table.     2) Wash your hands with soap and water or use alcohol based hand  for 20 seconds (sing \"Happy Birthday\" twice).    3) Right leg- Collagen to wound. Lightly moisten with saline.  Wound Vac reapplied today. Wrapped with kerlix and ace wrap. Leave wound vac intact until Monday, at which time Dr Rivas will reassess. If patient begins to feel skin irritation, like previously, home health to remove wound vac and use collagen to wound follow by ABD kerlix and ace wrap until follow up visit on Monday.     If the wound vac is leaking or not functioning properly vac sponge/dressing must be removed and a wet to dry dressing applied until reapplication can be done. Wound vac can not be in place non functional for more then 2 hours  If your canister becomes full of bright red drainage within 1 hour or less, turn off the wound vac and go to the nearest ER or urgent care  Recommend charging wound vac in the evenings while sleeping, take charge with you for long trips  Bring canister and extra wound vac dressing kit to each visit     Keep all dressings clean & dry.    Follow up visit:   1 Week 6/11/24 at 10:45am    Supplies:    Duration of dressings: 30 days    We have sent your supply order to the following company:  [employer]  If you don't receive the items you were expecting or don't know what the items are that you received, call the company where the order was sent.   If you are unable to obtain wound supplies, continue to use the supplies you have available until you are able to reach us. It is most important to keep the wound covered at all times.  It is YOUR responsibility to make sure that supplies are re-ordered before you run out. Re-order telephone numbers are included in each package.     Keep next scheduled appointment. Please give 24 hour notice if

## 2024-06-06 NOTE — PATIENT INSTRUCTIONS
Visit Discharge/Physician Orders:  - Debridement was completed today   Continue to wear offloading boot    Home Care: [facility]    Wound Location: right leg    Dressing orders:     1) Gather wound care supplies and arrange on clean table.     2) Wash your hands with soap and water or use alcohol based hand  for 20 seconds (sing \"Happy Birthday\" twice).    3) Right leg- Collagen to wound. Lightly moisten with saline.  Wound Vac reapplied today. Wrapped with kerlix and ace wrap. Leave wound vac intact until Monday, at which time Dr Rivas will reassess. If patient begins to feel skin irritation, like previously, home health to remove wound vac and use collagen to wound follow by ABD kerlix and ace wrap until follow up visit on Monday.     If the wound vac is leaking or not functioning properly vac sponge/dressing must be removed and a wet to dry dressing applied until reapplication can be done. Wound vac can not be in place non functional for more then 2 hours  If your canister becomes full of bright red drainage within 1 hour or less, turn off the wound vac and go to the nearest ER or urgent care  Recommend charging wound vac in the evenings while sleeping, take charge with you for long trips  Bring canister and extra wound vac dressing kit to each visit     Keep all dressings clean & dry.    Follow up visit:   1 Week 6/11/24 at 10:45am    Supplies:    Duration of dressings: 30 days    We have sent your supply order to the following company:  [employer]  If you don't receive the items you were expecting or don't know what the items are that you received, call the company where the order was sent.   If you are unable to obtain wound supplies, continue to use the supplies you have available until you are able to reach us. It is most important to keep the wound covered at all times.  It is YOUR responsibility to make sure that supplies are re-ordered before you run out. Re-order telephone numbers are

## 2024-06-07 ENCOUNTER — HOSPITAL ENCOUNTER (OUTPATIENT)
Dept: HYPERBARIC MEDICINE | Age: 64
Discharge: HOME | End: 2024-06-07
Payer: MEDICARE

## 2024-06-07 VITALS
SYSTOLIC BLOOD PRESSURE: 125 MMHG | HEART RATE: 48 BPM | TEMPERATURE: 97.5 F | DIASTOLIC BLOOD PRESSURE: 67 MMHG | OXYGEN SATURATION: 100 % | RESPIRATION RATE: 16 BRPM

## 2024-06-07 DIAGNOSIS — T86.821 FAILURE OF FLAP GRAFT: Primary | ICD-10-CM

## 2024-06-07 PROCEDURE — 99183 HYPERBARIC OXYGEN THERAPY: CPT | Performed by: NURSE PRACTITIONER

## 2024-06-07 PROCEDURE — G0277 HBOT, FULL BODY CHAMBER, 30M: HCPCS

## 2024-06-07 NOTE — PROGRESS NOTES
HBO PROGRESS NOTE      NAME: Damien Aguilar  MEDICAL RECORD NUMBER:  [id number]  AGE: 63 y.o.   GENDER: male  : 1960  EPISODE DATE:  2024     Subjective     HBO Treatment Number: 9 out of Total Treatments: 40    HBO Diagnosis:             Indications: Compromised/Failed Flap/Graft to ___(site) (right leg)    Safety checks performed prior to treatment.  See doc flowsheets for documentation.    Objective        No results for input(s): \"POCGLU\" in the last 72 hours.  Pre treatment Vital Signs       Temp: 98.1 °F (36.7 °C)     Pulse: (!) 49     Respirations: 16     BP: 118/60       Post treatment Vital Signs  Temp: 97.5 °F (36.4 °C)  Pulse: (!) 48  Respirations: 16  BP: 125/67    Assessment        HBO Diagnosis:   Problem List Items Addressed This Visit          Other    Failure of flap graft - Primary    Relevant Orders    Notify physician (specify)    Hyperbaric Oxygen Therapy       Physical Exam        General Appearance:  alert and oriented to person, place and time, well-developed and well-nourished, in no acute distress    Pre Tympanic Membrane Assessment:  Right: Normal  Left: Normal    Post Tympanic Membrane Assessment:  Left: Normal  Right: Normal    Pulmonary/Chest:  clear to auscultation bilaterally- no wheezes, rales or rhonchi, normal air movement, no respiratory distress    Cardiovascular:  normal    Plan        Patient placed in a full body Monoplace Chamber #: 3  Treatment Start Time: 0859     Pressure Reached Time: 0909  JANNETH : 2  Number of Air Breaks:  Treatment Status: No Air break     Decompression Time: 1039   Treatment End Time: 1049 (Treatment time fixed but total time will not recalculate to 110)  Length of Treatment: 120 Minutes  Symptoms Noted During Treatment: None  Total Treatment Time (min): 132    Treatment Completion Status: Treatment completed without issue    I was present on these premises and immediately available to furnish assistance & direction throughout the HBO

## 2024-06-10 ENCOUNTER — HOSPITAL ENCOUNTER (OUTPATIENT)
Dept: WOUND CARE | Age: 64
Discharge: HOME | End: 2024-06-10
Attending: PODIATRIST
Payer: MEDICARE

## 2024-06-10 ENCOUNTER — CARE COORDINATION (OUTPATIENT)
Dept: CASE MANAGEMENT | Age: 64
End: 2024-06-10

## 2024-06-10 ENCOUNTER — HOSPITAL ENCOUNTER (OUTPATIENT)
Dept: HYPERBARIC MEDICINE | Age: 64
Discharge: HOME | End: 2024-06-10
Payer: MEDICARE

## 2024-06-10 VITALS
TEMPERATURE: 96.4 F | RESPIRATION RATE: 16 BRPM | DIASTOLIC BLOOD PRESSURE: 96 MMHG | HEART RATE: 50 BPM | SYSTOLIC BLOOD PRESSURE: 134 MMHG | OXYGEN SATURATION: 100 %

## 2024-06-10 VITALS
RESPIRATION RATE: 16 BRPM | SYSTOLIC BLOOD PRESSURE: 134 MMHG | OXYGEN SATURATION: 100 % | DIASTOLIC BLOOD PRESSURE: 96 MMHG | TEMPERATURE: 96.4 F | HEART RATE: 50 BPM

## 2024-06-10 DIAGNOSIS — T86.821 FAILURE OF FLAP GRAFT: Primary | ICD-10-CM

## 2024-06-10 PROCEDURE — G0277 HBOT, FULL BODY CHAMBER, 30M: HCPCS

## 2024-06-10 PROCEDURE — 97605 NEG PRS WND THER DME<=50SQCM: CPT

## 2024-06-10 PROCEDURE — 97597 DBRDMT OPN WND 1ST 20 CM/<: CPT

## 2024-06-10 NOTE — PROGRESS NOTES
[facility]          Progress Note and Procedure Note      Damien Aguilar  MEDICAL RECORD NUMBER:  [id number]  AGE: 63 y.o.   GENDER: male  : 1960  EPISODE DATE:  6/10/2024    Subjective:     Chief Complaint   Patient presents with    Wound Check     Right leg    Presents for hyperbaric oxygen and evaluation of the wound and negative wound pressure therapy change and evaluation     HISTORY of PRESENT ILLNESS HPI     Damien Aguilar is a 63 y.o. male Established patient referred by LILLIANA, who presents today for wound/ulcer evaluation.   History of Wound Context: Patient status post hyperbaric oxygen today and he is being assessed as he is utilizing the negative wound pressure therapy has no new complaints.  wound/Ulcer Pain Timing/Severity: mild  Quality of pain: dull  Severity:  0 / 10   Modifying Factors: None  Associated Signs/Symptoms: edema and drainage    Interval History:   Patient presents today for follow up on wound/ulcer's progression. The patient is currently on antibiotics.  Current dressing care includes negative wound pressure therapy.        PAST MEDICAL HISTORY        Diagnosis Date    Morbid obesity (HCC)     Osteoarthritis     Snoring     possible apnea - per wife, better since lost 20# recently.  BMI 41.97, neck circ 17.5 cm, no daytime somnolence, no am headaches.       PAST SURGICAL HISTORY    Past Surgical History:   Procedure Laterality Date    HERNIA REPAIR      left inguinal    JOINT REPLACEMENT Left 2020    left hip    OTHER SURGICAL HISTORY      right leg vein stripping    PRESSURE ULCER DEBRIDEMENT Right 2024    Right Leg Excision and Debridement of Wound, Right Medial Soleus Muscle Flap Right Skin Substitute Graft, Application of Wound Vac performed by Ja Rivas DPM at [facility] OR    TONSILLECTOMY AND ADENOIDECTOMY      WISDOM TOOTH EXTRACTION         FAMILY HISTORY    Family History   Problem Relation Age of Onset    Cancer Mother

## 2024-06-10 NOTE — PATIENT INSTRUCTIONS
Visit Discharge/Physician Orders:  - Debridement was completed today.  - Continue to wear offloading boot and using scooter.    Home Care: [facility]    Wound Location: Right lower leg    Dressing orders:     1) Gather wound care supplies and arrange on clean table.     2) Wash your hands with soap and water or use alcohol based hand  for 20 seconds (sing \"Happy Birthday\" twice).    3) Right lower leg- Apply collagen powder to wound and lightly moisten with saline. Apply wound vac at 125 mmHg continuous suction. Wrap with kerlix and ace wrap. Next wound vac change in wound clinic on Thursday 6/13/24 - Dr. Rivas will re-evaluate at that time.     - If skin irritation returns then home health to remove wound vac and use collagen to wound follow by ABD kerlix and ace wrap until follow up visit.     If the wound vac is leaking or not functioning properly vac sponge/dressing must be removed and a wet to dry dressing applied until reapplication can be done. Wound vac can not be in place non functional for more then 2 hours  If your canister becomes full of bright red drainage within 1 hour or less, turn off the wound vac and go to the nearest ER or urgent care  Recommend charging wound vac in the evenings while sleeping, take charge with you for long trips  Bring canister and extra wound vac dressing kit to each visit     Keep all dressings clean & dry.    Follow up visit: 6/18/24 after hyperbaric treatment    Supplies:    Duration of dressings: 30 days    We have sent your supply order to the following company:  [employer]  If you don't receive the items you were expecting or don't know what the items are that you received, call the company where the order was sent.   If you are unable to obtain wound supplies, continue to use the supplies you have available until you are able to reach us. It is most important to keep the wound covered at all times.  It is YOUR responsibility to make sure that supplies are

## 2024-06-10 NOTE — PLAN OF CARE
Problem: Physical Regulation:  Goal: Tolerate HBO therapy and barotrauma will be prevented  Description: Tolerate HBO therapy and barotrauma will be prevented  Outcome: Adequate for Discharge   No s/s of seizure noted. No s/s of barotrauma noted. Pt demonstrated equalization of ears during pressurization.    Care plan reviewed with patient.  Patient verbalizes understanding of the plan of care and contributes to goal setting.

## 2024-06-10 NOTE — PROGRESS NOTES
Negative Pressure Wound Therapy    NAME:  Damien Aguilar  YOB: 1960  MEDICAL RECORD NUMBER:  [id number]  DATE:  6/10/2024    Applied Negative Pressure to right leg wound(s)/ulcer(s).  [x] Applied skin barrier prep to johnny-wound.   [x] Cut strips of plastic drape to picture frame wound so that johnny-wound is     covered with the drape.   [x] If bridging dressing to less prominent site, cover any intact skin that will come in contact with the Negative Pressure Therapy sponge, gauze or channel drain with plastic drape. The sponge should never touch intact skin.   [x] Cut sponge, gauze or channel drain to size which will fit into the wound/ulcer bed without being forced.   [x] Be sure the sponge is large enough to hold the entire round plastic flange which is attached to the tubing. Never allow flange to be larger than the sponge or it will produce suction damaging intact skin.  Total number of individual pieces of foam used within the wound bed: 1    [x] If bridging the dressing away from the primary site, be sure the bridge leads to a piece of sponge large enough to hold the entire flange without allowing any of the flange to overlap onto intact skin.   [x] Covered sponge, gauze or channel drain with plastic drape.   [x] Cut a hole in this plastic drape directly over the sponge the same size as the plastic drain tubing.   [x] Removed plastic liner from flange and apply it directly over the hole you cut.   [x] Removed the plastic cover from the flange.   [x] Attached the tubing to the wound/ulcer Negative Pressure Therapy and turn it on to be sure a vacuum is created and that there are no leaks.   [x] If air leaks occur, use plastic drape to patch them.   [x] Secured Negative Pressure Therapy dressing with ace wrap loosely if located on an extremity. Maintain tubing outside of ace wrap. Tubing must not exert pressure on intact skin.    Applied per  Guidelines      Electronically signed by

## 2024-06-10 NOTE — CARE COORDINATION
any needs or concerns that I can assist you with?: No   Identified Barriers: Lack of Education             Follow Up Appointment:   Reviewed upcoming appointment(s).  Future Appointments         Provider Specialty Dept Phone    6/11/2024 8:30 AM [facility] Hyper[facility] Medicine [phone]    6/12/2024 8:30 AM [facility] [phone]    6/13/2024 8:30 AM [facility] [phone]    6/14/2024 8:30 AM [facility] [phone]    6/17/2024 8:30 AM [facility] [phone]    6/18/2024 8:30 AM [facility] [phone]    6/18/2024 12:30 PM Ja Rivas, DPM Wound Ostomy [phone]    6/19/2024 8:30 AM [facility] [phone]    6/20/2024 8:30 AM [facility] [phone]    6/21/2024 8:30 AM [facility] [phone]    6/24/2024 8:30 AM [facility] [phone]    6/25/2024 8:30 AM [facility] [phone]    6/26/2024 8:30 AM [facility] [phone]    6/27/2024 8:30 AM [facility] [phone]    6/28/2024 8:30 AM [facility] [phone]    7/1/2024 8:30 AM [facility] Medicine [phone]    7/2/2024 8:30 AM [facility] [phone]    7/3/2024 8:30 AM [facility] [phone]    7/8/2024 8:30 AM [facility] [phone]    7/9/2024 8:30 AM [facility] [phone]    7/10/2024 8:30 AM [facility] [phone]    12/16/2024 8:00 AM Kat Freedman PA-C Pulmonology

## 2024-06-10 NOTE — PLAN OF CARE
Problem: Wound:  Goal: Will show signs of wound healing; wound closure and no evidence of infection  Description: Will show signs of wound healing; wound closure and no evidence of infection  Outcome: Progressing  Patient presents to wound clinic for follow up of right leg wound. Discharge instructions/dressing orders per AVS. Follow up appointment scheduled next week.     Care plan reviewed with patient.  Patient verbalizes understanding of the plan of care and contributes to goal setting.

## 2024-06-11 ENCOUNTER — HOSPITAL ENCOUNTER (OUTPATIENT)
Dept: HYPERBARIC MEDICINE | Age: 64
Discharge: HOME | End: 2024-06-11
Payer: MEDICARE

## 2024-06-11 ENCOUNTER — APPOINTMENT (OUTPATIENT)
Dept: WOUND CARE | Age: 64
End: 2024-06-11
Attending: PODIATRIST
Payer: MEDICARE

## 2024-06-11 VITALS
DIASTOLIC BLOOD PRESSURE: 58 MMHG | TEMPERATURE: 97.1 F | RESPIRATION RATE: 16 BRPM | SYSTOLIC BLOOD PRESSURE: 109 MMHG | HEART RATE: 44 BPM | OXYGEN SATURATION: 99 %

## 2024-06-11 DIAGNOSIS — T86.821 FAILURE OF FLAP GRAFT: Primary | ICD-10-CM

## 2024-06-11 PROCEDURE — G0277 HBOT, FULL BODY CHAMBER, 30M: HCPCS

## 2024-06-11 NOTE — PROGRESS NOTES
[facility]  Hyperbaric Oxygen Therapy   Progress Note      NAME: Damien Aguilar  MEDICAL RECORD NUMBER:  [id number]  AGE: 63 y.o.   GENDER: male  : 1960  EPISODE DATE:  6/10/2024     Subjective     HBO Treatment Number: 10 out of Total Treatments: 40    HBO Diagnosis:               Indications: Compromised/Failed Flap/Graft to ___(site) (Right leg)    Safety checks performed prior to treatment.  See doc flowsheets for documentation.    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    Pre treatment Vital Signs       Temp: 97.3 °F (36.3 °C)     Pulse: 52     Respirations: 16     BP: (!) 143/69       Post treatment Vital Signs  Temp: (!) 96.4 °F (35.8 °C)  Pulse: 50  Respirations: 16  BP: (!) 134/96      Assessment        Physical Exam:  General Appearance:  alert and oriented to person, place and time, well-developed and well-nourished, in no acute distress    Pre Tympanic Membrane Assessment:  tympanic membranes intact bilaterally    Post Tympanic Membrane Assessment:  Right: Normal  Left: Normal    Pulmonary/Chest:  clear to auscultation bilaterally- no wheezes, rales or rhonchi, normal air movement, no respiratory distress    Cardiovascular:  normal       Treatment Start Time: 0856     Pressure Reached Time: 0906  JANNETH :

## 2024-06-11 NOTE — PROGRESS NOTES
2  Treatment Status: No Air break      Decompression Time: 1028   Treatment End Time: 1038    Symptoms Noted During Treatment: None    Adverse Event: no      I was present on these premises and immediately available to furnish assistance & direction throughout the procedure.     Plan          Damien Aguilar is a 63 y.o. male  successfully completed today's hyperbaric oxygen treatment at [facility].    In my clinical judgement, ongoing HBO therapy is necessary at this time, given a threat to patient function, limb or life from the current condition.  Supervision and attendance of Hyperbaric Oxygen Therapy provided.  Continue HBO treatment as outlined in the treatment plan.    Hyperbaric Oxygen:  Pt tolerated Treatment Number: 11 well today without complications.     Electronically signed by Gokul Yen MD on 6/11/2024 at 11:01 AM

## 2024-06-12 ENCOUNTER — HOSPITAL ENCOUNTER (OUTPATIENT)
Dept: HYPERBARIC MEDICINE | Age: 64
Discharge: HOME | End: 2024-06-12
Payer: MEDICARE

## 2024-06-12 VITALS
SYSTOLIC BLOOD PRESSURE: 113 MMHG | OXYGEN SATURATION: 100 % | TEMPERATURE: 97.5 F | DIASTOLIC BLOOD PRESSURE: 62 MMHG | HEART RATE: 51 BPM | RESPIRATION RATE: 16 BRPM

## 2024-06-12 DIAGNOSIS — T86.821 FAILURE OF FLAP GRAFT: Primary | ICD-10-CM

## 2024-06-12 PROCEDURE — G0277 HBOT, FULL BODY CHAMBER, 30M: HCPCS

## 2024-06-13 ENCOUNTER — HOSPITAL ENCOUNTER (OUTPATIENT)
Dept: WOUND CARE | Age: 64
Discharge: HOME | End: 2024-06-13
Attending: PODIATRIST
Payer: MEDICARE

## 2024-06-13 ENCOUNTER — HOSPITAL ENCOUNTER (OUTPATIENT)
Dept: HYPERBARIC MEDICINE | Age: 64
Discharge: HOME | End: 2024-06-13
Payer: MEDICARE

## 2024-06-13 VITALS
TEMPERATURE: 97.5 F | OXYGEN SATURATION: 100 % | HEART RATE: 51 BPM | SYSTOLIC BLOOD PRESSURE: 131 MMHG | DIASTOLIC BLOOD PRESSURE: 71 MMHG | RESPIRATION RATE: 16 BRPM

## 2024-06-13 DIAGNOSIS — S81.801D WOUND OF RIGHT LEG, SUBSEQUENT ENCOUNTER: Primary | ICD-10-CM

## 2024-06-13 DIAGNOSIS — T86.821 FAILURE OF FLAP GRAFT: Primary | ICD-10-CM

## 2024-06-13 DIAGNOSIS — T86.821 FAILURE OF FLAP GRAFT: ICD-10-CM

## 2024-06-13 PROCEDURE — G0277 HBOT, FULL BODY CHAMBER, 30M: HCPCS

## 2024-06-13 PROCEDURE — 97597 DBRDMT OPN WND 1ST 20 CM/<: CPT

## 2024-06-13 PROCEDURE — 97605 NEG PRS WND THER DME<=50SQCM: CPT

## 2024-06-13 RX ORDER — TRAMADOL HYDROCHLORIDE 50 MG/1
50 TABLET ORAL EVERY 6 HOURS PRN
Qty: 20 TABLET | Refills: 0 | Status: SHIPPED | OUTPATIENT
Start: 2024-06-13 | End: 2024-06-18

## 2024-06-13 NOTE — PROGRESS NOTES
HBO PROGRESS NOTE      NAME: Damien Aguilar  MEDICAL RECORD NUMBER:  [id number]  AGE: 63 y.o.   GENDER: male  : 1960  EPISODE DATE:  2024     Subjective     HBO Treatment Number: 13 out of Total Treatments: 40    HBO Diagnosis:             Indications: Compromised/Failed Flap/Graft to ___(site) (right leg)    Safety checks performed prior to treatment.  See doc flowsheets for documentation.    Objective        No results for input(s): \"POCGLU\" in the last 72 hours.  Pre treatment Vital Signs       Temp: 98.1 °F (36.7 °C)     Pulse: 65     Respirations: 16     BP: (!) 108/58       Post treatment Vital Signs  Temp: 97.5 °F (36.4 °C)  Pulse: 51  Respirations: 16  BP: 131/71    Assessment        HBO Diagnosis:   Problem List Items Addressed This Visit          Other    Failure of flap graft - Primary    Relevant Orders    Notify physician (specify)    Hyperbaric Oxygen Therapy       Physical Exam        General Appearance:  alert and oriented to person, place and time, well-developed and well-nourished, in no acute distress    Pre Tympanic Membrane Assessment:  Right: Normal  Left: Normal    Post Tympanic Membrane Assessment:  Left: Normal  Right: Normal    Pulmonary/Chest:  clear to auscultation bilaterally- no wheezes, rales or rhonchi, normal air movement, no respiratory distress    Cardiovascular:  normal    Plan        Patient placed in a full body Monoplace Chamber #: 3  Treatment Start Time: 0851     Pressure Reached Time: 0901  JANNETH : 2  Number of Air Breaks:  Treatment Status: No Air break     Decompression Time: 1031   Treatment End Time: 1041  Length of Treatment: 90 Minutes  Symptoms Noted During Treatment: None  Total Treatment Time (min): 110    Treatment Completion Status: Treatment completed without issue    I was present on these premises and immediately available to furnish assistance & direction throughout the HBO Treatment.     Damien Aguilar is a 63 y.o. male  did successfully

## 2024-06-13 NOTE — PROGRESS NOTES
[facility]  Hyperbaric Oxygen Therapy   Progress Note      NAME: Damien Aguilar  MEDICAL RECORD NUMBER:  [id number]  AGE: 63 y.o.   GENDER: male  : 1960  EPISODE DATE:  2024     Subjective     HBO Treatment Number: 12 out of Total Treatments: 40    HBO Diagnosis:               Indications: Compromised/Failed Flap/Graft to ___(site) (right leg)    Safety checks performed prior to treatment.  See doc flowsheets for documentation.    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) E66.01    Contact dermatitis of lower leg L25.9          No results for input(s): \"POCGLU\" in the last 72 hours.    Pre treatment Vital Signs       Temp: 98 °F (36.7 °C)     Pulse: 56     Respirations: 16     BP: (!) 113/56       Post treatment Vital Signs  Temp: 97.5 °F (36.4 °C)  Pulse: 51  Respirations: 16  BP: 113/62      Assessment        Physical Exam:  General Appearance:  alert and oriented to person, place and time, well-developed and well-nourished, in no acute distress    Pre Tympanic Membrane Assessment:  tympanic membranes intact bilaterally    Post Tympanic Membrane Assessment:  Right: Normal  Left: Normal    Pulmonary/Chest:  clear to auscultation bilaterally- no wheezes, rales or rhonchi, normal air movement, no respiratory distress    Cardiovascular:  normal       Treatment Start Time: 0858     Pressure Reached Time: 0908  JANNETH : 2  Treatment

## 2024-06-13 NOTE — PROGRESS NOTES
[facility]          Progress Note and Procedure Note      Damien Aguilar  MEDICAL RECORD NUMBER:  [id number]  AGE: 63 y.o.   GENDER: male  : 1960  EPISODE DATE:  2024    Subjective:     Presents for hyperbaric oxygen and evaluation of the wound and negative wound pressure therapy change and evaluation     HISTORY of PRESENT ILLNESS HPI     Damien Aguilar is a 63 y.o. male Established patient referred by LILLIANA, who presents today for wound/ulcer evaluation.   History of Wound Context: Patient status post hyperbaric oxygen today and he is being assessed as he is utilizing the negative wound pressure therapy has no new complaints.  wound/Ulcer Pain Timing/Severity: mild  Quality of pain: dull  Severity:  0 / 10   Modifying Factors: None  Associated Signs/Symptoms: edema and drainage    Interval History:   Patient presents today for follow up on wound/ulcer's progression. The patient is currently on antibiotics.  Current dressing care includes negative wound pressure therapy.        PAST MEDICAL HISTORY        Diagnosis Date    Morbid obesity (HCC)     Osteoarthritis     Snoring     possible apnea - per wife, better since lost 20# recently.  BMI 41.97, neck circ 17.5 cm, no daytime somnolence, no am headaches.       PAST SURGICAL HISTORY    Past Surgical History:   Procedure Laterality Date    HERNIA REPAIR      left inguinal    JOINT REPLACEMENT Left 2020    left hip    OTHER SURGICAL HISTORY      right leg vein stripping    PRESSURE ULCER DEBRIDEMENT Right 2024    Right Leg Excision and Debridement of Wound, Right Medial Soleus Muscle Flap Right Skin Substitute Graft, Application of Wound Vac performed by Ja Rivas DPM at [facility] OR    TONSILLECTOMY AND ADENOIDECTOMY      WISDOM TOOTH EXTRACTION         FAMILY HISTORY    Family History   Problem Relation Age of Onset    Cancer Mother     Hypertension Father        SOCIAL HISTORY    Social History 
Kerecis and Grafix BVR forms sent in today. Will await response   
Negative Pressure Wound Therapy    NAME:  Damien Aguilar  YOB: 1960  MEDICAL RECORD NUMBER:  [id number]  DATE:  6/13/2024    Applied Negative Pressure to right wound(s)/ulcer(s).  [x] Applied skin barrier prep to johnny-wound.   [x] Cut strips of plastic drape to picture frame wound so that johnny-wound is     covered with the drape.   [] If bridging dressing to less prominent site, cover any intact skin that will come in contact with the Negative Pressure Therapy sponge, gauze or channel drain with plastic drape. The sponge should never touch intact skin.   [x] Cut sponge, gauze or channel drain to size which will fit into the wound/ulcer bed without being forced.   [x] Be sure the sponge is large enough to hold the entire round plastic flange which is attached to the tubing. Never allow flange to be larger than the sponge or it will produce suction damaging intact skin.  Total number of individual pieces of foam used within the wound bed: 1    [] If bridging the dressing away from the primary site, be sure the bridge leads to a piece of sponge large enough to hold the entire flange without allowing any of the flange to overlap onto intact skin.   [x] Covered sponge, gauze or channel drain with plastic drape.   [x] Cut a hole in this plastic drape directly over the sponge the same size as the plastic drain tubing.   [x] Removed plastic liner from flange and apply it directly over the hole you cut.   [x] Removed the plastic cover from the flange.   [x] Attached the tubing to the wound/ulcer Negative Pressure Therapy and turn it on to be sure a vacuum is created and that there are no leaks.   [x] If air leaks occur, use plastic drape to patch them.   [x] Secured Negative Pressure Therapy dressing with ace wrap loosely if located on an extremity. Maintain tubing outside of ace wrap. Tubing must not exert pressure on intact skin.    Applied per  Guidelines      Electronically signed by 
company where the order was sent.   If you are unable to obtain wound supplies, continue to use the supplies you have available until you are able to reach us. It is most important to keep the wound covered at all times.  It is YOUR responsibility to make sure that supplies are re-ordered before you run out. Re-order telephone numbers are included in each package.     Keep next scheduled appointment. Please give 24 hour notice if unable to keep appointment. [phone]    If you experience any of the following, please call the Wound Care Service during business hours: Monday through Friday 8:00 am - 4:30 pm  ([phone]).   *Increase in pain   *Temperature over 101   *Increase in drainage from your wound or a foul odor   *Uncontrolled swelling   *Need for compression bandage changes due to slippage, breakthrough drainage    If you need medical attention outside of business hours, please contact your Primary Care Doctor or go to the nearest emergency room.                        Skilled nurse to evaluate and treat for wound care. Change dressing as ordered. Patient/Family/caregiver may change dressings as needed as instructed when Home Care unavailable.    WOUNDS REQUIRING DRESSING Changes:     Wound 05/28/24 Leg Right (Active)   Wound Image   06/13/24 1057   Wound Etiology Non-Healing Surgical 06/13/24 1057   Dressing Status Intact;Old drainage noted;New dressing applied 06/13/24 1057   Wound Cleansed Cleansed with saline 06/13/24 1057   Dressing/Treatment Collagen;Negative pressure wound therapy;ABD;Roll gauze;Ace wrap 06/10/24 1059   Offloading for Diabetic Foot Ulcers Offloading not required 06/13/24 1057   Wound Length (cm) 14 cm 06/13/24 1057   Wound Width (cm) 3.4 cm 06/13/24 1057   Wound Depth (cm) 0.4 cm 06/13/24 1057   Wound Surface Area (cm^2) 47.6 cm^2 06/13/24 1057   Change in Wound Size % (l*w) -57.41 06/13/24 1057   Wound Volume (cm^3) 19.04 cm^3 06/13/24 1057   Wound Healing % 48 06/13/24 1057

## 2024-06-13 NOTE — PATIENT INSTRUCTIONS
Visit Discharge/Physician Orders:  - Debridement was completed today.  - Continue to wear offloading boot and using scooter.  - Ultram sent to pharmacy take  as needed  - insurance authorization sent in today for kerecis and grafix    Home Care: [facility]    Wound Location: Right lower leg    Dressing orders:     1) Gather wound care supplies and arrange on clean table.     2) Wash your hands with soap and water or use alcohol based hand  for 20 seconds (sing \"Happy Birthday\" twice).    3) Right lower leg- Apply collagen powder to wound and lightly moisten with saline. Apply wound vac at 125 mmHg continuous suction. Wrap with kerlix and ace wrap. Next wound vac change in wound clinic on 6/18/24 - Dr. Rivas will re-evaluate at that time.     - If skin irritation returns then home health to remove wound vac and use collagen to wound follow by ABD kerlix and ace wrap until follow up visit.     If the wound vac is leaking or not functioning properly vac sponge/dressing must be removed and a wet to dry dressing applied until reapplication can be done. Wound vac can not be in place non functional for more then 2 hours  If your canister becomes full of bright red drainage within 1 hour or less, turn off the wound vac and go to the nearest ER or urgent care  Recommend charging wound vac in the evenings while sleeping, take charge with you for long trips  Bring canister and extra wound vac dressing kit to each visit     Keep all dressings clean & dry.    Follow up visit: 6/18/24 after hyperbaric treatment 12:30pm    Supplies:    Duration of dressings: 30 days    We have sent your supply order to the following company:  [employer]  If you don't receive the items you were expecting or don't know what the items are that you received, call the company where the order was sent.   If you are unable to obtain wound supplies, continue to use the supplies you have available until you are able to reach us. It is most

## 2024-06-14 ENCOUNTER — HOSPITAL ENCOUNTER (OUTPATIENT)
Dept: HYPERBARIC MEDICINE | Age: 64
Discharge: HOME | End: 2024-06-14
Payer: MEDICARE

## 2024-06-14 VITALS
OXYGEN SATURATION: 100 % | DIASTOLIC BLOOD PRESSURE: 56 MMHG | HEART RATE: 50 BPM | SYSTOLIC BLOOD PRESSURE: 118 MMHG | TEMPERATURE: 97.1 F | RESPIRATION RATE: 16 BRPM

## 2024-06-14 DIAGNOSIS — T86.821 FAILURE OF FLAP GRAFT: Primary | ICD-10-CM

## 2024-06-14 PROCEDURE — G0277 HBOT, FULL BODY CHAMBER, 30M: HCPCS

## 2024-06-14 NOTE — PROGRESS NOTES
complete today's hyperbaric oxygen treatment at [facility] and HBO therapy.    In my clinical judgement, ongoing HBO therapy is  necessary at this time to assist with wound healing, preservation of limb, life, or function.    Supervision and attendance of Hyperbaric Oxygen Therapy provided. Continue HBO treatment as outlined in the treatment plan.    Hyperbaric Oxygen: Mr. Aguilar tolerated: Treatment Number: 14 without  issue.    Discharge Instructions were explained and given to Mr. Aguilar     Electronically signed by JAZZY Lopez CNP on 6/14/2024 at 11:51 AM

## 2024-06-17 ENCOUNTER — HOSPITAL ENCOUNTER (OUTPATIENT)
Dept: HYPERBARIC MEDICINE | Age: 64
Discharge: HOME | End: 2024-06-17
Payer: MEDICARE

## 2024-06-17 ENCOUNTER — CARE COORDINATION (OUTPATIENT)
Dept: CASE MANAGEMENT | Age: 64
End: 2024-06-17

## 2024-06-17 VITALS
OXYGEN SATURATION: 100 % | DIASTOLIC BLOOD PRESSURE: 85 MMHG | HEART RATE: 63 BPM | TEMPERATURE: 97.3 F | RESPIRATION RATE: 16 BRPM | SYSTOLIC BLOOD PRESSURE: 126 MMHG

## 2024-06-17 DIAGNOSIS — T86.821 FAILURE OF FLAP GRAFT: Primary | ICD-10-CM

## 2024-06-17 PROCEDURE — 99183 HYPERBARIC OXYGEN THERAPY: CPT | Performed by: NURSE PRACTITIONER

## 2024-06-17 PROCEDURE — G0277 HBOT, FULL BODY CHAMBER, 30M: HCPCS

## 2024-06-17 NOTE — CARE COORDINATION
(RPM) program for in-home monitoring: Patient is not eligible for RPM program because:  prefers to self monitor .    Assessments:  Care Transitions 24 Hour Call    Do you have any ongoing symptoms?: No  Patient-reported symptoms: Other  Do you have all of your prescriptions and are they filled?: Yes  Were you discharged with any Home Care or Post Acute Services: No  Post Acute Services: Home Health (Comment: [facility] )  Care Transitions Interventions   Home Care Waiver: Completed  Other Services:  (Comment: WOUND CARE WITH DR. RIVAS)      Transportation Support: Declined      DME Assistance: Declined   Disease Specific Clinic: Completed      Disease Association: Completed                Follow Up Appointment:   Reviewed upcoming appointment(s).  Future Appointments         Provider Specialty Dept Phone    6/18/2024 8:30 AM [facility] Hyperbaric Medicine [phone]    6/18/2024 12:30 PM Ja Rivas DPM Wound Ostomy [phone]    6/19/2024 8:30 AM [facility] Hyperbaric Medicine [phone]    6/20/2024 8:30 AM [facility] Hyperbaric Medicine [phone]    6/21/2024 8:30 AM [facility] Hyper[facility]ic Medicine [phone]    6/24/2024 8:30 AM [facility] Hyperbaric Medicine [phone]    6/25/2024 8:30 AM [facility] Hyperbaric Medicine [phone]    6/26/2024 8:30 AM [facility] Hyper[facility]ic Medicine [phone]    6/27/2024 8:30 AM [facility] Hyper[facility] Medicine [phone]    6/28/2024 8:30 AM [facility] Hyper[facility]ic Medicine [phone]    7/1/2024 8:30 AM [facility] Hyperbaric Medicine [phone]    7/2/2024 8:30 AM [facility] Hyperbaric Medicine [phone]    7/3/2024 8:30 AM [facility] Hyperbaric Medicine [phone]    7/8/2024 8:30 AM [facility] [phone]    7/9/2024 8:30 AM [facility] HYPERBARIC

## 2024-06-17 NOTE — PROGRESS NOTES
HBO PROGRESS NOTE      NAME: Damien Aguilar  MEDICAL RECORD NUMBER:  [id number]  AGE: 63 y.o.   GENDER: male  : 1960  EPISODE DATE:  2024     Subjective     HBO Treatment Number: 15 out of Total Treatments: 40    HBO Diagnosis:             Indications: Compromised/Failed Flap/Graft to ___(site) (right leg)    Safety checks performed prior to treatment.  See doc flowsheets for documentation.    Objective        No results for input(s): \"POCGLU\" in the last 72 hours.  Pre treatment Vital Signs       Temp: 98.1 °F (36.7 °C)     Pulse: 54     Respirations: 16     BP: 134/77       Post treatment Vital Signs  Temp: 97.3 °F (36.3 °C)  Pulse: 63  Respirations: 16  BP: 126/85    Assessment        HBO Diagnosis:   Problem List Items Addressed This Visit          Other    Failure of flap graft - Primary    Relevant Orders    Notify physician (specify)    Hyperbaric Oxygen Therapy       Physical Exam        General Appearance:  alert and oriented to person, place and time, well-developed and well-nourished, in no acute distress    Pre Tympanic Membrane Assessment:  Right: Normal  Left: Normal    Post Tympanic Membrane Assessment:  Left: Normal  Right: Normal    Pulmonary/Chest:  clear to auscultation bilaterally- no wheezes, rales or rhonchi, normal air movement, no respiratory distress    Cardiovascular:  normal    Plan        Patient placed in a full body Monoplace Chamber #: 3  Treatment Start Time: 0853     Pressure Reached Time: 0903  JANNETH : 2  Number of Air Breaks:  Treatment Status: No Air break     Decompression Time: 1033   Treatment End Time: 1043  Length of Treatment: 90 Minutes  Symptoms Noted During Treatment: None  Total Treatment Time (min): 110    Treatment Completion Status: Treatment completed without issue    I was present on these premises and immediately available to furnish assistance & direction throughout the HBO Treatment.     Damien Aguilar is a 63 y.o. male  did successfully complete

## 2024-06-18 ENCOUNTER — HOSPITAL ENCOUNTER (OUTPATIENT)
Dept: HYPERBARIC MEDICINE | Age: 64
Discharge: HOME | End: 2024-06-18
Payer: MEDICARE

## 2024-06-18 ENCOUNTER — HOSPITAL ENCOUNTER (OUTPATIENT)
Dept: WOUND CARE | Age: 64
Discharge: HOME | End: 2024-06-18
Attending: PODIATRIST
Payer: MEDICARE

## 2024-06-18 VITALS
DIASTOLIC BLOOD PRESSURE: 56 MMHG | HEART RATE: 47 BPM | SYSTOLIC BLOOD PRESSURE: 117 MMHG | OXYGEN SATURATION: 100 % | RESPIRATION RATE: 16 BRPM | TEMPERATURE: 97.1 F

## 2024-06-18 DIAGNOSIS — T86.821 FAILURE OF FLAP GRAFT: Primary | ICD-10-CM

## 2024-06-18 PROCEDURE — G0277 HBOT, FULL BODY CHAMBER, 30M: HCPCS

## 2024-06-18 PROCEDURE — 97606 NEG PRS WND THER DME>50 SQCM: CPT

## 2024-06-18 ASSESSMENT — PAIN DESCRIPTION - DESCRIPTORS: DESCRIPTORS: ACHING

## 2024-06-18 ASSESSMENT — PAIN DESCRIPTION - LOCATION: LOCATION: LEG

## 2024-06-18 ASSESSMENT — PAIN SCALES - GENERAL: PAINLEVEL_OUTOF10: 4

## 2024-06-18 ASSESSMENT — PAIN DESCRIPTION - ORIENTATION: ORIENTATION: RIGHT

## 2024-06-18 NOTE — PLAN OF CARE
Problem: Wound:  Goal: Will show signs of wound healing; wound closure and no evidence of infection  Description: Will show signs of wound healing; wound closure and no evidence of infection  Outcome: Progressing   RN visit for wound evaluation and management/dressing change. Discharge instructions/orders per AVS. Follow up appointment scheduled.     Care plan reviewed with patient.  Patient verbalize understanding of the plan of care and contribute to goal setting.

## 2024-06-18 NOTE — PATIENT INSTRUCTIONS
compression bandage changes due to slippage, breakthrough drainage    If you need medical attention outside of business hours, please contact your Primary Care Doctor or go to the nearest emergency room.

## 2024-06-18 NOTE — PROGRESS NOTES
[facility]  Hyperbaric Oxygen Therapy   Progress Note      NAME: Damien Aguilar  MEDICAL RECORD NUMBER:  [id number]  AGE: 63 y.o.   GENDER: male  : 1960  EPISODE DATE:  2024     Subjective     HBO Treatment Number: 16 out of Total Treatments: 40    HBO Diagnosis:               Indications: Compromised/Failed Flap/Graft to ___(site) (right leg)    Safety checks performed prior to treatment.  See doc flowsheets for documentation.    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    Pre treatment Vital Signs       Temp: 98 °F (36.7 °C)     Pulse: 52     Respirations: 16     BP: (!) 102/57       Post treatment Vital Signs  Temp: 97.1 °F (36.2 °C)  Pulse: (!) 47  Respirations: 16  BP: (!) 117/56      Assessment        Physical Exam:  General Appearance:  alert and oriented to person, place and time, well-developed and well-nourished, in no acute distress    Pre Tympanic Membrane Assessment:  tympanic membranes intact bilaterally    Post Tympanic Membrane Assessment:  Right: Normal  Left: Normal    Pulmonary/Chest:  clear to auscultation bilaterally- no wheezes, rales or rhonchi, normal air movement, no respiratory distress    Cardiovascular:  normal       Treatment Start Time: 0900     Pressure Reached Time: 0910  JANNETH :

## 2024-06-18 NOTE — PLAN OF CARE
Problem: Physical Regulation:  Goal: Tolerate HBO therapy and barotrauma will be prevented  Description: Tolerate HBO therapy and barotrauma will be prevented  Outcome: Adequate for Discharge   No s/s of seizure noted. No s/s of barotrauma noted. Pt demonstrated equalization of ears during pressurization.    Care plan reviewed with patient.  Patient verbalize understanding of the plan of care and contribute to goal setting.

## 2024-06-19 ENCOUNTER — HOSPITAL ENCOUNTER (OUTPATIENT)
Dept: HYPERBARIC MEDICINE | Age: 64
Discharge: HOME | End: 2024-06-19
Payer: MEDICARE

## 2024-06-19 VITALS
HEART RATE: 48 BPM | RESPIRATION RATE: 16 BRPM | SYSTOLIC BLOOD PRESSURE: 149 MMHG | TEMPERATURE: 97.1 F | DIASTOLIC BLOOD PRESSURE: 63 MMHG | OXYGEN SATURATION: 100 %

## 2024-06-19 DIAGNOSIS — T86.821 FAILURE OF FLAP GRAFT: Primary | ICD-10-CM

## 2024-06-19 PROCEDURE — G0277 HBOT, FULL BODY CHAMBER, 30M: HCPCS

## 2024-06-19 ASSESSMENT — PAIN DESCRIPTION - ORIENTATION: ORIENTATION: RIGHT

## 2024-06-19 ASSESSMENT — PAIN DESCRIPTION - DESCRIPTORS: DESCRIPTORS: ACHING

## 2024-06-19 ASSESSMENT — PAIN SCALES - GENERAL: PAINLEVEL_OUTOF10: 3

## 2024-06-19 ASSESSMENT — PAIN DESCRIPTION - LOCATION: LOCATION: LEG

## 2024-06-19 NOTE — PROGRESS NOTES
[facility]  Hyperbaric Oxygen Therapy   Progress Note      NAME: Damien Aguilar  MEDICAL RECORD NUMBER:  [id number]  AGE: 63 y.o.   GENDER: male  : 1960  EPISODE DATE:  2024     Subjective     HBO Treatment Number: 17 out of Total Treatments: 40    HBO Diagnosis:               Indications: Compromised/Failed Flap/Graft to ___(site) (right leg)    Safety checks performed prior to treatment.  See doc flowsheets for documentation.    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    Pre treatment Vital Signs       Temp: 97.8 °F (36.6 °C)     Pulse: (!) 47     Respirations: 16     BP: (!) 125/57       Post treatment Vital Signs  Temp: 97.1 °F (36.2 °C)  Pulse: (!) 48  Respirations: 16  BP: (!) 149/63      Assessment        Physical Exam:  General Appearance:  alert and oriented to person, place and time, well-developed and well-nourished, in no acute distress    Pre Tympanic Membrane Assessment:  tympanic membranes intact bilaterally    Post Tympanic Membrane Assessment:  Right: Normal  Left: Normal    Pulmonary/Chest:  clear to auscultation bilaterally- no wheezes, rales or rhonchi, normal air movement, no respiratory distress    Cardiovascular:  normal       Treatment Start Time: 08     Pressure Reached Time: 0902  JANNETH :

## 2024-06-20 ENCOUNTER — HOSPITAL ENCOUNTER (OUTPATIENT)
Dept: HYPERBARIC MEDICINE | Age: 64
Discharge: HOME | End: 2024-06-20
Payer: MEDICARE

## 2024-06-20 ENCOUNTER — HOSPITAL ENCOUNTER (OUTPATIENT)
Dept: WOUND CARE | Age: 64
Discharge: HOME | End: 2024-06-20
Attending: PODIATRIST
Payer: MEDICARE

## 2024-06-20 VITALS
RESPIRATION RATE: 16 BRPM | HEART RATE: 50 BPM | DIASTOLIC BLOOD PRESSURE: 58 MMHG | TEMPERATURE: 97.5 F | OXYGEN SATURATION: 100 % | SYSTOLIC BLOOD PRESSURE: 116 MMHG

## 2024-06-20 DIAGNOSIS — T86.821 FAILURE OF FLAP GRAFT: Primary | ICD-10-CM

## 2024-06-20 PROCEDURE — G0277 HBOT, FULL BODY CHAMBER, 30M: HCPCS

## 2024-06-20 PROCEDURE — 97605 NEG PRS WND THER DME<=50SQCM: CPT

## 2024-06-20 NOTE — PROGRESS NOTES
[facility]  [address]  [address]   [place], [place] [address]  Telephone: [phone]     FAX [phone]      Patient Instructions   Visit Discharge/Physician Orders:  - Continue to wear offloading boot and using scooter.  - Ultram as needed for pain.  - Awaiting insurance authorization for kerecis and grafix grafts.    Home Care: [facility]    Wound Location: Right lower leg    Dressing orders:     1) Gather wound care supplies and arrange on clean table.     2) Wash your hands with soap and water or use alcohol based hand  for 20 seconds (sing \"Happy Birthday\" twice).    3) Right lower leg- Apply collagen powder to wound and lightly moisten with saline. Apply wound vac at 125 mmHg continuous suction. Wrap with kerlix and ace wrap. Next wound vac change in wound clinic on 6/20/24 - Dr. Rivas will re-evaluate at that time.     - If skin irritation returns then home health to remove wound vac and use collagen to wound follow by ABD kerlix and ace wrap until follow up visit.     If the wound vac is leaking or not functioning properly vac sponge/dressing must be removed and a wet to dry dressing applied until reapplication can be done. Wound vac can not be in place non functional for more then 2 hours  If your canister becomes full of bright red drainage within 1 hour or less, turn off the wound vac and go to the nearest ER or urgent care  Recommend charging wound vac in the evenings while sleeping, take charge with you for long trips  Bring canister and extra wound vac dressing kit to each visit     Keep all dressings clean & dry.    Follow up visit: 6/27/2024 see GILLES for possible graft application after HBO, 7/2/24 at 12:30 pm    Supplies: Per Home Health    Keep next scheduled appointment. Please give 24 hour notice if unable to keep appointment. [phone]    If you experience any of the following, please call the Wound Care Service during business hours: Monday 
Silvia Topete RN on 6/20/2024 at 10:57 AM

## 2024-06-20 NOTE — PLAN OF CARE
Problem: Wound:  Goal: Will show signs of wound healing; wound closure and no evidence of infection  Description: Will show signs of wound healing; wound closure and no evidence of infection  Outcome: Progressing   Pt. Seen for wound vac change today see AVS for orders. Follow up in 1 week.  Care plan reviewed with patient.  Patient verbalize understanding of the plan of care and contribute to goal setting.

## 2024-06-20 NOTE — PROGRESS NOTES
HBO PROGRESS NOTE      NAME: Damien Aguilar  MEDICAL RECORD NUMBER:  [id number]  AGE: 63 y.o.   GENDER: male  : 1960  EPISODE DATE:  2024     Subjective     HBO Treatment Number: 18 out of Total Treatments: 40    HBO Diagnosis:             Indications: Compromised/Failed Flap/Graft to ___(site) (right leg)    Safety checks performed prior to treatment.  See doc flowsheets for documentation.    Objective        No results for input(s): \"POCGLU\" in the last 72 hours.  Pre treatment Vital Signs       Temp: 97.9 °F (36.6 °C)     Pulse: 51     Respirations: 16     BP: (!) 125/57       Post treatment Vital Signs  Temp: 97.5 °F (36.4 °C)  Pulse: 50  Respirations: 16  BP: (!) 116/58    Assessment        HBO Diagnosis:   Problem List Items Addressed This Visit          Other    Failure of flap graft - Primary    Relevant Orders    Notify physician (specify)    Hyperbaric Oxygen Therapy       Physical Exam        General Appearance:  alert and oriented to person, place and time, well-developed and well-nourished, in no acute distress    Pre Tympanic Membrane Assessment:  Right: Normal  Left: Normal    Post Tympanic Membrane Assessment:  Left: Normal  Right: Normal    Pulmonary/Chest:  clear to auscultation bilaterally- no wheezes, rales or rhonchi, normal air movement, no respiratory distress    Cardiovascular:  normal    Plan        Patient placed in a full body Monoplace Chamber #: 3  Treatment Start Time: 0849     Pressure Reached Time: 0859  JANNETH : 2  Number of Air Breaks:  Treatment Status: No Air break     Decompression Time: 1029   Treatment End Time: 1039  Length of Treatment: 90 Minutes  Symptoms Noted During Treatment: None  Total Treatment Time (min): 110    Treatment Completion Status: Treatment completed without issue    I was present on these premises and immediately available to furnish assistance & direction throughout the HBO Treatment.     Damien Aguilar is a 63 y.o. male  did successfully

## 2024-06-20 NOTE — PATIENT INSTRUCTIONS
Visit Discharge/Physician Orders:  - Continue to wear offloading boot and using scooter.  - Ultram as needed for pain.  - Awaiting insurance authorization for kerecis and grafix grafts.    Home Care: [facility]    Wound Location: Right lower leg    Dressing orders:     1) Gather wound care supplies and arrange on clean table.     2) Wash your hands with soap and water or use alcohol based hand  for 20 seconds (sing \"Happy Birthday\" twice).    3) Right lower leg- Apply collagen powder to wound and lightly moisten with saline. Apply wound vac at 125 mmHg continuous suction. Wrap with kerlix and ace wrap. Next wound vac change in wound clinic on 6/20/24 - Dr. Rivas will re-evaluate at that time.     - If skin irritation returns then home health to remove wound vac and use collagen to wound follow by ABD kerlix and ace wrap until follow up visit.     If the wound vac is leaking or not functioning properly vac sponge/dressing must be removed and a wet to dry dressing applied until reapplication can be done. Wound vac can not be in place non functional for more then 2 hours  If your canister becomes full of bright red drainage within 1 hour or less, turn off the wound vac and go to the nearest ER or urgent care  Recommend charging wound vac in the evenings while sleeping, take charge with you for long trips  Bring canister and extra wound vac dressing kit to each visit     Keep all dressings clean & dry.    Follow up visit: 6/27/2024 see GILLES for possible graft application after HBO, 7/2/24 at 12:30 pm    Supplies: Per Home Health    Keep next scheduled appointment. Please give 24 hour notice if unable to keep appointment. [phone]    If you experience any of the following, please call the Wound Care Service during business hours: Monday through Friday 8:00 am - 4:30 pm  ([phone]).   *Increase in pain   *Temperature over 101   *Increase in drainage from your wound or a foul odor   *Uncontrolled

## 2024-06-21 ENCOUNTER — HOSPITAL ENCOUNTER (OUTPATIENT)
Dept: HYPERBARIC MEDICINE | Age: 64
Discharge: HOME | End: 2024-06-21
Payer: MEDICARE

## 2024-06-21 VITALS
SYSTOLIC BLOOD PRESSURE: 133 MMHG | TEMPERATURE: 97.1 F | HEART RATE: 48 BPM | OXYGEN SATURATION: 100 % | DIASTOLIC BLOOD PRESSURE: 71 MMHG | RESPIRATION RATE: 16 BRPM

## 2024-06-21 DIAGNOSIS — T86.821 FAILURE OF FLAP GRAFT: Primary | ICD-10-CM

## 2024-06-21 PROCEDURE — G0277 HBOT, FULL BODY CHAMBER, 30M: HCPCS

## 2024-06-21 NOTE — PLAN OF CARE
Problem: Wound:  Goal: Will show signs of wound healing; wound closure and no evidence of infection  Description: Will show signs of wound healing; wound closure and no evidence of infection  Outcome: Adequate for Discharge   No s/s of seizure noted. No s/s of barotrauma noted. Pt demonstrated equalization of ears during pressurization.    Care plan reviewed with patient.  Patient verbalize understanding of the plan of care and contribute to goal setting.

## 2024-06-21 NOTE — PROGRESS NOTES
HBO PROGRESS NOTE      NAME: Damien Aguilar  MEDICAL RECORD NUMBER:  [id number]  AGE: 63 y.o.   GENDER: male  : 1960  EPISODE DATE:  2024     Subjective     HBO Treatment Number: 19 out of Total Treatments: 40    HBO Diagnosis:             Indications: Compromised/Failed Flap/Graft to ___(site) (Right leg)    Safety checks performed prior to treatment.  See doc flowsheets for documentation.    Objective        No results for input(s): \"POCGLU\" in the last 72 hours.  Pre treatment Vital Signs       Temp: 97.5 °F (36.4 °C)     Pulse: 50     Respirations: 16     BP: (!) 114/59       Post treatment Vital Signs  Temp: 97.1 °F (36.2 °C)  Pulse: (!) 48  Respirations: 16  BP: 133/71    Assessment        HBO Diagnosis:   Problem List Items Addressed This Visit          Other    Failure of flap graft - Primary    Relevant Orders    Notify physician (specify)    Hyperbaric Oxygen Therapy       Physical Exam        General Appearance:  alert and oriented to person, place and time, well-developed and well-nourished, in no acute distress    Pre Tympanic Membrane Assessment:  Right: Normal  Left: Normal    Post Tympanic Membrane Assessment:  Left: Normal  Right: Normal    Pulmonary/Chest:  clear to auscultation bilaterally- no wheezes, rales or rhonchi, normal air movement, no respiratory distress    Cardiovascular:  normal    Plan        Patient placed in a full body Monoplace Chamber #: 3  Treatment Start Time: 0856     Pressure Reached Time: 0906  JANNETH : 2  Number of Air Breaks:  Treatment Status: No Air break     Decompression Time: 1036   Treatment End Time: 1046  Length of Treatment: 90 Minutes  Symptoms Noted During Treatment: None  Total Treatment Time (min): 110    Treatment Completion Status: Treatment completed without issue    I was present on these premises and immediately available to furnish assistance & direction throughout the HBO Treatment.     Damien Aguilar is a 63 y.o. male  did successfully

## 2024-06-24 ENCOUNTER — CARE COORDINATION (OUTPATIENT)
Dept: CASE MANAGEMENT | Age: 64
End: 2024-06-24

## 2024-06-24 ENCOUNTER — HOSPITAL ENCOUNTER (OUTPATIENT)
Dept: HYPERBARIC MEDICINE | Age: 64
Discharge: HOME | End: 2024-06-24
Payer: MEDICARE

## 2024-06-24 VITALS
RESPIRATION RATE: 16 BRPM | HEART RATE: 53 BPM | DIASTOLIC BLOOD PRESSURE: 65 MMHG | TEMPERATURE: 97.3 F | SYSTOLIC BLOOD PRESSURE: 144 MMHG | OXYGEN SATURATION: 100 %

## 2024-06-24 DIAGNOSIS — T86.821 FAILURE OF FLAP GRAFT: Primary | ICD-10-CM

## 2024-06-24 PROCEDURE — G0277 HBOT, FULL BODY CHAMBER, 30M: HCPCS

## 2024-06-24 NOTE — PROGRESS NOTES
[facility]  Hyperbaric Oxygen Therapy   Progress Note      NAME: Damien Aguilar  MEDICAL RECORD NUMBER:  [id number]  AGE: 63 y.o.   GENDER: male  : 1960  EPISODE DATE:  2024     Subjective     HBO Treatment Number: 20 out of Total Treatments: 40    HBO Diagnosis:               Indications: Compromised/Failed Flap/Graft to ___(site) (right leg)    Safety checks performed prior to treatment.  See doc flowsheets for documentation.    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    Pre treatment Vital Signs       Temp: 98.1 °F (36.7 °C)     Pulse: (!) 48     Respirations: 16     BP: 133/64       Post treatment Vital Signs  Temp: 97.3 °F (36.3 °C)  Pulse: 53  Respirations: 16  BP: (!) 144/65      Assessment        Physical Exam:  General Appearance:  alert and oriented to person, place and time, well-developed and well-nourished, in no acute distress    Pre Tympanic Membrane Assessment:  tympanic membranes intact bilaterally    Post Tympanic Membrane Assessment:  Right: Normal  Left: Normal    Pulmonary/Chest:  clear to auscultation bilaterally- no wheezes, rales or rhonchi, normal air movement, no respiratory distress    Cardiovascular:  normal       Treatment Start Time: 0857     Pressure Reached Time: 0907  JANNETH :

## 2024-06-24 NOTE — PROGRESS NOTES
Patient arrived for HBO treatment today, upon removal of patients walking boot it was noted that the wound vac had leaked and drained all the way through the patients ace wrap. Wound vac removed, picture taken (johnny wound denuded and macerated) and message sent to resident on call Dr. Rafal Guillermo. Orders received to apply a collagen wet to dry dressing, kerlix and ace wrap today and to reevaluate the skin tomorrow. If skin is looking better tomorrow the wound vac will be reapplied per previous orders.

## 2024-06-24 NOTE — CARE COORDINATION
(Comment: WOUND CARE WITH DR. RIVAS)      Transportation Support: Declined      DME Assistance: Declined   Disease Specific Clinic: Completed      Disease Association: Completed      Other Interventions:              Upcoming Appointments:    Future Appointments         Provider Specialty Dept Phone    6/25/2024 8:30 AM [facility] Hyperbaric Medicine [phone]    6/26/2024 8:30 AM [facility] Hyperbaric Medicine [phone]    6/27/2024 8:30 AM [facility] Hyper[facility]ic Medicine [phone]    6/27/2024 11:00 AM Rhonda Harvey, APRN - CNP Wound Ostomy [phone]    6/28/2024 8:30 AM [facility] Hyper[facility]ic Medicine [phone]    7/1/2024 8:30 AM [facility] Hyperbaric Medicine [phone]    7/2/2024 8:30 AM [facility] Hyperbaric Medicine [phone]    7/2/2024 12:30 PM Ja Rivas, DPM Wound Ostomy [phone]    7/3/2024 8:30 AM [facility] Hyperbaric Medicine [phone]    7/8/2024 8:30 AM [facility] Hyperbaric Medicine [phone]    7/9/2024 8:30 AM [facility] Hyperbaric Medicine [phone]    7/10/2024 8:30 AM [facility] Hyperbaric Medicine [phone]    12/16/2024 8:00 AM Kat Freedman PA-C Pulmonology [phone]            Patient has agreed to contact primary care provider and/or specialist for any further questions, concerns, or needs.    Sophia Avery, RN

## 2024-06-25 ENCOUNTER — HOSPITAL ENCOUNTER (OUTPATIENT)
Dept: HYPERBARIC MEDICINE | Age: 64
Discharge: HOME | End: 2024-06-25
Payer: MEDICARE

## 2024-06-25 ENCOUNTER — HOSPITAL ENCOUNTER (OUTPATIENT)
Dept: WOUND CARE | Age: 64
Discharge: HOME | End: 2024-06-25

## 2024-06-25 VITALS
SYSTOLIC BLOOD PRESSURE: 138 MMHG | RESPIRATION RATE: 18 BRPM | HEART RATE: 57 BPM | TEMPERATURE: 97.5 F | OXYGEN SATURATION: 100 % | DIASTOLIC BLOOD PRESSURE: 60 MMHG

## 2024-06-25 DIAGNOSIS — T86.821 FAILURE OF FLAP GRAFT: Primary | ICD-10-CM

## 2024-06-25 PROCEDURE — G0277 HBOT, FULL BODY CHAMBER, 30M: HCPCS

## 2024-06-25 PROCEDURE — 97605 NEG PRS WND THER DME<=50SQCM: CPT

## 2024-06-25 ASSESSMENT — PAIN DESCRIPTION - LOCATION
LOCATION: LEG
LOCATION: LEG

## 2024-06-25 ASSESSMENT — PAIN DESCRIPTION - ORIENTATION
ORIENTATION: RIGHT
ORIENTATION: RIGHT;LOWER

## 2024-06-25 ASSESSMENT — PAIN SCALES - GENERAL: PAINLEVEL_OUTOF10: 2

## 2024-06-25 NOTE — PLAN OF CARE
Problem: Wound:  Goal: Will show signs of wound healing; wound closure and no evidence of infection  Description: Will show signs of wound healing; wound closure and no evidence of infection  Outcome: Progressing   Patient presents to wound clinic for RN visit for wound vac dressing change on right leg. Discharge instructions/dressing orders per AVS. Follow up appointment scheduled on Thursday.     Care plan reviewed with patient.  Patient verbalize understanding of the plan of care and contribute to goal setting.

## 2024-06-25 NOTE — PROGRESS NOTES
2  Treatment Status: No Air break      Decompression Time: 1027   Treatment End Time: 1037    Symptoms Noted During Treatment: None    Adverse Event: no      I was present on these premises and immediately available to furnish assistance & direction throughout the procedure.     Plan          Damien Aguilar is a 63 y.o. male  successfully completed today's hyperbaric oxygen treatment at [facility].    In my clinical judgement, ongoing HBO therapy is necessary at this time, given a threat to patient function, limb or life from the current condition.  Supervision and attendance of Hyperbaric Oxygen Therapy provided.  Continue HBO treatment as outlined in the treatment plan.    Hyperbaric Oxygen:  Pt tolerated Treatment Number: 21 well today without complications.     Electronically signed by Gokul Yen MD on 6/25/2024 at 3:34 PM       ---

## 2024-06-25 NOTE — PATIENT INSTRUCTIONS
Visit Discharge/Physician Orders:  - Continue to wear offloading boot and using scooter.  - Ultram as needed for pain.  - Graft application planned for 6/27/24.    Home Care: [facility]    Wound Location: Right lower leg    Dressing orders:     1) Gather wound care supplies and arrange on clean table.     2) Wash your hands with soap and water or use alcohol based hand  for 20 seconds (sing \"Happy Birthday\" twice).    3) Right lower leg- Apply collagen powder to wound and lightly moisten with saline. Apply wound vac at 125 mmHg continuous suction. Wrap with kerlix and ace wrap. Next wound vac change in wound clinic on 6/27/24.   - If skin irritation returns then home health to remove wound vac and use collagen to wound follow by ABD kerlix and ace wrap until follow up visit.     If the wound vac is leaking or not functioning properly vac sponge/dressing must be removed and a wet to dry dressing applied until reapplication can be done. Wound vac can not be in place non functional for more then 2 hours  If your canister becomes full of bright red drainage within 1 hour or less, turn off the wound vac and go to the nearest ER or urgent care  Recommend charging wound vac in the evenings while sleeping, take charge with you for long trips  Bring canister and extra wound vac dressing kit to each visit     Keep all dressings clean & dry.    Follow up visit: 6/27/2024 with Rhonda ALONSO and 7/2/24 at 12:30 pm with Dr. Rivas    Supplies: Per Home Health    Keep next scheduled appointment. Please give 24 hour notice if unable to keep appointment. [phone]    If you experience any of the following, please call the Wound Care Service during business hours: Monday through Friday 8:00 am - 4:30 pm  ([phone]).   *Increase in pain   *Temperature over 101   *Increase in drainage from your wound or a foul odor   *Uncontrolled swelling   *Need for compression bandage changes due to slippage, breakthrough

## 2024-06-26 ENCOUNTER — HOSPITAL ENCOUNTER (OUTPATIENT)
Dept: HYPERBARIC MEDICINE | Age: 64
Discharge: HOME | End: 2024-06-26
Payer: MEDICARE

## 2024-06-26 VITALS
OXYGEN SATURATION: 100 % | DIASTOLIC BLOOD PRESSURE: 80 MMHG | TEMPERATURE: 97.3 F | HEART RATE: 50 BPM | RESPIRATION RATE: 16 BRPM | SYSTOLIC BLOOD PRESSURE: 119 MMHG

## 2024-06-26 DIAGNOSIS — T86.821 FAILURE OF FLAP GRAFT: Primary | ICD-10-CM

## 2024-06-26 PROCEDURE — G0277 HBOT, FULL BODY CHAMBER, 30M: HCPCS

## 2024-06-26 ASSESSMENT — PAIN SCALES - GENERAL
PAINLEVEL_OUTOF10: 2
PAINLEVEL_OUTOF10: 2

## 2024-06-26 ASSESSMENT — PAIN DESCRIPTION - DESCRIPTORS: DESCRIPTORS: BURNING

## 2024-06-26 ASSESSMENT — PAIN DESCRIPTION - LOCATION
LOCATION: LEG
LOCATION: LEG

## 2024-06-26 ASSESSMENT — PAIN DESCRIPTION - ORIENTATION
ORIENTATION: RIGHT;LOWER
ORIENTATION: RIGHT;LOWER

## 2024-06-26 NOTE — PROGRESS NOTES
Wound vac dressing is leaking drainage. Skin around wound is denuded and irritated. Dr. Yen in to evaluate. Dr. Yen placed wound vac on hold - and Marjorie ALONSO to evaluate at appointment tomorrow. New dressing applied as ordered to right lower leg today - Triad paste to skin around wound, collagen to wound, drawtex, ABD, kerlix and ace bandage.

## 2024-06-27 ENCOUNTER — HOSPITAL ENCOUNTER (OUTPATIENT)
Dept: WOUND CARE | Age: 64
Discharge: HOME | End: 2024-06-27
Attending: NURSE PRACTITIONER
Payer: MEDICARE

## 2024-06-27 ENCOUNTER — HOSPITAL ENCOUNTER (OUTPATIENT)
Dept: HYPERBARIC MEDICINE | Age: 64
Discharge: HOME | End: 2024-06-27
Payer: MEDICARE

## 2024-06-27 VITALS
DIASTOLIC BLOOD PRESSURE: 63 MMHG | RESPIRATION RATE: 16 BRPM | TEMPERATURE: 97.5 F | HEART RATE: 51 BPM | SYSTOLIC BLOOD PRESSURE: 126 MMHG | OXYGEN SATURATION: 100 %

## 2024-06-27 DIAGNOSIS — S81.801D WOUND OF RIGHT LEG, SUBSEQUENT ENCOUNTER: ICD-10-CM

## 2024-06-27 DIAGNOSIS — L25.9 CONTACT DERMATITIS OF LOWER LEG: ICD-10-CM

## 2024-06-27 DIAGNOSIS — L03.115 CELLULITIS OF RIGHT LOWER EXTREMITY: Primary | ICD-10-CM

## 2024-06-27 DIAGNOSIS — T86.821 FAILURE OF FLAP GRAFT: Primary | ICD-10-CM

## 2024-06-27 PROBLEM — R60.0 VENOUS STASIS ULCER OF RIGHT LOWER LEG WITH EDEMA OF RIGHT LOWER LEG (HCC): Status: RESOLVED | Noted: 2023-08-31 | Resolved: 2024-06-27

## 2024-06-27 PROBLEM — I83.019 VENOUS STASIS ULCER OF RIGHT LOWER LEG WITH EDEMA OF RIGHT LOWER LEG (HCC): Status: RESOLVED | Noted: 2023-08-31 | Resolved: 2024-06-27

## 2024-06-27 PROBLEM — I83.891 VENOUS STASIS ULCER OF RIGHT LOWER LEG WITH EDEMA OF RIGHT LOWER LEG (HCC): Status: RESOLVED | Noted: 2023-08-31 | Resolved: 2024-06-27

## 2024-06-27 PROBLEM — L97.919 VENOUS STASIS ULCER OF RIGHT LOWER LEG WITH EDEMA OF RIGHT LOWER LEG (HCC): Status: RESOLVED | Noted: 2023-08-31 | Resolved: 2024-06-27

## 2024-06-27 PROCEDURE — 11045 DBRDMT SUBQ TISS EACH ADDL: CPT

## 2024-06-27 PROCEDURE — 97597 DBRDMT OPN WND 1ST 20 CM/<: CPT

## 2024-06-27 PROCEDURE — 87077 CULTURE AEROBIC IDENTIFY: CPT

## 2024-06-27 PROCEDURE — 87147 CULTURE TYPE IMMUNOLOGIC: CPT

## 2024-06-27 PROCEDURE — 11045 DBRDMT SUBQ TISS EACH ADDL: CPT | Performed by: NURSE PRACTITIONER

## 2024-06-27 PROCEDURE — 99183 HYPERBARIC OXYGEN THERAPY: CPT | Performed by: NURSE PRACTITIONER

## 2024-06-27 PROCEDURE — G0277 HBOT, FULL BODY CHAMBER, 30M: HCPCS

## 2024-06-27 PROCEDURE — 87186 SC STD MICRODIL/AGAR DIL: CPT

## 2024-06-27 PROCEDURE — 11042 DBRDMT SUBQ TIS 1ST 20SQCM/<: CPT

## 2024-06-27 PROCEDURE — 6370000000 HC RX 637 (ALT 250 FOR IP): Performed by: NURSE PRACTITIONER

## 2024-06-27 PROCEDURE — 87205 SMEAR GRAM STAIN: CPT

## 2024-06-27 PROCEDURE — 87070 CULTURE OTHR SPECIMN AEROBIC: CPT

## 2024-06-27 PROCEDURE — 11042 DBRDMT SUBQ TIS 1ST 20SQCM/<: CPT | Performed by: NURSE PRACTITIONER

## 2024-06-27 PROCEDURE — 99214 OFFICE O/P EST MOD 30 MIN: CPT | Performed by: NURSE PRACTITIONER

## 2024-06-27 RX ORDER — CIPROFLOXACIN 500 MG/1
500 TABLET, FILM COATED ORAL 2 TIMES DAILY
Qty: 20 TABLET | Refills: 0 | Status: SHIPPED | OUTPATIENT
Start: 2024-06-27 | End: 2024-06-28 | Stop reason: ALTCHOICE

## 2024-06-27 RX ORDER — LIDOCAINE HYDROCHLORIDE 20 MG/ML
JELLY TOPICAL ONCE
Status: COMPLETED | OUTPATIENT
Start: 2024-06-27 | End: 2024-06-27

## 2024-06-27 RX ADMIN — LIDOCAINE HYDROCHLORIDE: 20 JELLY TOPICAL at 10:54

## 2024-06-27 ASSESSMENT — PAIN DESCRIPTION - LOCATION
LOCATION: LEG

## 2024-06-27 ASSESSMENT — PAIN DESCRIPTION - ORIENTATION
ORIENTATION: RIGHT

## 2024-06-27 ASSESSMENT — PAIN SCALES - GENERAL
PAINLEVEL_OUTOF10: 2

## 2024-06-27 ASSESSMENT — PAIN DESCRIPTION - DESCRIPTORS
DESCRIPTORS: ACHING
DESCRIPTORS: BURNING;ACHING
DESCRIPTORS: ACHING

## 2024-06-27 NOTE — PATIENT INSTRUCTIONS
Visit Discharge/Physician Orders:  - Continue to wear offloading boot and using scooter.  - debridement done today and sutures removed   - Culture taken today   - prescription for Cipro sent to the pharmacy take as directed  - recommend yogurt or probiotic while on the antibiotics  - wound vac on hold  - graft held until culture comes back    Home Care: [facility]    Wound Location: Right lower leg    Dressing orders:     1) Gather wound care supplies and arrange on clean table.     2) Wash your hands with soap and water or use alcohol based hand  for 20 seconds (sing \"Happy Birthday\" twice).    3) Right lower leg- Apply triad paste to the wound and the skin around the wound that's irritated, cover wound with drawtex, ABD pad, roll gauze and ace wrap. Wrap from the base of the toes to just below the bend of the knee. Change daily and as needed for drainage control         Keep all dressings clean & dry.    Follow up visit: 7/2/24 at 12:30 pm with flores    Supplies: Per Home Health    Keep next scheduled appointment. Please give 24 hour notice if unable to keep appointment. [phone]    If you experience any of the following, please call the Wound Care Service during business hours: Monday through Friday 8:00 am - 4:30 pm  ([phone]).   *Increase in pain   *Temperature over 101   *Increase in drainage from your wound or a foul odor   *Uncontrolled swelling   *Need for compression bandage changes due to slippage, breakthrough drainage    If you need medical attention outside of business hours, please contact your Primary Care Doctor or go to the nearest emergency room.

## 2024-06-27 NOTE — PROGRESS NOTES
[facility]  Hyperbaric Oxygen Therapy   Progress Note      NAME: Damien Aguilar  MEDICAL RECORD NUMBER:  [id number]  AGE: 63 y.o.   GENDER: male  : 1960  EPISODE DATE:  2024     Subjective     HBO Treatment Number: 22 out of Total Treatments: 40    HBO Diagnosis:               Indications: Compromised/Failed Flap/Graft to ___(site) (Right leg)    Safety checks performed prior to treatment.  See doc flowsheets for documentation.    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    Pre treatment Vital Signs       Temp: 98 °F (36.7 °C)     Pulse: 55     Respirations: 16     BP: (!) 120/59       Post treatment Vital Signs  Temp: 97.3 °F (36.3 °C)  Pulse: 50  Respirations: 16  BP: 119/80      Assessment        Physical Exam:  General Appearance:  alert and oriented to person, place and time, well-developed and well-nourished, in no acute distress    Pre Tympanic Membrane Assessment:  tympanic membranes intact bilaterally    Post Tympanic Membrane Assessment:  Right: Normal  Left: Normal    Pulmonary/Chest:  clear to auscultation bilaterally- no wheezes, rales or rhonchi, normal air movement, no respiratory distress    Cardiovascular:  normal       Treatment Start Time: 0833     Pressure Reached Time: 0843  JANNETH : 2  Treatment Status: No Air break      Decompression Time: 1013   Treatment End Time: 1023    Symptoms Noted During

## 2024-06-27 NOTE — PLAN OF CARE
Problem: Physical Regulation:  Goal: Tolerate HBO therapy and barotrauma will be prevented  Description: Tolerate HBO therapy and barotrauma will be prevented  Outcome: Progressing   No s/s of seizure noted. No s/s of barotrauma noted. Pt demonstrated equalization of ears during pressurization.   Care plan reviewed with patient.  Patient verbalize understanding of the plan of care and contribute to goal setting.

## 2024-06-27 NOTE — PROGRESS NOTES
today's hyperbaric oxygen treatment at [facility] and HBO therapy.    In my clinical judgement, ongoing HBO therapy is  necessary at this time to assist with wound healing, preservation of limb, life, or function.    Supervision and attendance of Hyperbaric Oxygen Therapy provided. Continue HBO treatment as outlined in the treatment plan.    Hyperbaric Oxygen: Mr. Aguilar tolerated: Treatment Number: 23 without  issue.    Discharge Instructions were explained and given to Mr. Aguilar     Electronically signed by JAZZY Lopez CNP on 6/27/2024 at 12:04 PM

## 2024-06-27 NOTE — PLAN OF CARE
Problem: Wound:  Goal: Will show signs of wound healing; wound closure and no evidence of infection  Description: Will show signs of wound healing; wound closure and no evidence of infection  Outcome: Progressing   Pt. Seen today for right leg wound see AVS for new orders. Follow up in 1 week.  Care plan reviewed with patient.  Patient verbalize understanding of the plan of care and contribute to goal setting.

## 2024-06-27 NOTE — PROGRESS NOTES
[facility]  Consult and Procedure Note      Damien Aguilar  MEDICAL RECORD NUMBER:  [id number]  AGE: 63 y.o.   GENDER: male  : 1960  EPISODE DATE:  2024  Referring Provider: Gokul Yen, *   Reason for Referral: right leg wound    SUBJECTIVE:     Chief Complaint   Patient presents with    Wound Check     Right leg         HISTORY OF PRESENT ILLNESS      Damien Aguilar is a 63 y.o. male who presents today for wound/ulcer evaluation. Patient is  established . Wound duration:  1 year(s).    Patient seen today for evaluation of nonhealing surgical wound to right lower extremity s/p Right Leg Excision and Debridement of Wound, Right Medial Soleus Muscle Flap 24 by Dr. Rivas.  Patient follows with Dr. Rivas for this wound, is seen by me today as Dr. Rivas is out of town.  Negative pressure wound therapy has been utilized with good progression of wound.  This was held early this week secondary to increased drainage.  He presents today with Drawtex, ABD pad, roll gauze and ACE wrap in place, applied yesterday. This dressing was saturated, had drained through all layers in less than 24 hours.  He also notes increased pain to wound as compared to baseline. He denies any fevers or chills.  Denies any further needs or concerns.    PAST MEDICAL HISTORY             Diagnosis Date    Morbid obesity (HCC)     Osteoarthritis     Snoring     possible apnea - per wife, better since lost 20# recently.  BMI 41.97, neck circ 17.5 cm, no daytime somnolence, no am headaches.       PAST SURGICAL HISTORY     Past Surgical History:   Procedure Laterality Date    HERNIA REPAIR      left inguinal    JOINT REPLACEMENT Left 2020    left hip    OTHER SURGICAL HISTORY      right leg vein stripping    PRESSURE ULCER DEBRIDEMENT Right 2024    Right Leg Excision and Debridement of Wound, Right Medial Soleus Muscle Flap Right Skin Substitute Graft, Application of Wound Vac performed by Rob,

## 2024-06-27 NOTE — PROGRESS NOTES
[facility]  [address]   [place], [place] [address]  Telephone: [phone]     FAX [phone]      Patient Instructions   Visit Discharge/Physician Orders:  - Continue to wear offloading boot and using scooter.  - debridement done today and sutures removed   - Culture taken today   - prescription for Cipro sent to the pharmacy take as directed  - recommend yogurt or probiotic while on the antibiotics  - wound vac on hold  - graft held until culture comes back    Home Care: [facility]    Wound Location: Right lower leg    Dressing orders:     1) Gather wound care supplies and arrange on clean table.     2) Wash your hands with soap and water or use alcohol based hand  for 20 seconds (sing \"Happy Birthday\" twice).    3) Right lower leg- Apply triad paste to the wound and the skin around the wound that's irritated, cover wound with drawtex, ABD pad, roll gauze and ace wrap. Wrap from the base of the toes to just below the bend of the knee. Change daily and as needed for drainage control         Keep all dressings clean & dry.    Follow up visit: 7/2/24 at 12:30 pm with flores    Supplies: Per Home Health    Keep next scheduled appointment. Please give 24 hour notice if unable to keep appointment. [phone]    If you experience any of the following, please call the Wound Care Service during business hours: Monday through Friday 8:00 am - 4:30 pm  ([phone]).   *Increase in pain   *Temperature over 101   *Increase in drainage from your wound or a foul odor   *Uncontrolled swelling   *Need for compression bandage changes due to slippage, breakthrough drainage    If you need medical attention outside of business hours, please contact your Primary Care Doctor or go to the nearest emergency room.                        Skilled nurse to evaluate and treat for wound care. Change dressing as ordered. Patient/Family/caregiver may change dressings as needed as

## 2024-06-28 ENCOUNTER — TELEPHONE (OUTPATIENT)
Dept: WOUND CARE | Age: 64
End: 2024-06-28

## 2024-06-28 ENCOUNTER — HOSPITAL ENCOUNTER (OUTPATIENT)
Dept: HYPERBARIC MEDICINE | Age: 64
Discharge: HOME | End: 2024-06-28
Payer: MEDICARE

## 2024-06-28 VITALS
TEMPERATURE: 97.1 F | HEART RATE: 51 BPM | SYSTOLIC BLOOD PRESSURE: 143 MMHG | RESPIRATION RATE: 16 BRPM | OXYGEN SATURATION: 100 % | DIASTOLIC BLOOD PRESSURE: 64 MMHG

## 2024-06-28 DIAGNOSIS — B95.8 STAPH INFECTION: Primary | ICD-10-CM

## 2024-06-28 DIAGNOSIS — T86.821 FAILURE OF FLAP GRAFT: Primary | ICD-10-CM

## 2024-06-28 DIAGNOSIS — A49.1 ENTEROCOCCAL INFECTION: ICD-10-CM

## 2024-06-28 PROCEDURE — 99183 HYPERBARIC OXYGEN THERAPY: CPT | Performed by: NURSE PRACTITIONER

## 2024-06-28 PROCEDURE — G0277 HBOT, FULL BODY CHAMBER, 30M: HCPCS

## 2024-06-28 RX ORDER — AMOXICILLIN AND CLAVULANATE POTASSIUM 875; 125 MG/1; MG/1
1 TABLET, FILM COATED ORAL 2 TIMES DAILY
Qty: 28 TABLET | Refills: 0 | Status: SHIPPED | OUTPATIENT
Start: 2024-06-28 | End: 2024-07-12

## 2024-06-28 NOTE — TELEPHONE ENCOUNTER
----- Message from JAZZY Lopez - CNP sent at 6/28/2024  3:22 PM EDT -----  Reviewed preliminary findings with Dr. Yen.  Recommend patient stop Cipro, start Augmentin, 875 mg BID x14 days.  Prescription sent to pharmacy.

## 2024-06-28 NOTE — RESULT ENCOUNTER NOTE
Reviewed preliminary findings with Dr. Yen.  Recommend patient stop Cipro, start Augmentin, 875 mg BID x14 days.  Prescription sent to pharmacy.
normal...

## 2024-06-28 NOTE — PROGRESS NOTES
complete today's hyperbaric oxygen treatment at [facility] and HBO therapy.    In my clinical judgement, ongoing HBO therapy is necessary at this time to assist with wound healing, preservation of limb, life, or function.    Supervision and attendance of Hyperbaric Oxygen Therapy provided. Continue HBO treatment as outlined in the treatment plan.    Hyperbaric Oxygen: Mr. Aguilar tolerated: Treatment Number: 24 without  issue.    Discharge Instructions were explained and given to Mr. Aguilar     Electronically signed by JAZZY Lopez CNP on 6/28/2024 at 12:57 PM

## 2024-06-28 NOTE — TELEPHONE ENCOUNTER
Contacted Koby at this time, instructed him to stop the Cipro and begin taking the Augmenting twice a day for 14 days, patient states he already received a notification from his pharmacy and voices good understanding, he has no further questions at this time.

## 2024-06-30 DIAGNOSIS — S81.801D WOUND OF RIGHT LOWER EXTREMITY, SUBSEQUENT ENCOUNTER: ICD-10-CM

## 2024-07-01 ENCOUNTER — HOSPITAL ENCOUNTER (OUTPATIENT)
Dept: HYPERBARIC MEDICINE | Age: 64
Discharge: HOME | End: 2024-07-01
Payer: MEDICARE

## 2024-07-01 VITALS
DIASTOLIC BLOOD PRESSURE: 75 MMHG | SYSTOLIC BLOOD PRESSURE: 169 MMHG | OXYGEN SATURATION: 100 % | TEMPERATURE: 97.5 F | HEART RATE: 50 BPM | RESPIRATION RATE: 16 BRPM

## 2024-07-01 DIAGNOSIS — T86.821 FAILURE OF FLAP GRAFT: Primary | ICD-10-CM

## 2024-07-01 PROCEDURE — G0277 HBOT, FULL BODY CHAMBER, 30M: HCPCS

## 2024-07-01 RX ORDER — MELOXICAM 7.5 MG/1
7.5 TABLET ORAL DAILY
Qty: 90 TABLET | Refills: 0 | Status: SHIPPED | OUTPATIENT
Start: 2024-07-01

## 2024-07-01 NOTE — TELEPHONE ENCOUNTER
Damien Aguilar called requesting a refill on the following medications:  Requested Prescriptions     Pending Prescriptions Disp Refills    meloxicam (MOBIC) 7.5 MG tablet [Pharmacy Med Name: MELOXICAM 7.5 MG TABLET] 90 tablet 0     Sig: TAKE 1 TABLET BY MOUTH EVERY DAY       Date of last visit: 3/20/2024  Date of next visit (if applicable):Visit date not found  Date of last refill: 4/1/2024  Pharmacy Name: [facility]    Looks like medication was discontinued      Thanks,  Obdulia Hendrix, CMA

## 2024-07-01 NOTE — PROGRESS NOTES
HBO PROGRESS NOTE      NAME: Damien Aguilar  MEDICAL RECORD NUMBER:  [id number]  AGE: 63 y.o.   GENDER: male  : 1960  EPISODE DATE:  2024     Subjective     HBO Treatment Number: 25 out of Total Treatments: 40    HBO Diagnosis:             Indications: Compromised/Failed Flap/Graft to ___(site) (right leg)    Safety checks performed prior to treatment.  See doc flowsheets for documentation.    Objective        No results for input(s): \"POCGLU\" in the last 72 hours.  Pre treatment Vital Signs       Temp: 97.9 °F (36.6 °C)     Pulse: 56     Respirations: 16     BP: (!) 141/90       Post treatment Vital Signs  Temp: 97.5 °F (36.4 °C)  Pulse: 50  Respirations: 16  BP: (!) 169/75    Assessment        HBO Diagnosis:   Problem List Items Addressed This Visit          Other    Failure of flap graft - Primary    Relevant Orders    Notify physician (specify)    Hyperbaric Oxygen Therapy       Physical Exam        General Appearance:  alert and oriented to person, place and time, well-developed and well-nourished, in no acute distress    Pre Tympanic Membrane Assessment:  Right: Normal  Left: Normal    Post Tympanic Membrane Assessment:  Left: Normal  Right: Normal    Pulmonary/Chest:  clear to auscultation bilaterally- no wheezes, rales or rhonchi, normal air movement, no respiratory distress    Cardiovascular:  normal    Plan        Patient placed in a full body Monoplace Chamber #: 3  Treatment Start Time: 0900     Pressure Reached Time: 0910  JANNETH : 2  Number of Air Breaks:  Treatment Status: No Air break     Decompression Time: 1040   Treatment End Time: 1050  Length of Treatment: 90 Minutes  Symptoms Noted During Treatment: None  Total Treatment Time (min): 110    Treatment Completion Status: Treatment completed without issue    I was present on these premises and immediately available to furnish assistance & direction throughout the HBO Treatment.     Damien Aguilar is a 63 y.o. male  did successfully

## 2024-07-02 ENCOUNTER — HOSPITAL ENCOUNTER (OUTPATIENT)
Dept: HYPERBARIC MEDICINE | Age: 64
Discharge: HOME | End: 2024-07-02
Payer: MEDICARE

## 2024-07-02 ENCOUNTER — TELEPHONE (OUTPATIENT)
Dept: WOUND CARE | Age: 64
End: 2024-07-02

## 2024-07-02 VITALS
OXYGEN SATURATION: 100 % | SYSTOLIC BLOOD PRESSURE: 139 MMHG | DIASTOLIC BLOOD PRESSURE: 72 MMHG | RESPIRATION RATE: 16 BRPM | HEART RATE: 54 BPM | TEMPERATURE: 97.3 F

## 2024-07-02 DIAGNOSIS — T86.821 FAILURE OF FLAP GRAFT: Primary | ICD-10-CM

## 2024-07-02 PROCEDURE — G0277 HBOT, FULL BODY CHAMBER, 30M: HCPCS

## 2024-07-02 NOTE — PLAN OF CARE
Problem: Physical Regulation:  Goal: Tolerate HBO therapy and barotrauma will be prevented  Description: Tolerate HBO therapy and barotrauma will be prevented  Outcome: Adequate for Discharge     No s/s of seizure noted. No s/s of barotrauma noted. Pt demonstrated equalization of ears during pressurization.    Care plan reviewed with patient .  Patient verbalize understanding of the plan of care and contribute to goal setting.

## 2024-07-02 NOTE — TELEPHONE ENCOUNTER
Contacted Dr Rivas regarding patient HBO treatment end time. He is beginning a surgery case and will not be able to come see patient. He would like patient to come back at 1230 if he is able. Discussed plan with patient after his HBO treatment. He is unable to come back at 1230. Dr Rivas updated. Dressing change completed per orders today after HBO.

## 2024-07-03 ENCOUNTER — HOSPITAL ENCOUNTER (OUTPATIENT)
Dept: HYPERBARIC MEDICINE | Age: 64
Discharge: HOME | End: 2024-07-03
Payer: MEDICARE

## 2024-07-03 VITALS
SYSTOLIC BLOOD PRESSURE: 134 MMHG | DIASTOLIC BLOOD PRESSURE: 63 MMHG | TEMPERATURE: 97.1 F | OXYGEN SATURATION: 100 % | HEART RATE: 48 BPM | RESPIRATION RATE: 16 BRPM

## 2024-07-03 DIAGNOSIS — T86.821 FAILURE OF FLAP GRAFT: Primary | ICD-10-CM

## 2024-07-03 PROCEDURE — G0277 HBOT, FULL BODY CHAMBER, 30M: HCPCS

## 2024-07-03 ASSESSMENT — PAIN DESCRIPTION - DESCRIPTORS: DESCRIPTORS: STABBING

## 2024-07-03 ASSESSMENT — PAIN DESCRIPTION - LOCATION: LOCATION: LEG

## 2024-07-03 ASSESSMENT — PAIN SCALES - GENERAL: PAINLEVEL_OUTOF10: 4

## 2024-07-03 ASSESSMENT — PAIN DESCRIPTION - ORIENTATION: ORIENTATION: RIGHT

## 2024-07-03 NOTE — PROGRESS NOTES
[facility]  Hyperbaric Oxygen Therapy   Progress Note      NAME: Damien Aguilar  MEDICAL RECORD NUMBER:  [id number]  AGE: 63 y.o.   GENDER: male  : 1960  EPISODE DATE:  7/3/2024     Subjective     HBO Treatment Number: 27 out of Total Treatments: 40    HBO Diagnosis:               Indications: Compromised/Failed Flap/Graft to ___(site) (Right leg)    Safety checks performed prior to treatment.  See doc flowsheets for documentation.    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    Pre treatment Vital Signs       Temp: 98.1 °F (36.7 °C)     Pulse: 51     Respirations: 16     BP: 122/60       Post treatment Vital Signs  Temp: 97.1 °F (36.2 °C)  Pulse: (!) 48  Respirations: 16  BP: 134/63      Assessment        Physical Exam:  General Appearance:  alert and oriented to person, place and time, well-developed and well-nourished, in no acute distress    Pre Tympanic Membrane Assessment:  tympanic membranes intact bilaterally    Post Tympanic Membrane Assessment:  Right: Normal  Left: Normal    Pulmonary/Chest:  clear to auscultation bilaterally- no wheezes, rales or rhonchi, normal air movement, no respiratory distress    Cardiovascular:  normal       Treatment Start Time: 0845     Pressure Reached Time: 0855  JANNETH : 2  Treatment Status: No Air break      Decompression Time: 1025   Treatment End Time: 1035    Symptoms Noted During

## 2024-07-03 NOTE — PROGRESS NOTES
None    Adverse Event: no      I was present on these premises and immediately available to furnish assistance & direction throughout the procedure.     Plan          Damien Aguilar is a 63 y.o. male  successfully completed today's hyperbaric oxygen treatment at [facility].    In my clinical judgement, ongoing HBO therapy is necessary at this time, given a threat to patient function, limb or life from the current condition.  Supervision and attendance of Hyperbaric Oxygen Therapy provided.  Continue HBO treatment as outlined in the treatment plan.    Hyperbaric Oxygen:  Pt tolerated Treatment Number: 26 well today without complications.     Electronically signed by Gokul Yen MD on 7/2/2024 at 10:53 AM

## 2024-07-08 ENCOUNTER — HOSPITAL ENCOUNTER (OUTPATIENT)
Dept: HYPERBARIC MEDICINE | Age: 64
Discharge: HOME | End: 2024-07-08
Payer: COMMERCIAL

## 2024-07-08 VITALS
OXYGEN SATURATION: 100 % | SYSTOLIC BLOOD PRESSURE: 127 MMHG | RESPIRATION RATE: 16 BRPM | TEMPERATURE: 97.3 F | HEART RATE: 54 BPM | DIASTOLIC BLOOD PRESSURE: 56 MMHG

## 2024-07-08 DIAGNOSIS — T86.821 FAILURE OF FLAP GRAFT: Primary | ICD-10-CM

## 2024-07-08 PROCEDURE — G0277 HBOT, FULL BODY CHAMBER, 30M: HCPCS

## 2024-07-09 ENCOUNTER — HOSPITAL ENCOUNTER (OUTPATIENT)
Dept: WOUND CARE | Age: 64
Discharge: HOME | End: 2024-07-09
Attending: PODIATRIST
Payer: MEDICARE

## 2024-07-09 ENCOUNTER — HOSPITAL ENCOUNTER (OUTPATIENT)
Dept: HYPERBARIC MEDICINE | Age: 64
Discharge: HOME | End: 2024-07-09
Payer: COMMERCIAL

## 2024-07-09 VITALS
RESPIRATION RATE: 18 BRPM | OXYGEN SATURATION: 97 % | SYSTOLIC BLOOD PRESSURE: 137 MMHG | HEART RATE: 52 BPM | DIASTOLIC BLOOD PRESSURE: 65 MMHG

## 2024-07-09 VITALS
OXYGEN SATURATION: 100 % | RESPIRATION RATE: 16 BRPM | SYSTOLIC BLOOD PRESSURE: 124 MMHG | HEART RATE: 48 BPM | TEMPERATURE: 97.1 F | DIASTOLIC BLOOD PRESSURE: 59 MMHG

## 2024-07-09 DIAGNOSIS — I87.2 VENOUS INSUFFICIENCY OF BOTH LOWER EXTREMITIES: ICD-10-CM

## 2024-07-09 DIAGNOSIS — T86.821 FAILURE OF FLAP GRAFT: Primary | ICD-10-CM

## 2024-07-09 DIAGNOSIS — I87.2 CHRONIC VENOUS STASIS DERMATITIS OF RIGHT LOWER EXTREMITY: ICD-10-CM

## 2024-07-09 DIAGNOSIS — S81.801D WOUND OF RIGHT LEG, SUBSEQUENT ENCOUNTER: Primary | ICD-10-CM

## 2024-07-09 PROCEDURE — G0277 HBOT, FULL BODY CHAMBER, 30M: HCPCS

## 2024-07-09 PROCEDURE — 15271 SKIN SUB GRAFT TRNK/ARM/LEG: CPT

## 2024-07-09 NOTE — PATIENT INSTRUCTIONS
Visit Discharge/Physician Orders:  - Continue to wear offloading boot and using scooter.  - graft held until culture comes back  -surgery discussed today-split thickness skin graft   -debridement done today   -graft applied today #1 grafix pl prime     Wound Location: Right lower leg    Dressing orders:     1) Gather wound care supplies and arrange on clean table.     2) Wash your hands with soap and water or use alcohol based hand  for 20 seconds (sing \"Happy Birthday\" twice).    3) Right lower leg- Applied grafix to the wound and covered with wound veil and steri strips. On Friday change the dressing down to the wound veil. Apply ABD pad, kerlix, and ace wrap (wrap from the base of the toes to the bend of the knee). Continue to wear cam walking boot  Keep all dressings clean & dry.    Follow up visit: 1 week Tuesday July 16 at 12:30 pm     Supplies:     Keep next scheduled appointment. Please give 24 hour notice if unable to keep appointment. [phone]    If you experience any of the following, please call the Wound Care Service during business hours: Monday through Friday 8:00 am - 4:30 pm  ([phone]).   *Increase in pain   *Temperature over 101   *Increase in drainage from your wound or a foul odor   *Uncontrolled swelling   *Need for compression bandage changes due to slippage, breakthrough drainage    If you need medical attention outside of business hours, please contact your Primary Care Doctor or go to the nearest emergency room.

## 2024-07-09 NOTE — PLAN OF CARE
Problem: Wound:  Goal: Will show signs of wound healing; wound closure and no evidence of infection  Description: Will show signs of wound healing; wound closure and no evidence of infection  Outcome: Progressing   Pt. Seen today for right leg wound see AVS for orders. Follow up in 1 week.  Care plan reviewed with patient.  Patient verbalize understanding of the plan of care and contribute to goal setting.

## 2024-07-09 NOTE — PROGRESS NOTES
[facility]  Hyperbaric Oxygen Therapy   Progress Note      NAME: Damien Aguilar  MEDICAL RECORD NUMBER:  [id number]  AGE: 63 y.o.   GENDER: male  : 1960  EPISODE DATE:  2024     Subjective     HBO Treatment Number: 29 out of Total Treatments: 40    HBO Diagnosis:               Indications: Compromised/Failed Flap/Graft to ___(site) (right leg)    Safety checks performed prior to treatment.  See doc flowsheets for documentation.    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    Pre treatment Vital Signs       Temp: 98 °F (36.7 °C)     Pulse: 52     Respirations: 18     BP: 137/65       Post treatment Vital Signs  Temp: 97.1 °F (36.2 °C)  Pulse: (!) 48  Respirations: 16  BP: (!) 124/59      Assessment        Physical Exam:  General Appearance:  alert and oriented to person, place and time, well-developed and well-nourished, in no acute distress    Pre Tympanic Membrane Assessment:  tympanic membranes intact bilaterally    Post Tympanic Membrane Assessment:  Right: Normal  Left: Normal    Pulmonary/Chest:  clear to auscultation bilaterally- no wheezes, rales or rhonchi, normal air movement, no respiratory distress    Cardiovascular:  normal       Treatment Start Time: 0840     Pressure Reached Time: 0850  JANNETH : 2  Treatment Status: No Air break      Decompression Time: 1020   Treatment End Time: 1030    Symptoms Noted During

## 2024-07-09 NOTE — PROGRESS NOTES
Waste: N/A     Surgically Fixated: Yes    Secured With: Steri Strips    Procedural Pain: 0/10     Post Procedural Pain: 0 / 10    Response to Treatment: Well tolerated by patient.        Plan:     Patient examined and evaluated  Debridement  Graft  Discussed the problem with the patient and he will continue to use the HBO and our goal is to order a skin substitute to increase the speed at which this granulates and epithelializes the patient is on track and doing very well and has healed very nicely.  Treatment:   Orders Placed This Encounter   Procedures    Debridement     Selective of necrotic tissue     Standing Status:   Standing     Number of Occurrences:   1    Foot/Ankle Post Surgical Boot     Standing Status:   Standing     Number of Occurrences:   1    Nursing communication     Moist to moist normal saline  gauze followed by a dry dressing.     Standing Status:   Standing     Number of Occurrences:   1   HBO  Apply GRAFIX PL    Antibiotics: Yes finish    Follow up: monday    Please see attached Discharge Instructions    Written patient dismissal instructions given to patient and signed by patient or POA.         Patient Instructions   Visit Discharge/Physician Orders:  - Continue to wear offloading boot and using scooter.  - graft held until culture comes back  -surgery discussed today-split thickness skin graft   -debridement done today   -graft applied today #1 grafix pl prime     Home Care: ?    Wound Location: Right lower leg    Dressing orders:     1) Gather wound care supplies and arrange on clean table.     2) Wash your hands with soap and water or use alcohol based hand  for 20 seconds (sing \"Happy Birthday\" twice).    3) Right lower leg- Apply triad paste to the wound and the skin around the wound that's irritated, cover wound with drawtex, ABD pad, roll gauze and ace wrap. Wrap from the base of the toes to just below the bend of the knee. Change daily and as needed for drainage control

## 2024-07-09 NOTE — PROGRESS NOTES
[facility]  Hyperbaric Oxygen Therapy   Progress Note      NAME: Damien Aguilar  MEDICAL RECORD NUMBER:  [id number]  AGE: 63 y.o.   GENDER: male  : 1960  EPISODE DATE:  2024     Subjective     HBO Treatment Number: 28 out of Total Treatments: 40    HBO Diagnosis:               Indications: Compromised/Failed Flap/Graft to ___(site) (right leg)    Safety checks performed prior to treatment.  See doc flowsheets for documentation.    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    Pre treatment Vital Signs       Temp: 97.9 °F (36.6 °C)     Pulse: 52     Respirations: 16     BP: (!) 143/65       Post treatment Vital Signs  Temp: 97.3 °F (36.3 °C)  Pulse: 54  Respirations: 16  BP: (!) 127/56      Assessment        Physical Exam:  General Appearance:  alert and oriented to person, place and time, well-developed and well-nourished, in no acute distress    Pre Tympanic Membrane Assessment:  tympanic membranes intact bilaterally    Post Tympanic Membrane Assessment:  Right: Normal  Left: Normal    Pulmonary/Chest:  clear to auscultation bilaterally- no wheezes, rales or rhonchi, normal air movement, no respiratory distress    Cardiovascular:  normal       Treatment Start Time: 0843     Pressure Reached Time: 0853  JANNETH : 2  Treatment Status: No Air break      Decompression Time: 1023   Treatment End Time: 1033    Symptoms Noted During

## 2024-07-10 ENCOUNTER — HOSPITAL ENCOUNTER (OUTPATIENT)
Dept: HYPERBARIC MEDICINE | Age: 64
Discharge: HOME | End: 2024-07-10
Payer: COMMERCIAL

## 2024-07-10 VITALS
TEMPERATURE: 97.3 F | DIASTOLIC BLOOD PRESSURE: 61 MMHG | SYSTOLIC BLOOD PRESSURE: 142 MMHG | HEART RATE: 51 BPM | RESPIRATION RATE: 16 BRPM | OXYGEN SATURATION: 100 %

## 2024-07-10 DIAGNOSIS — T86.821 FAILURE OF FLAP GRAFT: Primary | ICD-10-CM

## 2024-07-10 PROCEDURE — G0277 HBOT, FULL BODY CHAMBER, 30M: HCPCS

## 2024-07-10 NOTE — PROGRESS NOTES
[facility]  Hyperbaric Oxygen Therapy   Progress Note      NAME: Damien Aguilar  MEDICAL RECORD NUMBER:  [id number]  AGE: 63 y.o.   GENDER: male  : 1960  EPISODE DATE:  7/10/2024     Subjective     HBO Treatment Number: 30 out of Total Treatments: 40    HBO Diagnosis:               Indications: Compromised/Failed Flap/Graft to ___(site) (right leg)    Safety checks performed prior to treatment.  See doc flowsheets for documentation.    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    Pre treatment Vital Signs       Temp: 98 °F (36.7 °C)     Pulse: (!) 48     Respirations: 16     BP: (!) 126/54       Post treatment Vital Signs  Temp: 97.3 °F (36.3 °C)  Pulse: 51  Respirations: 16  BP: (!) 142/61      Assessment        Physical Exam:  General Appearance:  alert and oriented to person, place and time, well-developed and well-nourished, in no acute distress    Pre Tympanic Membrane Assessment:  tympanic membranes intact bilaterally    Post Tympanic Membrane Assessment:          Pulmonary/Chest:  clear to auscultation bilaterally- no wheezes, rales or rhonchi, normal air movement, no respiratory distress    Cardiovascular:  normal       Treatment Start Time: 0841     Pressure Reached Time: 0851  JANNETH : 2  Treatment Status: No Air break      Decompression Time: 1021   Treatment End Time: 1031    Symptoms Noted During Treatment:

## 2024-07-11 ENCOUNTER — HOSPITAL ENCOUNTER (OUTPATIENT)
Dept: HYPERBARIC MEDICINE | Age: 64
Discharge: HOME | End: 2024-07-11
Payer: COMMERCIAL

## 2024-07-11 VITALS
RESPIRATION RATE: 16 BRPM | OXYGEN SATURATION: 100 % | DIASTOLIC BLOOD PRESSURE: 72 MMHG | TEMPERATURE: 97.5 F | HEART RATE: 48 BPM | SYSTOLIC BLOOD PRESSURE: 131 MMHG

## 2024-07-11 DIAGNOSIS — T86.821 FAILURE OF FLAP GRAFT: Primary | ICD-10-CM

## 2024-07-11 PROCEDURE — G0277 HBOT, FULL BODY CHAMBER, 30M: HCPCS

## 2024-07-11 NOTE — PROGRESS NOTES
[facility]  Hyperbaric Oxygen Therapy   Progress Note      NAME: Damien Aguilar  MEDICAL RECORD NUMBER:  [id number]  AGE: 63 y.o.   GENDER: male  : 1960  EPISODE DATE:  2024     Subjective     HBO Treatment Number: 31 out of Total Treatments: 40    HBO Diagnosis:               Indications: Compromised/Failed Flap/Graft to ___(site) (right leg)    Safety checks performed prior to treatment.  See doc flowsheets for documentation.    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    Pre treatment Vital Signs       Temp: 97.8 °F (36.6 °C)     Pulse: (!) 49     Respirations: 16     BP: 132/60       Post treatment Vital Signs  Temp: 97.5 °F (36.4 °C)  Pulse: (!) 48  Respirations: 16  BP: 131/72      Assessment        Physical Exam:  General Appearance:  alert and oriented to person, place and time, well-developed and well-nourished, in no acute distress    Pre Tympanic Membrane Assessment:  tympanic membranes intact bilaterally    Post Tympanic Membrane Assessment:          Pulmonary/Chest:  clear to auscultation bilaterally- no wheezes, rales or rhonchi, normal air movement, no respiratory distress    Cardiovascular:  normal       Treatment Start Time: 0837     Pressure Reached Time: 0847  JANNETH : 2  Treatment Status: No Air break      Decompression Time: 1017   Treatment End Time: 1027    Symptoms Noted During Treatment:

## 2024-07-12 ENCOUNTER — HOSPITAL ENCOUNTER (OUTPATIENT)
Dept: HYPERBARIC MEDICINE | Age: 64
Discharge: HOME | End: 2024-07-12
Payer: COMMERCIAL

## 2024-07-12 VITALS
HEART RATE: 52 BPM | RESPIRATION RATE: 16 BRPM | SYSTOLIC BLOOD PRESSURE: 124 MMHG | OXYGEN SATURATION: 100 % | TEMPERATURE: 97.5 F | DIASTOLIC BLOOD PRESSURE: 62 MMHG

## 2024-07-12 DIAGNOSIS — T86.821 FAILURE OF FLAP GRAFT: Primary | ICD-10-CM

## 2024-07-12 PROCEDURE — G0277 HBOT, FULL BODY CHAMBER, 30M: HCPCS

## 2024-07-12 NOTE — PROGRESS NOTES
[facility]  Hyperbaric Oxygen Therapy   Progress Note      NAME: Damien Aguilar  MEDICAL RECORD NUMBER:  [id number]  AGE: 63 y.o.   GENDER: male  : 1960  EPISODE DATE:  2024     Subjective     HBO Treatment Number: 32 out of Total Treatments: 40    HBO Diagnosis:               Indications: Compromised/Failed Flap/Graft to ___(site) (right leg)    Safety checks performed prior to treatment.  See doc flowsheets for documentation.    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    Pre treatment Vital Signs       Temp: 98.3 °F (36.8 °C)     Pulse: 55     Respirations: 16     BP: (!) 117/56       Post treatment Vital Signs  Temp: 97.5 °F (36.4 °C)  Pulse: 52  Respirations: 16  BP: 124/62      Assessment        Physical Exam:  General Appearance:  alert and oriented to person, place and time, well-developed and well-nourished, in no acute distress    Pre Tympanic Membrane Assessment:  tympanic membranes intact bilaterally    Post Tympanic Membrane Assessment:  Right: Normal  Left: Normal    Pulmonary/Chest:  clear to auscultation bilaterally- no wheezes, rales or rhonchi, normal air movement, no respiratory distress    Cardiovascular:  normal       Treatment Start Time: 0844     Pressure Reached Time: 0854  JANNETH : 2  Treatment Status: No Air break      Decompression Time: 1024   Treatment End Time: 1034    Symptoms Noted During

## 2024-07-15 ENCOUNTER — HOSPITAL ENCOUNTER (OUTPATIENT)
Dept: HYPERBARIC MEDICINE | Age: 64
Discharge: HOME | End: 2024-07-15
Payer: MEDICARE

## 2024-07-15 VITALS
OXYGEN SATURATION: 100 % | TEMPERATURE: 97.5 F | RESPIRATION RATE: 16 BRPM | DIASTOLIC BLOOD PRESSURE: 62 MMHG | SYSTOLIC BLOOD PRESSURE: 136 MMHG | HEART RATE: 51 BPM

## 2024-07-15 DIAGNOSIS — T86.821 FAILURE OF FLAP GRAFT: Primary | ICD-10-CM

## 2024-07-15 PROCEDURE — G0277 HBOT, FULL BODY CHAMBER, 30M: HCPCS

## 2024-07-15 NOTE — PROGRESS NOTES
[facility]  Hyperbaric Oxygen Therapy   Progress Note      NAME: Damien Aguilar  MEDICAL RECORD NUMBER:  [id number]  AGE: 63 y.o.   GENDER: male  : 1960  EPISODE DATE:  7/15/2024     Subjective     HBO Treatment Number: 33 out of Total Treatments: 40    HBO Diagnosis:               Indications: Compromised/Failed Flap/Graft to ___(site) (right leg)    Safety checks performed prior to treatment.  See doc flowsheets for documentation.    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    Pre treatment Vital Signs       Temp: 97.8 °F (36.6 °C)     Pulse: 51     Respirations: 16     BP: (!) 151/65       Post treatment Vital Signs  Temp: 97.5 °F (36.4 °C)  Pulse: 51  Respirations: 16  BP: 136/62      Assessment        Physical Exam:  General Appearance:  alert and oriented to person, place and time, well-developed and well-nourished, in no acute distress    Pre Tympanic Membrane Assessment:  tympanic membranes intact bilaterally    Post Tympanic Membrane Assessment:  Right: Normal  Left: Normal    Pulmonary/Chest:  clear to auscultation bilaterally- no wheezes, rales or rhonchi, normal air movement, no respiratory distress    Cardiovascular:  normal       Treatment Start Time: 0843     Pressure Reached Time: 0853  JANNETH : 2  Treatment Status: No Air break      Decompression Time: 1023   Treatment End Time: 1033    Symptoms Noted During

## 2024-07-16 ENCOUNTER — HOSPITAL ENCOUNTER (OUTPATIENT)
Dept: WOUND CARE | Age: 64
Discharge: HOME | End: 2024-07-16
Attending: PODIATRIST
Payer: MEDICARE

## 2024-07-16 ENCOUNTER — HOSPITAL ENCOUNTER (OUTPATIENT)
Dept: HYPERBARIC MEDICINE | Age: 64
Discharge: HOME | End: 2024-07-16
Payer: MEDICARE

## 2024-07-16 VITALS
HEART RATE: 50 BPM | SYSTOLIC BLOOD PRESSURE: 152 MMHG | OXYGEN SATURATION: 100 % | TEMPERATURE: 97.5 F | RESPIRATION RATE: 16 BRPM | DIASTOLIC BLOOD PRESSURE: 73 MMHG

## 2024-07-16 DIAGNOSIS — T86.821 FAILURE OF FLAP GRAFT: Primary | ICD-10-CM

## 2024-07-16 PROCEDURE — 15271 SKIN SUB GRAFT TRNK/ARM/LEG: CPT

## 2024-07-16 PROCEDURE — G0277 HBOT, FULL BODY CHAMBER, 30M: HCPCS

## 2024-07-16 PROCEDURE — 97597 DBRDMT OPN WND 1ST 20 CM/<: CPT

## 2024-07-16 NOTE — PATIENT INSTRUCTIONS
Visit Discharge/Physician Orders:  - Continue to wear offloading boot and using scooter.  -surgery discussed today-split thickness skin graft   -debridement done today   -graft applied today #1 grafix pl prime, graft #2 applied 7-16-24  -ok to take boot off Friday and drive   -    Wound Location: Right lower leg    Dressing orders:     1) Gather wound care supplies and arrange on clean table.     2) Wash your hands with soap and water or use alcohol based hand  for 20 seconds (sing \"Happy Birthday\" twice).    3) Right lower leg- Applied grafix to the wound and covered with wound veil, steroid cream applied to the red skin around the wound. On Friday change the dressing down to the wound veil. Apply ABD pad, kerlix, and ace wrap (wrap from the base of the toes to the bend of the knee). Change outer dressing every other day, if draining through .   Continue to wear cam walking boot    Keep all dressings clean & dry.    Follow up visit: 2 week Tuesday July 30 at 8:00 am     Supplies:     Keep next scheduled appointment. Please give 24 hour notice if unable to keep appointment. [phone]    If you experience any of the following, please call the Wound Care Service during business hours: Monday through Friday 8:00 am - 4:30 pm  ([phone]).   *Increase in pain   *Temperature over 101   *Increase in drainage from your wound or a foul odor   *Uncontrolled swelling   *Need for compression bandage changes due to slippage, breakthrough drainage    If you need medical attention outside of business hours, please contact your Primary Care Doctor or go to the nearest emergency room.

## 2024-07-16 NOTE — PROGRESS NOTES
[facility]  Hyperbaric Oxygen Therapy   Progress Note      NAME: Damien Aguilar  MEDICAL RECORD NUMBER:  [id number]  AGE: 63 y.o.   GENDER: male  : 1960  EPISODE DATE:  2024     Subjective     HBO Treatment Number: 34 out of Total Treatments: 40    HBO Diagnosis:               Indications: Compromised/Failed Flap/Graft to ___(site) (right leg)    Safety checks performed prior to treatment.  See doc flowsheets for documentation.    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    Pre treatment Vital Signs       Temp: 97.7 °F (36.5 °C)     Pulse: (!) 47     Respirations: 16     BP: 125/61       Post treatment Vital Signs  Temp: 97.5 °F (36.4 °C)  Pulse: 50  Respirations: 16  BP: (!) 152/73      Assessment        Physical Exam:  General Appearance:  alert and oriented to person, place and time, well-developed and well-nourished, in no acute distress    Pre Tympanic Membrane Assessment:  tympanic membranes intact bilaterally    Post Tympanic Membrane Assessment:  Right: Normal  Left: Normal    Pulmonary/Chest:  clear to auscultation bilaterally- no wheezes, rales or rhonchi, normal air movement, no respiratory distress    Cardiovascular:  normal       Treatment Start Time: 0837     Pressure Reached Time: 0847  JANNETH : 2  Treatment Status: No Air break      Decompression Time: 1017   Treatment End Time: 1027    Symptoms Noted During

## 2024-07-16 NOTE — PROGRESS NOTES
Wound Care Supplies      Supply Company:     [employer] [address] [place] [address] f: [phone] f: [phone] p: [phone] orders@[employer]      Ordering Center:   [facility]  [address] [address]  [phone]  WOUND CARE Dept: [phone]   FAX NUMBER [phone]    Patient Information:      Damien Johnston  [address] [facility] [address]  [facility] [address]   [phone]   : 1960  AGE: 63 y.o.     GENDER: male   EPISODE DATE: 2024    Insurance:      PRIMARY INSURANCE:  Plan: MEDICARE PART A AND B  Coverage: MEDICARE  Effective Date: 2023  Group Number: [unfilled]  Subscriber Number: [id number] - (Medicare)    Payer/Plan Subscr  Sex Relation Sub. Ins. ID Effective Group Num   1. BCBS - BCBS -* ADRIANA JOHNSTONCATRACHITA CALDERÓN 1960 Male Self [id number]                                   [address]   2. MEDICARE - ME* DAMIEN JOHNSTON STEPHANE 1960 Male Self [id number]                                    [address]        Patient Wound Information:      Problem List Items Addressed This Visit    None      WOUNDS REQUIRING DRESSING SUPPLIES:     Wound 24 Leg Right (Active)   Wound Image   24 1043   Wound Etiology Non-Healing Surgical 24 1043   Dressing Status Intact;New dressing applied 24 1043   Wound Cleansed Cleansed with saline 24 1043   Dressing/Treatment ABD;Ace wrap;Roll gauze;Other (comment);Steri-strips 24 0810   Offloading for Diabetic Foot Ulcers Offloading not required;Offloading boot 24 1043   Wound Length (cm) 3.3 cm 24 1043   Wound Width (cm) 2.4 cm 24 1043   Wound Depth (cm) 0.2 cm 24 1043   Wound Surface Area (cm^2) 7.92 cm^2 24 1043   Change in Wound Size % (l*w) 73.81 24 1043   Wound Volume (cm^3) 1.584 cm^3 24 1043   Wound Healing % 96 24 1043   Post-Procedure Length (cm) 10 cm 06/10/24 1059   Post-Procedure 
  Teaching Note:    I was present with the resident during the visit.  Wound prep of the right leg was performed we prepped the the area there is some dermatitis around this area and so betamethasone was applied then and then skin substitute Grafix was applied to the leg and then wound veil and drawtex with compression.  I discussed the case with the resident and agree with the findings and the plan as documented in the residents note.    Ja Rivas DPM, FACFAS  Podiatric Medicine & Surgery       [facility]        
Grafix PL Prime Treatment Note    NAME:  Damien Aguilar  YOB: 1960  MEDICAL RECORD NUMBER:  [id number]  DATE:  7/16/2024    Goal:  Patient will receive safe and proper application of skin substitute. Patient will comply with caring for dressing, and reporting complications.     The expiration date is checked immediately before use., The package was intact before use, and no damage was noted., Grafix PL is stored at room temperature., Grafix PL Prime was removed from protective sterile packaging by the provider and applied to the prepared ulcer bed., Provider removed the mesh applicators from both sides of graft and discarded  the two mesh pieces.  , Grafix PL Prime was hydrated with sterile normal saline per the provider., Grafix PL Prime was applied to right leg ., Grafix PL Prime was covered with non-adherent ulcer dressing, Applied drawtex, abd pad, kerlix and ace wrap over non-adherent., Applied moisten saline gauze., Applied dry gauze and/or roll gauze., Applied appropriate off-loading device., The patient/caregiver was instructed not to remove the dressing and to keep it clean and dry., The patient/family/caregiver was instructed on the need for offloading and elevation of the affected extremity and on using the prescribed offloading device., and The patient/family/caregiver was instructed on signs and symptoms of complication to report, such as draining through dressing, dressing falling /slipping, getting wet, or severe pain or tingling.     Guidelines followed  Grafix PL Prime may only be used every 12 months per wound.      Date of first application of Grafix PL Prime for this current wound is July 9, 2024.    Application # 2 out of 10    Electronically signed by Lalita Lindsay RN on 7/16/2024 at 11:51 AM   
Attached edges 07/16/24 1043   Wound Thickness Description not for Pressure Injury Full thickness 07/16/24 1043   Number of days: 49          Diabetic/Pressure/Non Pressure Ulcers:  Ulcer:   Non-Pressure ulcer, muscle necrosis      Total Surface Area of Ulcer(s) Covered 11.28 sq/cm    Was the Product Layered  No     Amount of Product Applied 12 sq/cm     Amount of Product Wasted 0 sq/cm     Reason for Waste: N/A     Surgically Fixated: No    Secured With: N/A    Procedural Pain: 0/10     Post Procedural Pain: 0 / 10    Response to Treatment: Well tolerated by patient.      Plan:     Patient examined and evaluated  - Non-excisional debridement performed today using a dermal curette  - Discussed and educated with patient that due to the skin irritation from the Steri-Strips, we will not reapply Steri-Strips and at this point the graft does not need to be secured with anything.  - Irrigated the wound with normal saline  - Applied betamethasone cream 0.05% surrounding the area of the wound where the skin irritation was present.  - Applied the Grafix PL graft, wound veil, drawtex dressing, ABD, Kerlix, Ace bandage for compression. Applied the patient's cam boot.  - Discussed with patient the importance of good compression to the lower extremity   - Discussed the problem with the patient and he will continue to use the HBO and our goal is to order a skin substitute to increase the speed at which this granulates and epithelializes. The patient is on track and doing very well and has healed very nicely.  - The patient understands and agrees with the current treatment plan.    Treatment:   No orders of the defined types were placed in this encounter.  HBO  Apply GRAFIX PL    Antibiotics: Not indicated at this time    Follow up: 2 weeks     Please see attached Discharge Instructions    Written patient dismissal instructions given to patient and signed by patient or POA.         Patient Instructions   Visit Discharge/Physician

## 2024-07-16 NOTE — PLAN OF CARE
Problem: Wound:  Goal: Will show signs of wound healing; wound closure and no evidence of infection  Description: Will show signs of wound healing; wound closure and no evidence of infection  Outcome: Progressing   Seen today for right foot wound. See AVS for discharge  Care plan reviewed with patient.  Patient verbalize understanding of the plan of care and contribute to goal setting.

## 2024-07-17 ENCOUNTER — HOSPITAL ENCOUNTER (OUTPATIENT)
Dept: HYPERBARIC MEDICINE | Age: 64
Discharge: HOME | End: 2024-07-17
Payer: MEDICARE

## 2024-07-17 VITALS
HEART RATE: 47 BPM | TEMPERATURE: 97.3 F | SYSTOLIC BLOOD PRESSURE: 118 MMHG | DIASTOLIC BLOOD PRESSURE: 58 MMHG | RESPIRATION RATE: 16 BRPM | OXYGEN SATURATION: 100 %

## 2024-07-17 DIAGNOSIS — T86.821 FAILURE OF FLAP GRAFT: Primary | ICD-10-CM

## 2024-07-17 PROCEDURE — G0277 HBOT, FULL BODY CHAMBER, 30M: HCPCS

## 2024-07-17 NOTE — PROGRESS NOTES
During Treatment: None    Adverse Event: no      I was present on these premises and immediately available to furnish assistance & direction throughout the procedure.     Plan          Damien Aguilar is a 63 y.o. male  successfully completed today's hyperbaric oxygen treatment at [facility].    In my clinical judgement, ongoing HBO therapy is necessary at this time, given a threat to patient function, limb or life from the current condition.  Supervision and attendance of Hyperbaric Oxygen Therapy provided.  Continue HBO treatment as outlined in the treatment plan.    Hyperbaric Oxygen:  Pt tolerated Treatment Number: 35 well today without complications.     Electronically signed by Gokul Yen MD on 7/17/2024 at 2:00 PM

## 2024-07-18 ENCOUNTER — HOSPITAL ENCOUNTER (OUTPATIENT)
Dept: HYPERBARIC MEDICINE | Age: 64
Discharge: HOME | End: 2024-07-18
Payer: MEDICARE

## 2024-07-18 VITALS
OXYGEN SATURATION: 100 % | RESPIRATION RATE: 16 BRPM | SYSTOLIC BLOOD PRESSURE: 131 MMHG | HEART RATE: 48 BPM | TEMPERATURE: 97.3 F | DIASTOLIC BLOOD PRESSURE: 70 MMHG

## 2024-07-18 DIAGNOSIS — T86.821 FAILURE OF FLAP GRAFT: Primary | ICD-10-CM

## 2024-07-18 PROCEDURE — G0277 HBOT, FULL BODY CHAMBER, 30M: HCPCS

## 2024-07-18 NOTE — PROGRESS NOTES
HBO PROGRESS NOTE      NAME: Damien Aguilar  MEDICAL RECORD NUMBER:  [id number]  AGE: 63 y.o.   GENDER: male  : 1960  EPISODE DATE:  2024     Subjective     HBO Treatment Number: 36 out of Total Treatments: 40    HBO Diagnosis:             Indications: Compromised/Failed Flap/Graft to ___(site) (Right leg)    Safety checks performed prior to treatment.  See doc flowsheets for documentation.    Objective        No results for input(s): \"POCGLU\" in the last 72 hours.  Pre treatment Vital Signs       Temp: 97.7 °F (36.5 °C)     Pulse: 50     Respirations: 16     BP: (!) 153/69       Post treatment Vital Signs  Temp: 97.3 °F (36.3 °C)  Pulse: (!) 48  Respirations: 16  BP: 131/70    Assessment        HBO Diagnosis:   Problem List Items Addressed This Visit          Other    Failure of flap graft - Primary    Relevant Orders    Notify physician (specify)    Hyperbaric Oxygen Therapy       Physical Exam        General Appearance:  alert and oriented to person, place and time, well-developed and well-nourished, in no acute distress    Pre Tympanic Membrane Assessment:  Right: Normal  Left: Normal    Post Tympanic Membrane Assessment:  Left: Normal  Right: Normal    Pulmonary/Chest:  clear to auscultation bilaterally- no wheezes, rales or rhonchi, normal air movement, no respiratory distress    Cardiovascular:  normal    Plan        Patient placed in a full body Monoplace Chamber #: 3  Treatment Start Time: 0841     Pressure Reached Time: 0851  JANNETH : 2  Number of Air Breaks:  Treatment Status: No Air break     Decompression Time: 1021   Treatment End Time: 1031  Length of Treatment: 90 Minutes  Symptoms Noted During Treatment: None  Total Treatment Time (min): 110    Treatment Completion Status: Treatment completed without issue    I was present on these premises and immediately available to furnish assistance & direction throughout the HBO Treatment.     Damien Aguilar is a 63 y.o. male  did successfully

## 2024-07-19 ENCOUNTER — HOSPITAL ENCOUNTER (OUTPATIENT)
Dept: HYPERBARIC MEDICINE | Age: 64
Discharge: HOME | End: 2024-07-19
Payer: MEDICARE

## 2024-07-19 VITALS
RESPIRATION RATE: 16 BRPM | TEMPERATURE: 97.5 F | OXYGEN SATURATION: 100 % | HEART RATE: 49 BPM | DIASTOLIC BLOOD PRESSURE: 64 MMHG | SYSTOLIC BLOOD PRESSURE: 140 MMHG

## 2024-07-19 DIAGNOSIS — T86.821 FAILURE OF FLAP GRAFT: Primary | ICD-10-CM

## 2024-07-19 PROCEDURE — G0277 HBOT, FULL BODY CHAMBER, 30M: HCPCS

## 2024-07-19 NOTE — PROGRESS NOTES
HBO PROGRESS NOTE      NAME: Damien Aguilar  MEDICAL RECORD NUMBER:  [id number]  AGE: 63 y.o.   GENDER: male  : 1960  EPISODE DATE:  2024     Subjective     HBO Treatment Number: 37 out of Total Treatments: 40    HBO Diagnosis:             Indications: Compromised/Failed Flap/Graft to ___(site) (right leg)    Safety checks performed prior to treatment.  See doc flowsheets for documentation.    Objective        No results for input(s): \"POCGLU\" in the last 72 hours.  Pre treatment Vital Signs       Temp: 98 °F (36.7 °C)     Pulse: 54     Respirations: 16     BP: (!) 131/58       Post treatment Vital Signs  Temp: 97.5 °F (36.4 °C)  Pulse: (!) 49  Respirations: 16  BP: (!) 140/64    Assessment        HBO Diagnosis:   Problem List Items Addressed This Visit          Other    Failure of flap graft - Primary    Relevant Orders    Notify physician (specify)    Hyperbaric Oxygen Therapy       Physical Exam        General Appearance:  alert and oriented to person, place and time, well-developed and well-nourished, in no acute distress    Pre Tympanic Membrane Assessment:  Right: Normal  Left: Normal    Post Tympanic Membrane Assessment:  Left: Normal  Right: Normal    Pulmonary/Chest:  clear to auscultation bilaterally- no wheezes, rales or rhonchi, normal air movement, no respiratory distress    Cardiovascular:  normal    Plan        Patient placed in a full body Monoplace Chamber #: 3  Treatment Start Time: 0846     Pressure Reached Time: 0856  JANNETH : 2  Number of Air Breaks:  Treatment Status: No Air break     Decompression Time: 1026   Treatment End Time: 1036  Length of Treatment: 90 Minutes  Symptoms Noted During Treatment: None  Total Treatment Time (min): 110    Treatment Completion Status: Treatment completed without issue    I was present on these premises and immediately available to furnish assistance & direction throughout the HBO Treatment.     Damien Aguilar is a 63 y.o. male  did

## 2024-07-22 ENCOUNTER — HOSPITAL ENCOUNTER (OUTPATIENT)
Dept: HYPERBARIC MEDICINE | Age: 64
Discharge: HOME | End: 2024-07-22
Payer: COMMERCIAL

## 2024-07-22 VITALS
SYSTOLIC BLOOD PRESSURE: 127 MMHG | HEART RATE: 48 BPM | RESPIRATION RATE: 18 BRPM | OXYGEN SATURATION: 100 % | TEMPERATURE: 97.1 F | DIASTOLIC BLOOD PRESSURE: 66 MMHG

## 2024-07-22 DIAGNOSIS — T86.821 FAILURE OF FLAP GRAFT: Primary | ICD-10-CM

## 2024-07-22 PROCEDURE — G0277 HBOT, FULL BODY CHAMBER, 30M: HCPCS

## 2024-07-22 NOTE — PROGRESS NOTES
[facility]  Hyperbaric Oxygen Therapy   Progress Note      NAME: Damien Aguilar  MEDICAL RECORD NUMBER:  [id number]  AGE: 63 y.o.   GENDER: male  : 1960  EPISODE DATE:  2024     Subjective     HBO Treatment Number: 38 out of Total Treatments: 40    HBO Diagnosis:               Indications: Compromised/Failed Flap/Graft to ___(site) (right leg)    Safety checks performed prior to treatment.  See doc flowsheets for documentation.    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    Pre treatment Vital Signs       Temp: 97.7 °F (36.5 °C)     Pulse: 78     Respirations: 18     BP: (!) 162/84       Post treatment Vital Signs  Temp: 97.1 °F (36.2 °C)  Pulse: (!) 48  Respirations: 18  BP: 127/66      Assessment        Physical Exam:  General Appearance:  alert and oriented to person, place and time, well-developed and well-nourished, in no acute distress    Pre Tympanic Membrane Assessment:  tympanic membranes intact bilaterally    Post Tympanic Membrane Assessment:  Right: Normal  Left: Normal    Pulmonary/Chest:  clear to auscultation bilaterally- no wheezes, rales or rhonchi, normal air movement, no respiratory distress    Cardiovascular:  normal       Treatment Start Time: 0849     Pressure Reached Time: 0859  JANNETH : 2  Treatment Status: No Air break      Decompression Time: 1029   Treatment End Time: 1039    Symptoms Noted During

## 2024-07-23 ENCOUNTER — HOSPITAL ENCOUNTER (OUTPATIENT)
Dept: HYPERBARIC MEDICINE | Age: 64
Discharge: HOME | End: 2024-07-23
Payer: COMMERCIAL

## 2024-07-23 VITALS
RESPIRATION RATE: 16 BRPM | DIASTOLIC BLOOD PRESSURE: 64 MMHG | HEART RATE: 51 BPM | SYSTOLIC BLOOD PRESSURE: 122 MMHG | TEMPERATURE: 97.5 F | OXYGEN SATURATION: 100 %

## 2024-07-23 DIAGNOSIS — T86.821 FAILURE OF FLAP GRAFT: Primary | ICD-10-CM

## 2024-07-23 PROCEDURE — G0277 HBOT, FULL BODY CHAMBER, 30M: HCPCS

## 2024-07-24 ENCOUNTER — HOSPITAL ENCOUNTER (OUTPATIENT)
Dept: HYPERBARIC MEDICINE | Age: 64
Discharge: HOME | End: 2024-07-24
Payer: COMMERCIAL

## 2024-07-24 VITALS
HEART RATE: 49 BPM | RESPIRATION RATE: 16 BRPM | TEMPERATURE: 97.3 F | DIASTOLIC BLOOD PRESSURE: 58 MMHG | OXYGEN SATURATION: 100 % | SYSTOLIC BLOOD PRESSURE: 128 MMHG

## 2024-07-24 DIAGNOSIS — T86.821 FAILURE OF FLAP GRAFT: Primary | ICD-10-CM

## 2024-07-24 PROCEDURE — G0277 HBOT, FULL BODY CHAMBER, 30M: HCPCS

## 2024-07-24 NOTE — PROGRESS NOTES
[facility]  Hyperbaric Oxygen Therapy   Progress Note      NAME: Damien Aguilar  MEDICAL RECORD NUMBER:  [id number]  AGE: 63 y.o.   GENDER: male  : 1960  EPISODE DATE:  2024     Subjective     HBO Treatment Number: 40 out of Total Treatments: 40    HBO Diagnosis:               Indications: Compromised/Failed Flap/Graft to ___(site) (right leg)    Safety checks performed prior to treatment.  See doc flowsheets for documentation.    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    Pre treatment Vital Signs       Temp: 98 °F (36.7 °C)     Pulse: 73     Respirations: 16     BP: (!) 134/96       Post treatment Vital Signs  Temp: 97.3 °F (36.3 °C)  Pulse: (!) 49  Respirations: 16  BP: (!) 128/58      Assessment        Physical Exam:  General Appearance:  alert and oriented to person, place and time, well-developed and well-nourished, in no acute distress    Pre Tympanic Membrane Assessment:  tympanic membranes intact bilaterally    Post Tympanic Membrane Assessment:  Right: Normal  Left: Normal    Pulmonary/Chest:  clear to auscultation bilaterally- no wheezes, rales or rhonchi, normal air movement, no respiratory distress    Cardiovascular:  normal       Treatment Start Time: 0846     Pressure Reached Time: 0856  JANNETH : 2  Treatment Status: No Air break      Decompression Time: 1026   Treatment End Time: 1036    Symptoms Noted During

## 2024-07-24 NOTE — PROGRESS NOTES
[facility]  Hyperbaric Oxygen Therapy   Progress Note      NAME: Damien Aguilar  MEDICAL RECORD NUMBER:  [id number]  AGE: 63 y.o.   GENDER: male  : 1960  EPISODE DATE:  2024     Subjective     HBO Treatment Number: 39 out of Total Treatments: 40    HBO Diagnosis:               Indications: Compromised/Failed Flap/Graft to ___(site) (Right leg)    Safety checks performed prior to treatment.  See doc flowsheets for documentation.    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    Pre treatment Vital Signs       Temp: 97.9 °F (36.6 °C)     Pulse: 55     Respirations: 16     BP: 134/85       Post treatment Vital Signs  Temp: 97.5 °F (36.4 °C)  Pulse: 51  Respirations: 16  BP: 122/64      Assessment        Physical Exam:  General Appearance:  alert and oriented to person, place and time, well-developed and well-nourished, in no acute distress    Pre Tympanic Membrane Assessment:  tympanic membranes intact bilaterally    Post Tympanic Membrane Assessment:  Right: Normal  Left: Normal    Pulmonary/Chest:  clear to auscultation bilaterally- no wheezes, rales or rhonchi, normal air movement, no respiratory distress    Cardiovascular:  normal       Treatment Start Time: 0837     Pressure Reached Time: 0847  JANNETH : 2  Treatment Status: No Air break      Decompression Time: 1017   Treatment End Time: 1027    Symptoms Noted During Treatment:

## 2024-07-30 ENCOUNTER — HOSPITAL ENCOUNTER (OUTPATIENT)
Dept: WOUND CARE | Age: 64
Discharge: HOME | End: 2024-07-30
Attending: PODIATRIST
Payer: MEDICARE

## 2024-07-30 VITALS
HEART RATE: 64 BPM | SYSTOLIC BLOOD PRESSURE: 134 MMHG | OXYGEN SATURATION: 98 % | DIASTOLIC BLOOD PRESSURE: 63 MMHG | TEMPERATURE: 98.1 F | RESPIRATION RATE: 16 BRPM

## 2024-07-30 PROCEDURE — 15271 SKIN SUB GRAFT TRNK/ARM/LEG: CPT

## 2024-07-30 NOTE — PROGRESS NOTES
[facility]          Progress Note and Procedure Note      Damien Aguilar  MEDICAL RECORD NUMBER:  [id number]  AGE: 63 y.o.   GENDER: male  : 1960  EPISODE DATE:  2024    Subjective:     Presents for follow-up of the wound to his right leg status post medial Jorge L soleus muscle flap and now we are finishing utilizing Grafix PL     HISTORY of PRESENT ILLNESS HPI     Damien Aguilar is a 63 y.o. male Established patient referred by TIJERINA, who presents today for wound/ulcer evaluation.   History of Wound Context: Patient status post hyperbaric oxygen today and he is being assessed as he is utilizing the negative wound pressure therapy has no new complaints. Patient underwent some problems with infection he was going to have a graft and so we had to delay this he is presenting today now utilizing hyperbaric oxygen and he is on an antibiotic orally and is that he is improving and doing much better he does not complain of any pain he is in a cam boot walking and he is not having any other problems.  wound/Ulcer Pain Timing/Severity: mild  Quality of pain: dull  Severity:  0 / 10   Modifying Factors: None  Associated Signs/Symptoms: edema and drainage    Interval History:   Patient presents today for follow up on wound/ulcer's progression. Relates he is no longer taking an antibiotic at this time. Current dressing care includes Grafix PL graft, wound veil, ABD and dry sterile dressing with compression. Patient relates he has been receiving hyperbaric oxygen therapy as well. Patient relates moderate drainage from the wound and states that the drainage had seeped through the ABD and a portion of the kerlix dressing but not the Ace bandage. Denies nausea, vomiting, fever, chills, shortness of breath, and chest pain. Denies any other pedal complaints at this time.        PAST MEDICAL HISTORY        Diagnosis Date    Morbid obesity (HCC)     Osteoarthritis     Snoring     possible apnea

## 2024-07-30 NOTE — PATIENT INSTRUCTIONS
Visit Discharge/Physician Orders:  - debridement of wound done today  -graft applied today #1 grafix pl prime, graft #2 applied 7-16-24, graft #3 applied 7/30/24  - continue to elevate for swelling  - make sure to wrap all the way up to bend of knee   - another grafix ordered today       Wound Location: Right lower leg    Dressing orders:     1) Gather wound care supplies and arrange on clean table.     2) Wash your hands with soap and water or use alcohol based hand  for 20 seconds (sing \"Happy Birthday\" twice).    3) Right lower leg- Applied grafix to the wound and covered with wound veil, steroid cream applied to the red skin around the wound. On Friday change the dressing down to the wound veil. Apply ABD pad, kerlix, and ace wrap (wrap from the base of the toes to the bend of the knee). Change outer dressing every other day, if draining through .       Keep all dressings clean & dry.    Follow up visit: 2 week Tuesday August 13th at 8am    Supplies:     Keep next scheduled appointment. Please give 24 hour notice if unable to keep appointment. [phone]    If you experience any of the following, please call the Wound Care Service during business hours: Monday through Friday 8:00 am - 4:30 pm  ([phone]).   *Increase in pain   *Temperature over 101   *Increase in drainage from your wound or a foul odor   *Uncontrolled swelling   *Need for compression bandage changes due to slippage, breakthrough drainage    If you need medical attention outside of business hours, please contact your Primary Care Doctor or go to the nearest emergency room.

## 2024-07-30 NOTE — PLAN OF CARE
Problem: Wound:  Goal: Will show signs of wound healing; wound closure and no evidence of infection  Description: Will show signs of wound healing; wound closure and no evidence of infection  Outcome: Progressing  Note: Patient seen for right leg wound. Wound shows signs of proper closure and healing. Wound debrided. Grafix applied today. No s/s of infection noted. Follow up in 2 weeks. See AVS for order changes.      Care plan reviewed with patient.  Patient verbalize understanding of the plan of care and contribute to goal setting.

## 2024-08-13 ENCOUNTER — HOSPITAL ENCOUNTER (OUTPATIENT)
Dept: WOUND CARE | Age: 64
Discharge: HOME | End: 2024-08-13
Attending: PODIATRIST
Payer: COMMERCIAL

## 2024-08-13 VITALS
RESPIRATION RATE: 20 BRPM | HEART RATE: 59 BPM | DIASTOLIC BLOOD PRESSURE: 93 MMHG | TEMPERATURE: 97.9 F | SYSTOLIC BLOOD PRESSURE: 178 MMHG

## 2024-08-13 DIAGNOSIS — S81.801D WOUND OF RIGHT LEG, SUBSEQUENT ENCOUNTER: Primary | ICD-10-CM

## 2024-08-13 DIAGNOSIS — I87.2 VENOUS INSUFFICIENCY OF BOTH LOWER EXTREMITIES: ICD-10-CM

## 2024-08-13 PROCEDURE — 17250 CHEM CAUT OF GRANLTJ TISSUE: CPT

## 2024-08-13 PROCEDURE — 97597 DBRDMT OPN WND 1ST 20 CM/<: CPT

## 2024-08-13 PROCEDURE — 6370000000 HC RX 637 (ALT 250 FOR IP): Performed by: PODIATRIST

## 2024-08-13 RX ADMIN — SILVER NITRATE APPLICATORS 1 EACH: 25; 75 STICK TOPICAL at 08:37

## 2024-08-13 NOTE — PROGRESS NOTES
[facility]          Progress Note and Procedure Note      Damien Aguilar  MEDICAL RECORD NUMBER:  [id number]  AGE: 63 y.o.   GENDER: male  : 1960  EPISODE DATE:  2024    Subjective:     Presents for follow-up of the wound to his right leg status post medial Jorge L soleus muscle flap and now we are finishing utilizing Grafix PL     HISTORY of PRESENT ILLNESS HPI     Damien Aguilar is a 63 y.o. male Established patient referred by TIJERINA, who presents today for wound/ulcer evaluation.   History of Wound Context: Patient has been dealing with ulceration to the right lower extremity that is status post flap grafting.  He has used a negative pressure wound therapy with HBO.  Currently he has been having Grafix applied to a small residual wound in the area.  Wound/Ulcer Pain Timing/Severity: mild  Quality of pain: dull  Severity:  0 / 10   Modifying Factors: None  Associated Signs/Symptoms: edema and drainage    Interval History:   Patient presents today for follow up on wound/ulcer's progression. Relates he is no longer taking an antibiotic at this time. Current dressing care includes Grafix PL graft, wound veil, ABD and dry sterile dressing with compression. Patient relates he has been receiving hyperbaric oxygen therapy as well.  Denies any new or significant issues to the right lower extremity.          PAST MEDICAL HISTORY        Diagnosis Date    Morbid obesity (HCC)     Osteoarthritis     Snoring     possible apnea - per wife, better since lost 20# recently.  BMI 41.97, neck circ 17.5 cm, no daytime somnolence, no am headaches.       PAST SURGICAL HISTORY    Past Surgical History:   Procedure Laterality Date    HERNIA REPAIR      left inguinal    JOINT REPLACEMENT Left 2020    left hip    OTHER SURGICAL HISTORY      right leg vein stripping    PRESSURE ULCER DEBRIDEMENT Right 2024    Right Leg Excision and Debridement of Wound, Right Medial Soleus Muscle Flap 
  Teaching Note:    I was present with the resident during the visit.  I discussed the case with the resident and agree with the findings and the plan as documented in the residents note.    Ja Rivas DPM, FACFAS  Podiatric Medicine & Surgery       [facility]        
0816   Wound Volume (cm^3) 0 cm^3 08/13/24 0816   Wound Healing % 100 08/13/24 0816   Post-Procedure Length (cm) 10 cm 06/10/24 1059   Post-Procedure Width (cm) 3.5 cm 06/10/24 1059   Post-Procedure Depth (cm) 0.4 cm 06/10/24 1059   Post-Procedure Surface Area (cm^2) 35 cm^2 06/10/24 1059   Post-Procedure Volume (cm^3) 14 cm^3 06/10/24 1059   Wound Assessment Hyper granulation tissue 08/13/24 0816   Drainage Amount Moderate (25-50%) 08/13/24 0816   Drainage Description Serosanguinous;Yellow 08/13/24 0816   Odor None 08/13/24 0816   Maira-wound Assessment Dry/flaky;Intact 08/13/24 0816   Margins Attached edges 08/13/24 0816   Wound Thickness Description not for Pressure Injury Full thickness 08/13/24 0816   Number of days: 76          Supplies Requested :        *DISPENSE AS WRITTEN*    WOUND #: 1   PRIMARY DRESSING:  Collagen    Cover and Secure with: ABD pad  Bulky roll gauze  Ace wrap  Other :adaptic     FREQUENCY OF DRESSING CHANGES:  Every other day     *DISPENSE AS WRITTEN*    ADDITIONAL ITEMS:  [] Gloves Small  [x] Gloves Medium [] Gloves Large [] Gloves XLarge  [] Tape 1\" [x] Tape 2\" [] Tape 3\"  [] Medipore Tape  [x] Saline  [] Skin Prep   [] Adhesive Remover   [] Cotton Tip Applicators   [] Other:    Patient Wound(s) Debrided: [x] Yes if yes please add date 8/13/24   [] No    Debribement Type: Non-excisional/Selective    Patient currently being seen by Home Health: [] Yes   [x] No    Duration for needed supplies:  []15  [x]30  []60  []90 Days        Electronically signed by Gladis Jackson RN on 8/13/2024 at 9:23 AM     Provider Information:      PROVIDER'S NAME: Ja Rivas DPM    NPI: [id number]

## 2024-08-13 NOTE — PLAN OF CARE
Problem: Wound:  Goal: Will show signs of wound healing; wound closure and no evidence of infection  Description: Will show signs of wound healing; wound closure and no evidence of infection  Outcome: Progressing     Patient seen at the wound clinic today for evaluation of right leg wound, please see AVS. Care plan reviewed with patient.  Patient verbalizes understanding of the plan of care and contribute to goal setting.

## 2024-08-13 NOTE — PATIENT INSTRUCTIONS
Visit Discharge/Physician Orders:  - Debridement of wound done today.  - Silver nitrate applied today.   - Grafix held 8/13/24  -graft applied today #1 grafix pl prime, graft #2 applied 7-16-24, graft #3 applied 7/30/2 (no Grafix beyond 10/1/24)  - continue to elevate for swelling  - make sure to wrap all the way up to bend of knee     Wound Location: Right lower leg    Dressing orders:     1) Gather wound care supplies and arrange on clean table.     2) Wash your hands with soap and water or use alcohol based hand  for 20 seconds (sing \"Happy Birthday\" twice).    3) Right lower leg- Apply collagen to the wound, moisten with saline, cover with adaptic, cover with ABD pad, wrap leg with kerlix, and ace wrap (wrap from the base of the toes to the bend of the knee). Change every other day.      Keep all dressings clean & dry.    Follow up visit: 2 week Tuesday August 27th @ 8:15 am     Supplies:     Keep next scheduled appointment. Please give 24 hour notice if unable to keep appointment. [phone]    If you experience any of the following, please call the Wound Care Service during business hours: Monday through Friday 8:00 am - 4:30 pm  ([phone]).   *Increase in pain   *Temperature over 101   *Increase in drainage from your wound or a foul odor   *Uncontrolled swelling   *Need for compression bandage changes due to slippage, breakthrough drainage    If you need medical attention outside of business hours, please contact your Primary Care Doctor or go to the nearest emergency room.

## 2024-08-14 DIAGNOSIS — R60.0 BILATERAL LOWER EXTREMITY EDEMA: ICD-10-CM

## 2024-08-14 DIAGNOSIS — E78.00 HYPERCHOLESTEROLEMIA: ICD-10-CM

## 2024-08-19 RX ORDER — ATORVASTATIN CALCIUM 20 MG/1
20 TABLET, FILM COATED ORAL DAILY
Qty: 90 TABLET | Refills: 1 | OUTPATIENT
Start: 2024-08-19

## 2024-08-19 RX ORDER — POTASSIUM CHLORIDE 1500 MG/1
20 TABLET, EXTENDED RELEASE ORAL DAILY
Qty: 90 TABLET | Refills: 1 | OUTPATIENT
Start: 2024-08-19

## 2024-08-27 ENCOUNTER — HOSPITAL ENCOUNTER (OUTPATIENT)
Dept: WOUND CARE | Age: 64
Discharge: HOME | End: 2024-08-27
Attending: PODIATRIST
Payer: MEDICARE

## 2024-08-27 VITALS
OXYGEN SATURATION: 99 % | HEART RATE: 58 BPM | RESPIRATION RATE: 20 BRPM | SYSTOLIC BLOOD PRESSURE: 151 MMHG | TEMPERATURE: 98.1 F | DIASTOLIC BLOOD PRESSURE: 70 MMHG

## 2024-08-27 PROCEDURE — 97597 DBRDMT OPN WND 1ST 20 CM/<: CPT

## 2024-08-27 PROCEDURE — 15271 SKIN SUB GRAFT TRNK/ARM/LEG: CPT

## 2024-08-27 NOTE — PATIENT INSTRUCTIONS
Visit Discharge/Physician Orders:  - Debridement of wound done today  -graft applied today #1 grafix pl prime, graft #2 applied 7-16-24, graft #3 applied 7/30/24, graft #4 applied 8-27-24 (no Grafix beyond 10/1/24)  - continue to elevate for swelling  - make sure to wrap all the way up to bend of knee     Wound Location: Right lower leg    Dressing orders:     1) Gather wound care supplies and arrange on clean table.     2) Wash your hands with soap and water or use alcohol based hand  for 20 seconds (sing \"Happy Birthday\" twice).    3) Right lower leg- Grafix applied today, with wound veil and steri strips holding graft in place. In a couple days take top dressing off and replace with adaptic,saline moistened guaze and  cover with ABD pad, wrap leg with kerlix, and ace wrap (wrap from the base of the toes to the bend of the knee).       Keep all dressings clean & dry.    Follow up visit: 1 week Tuesday September 3rd at 2:45 pm     Supplies:     Keep next scheduled appointment. Please give 24 hour notice if unable to keep appointment. [phone]    If you experience any of the following, please call the Wound Care Service during business hours: Monday through Friday 8:00 am - 4:30 pm  ([phone]).   *Increase in pain   *Temperature over 101   *Increase in drainage from your wound or a foul odor   *Uncontrolled swelling   *Need for compression bandage changes due to slippage, breakthrough drainage    If you need medical attention outside of business hours, please contact your Primary Care Doctor or go to the nearest emergency room.

## 2024-08-27 NOTE — PLAN OF CARE
Problem: Wound:  Goal: Will show signs of wound healing; wound closure and no evidence of infection  Description: Will show signs of wound healing; wound closure and no evidence of infection  Outcome: Progressing   Seen today for right leg wound. See AVS for discharge   Care plan reviewed with patient .  Patient  verbalize understanding of the plan of care and contribute to goal setting.

## 2024-08-27 NOTE — PROGRESS NOTES
medications on file prior to encounter.       REVIEW OF SYSTEMS    Pertinent items are noted in HPI.    Objective:      BP (!) 151/70   Pulse 58   Temp 98.1 °F (36.7 °C) (Infrared)   Resp 20   SpO2 99%     Wt Readings from Last 3 Encounters:   05/21/24 120.9 kg (266 lb 8.6 oz)   05/17/24 122.2 kg (269 lb 6.4 oz)   05/16/24 122.2 kg (269 lb 6.4 oz)       PHYSICAL EXAM  Dermatologic dermatological there is a granular ulceration that has some slight hyper atrophic area along the margin hypergranular in nature there is fibrinopurulent slough with biofilm along the surface of the wound the edge of the wound medially and posteriorly has actually epithelialized some the wound is improving slightly there is no signs of any redness again he has indurated very stiff hard skin with with some movement but and the swelling is relatively controlled.  Neurologically he does have some deficiencies and is unable to feel any pain.    Vascular he does have the venous stasis and some venous oozing but there is good palpable pulses.    Musculoskeletal reveals a normal range of motion he has minimal pain or tenderness on palpation      Wound 05/28/24 Leg Right (Active)   Wound Image   08/27/24 0823   Wound Etiology Non-Healing Surgical 08/27/24 0823   Dressing Status Intact;Old drainage noted;New dressing applied 08/27/24 0823   Wound Cleansed Cleansed with saline 08/27/24 0823   Dressing/Treatment ABD;Ace wrap;Moist to dry;Roll gauze 08/27/24 0823   Offloading for Diabetic Foot Ulcers Offloading not required;Offloading not ordered 08/27/24 0823   Wound Length (cm) 2.7 cm 08/27/24 0823   Wound Width (cm) 1.3 cm 08/27/24 0823   Wound Depth (cm) 0.05 cm 08/27/24 0823   Wound Surface Area (cm^2) 3.51 cm^2 08/27/24 0823   Change in Wound Size % (l*w) 88.39 08/27/24 0823   Wound Volume (cm^3) 0.1755 cm^3 08/27/24 0823   Wound Healing % 100 08/27/24 0823   Post-Procedure Length (cm) 10 cm 06/10/24 1059   Post-Procedure Width (cm) 3.5 cm  06/10/24 1059   Post-Procedure Depth (cm) 0.4 cm 06/10/24 1059   Post-Procedure Surface Area (cm^2) 35 cm^2 06/10/24 1059   Post-Procedure Volume (cm^3) 14 cm^3 06/10/24 1059   Wound Assessment Epithelialization 08/27/24 0823   Drainage Amount Moderate (25-50%) 08/27/24 0823   Drainage Description Yellow 08/27/24 0823   Odor None 08/27/24 0823   Maira-wound Assessment Dry/flaky;Intact 08/27/24 0823   Margins Attached edges 08/27/24 0823   Wound Thickness Description not for Pressure Injury Full thickness 08/27/24 0823   Number of days: 90       LABS      CBC:   Lab Results   Component Value Date/Time    WBC 5.4 05/25/2024 06:00 AM    HGB 10.7 05/25/2024 06:00 AM    HCT 32.8 05/25/2024 06:00 AM    MCV 96.8 05/25/2024 06:00 AM     05/25/2024 06:00 AM     BMP:   Lab Results   Component Value Date/Time     05/22/2024 05:24 AM    K 4.6 05/22/2024 05:24 AM     05/22/2024 05:24 AM    CO2 27 05/22/2024 05:24 AM    BUN 12 05/22/2024 05:24 AM    CREATININE 0.9 05/24/2024 05:24 AM     PT/INR:   Lab Results   Component Value Date/Time    INR 1.08 05/22/2024 05:24 AM     Prealbumin:   Lab Results   Component Value Date/Time    PREALBUMIN 23.3 04/25/2024 09:00 AM     Albumin:No results found for: \"LABALBU\"  Sed Rate:No results found for: \"SEDRATE\"  CRP: No results found for: \"CRP\"  Micro: No results found for: \"BC\"   Hemoglobin A1C: No results found for: \"LABA1C\"    Assessment:     Ulcer Identification:  Ulcer Type: venous and traumatic  Contributing Factors: edema, venous stasis, and lymphedema    Wound: Contusion    Depth of Diabetic/Pressure/Non Pressure Ulcers or Wound:  Non-Pressure ulcer, muscle necrosis    Patient Active Problem List   Diagnosis Code    Osteoarthritis M19.90    Essential hypertension I10    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

## 2024-08-28 NOTE — PROGRESS NOTES
Grafix PL Prime Treatment Note    NAME:  Damien Aguilar  YOB: 1960  MEDICAL RECORD NUMBER:  [id number]  DATE:  8/27/2024    Goal:  Patient will receive safe and proper application of skin substitute. Patient will comply with caring for dressing, and reporting complications.     The expiration date is checked immediately before use., The package was intact before use, and no damage was noted., Grafix PL is stored at room temperature., Grafix PL Prime was removed from protective sterile packaging by the provider and applied to the prepared ulcer bed., Provider removed the mesh applicators from both sides of graft and discarded  the two mesh pieces.  , Grafix PL Prime was hydrated with sterile normal saline per the provider., Grafix PL Prime was applied to right leg ., Grafix PL Prime was affixed with steri-strips by the provider. , Grafix PL Prime was covered with non-adherent ulcer dressing, Applied adaptic, abd pad, kerlix, and ace wrap over non-adherent., Applied moisten saline gauze., Applied dry gauze and/or roll gauze., Applied appropriate off-loading device., The patient/caregiver was instructed not to remove the dressing and to keep it clean and dry., The patient/family/caregiver was instructed on the need for offloading and elevation of the affected extremity and on using the prescribed offloading device., and The patient/family/caregiver was instructed on signs and symptoms of complication to report, such as draining through dressing, dressing falling /slipping, getting wet, or severe pain or tingling.     Guidelines followed  Grafix PL Prime may only be used every 12 months per wound.      Date of first application of Grafix PL Prime for this current wound is July 9, 2024.    Application # 4 out of 10    Electronically signed by Lalita Lindsay RN on 8/28/2024 at 7:51 AM

## 2024-09-03 ENCOUNTER — HOSPITAL ENCOUNTER (OUTPATIENT)
Dept: WOUND CARE | Age: 64
Discharge: HOME | End: 2024-09-03
Attending: PODIATRIST
Payer: MEDICARE

## 2024-09-03 VITALS
SYSTOLIC BLOOD PRESSURE: 127 MMHG | OXYGEN SATURATION: 98 % | TEMPERATURE: 98 F | DIASTOLIC BLOOD PRESSURE: 72 MMHG | RESPIRATION RATE: 20 BRPM | HEART RATE: 71 BPM

## 2024-09-03 DIAGNOSIS — L23.1 ALLERGIC CONTACT DERMATITIS DUE TO ADHESIVES: Primary | ICD-10-CM

## 2024-09-03 PROCEDURE — 6370000000 HC RX 637 (ALT 250 FOR IP): Performed by: PODIATRIST

## 2024-09-03 PROCEDURE — 29580 STRAPPING UNNA BOOT: CPT

## 2024-09-03 RX ORDER — BETAMETHASONE DIPROPIONATE 0.5 MG/G
CREAM TOPICAL ONCE
Status: COMPLETED | OUTPATIENT
Start: 2024-09-03 | End: 2024-09-03

## 2024-09-03 RX ORDER — BETAMETHASONE DIPROPIONATE 0.5 MG/G
CREAM TOPICAL ONCE
Status: DISCONTINUED | OUTPATIENT
Start: 2024-09-03 | End: 2024-09-04 | Stop reason: HOSPADM

## 2024-09-03 RX ADMIN — BETAMETHASONE DIPROPIONATE: 0.5 CREAM, AUGMENTED TOPICAL at 15:46

## 2024-09-03 NOTE — PROGRESS NOTES
Unna Boot Application   Below Knee    NAME:  Damien Aguilar  YOB: 1960  MEDICAL RECORD NUMBER:  [id number]  DATE:  9/3/2024    Unna boot: Applied moisturizing agent to dry skin as needed.   Appied primary and secondary dressing as ordered.  Applied Unna roll from toes to knee overlapping each time.   Applied ace wrap or coban from toes to below the knee.   Instructed patient/caregiver to keep dressing dry and intact. DO NOT REMOVE DRESSING.   Instructed pt/family/caregiver to report excessive draining, loose bandage, wet dressing, severe pain or tingling in toes.  Applied Unna Boot dressing below the knee to right lower leg.    Unna Boot(s) were applied per  Guidelines.     Electronically signed by Lalita Lindsay RN on 9/3/2024 at 4:06 PM     
medications on file prior to encounter.       REVIEW OF SYSTEMS    Pertinent items are noted in HPI.    Objective:      /72   Pulse 71   Temp 98 °F (36.7 °C) (Infrared)   Resp 20   SpO2 98%     Wt Readings from Last 3 Encounters:   05/21/24 120.9 kg (266 lb 8.6 oz)   05/17/24 122.2 kg (269 lb 6.4 oz)   05/16/24 122.2 kg (269 lb 6.4 oz)       PHYSICAL EXAM  Dermatologic anterior aspect of the right leg shows along the medial aspect there is an incision line is healed there is a 1 area to the center that has that healing epithelial dry area around an ulceration that has caramel is Asian from the Grafix that was placed last week there is no undermining there is no hypergranulation there is no signs of any or evidence of any cellulitis there is distinct rash and inflammation to the skin around this wound in the same pattern of the Steri-Strips the patient has some hyperpigmentation and has a lot of swelling still present.    Neurologically he does have some deficiencies and is unable to feel any pain.    Vascular he does have the venous stasis and some venous oozing but there is good palpable pulses.    Musculoskeletal reveals a normal range of motion he has minimal pain or tenderness on palpation      Wound 05/28/24 Leg Right (Active)   Wound Image   09/03/24 1525   Wound Etiology Non-Healing Surgical 09/03/24 1525   Dressing Status Intact;Old drainage noted;New dressing applied 09/03/24 1525   Wound Cleansed Cleansed with saline 09/03/24 1525   Dressing/Treatment ABD;Ace wrap;Moist to dry;Roll gauze 08/27/24 0823   Offloading for Diabetic Foot Ulcers Offloading not required;Offloading not ordered 09/03/24 1525   Wound Length (cm) 2 cm 09/03/24 1525   Wound Width (cm) 1 cm 09/03/24 1525   Wound Depth (cm) 0 cm 09/03/24 1525   Wound Surface Area (cm^2) 2 cm^2 09/03/24 1525   Change in Wound Size % (l*w) 93.39 09/03/24 1525   Wound Volume (cm^3) 0 cm^3 09/03/24 1525   Wound Healing % 100 09/03/24 1525

## 2024-09-03 NOTE — PLAN OF CARE
Problem: Wound:  Goal: Will show signs of wound healing; wound closure and no evidence of infection  Description: Will show signs of wound healing; wound closure and no evidence of infection  Outcome: Progressing   Seen today for right leg wound. See AVS for discharge   Care plan reviewed with patient.  Patient  verbalize understanding of the plan of care and contribute to goal setting.

## 2024-09-03 NOTE — PATIENT INSTRUCTIONS
Visit Discharge/Physician Orders:  -graft applied today #1 grafix pl prime, graft #2 applied 7-16-24, graft #3 applied 7/30/24, graft #4 applied 8-27-24 (no Grafix beyond 10/1/24)  - continue to elevate for swelling  - make sure to wrap all the way up to bend of knee   -do not get dressing wet     Wound Location: Right lower leg    Dressing orders:     1) Gather wound care supplies and arrange on clean table.     2) Wash your hands with soap and water or use alcohol based hand  for 20 seconds (sing \"Happy Birthday\" twice).    3) Right lower leg- steroid cream applied to skin around wound. Apply unna boot layer with abd pad and coban over top. Keep in place until next appointment     Keep all dressings clean & dry.    Follow up visit: 1 week Tuesday September 10th at 11:15 am     Supplies:     Keep next scheduled appointment. Please give 24 hour notice if unable to keep appointment. [phone]    If you experience any of the following, please call the Wound Care Service during business hours: Monday through Friday 8:00 am - 4:30 pm  ([phone]).   *Increase in pain   *Temperature over 101   *Increase in drainage from your wound or a foul odor   *Uncontrolled swelling   *Need for compression bandage changes due to slippage, breakthrough drainage    If you need medical attention outside of business hours, please contact your Primary Care Doctor or go to the nearest emergency room.

## 2024-09-05 ENCOUNTER — TELEPHONE (OUTPATIENT)
Dept: WOUND CARE | Age: 64
End: 2024-09-05

## 2024-09-11 ENCOUNTER — HOSPITAL ENCOUNTER (OUTPATIENT)
Dept: WOUND CARE | Age: 64
Discharge: HOME | End: 2024-09-11
Attending: PODIATRIST
Payer: MEDICARE

## 2024-09-11 VITALS
OXYGEN SATURATION: 95 % | SYSTOLIC BLOOD PRESSURE: 173 MMHG | TEMPERATURE: 98.1 F | DIASTOLIC BLOOD PRESSURE: 76 MMHG | RESPIRATION RATE: 18 BRPM | HEART RATE: 66 BPM

## 2024-09-11 PROCEDURE — 29580 STRAPPING UNNA BOOT: CPT

## 2024-09-17 ENCOUNTER — HOSPITAL ENCOUNTER (OUTPATIENT)
Dept: WOUND CARE | Age: 64
Discharge: HOME | End: 2024-09-17
Attending: PODIATRIST
Payer: MEDICARE

## 2024-09-17 VITALS
HEART RATE: 62 BPM | OXYGEN SATURATION: 98 % | DIASTOLIC BLOOD PRESSURE: 68 MMHG | TEMPERATURE: 98.1 F | RESPIRATION RATE: 16 BRPM | SYSTOLIC BLOOD PRESSURE: 143 MMHG

## 2024-09-17 PROCEDURE — 15271 SKIN SUB GRAFT TRNK/ARM/LEG: CPT

## 2024-09-17 RX ORDER — BACITRACIN ZINC AND POLYMYXIN B SULFATE 500; 1000 [USP'U]/G; [USP'U]/G
OINTMENT TOPICAL ONCE
OUTPATIENT
Start: 2024-09-17 | End: 2024-09-17

## 2024-09-17 RX ORDER — BETAMETHASONE DIPROPIONATE 0.5 MG/G
CREAM TOPICAL ONCE
OUTPATIENT
Start: 2024-09-17 | End: 2024-09-17

## 2024-09-17 RX ORDER — NEOMYCIN/BACITRACIN/POLYMYXINB 3.5-400-5K
OINTMENT (GRAM) TOPICAL ONCE
OUTPATIENT
Start: 2024-09-17 | End: 2024-09-17

## 2024-09-17 RX ORDER — CLOBETASOL PROPIONATE 0.5 MG/G
OINTMENT TOPICAL ONCE
OUTPATIENT
Start: 2024-09-17 | End: 2024-09-17

## 2024-09-17 RX ORDER — LIDOCAINE HYDROCHLORIDE 20 MG/ML
JELLY TOPICAL ONCE
OUTPATIENT
Start: 2024-09-17 | End: 2024-09-17

## 2024-09-17 RX ORDER — MUPIROCIN 20 MG/G
OINTMENT TOPICAL ONCE
OUTPATIENT
Start: 2024-09-17 | End: 2024-09-17

## 2024-09-17 RX ORDER — LIDOCAINE HYDROCHLORIDE 40 MG/ML
SOLUTION TOPICAL ONCE
OUTPATIENT
Start: 2024-09-17 | End: 2024-09-17

## 2024-09-17 RX ORDER — SODIUM CHLOR/HYPOCHLOROUS ACID 0.033 %
SOLUTION, IRRIGATION IRRIGATION ONCE
OUTPATIENT
Start: 2024-09-17 | End: 2024-09-17

## 2024-09-17 RX ORDER — GINSENG 100 MG
CAPSULE ORAL ONCE
OUTPATIENT
Start: 2024-09-17 | End: 2024-09-17

## 2024-09-17 RX ORDER — GENTAMICIN SULFATE 1 MG/G
OINTMENT TOPICAL ONCE
OUTPATIENT
Start: 2024-09-17 | End: 2024-09-17

## 2024-09-17 RX ORDER — LIDOCAINE 40 MG/G
CREAM TOPICAL ONCE
OUTPATIENT
Start: 2024-09-17 | End: 2024-09-17

## 2024-09-17 RX ORDER — SILVER SULFADIAZINE 10 MG/G
CREAM TOPICAL ONCE
OUTPATIENT
Start: 2024-09-17 | End: 2024-09-17

## 2024-09-17 RX ORDER — TRIAMCINOLONE ACETONIDE 1 MG/G
OINTMENT TOPICAL ONCE
OUTPATIENT
Start: 2024-09-17 | End: 2024-09-17

## 2024-09-17 RX ORDER — LIDOCAINE 50 MG/G
OINTMENT TOPICAL ONCE
OUTPATIENT
Start: 2024-09-17 | End: 2024-09-17

## 2024-09-20 ENCOUNTER — OFFICE VISIT (OUTPATIENT)
Dept: FAMILY MEDICINE CLINIC | Age: 64
End: 2024-09-20
Payer: COMMERCIAL

## 2024-09-20 VITALS
SYSTOLIC BLOOD PRESSURE: 132 MMHG | DIASTOLIC BLOOD PRESSURE: 60 MMHG | WEIGHT: 271 LBS | HEART RATE: 74 BPM | HEIGHT: 67 IN | RESPIRATION RATE: 22 BRPM | BODY MASS INDEX: 42.53 KG/M2

## 2024-09-20 DIAGNOSIS — R60.0 BILATERAL LOWER EXTREMITY EDEMA: ICD-10-CM

## 2024-09-20 DIAGNOSIS — E78.00 HYPERCHOLESTEROLEMIA: ICD-10-CM

## 2024-09-20 DIAGNOSIS — Z00.00 WELCOME TO MEDICARE PREVENTIVE VISIT: Primary | ICD-10-CM

## 2024-09-20 DIAGNOSIS — S81.801D WOUND OF RIGHT LOWER EXTREMITY, SUBSEQUENT ENCOUNTER: ICD-10-CM

## 2024-09-20 DIAGNOSIS — I10 ESSENTIAL HYPERTENSION: ICD-10-CM

## 2024-09-20 PROCEDURE — G0402 INITIAL PREVENTIVE EXAM: HCPCS | Performed by: NURSE PRACTITIONER

## 2024-09-20 PROCEDURE — 1123F ACP DISCUSS/DSCN MKR DOCD: CPT | Performed by: NURSE PRACTITIONER

## 2024-09-20 PROCEDURE — 3075F SYST BP GE 130 - 139MM HG: CPT | Performed by: NURSE PRACTITIONER

## 2024-09-20 PROCEDURE — 3078F DIAST BP <80 MM HG: CPT | Performed by: NURSE PRACTITIONER

## 2024-09-20 PROCEDURE — 3017F COLORECTAL CA SCREEN DOC REV: CPT | Performed by: NURSE PRACTITIONER

## 2024-09-20 RX ORDER — METOPROLOL SUCCINATE 50 MG/1
50 TABLET, EXTENDED RELEASE ORAL DAILY
Qty: 90 TABLET | Refills: 1 | Status: SHIPPED | OUTPATIENT
Start: 2024-09-20 | End: 2025-03-19

## 2024-09-20 RX ORDER — MELOXICAM 7.5 MG/1
7.5 TABLET ORAL DAILY
Qty: 90 TABLET | Refills: 1 | Status: SHIPPED | OUTPATIENT
Start: 2024-09-20 | End: 2025-03-19

## 2024-09-20 RX ORDER — VALSARTAN 160 MG/1
160 TABLET ORAL DAILY
Qty: 90 TABLET | Refills: 1 | Status: SHIPPED | OUTPATIENT
Start: 2024-09-20 | End: 2025-03-19

## 2024-09-20 RX ORDER — AMLODIPINE BESYLATE 10 MG/1
10 TABLET ORAL DAILY
Qty: 90 TABLET | Refills: 1 | Status: SHIPPED | OUTPATIENT
Start: 2024-09-20 | End: 2025-03-19

## 2024-09-20 RX ORDER — FUROSEMIDE 20 MG
20 TABLET ORAL 2 TIMES DAILY
Qty: 180 TABLET | Refills: 1 | Status: SHIPPED | OUTPATIENT
Start: 2024-09-20 | End: 2025-03-19

## 2024-09-20 RX ORDER — POTASSIUM CHLORIDE 1500 MG/1
20 TABLET, EXTENDED RELEASE ORAL DAILY
Qty: 90 TABLET | Refills: 1 | Status: SHIPPED | OUTPATIENT
Start: 2024-09-20 | End: 2025-03-19

## 2024-09-20 RX ORDER — ATORVASTATIN CALCIUM 20 MG/1
20 TABLET, FILM COATED ORAL DAILY
Qty: 90 TABLET | Refills: 1 | Status: SHIPPED | OUTPATIENT
Start: 2024-09-20 | End: 2025-03-19

## 2024-09-20 SDOH — ECONOMIC STABILITY: FOOD INSECURITY: WITHIN THE PAST 12 MONTHS, THE FOOD YOU BOUGHT JUST DIDN'T LAST AND YOU DIDN'T HAVE MONEY TO GET MORE.: NEVER TRUE

## 2024-09-20 SDOH — ECONOMIC STABILITY: FOOD INSECURITY: WITHIN THE PAST 12 MONTHS, YOU WORRIED THAT YOUR FOOD WOULD RUN OUT BEFORE YOU GOT MONEY TO BUY MORE.: NEVER TRUE

## 2024-09-20 SDOH — ECONOMIC STABILITY: INCOME INSECURITY: HOW HARD IS IT FOR YOU TO PAY FOR THE VERY BASICS LIKE FOOD, HOUSING, MEDICAL CARE, AND HEATING?: NOT HARD AT ALL

## 2024-09-20 ASSESSMENT — PATIENT HEALTH QUESTIONNAIRE - PHQ9
SUM OF ALL RESPONSES TO PHQ9 QUESTIONS 1 & 2: 0
SUM OF ALL RESPONSES TO PHQ QUESTIONS 1-9: 0
SUM OF ALL RESPONSES TO PHQ QUESTIONS 1-9: 0
2. FEELING DOWN, DEPRESSED OR HOPELESS: NOT AT ALL
SUM OF ALL RESPONSES TO PHQ QUESTIONS 1-9: 0
SUM OF ALL RESPONSES TO PHQ QUESTIONS 1-9: 0
1. LITTLE INTEREST OR PLEASURE IN DOING THINGS: NOT AT ALL

## 2024-09-20 ASSESSMENT — LIFESTYLE VARIABLES
HOW OFTEN DURING THE LAST YEAR HAVE YOU FOUND THAT YOU WERE NOT ABLE TO STOP DRINKING ONCE YOU HAD STARTED: NEVER
HAVE YOU OR SOMEONE ELSE BEEN INJURED AS A RESULT OF YOUR DRINKING: NO
HOW OFTEN DURING THE LAST YEAR HAVE YOU BEEN UNABLE TO REMEMBER WHAT HAPPENED THE NIGHT BEFORE BECAUSE YOU HAD BEEN DRINKING: NEVER
HOW OFTEN DO YOU HAVE A DRINK CONTAINING ALCOHOL: 2-4 TIMES A MONTH
HOW OFTEN DURING THE LAST YEAR HAVE YOU HAD A FEELING OF GUILT OR REMORSE AFTER DRINKING: NEVER
HAS A RELATIVE, FRIEND, DOCTOR, OR ANOTHER HEALTH PROFESSIONAL EXPRESSED CONCERN ABOUT YOUR DRINKING OR SUGGESTED YOU CUT DOWN: NO
HOW OFTEN DURING THE LAST YEAR HAVE YOU FAILED TO DO WHAT WAS NORMALLY EXPECTED FROM YOU BECAUSE OF DRINKING: NEVER
HOW OFTEN DURING THE LAST YEAR HAVE YOU NEEDED AN ALCOHOLIC DRINK FIRST THING IN THE MORNING TO GET YOURSELF GOING AFTER A NIGHT OF HEAVY DRINKING: NEVER
HOW MANY STANDARD DRINKS CONTAINING ALCOHOL DO YOU HAVE ON A TYPICAL DAY: 1 OR 2

## 2024-09-24 ENCOUNTER — HOSPITAL ENCOUNTER (OUTPATIENT)
Dept: WOUND CARE | Age: 64
Discharge: HOME | End: 2024-09-24
Attending: PODIATRIST
Payer: MEDICARE

## 2024-09-24 VITALS
TEMPERATURE: 97.6 F | DIASTOLIC BLOOD PRESSURE: 76 MMHG | HEART RATE: 61 BPM | OXYGEN SATURATION: 98 % | SYSTOLIC BLOOD PRESSURE: 150 MMHG | RESPIRATION RATE: 20 BRPM

## 2024-09-24 DIAGNOSIS — S81.801D WOUND OF RIGHT LEG, SUBSEQUENT ENCOUNTER: ICD-10-CM

## 2024-09-24 DIAGNOSIS — I87.2 CHRONIC VENOUS STASIS DERMATITIS OF RIGHT LOWER EXTREMITY: Primary | ICD-10-CM

## 2024-09-24 PROCEDURE — 15271 SKIN SUB GRAFT TRNK/ARM/LEG: CPT

## 2024-09-24 PROCEDURE — 29580 STRAPPING UNNA BOOT: CPT

## 2024-09-24 RX ORDER — LIDOCAINE HYDROCHLORIDE 20 MG/ML
JELLY TOPICAL ONCE
OUTPATIENT
Start: 2024-09-24 | End: 2024-09-24

## 2024-09-24 RX ORDER — MUPIROCIN 20 MG/G
OINTMENT TOPICAL ONCE
OUTPATIENT
Start: 2024-09-24 | End: 2024-09-24

## 2024-09-24 RX ORDER — SODIUM CHLOR/HYPOCHLOROUS ACID 0.033 %
SOLUTION, IRRIGATION IRRIGATION ONCE
OUTPATIENT
Start: 2024-09-24 | End: 2024-09-24

## 2024-09-24 RX ORDER — BACITRACIN ZINC AND POLYMYXIN B SULFATE 500; 1000 [USP'U]/G; [USP'U]/G
OINTMENT TOPICAL ONCE
OUTPATIENT
Start: 2024-09-24 | End: 2024-09-24

## 2024-09-24 RX ORDER — GENTAMICIN SULFATE 1 MG/G
OINTMENT TOPICAL ONCE
OUTPATIENT
Start: 2024-09-24 | End: 2024-09-24

## 2024-09-24 RX ORDER — GINSENG 100 MG
CAPSULE ORAL ONCE
OUTPATIENT
Start: 2024-09-24 | End: 2024-09-24

## 2024-09-24 RX ORDER — SILVER SULFADIAZINE 10 MG/G
CREAM TOPICAL ONCE
OUTPATIENT
Start: 2024-09-24 | End: 2024-09-24

## 2024-09-24 RX ORDER — LIDOCAINE HYDROCHLORIDE 40 MG/ML
SOLUTION TOPICAL ONCE
OUTPATIENT
Start: 2024-09-24 | End: 2024-09-24

## 2024-09-24 RX ORDER — CLOBETASOL PROPIONATE 0.5 MG/G
OINTMENT TOPICAL ONCE
OUTPATIENT
Start: 2024-09-24 | End: 2024-09-24

## 2024-09-24 RX ORDER — BETAMETHASONE DIPROPIONATE 0.5 MG/G
CREAM TOPICAL ONCE
OUTPATIENT
Start: 2024-09-24 | End: 2024-09-24

## 2024-09-24 RX ORDER — LIDOCAINE 40 MG/G
CREAM TOPICAL ONCE
OUTPATIENT
Start: 2024-09-24 | End: 2024-09-24

## 2024-09-24 RX ORDER — NEOMYCIN/BACITRACIN/POLYMYXINB 3.5-400-5K
OINTMENT (GRAM) TOPICAL ONCE
OUTPATIENT
Start: 2024-09-24 | End: 2024-09-24

## 2024-09-24 RX ORDER — LIDOCAINE 50 MG/G
OINTMENT TOPICAL ONCE
OUTPATIENT
Start: 2024-09-24 | End: 2024-09-24

## 2024-09-24 RX ORDER — TRIAMCINOLONE ACETONIDE 1 MG/G
OINTMENT TOPICAL ONCE
OUTPATIENT
Start: 2024-09-24 | End: 2024-09-24

## 2024-09-25 ENCOUNTER — TELEPHONE (OUTPATIENT)
Dept: FAMILY MEDICINE CLINIC | Age: 64
End: 2024-09-25

## 2024-10-01 ENCOUNTER — HOSPITAL ENCOUNTER (OUTPATIENT)
Dept: WOUND CARE | Age: 64
Discharge: HOME | End: 2024-10-01
Attending: PODIATRIST
Payer: MEDICARE

## 2024-10-01 VITALS
OXYGEN SATURATION: 97 % | HEART RATE: 60 BPM | DIASTOLIC BLOOD PRESSURE: 65 MMHG | SYSTOLIC BLOOD PRESSURE: 141 MMHG | TEMPERATURE: 97.7 F | RESPIRATION RATE: 16 BRPM

## 2024-10-01 DIAGNOSIS — S81.801D WOUND OF RIGHT LEG, SUBSEQUENT ENCOUNTER: ICD-10-CM

## 2024-10-01 DIAGNOSIS — I87.2 CHRONIC VENOUS STASIS DERMATITIS OF RIGHT LOWER EXTREMITY: Primary | ICD-10-CM

## 2024-10-01 PROCEDURE — 29580 STRAPPING UNNA BOOT: CPT

## 2024-10-01 PROCEDURE — 15271 SKIN SUB GRAFT TRNK/ARM/LEG: CPT

## 2024-10-01 RX ORDER — LIDOCAINE HYDROCHLORIDE 20 MG/ML
JELLY TOPICAL ONCE
OUTPATIENT
Start: 2024-10-01 | End: 2024-10-01

## 2024-10-01 RX ORDER — MUPIROCIN 20 MG/G
OINTMENT TOPICAL ONCE
OUTPATIENT
Start: 2024-10-01 | End: 2024-10-01

## 2024-10-01 RX ORDER — CLOBETASOL PROPIONATE 0.5 MG/G
OINTMENT TOPICAL ONCE
OUTPATIENT
Start: 2024-10-01 | End: 2024-10-01

## 2024-10-01 RX ORDER — BETAMETHASONE DIPROPIONATE 0.5 MG/G
CREAM TOPICAL ONCE
OUTPATIENT
Start: 2024-10-01 | End: 2024-10-01

## 2024-10-01 RX ORDER — LIDOCAINE HYDROCHLORIDE 40 MG/ML
SOLUTION TOPICAL ONCE
OUTPATIENT
Start: 2024-10-01 | End: 2024-10-01

## 2024-10-01 RX ORDER — LIDOCAINE 40 MG/G
CREAM TOPICAL ONCE
OUTPATIENT
Start: 2024-10-01 | End: 2024-10-01

## 2024-10-01 RX ORDER — GINSENG 100 MG
CAPSULE ORAL ONCE
OUTPATIENT
Start: 2024-10-01 | End: 2024-10-01

## 2024-10-01 RX ORDER — SILVER SULFADIAZINE 10 MG/G
CREAM TOPICAL ONCE
OUTPATIENT
Start: 2024-10-01 | End: 2024-10-01

## 2024-10-01 RX ORDER — LIDOCAINE 50 MG/G
OINTMENT TOPICAL ONCE
OUTPATIENT
Start: 2024-10-01 | End: 2024-10-01

## 2024-10-01 RX ORDER — NEOMYCIN/BACITRACIN/POLYMYXINB 3.5-400-5K
OINTMENT (GRAM) TOPICAL ONCE
OUTPATIENT
Start: 2024-10-01 | End: 2024-10-01

## 2024-10-01 RX ORDER — SODIUM CHLOR/HYPOCHLOROUS ACID 0.033 %
SOLUTION, IRRIGATION IRRIGATION ONCE
OUTPATIENT
Start: 2024-10-01 | End: 2024-10-01

## 2024-10-01 RX ORDER — GENTAMICIN SULFATE 1 MG/G
OINTMENT TOPICAL ONCE
OUTPATIENT
Start: 2024-10-01 | End: 2024-10-01

## 2024-10-01 RX ORDER — TRIAMCINOLONE ACETONIDE 1 MG/G
OINTMENT TOPICAL ONCE
OUTPATIENT
Start: 2024-10-01 | End: 2024-10-01

## 2024-10-01 RX ORDER — BACITRACIN ZINC AND POLYMYXIN B SULFATE 500; 1000 [USP'U]/G; [USP'U]/G
OINTMENT TOPICAL ONCE
OUTPATIENT
Start: 2024-10-01 | End: 2024-10-01

## 2024-10-01 NOTE — PATIENT INSTRUCTIONS
Visit Discharge/Physician Orders:  - Grafix graft - #1 applied 7/9/24, graft #2 applied 7/16/24, graft #3 applied 7/30/24, graft #4 applied 8/27/24, graft #5 applied 9/17/24, graft #6 applied 9/24/24, graft #7 applied 10/01/2024 (no Grafix beyond 10/1/24)  - Continue to elevate for swelling.    Wound Location: Right lower leg    Dressing orders:     1) Gather wound care supplies and arrange on clean table.     2) Wash your hands with soap and water or use alcohol based hand  for 20 seconds (sing \"Happy Birthday\" twice).    3) Right lower leg- Gafix applied to wound and covered with adaptic layer. Wrap with unna boot, ABD over wound area, roll gauze and coban. Keep in place until next appointment.     If unna boot becomes too tight- raise/elevate legs above the level of the heart for 15-20 minutes if swelling does not go down then carefully cut off unna boot and call clinic or go to local ER or family physician. If unna boot becomes wet or starts to roll down then carefully remove unna boot and call clinic.      Keep all dressings clean & dry.    Follow up visit: 1 week - Tuesday October 8th at 8am    Supplies:     Keep next scheduled appointment. Please give 24 hour notice if unable to keep appointment. [phone]    If you experience any of the following, please call the Wound Care Service during business hours: Monday through Friday 8:00 am - 4:30 pm  ([phone]).   *Increase in pain   *Temperature over 101   *Increase in drainage from your wound or a foul odor   *Uncontrolled swelling   *Need for compression bandage changes due to slippage, breakthrough drainage    If you need medical attention outside of business hours, please contact your Primary Care Doctor or go to the nearest emergency room.

## 2024-10-01 NOTE — PROGRESS NOTES
Unna Boot Application   Below Knee    NAME:  Damien Aguilar  YOB: 1960  MEDICAL RECORD NUMBER:  [id number]  DATE:  10/1/2024    Unna boot: Applied moisturizing agent to dry skin as needed.   Appied primary and secondary dressing as ordered.  Applied Unna roll from toes to knee overlapping each time.   Applied ace wrap or coban from toes to below the knee.   Secured with tape and/or metal clips covered with tape.   Instructed patient/caregiver to keep dressing dry and intact. DO NOT REMOVE DRESSING.   Instructed pt/family/caregiver to report excessive draining, loose bandage, wet dressing, severe pain or tingling in toes.  Applied Unna Boot dressing below the knee to right lower leg.    Unna Boot(s) were applied per  Guidelines.     Electronically signed by Silvia Topete RN on 10/1/2024 at 3:48 PM     
  Teaching Note:    I was present with the resident during the visit.  I discussed the case with the resident and agree with the findings and the plan as documented in the residents note.    Ja Rivas DPM, FACFAS  Podiatric Medicine & Surgery       [facility]        
Grafix PL Prime Treatment Note    NAME:  Damien Aguilar  YOB: 1960  MEDICAL RECORD NUMBER:  [id number]  DATE:  10/1/2024    Goal:  Patient will receive safe and proper application of skin substitute. Patient will comply with caring for dressing, and reporting complications.     The expiration date is checked immediately before use., The package was intact before use, and no damage was noted., Grafix PL is stored at room temperature., Grafix PL Prime was removed from protective sterile packaging by the provider and applied to the prepared ulcer bed., Provider removed the mesh applicators from both sides of graft and discarded  the two mesh pieces.  , Grafix PL Prime was hydrated with sterile normal saline per the provider., Grafix PL Prime was applied to right leg wound ., Grafix PL Prime was covered with non-adherent ulcer dressing, Applied unna boot over non-adherent., Applied moisten saline gauze., Applied dry gauze and/or roll gauze., Applied appropriate off-loading device., The patient/caregiver was instructed not to remove the dressing and to keep it clean and dry., The patient/family/caregiver was instructed on the need for offloading and elevation of the affected extremity and on using the prescribed offloading device., and The patient/family/caregiver was instructed on signs and symptoms of complication to report, such as draining through dressing, dressing falling /slipping, getting wet, or severe pain or tingling.     Guidelines followed  Grafix PL Prime may only be used every 12 months per wound.      Date of first application of Grafix PL Prime for this current wound is July 9, 2024.    Application # 7 out of 10    Electronically signed by Silvia Topete RN on 10/1/2024 at 3:47 PM   
(cm^3) 0.665 cm^3 09/17/24 0835   Wound Assessment Pale granulation tissue;Eschar dry 09/17/24 0817   Drainage Amount Small (< 25%) 09/17/24 0817   Drainage Description Yellow 09/17/24 0817   Odor None 09/17/24 0817   Maira-wound Assessment Dry/flaky 09/17/24 0817   Margins Attached edges 09/17/24 0817   Wound Thickness Description not for Pressure Injury Full thickness 09/17/24 0817   Number of days: 111     Percent of Wound Debrided:  100%    Total Surface Area Debrided:  0.3 sq cm    Bleeding:  None    Hemostasis Achieved:  not needed    Procedural Pain: 0  / 10     Post Procedural Pain:  0 / 10     Procedure:  Skin Substitute Application    Performed by: Rafal Guillermo DPM    Ulcer Type: venous, Surgical    Consent obtained: Yes    Time out taken: Yes    Product Utilized:    Grafix PL Prime 3 sq/cm     Fenestrated: Yes    Mesher Utilized: Yes    Instrument(s) curette    Skin Substitute was Applied to Ulcer Number(s):    Ulcer #: 1    Wound 05/28/24 Leg Right (Active)   Wound Image   10/01/24 1429   Wound Etiology Non-Healing Surgical 10/01/24 1429   Dressing Status Intact;Old drainage noted;New dressing applied 10/01/24 1429   Wound Cleansed Cleansed with saline 10/01/24 1429   Dressing/Treatment Other (comment);ABD;Roll gauze;Coban/self-adherent bandages 09/24/24 0850   Offloading for Diabetic Foot Ulcers Offloading not required 10/01/24 1429   Wound Length (cm) 3.4 cm 10/01/24 1429   Wound Width (cm) 0.7 cm 10/01/24 1429   Wound Depth (cm) 0.05 cm 10/01/24 1429   Wound Surface Area (cm^2) 2.38 cm^2 10/01/24 1429   Change in Wound Size % (l*w) 92.13 10/01/24 1429   Wound Volume (cm^3) 0.119 cm^3 10/01/24 1429   Wound Healing % 100 10/01/24 1429   Post-Procedure Length (cm) 2.8 cm 09/24/24 0850   Post-Procedure Width (cm) 1.3 cm 09/24/24 0850   Post-Procedure Depth (cm) 0.05 cm 09/24/24 0850   Post-Procedure Surface Area (cm^2) 3.64 cm^2 09/24/24 0850   Post-Procedure Volume (cm^3) 0.182 cm^3 09/24/24 0850   Wound

## 2024-10-08 ENCOUNTER — HOSPITAL ENCOUNTER (OUTPATIENT)
Dept: WOUND CARE | Age: 64
Discharge: HOME | End: 2024-10-08
Attending: PODIATRIST
Payer: MEDICARE

## 2024-10-08 VITALS
RESPIRATION RATE: 18 BRPM | TEMPERATURE: 98.3 F | HEART RATE: 70 BPM | OXYGEN SATURATION: 98 % | SYSTOLIC BLOOD PRESSURE: 162 MMHG | DIASTOLIC BLOOD PRESSURE: 74 MMHG

## 2024-10-08 DIAGNOSIS — I87.2 CHRONIC VENOUS STASIS DERMATITIS OF RIGHT LOWER EXTREMITY: Primary | ICD-10-CM

## 2024-10-08 DIAGNOSIS — S81.801D WOUND OF RIGHT LEG, SUBSEQUENT ENCOUNTER: ICD-10-CM

## 2024-10-08 PROCEDURE — 29580 STRAPPING UNNA BOOT: CPT

## 2024-10-08 RX ORDER — NEOMYCIN/BACITRACIN/POLYMYXINB 3.5-400-5K
OINTMENT (GRAM) TOPICAL ONCE
OUTPATIENT
Start: 2024-10-08 | End: 2024-10-08

## 2024-10-08 RX ORDER — LIDOCAINE HYDROCHLORIDE 20 MG/ML
JELLY TOPICAL ONCE
OUTPATIENT
Start: 2024-10-08 | End: 2024-10-08

## 2024-10-08 RX ORDER — LIDOCAINE 40 MG/G
CREAM TOPICAL ONCE
OUTPATIENT
Start: 2024-10-08 | End: 2024-10-08

## 2024-10-08 RX ORDER — BETAMETHASONE DIPROPIONATE 0.5 MG/G
CREAM TOPICAL ONCE
OUTPATIENT
Start: 2024-10-08 | End: 2024-10-08

## 2024-10-08 RX ORDER — LIDOCAINE HYDROCHLORIDE 40 MG/ML
SOLUTION TOPICAL ONCE
OUTPATIENT
Start: 2024-10-08 | End: 2024-10-08

## 2024-10-08 RX ORDER — SODIUM CHLOR/HYPOCHLOROUS ACID 0.033 %
SOLUTION, IRRIGATION IRRIGATION ONCE
OUTPATIENT
Start: 2024-10-08 | End: 2024-10-08

## 2024-10-08 RX ORDER — LIDOCAINE 50 MG/G
OINTMENT TOPICAL ONCE
OUTPATIENT
Start: 2024-10-08 | End: 2024-10-08

## 2024-10-08 RX ORDER — GINSENG 100 MG
CAPSULE ORAL ONCE
OUTPATIENT
Start: 2024-10-08 | End: 2024-10-08

## 2024-10-08 RX ORDER — BACITRACIN ZINC AND POLYMYXIN B SULFATE 500; 1000 [USP'U]/G; [USP'U]/G
OINTMENT TOPICAL ONCE
OUTPATIENT
Start: 2024-10-08 | End: 2024-10-08

## 2024-10-08 RX ORDER — GENTAMICIN SULFATE 1 MG/G
OINTMENT TOPICAL ONCE
OUTPATIENT
Start: 2024-10-08 | End: 2024-10-08

## 2024-10-08 RX ORDER — CLOBETASOL PROPIONATE 0.5 MG/G
OINTMENT TOPICAL ONCE
OUTPATIENT
Start: 2024-10-08 | End: 2024-10-08

## 2024-10-08 RX ORDER — MUPIROCIN 20 MG/G
OINTMENT TOPICAL ONCE
OUTPATIENT
Start: 2024-10-08 | End: 2024-10-08

## 2024-10-08 RX ORDER — SILVER SULFADIAZINE 10 MG/G
CREAM TOPICAL ONCE
OUTPATIENT
Start: 2024-10-08 | End: 2024-10-08

## 2024-10-08 RX ORDER — TRIAMCINOLONE ACETONIDE 1 MG/G
OINTMENT TOPICAL ONCE
OUTPATIENT
Start: 2024-10-08 | End: 2024-10-08

## 2024-10-08 NOTE — PROGRESS NOTES
[facility]          Progress Note and Procedure Note      Damien Aguilar  MEDICAL RECORD NUMBER:  [id number]  AGE: 63 y.o.   GENDER: male  : 1960  EPISODE DATE:  10/8/2024    Subjective:     Presents for follow-up of the wound to his right leg status post medial Jorge L soleus muscle flap and now patient is having local wound care in order to completely resolved and epithelialized the wound that has been created.    HISTORY of PRESENT ILLNESS HPI     Damien Aguilar is a 63 y.o. male Established patient referred by TIJERINA, who presents today for wound/ulcer evaluation.   History of Wound Context: The wound continues to heal there is still an area of granulation tissue although it is improving there is still the ability to speed the wound prior to getting any kind of infection.  Wound/Ulcer Pain Timing/Severity: mild  Quality of pain: dull  Severity:  0 / 10   Modifying Factors: None  Associated Signs/Symptoms: edema and drainage    Interval History:   Patient presents today for follow up on wound/ulcer's progression. Patient was seen 1 week ago and at this time Grafix PL prime was applied. Additionally, an Unna boot was applied to the right lower leg. The Unna boot was left clean, dry, and intact to the right lower extremity. Denies any systemic symptoms. Denies any new or significant issues to the right lower extremity.          PAST MEDICAL HISTORY        Diagnosis Date    Morbid obesity     Osteoarthritis     Snoring     possible apnea - per wife, better since lost 20# recently.  BMI 41.97, neck circ 17.5 cm, no daytime somnolence, no am headaches.       PAST SURGICAL HISTORY    Past Surgical History:   Procedure Laterality Date    HERNIA REPAIR      left inguinal    JOINT REPLACEMENT Left 2020    left hip    OTHER SURGICAL HISTORY      right leg vein stripping    PRESSURE ULCER DEBRIDEMENT Right 2024    Right Leg Excision and Debridement of Wound, Right Medial

## 2024-10-08 NOTE — PATIENT INSTRUCTIONS
Visit Discharge/Physician Orders:  - Grafix graft - #1 applied 7/9/24, graft #2 applied 7/16/24, graft #3 applied 7/30/24, graft #4 applied 8/27/24, graft #5 applied 9/17/24, graft #6 applied 9/24/24, graft #7 applied 10/01/2024 (no Grafix beyond 10/1/24)  - Continue to elevate for swelling.    Wound Location: Right lower leg    Dressing orders:     1) Gather wound care supplies and arrange on clean table.     2) Wash your hands with soap and water or use alcohol based hand  for 20 seconds (sing \"Happy Birthday\" twice).    3) Right lower leg- mepitel ag and alginate applied to wound. Wrap with unna boot, ABD over wound area, roll gauze and coban. Keep in place until next appointment.     If unna boot becomes too tight- raise/elevate legs above the level of the heart for 15-20 minutes if swelling does not go down then carefully cut off unna boot and call clinic or go to local ER or family physician. If unna boot becomes wet or starts to roll down then carefully remove unna boot and call clinic.      Keep all dressings clean & dry.    Follow up visit: 1 week - Tuesday October     Supplies:     Keep next scheduled appointment. Please give 24 hour notice if unable to keep appointment. [phone]    If you experience any of the following, please call the Wound Care Service during business hours: Monday through Friday 8:00 am - 4:30 pm  ([phone]).   *Increase in pain   *Temperature over 101   *Increase in drainage from your wound or a foul odor   *Uncontrolled swelling   *Need for compression bandage changes due to slippage, breakthrough drainage    If you need medical attention outside of business hours, please contact your Primary Care Doctor or go to the nearest emergency room.

## 2024-10-15 ENCOUNTER — HOSPITAL ENCOUNTER (OUTPATIENT)
Dept: WOUND CARE | Age: 64
Discharge: HOME | End: 2024-10-15
Attending: PODIATRIST
Payer: MEDICARE

## 2024-10-15 VITALS
HEART RATE: 79 BPM | SYSTOLIC BLOOD PRESSURE: 136 MMHG | OXYGEN SATURATION: 96 % | TEMPERATURE: 97.9 F | DIASTOLIC BLOOD PRESSURE: 59 MMHG | RESPIRATION RATE: 18 BRPM

## 2024-10-15 DIAGNOSIS — I87.2 CHRONIC VENOUS STASIS DERMATITIS OF RIGHT LOWER EXTREMITY: Primary | ICD-10-CM

## 2024-10-15 DIAGNOSIS — S81.801D WOUND OF RIGHT LEG, SUBSEQUENT ENCOUNTER: ICD-10-CM

## 2024-10-15 PROCEDURE — 99213 OFFICE O/P EST LOW 20 MIN: CPT

## 2024-10-15 RX ORDER — BACITRACIN ZINC AND POLYMYXIN B SULFATE 500; 1000 [USP'U]/G; [USP'U]/G
OINTMENT TOPICAL ONCE
OUTPATIENT
Start: 2024-10-15 | End: 2024-10-15

## 2024-10-15 RX ORDER — GINSENG 100 MG
CAPSULE ORAL ONCE
OUTPATIENT
Start: 2024-10-15 | End: 2024-10-15

## 2024-10-15 RX ORDER — MUPIROCIN 20 MG/G
OINTMENT TOPICAL ONCE
OUTPATIENT
Start: 2024-10-15 | End: 2024-10-15

## 2024-10-15 RX ORDER — NEOMYCIN/BACITRACIN/POLYMYXINB 3.5-400-5K
OINTMENT (GRAM) TOPICAL ONCE
OUTPATIENT
Start: 2024-10-15 | End: 2024-10-15

## 2024-10-15 RX ORDER — CLOBETASOL PROPIONATE 0.5 MG/G
OINTMENT TOPICAL ONCE
OUTPATIENT
Start: 2024-10-15 | End: 2024-10-15

## 2024-10-15 RX ORDER — LIDOCAINE HYDROCHLORIDE 20 MG/ML
JELLY TOPICAL ONCE
OUTPATIENT
Start: 2024-10-15 | End: 2024-10-15

## 2024-10-15 RX ORDER — TRIAMCINOLONE ACETONIDE 1 MG/G
OINTMENT TOPICAL ONCE
OUTPATIENT
Start: 2024-10-15 | End: 2024-10-15

## 2024-10-15 RX ORDER — GENTAMICIN SULFATE 1 MG/G
OINTMENT TOPICAL ONCE
OUTPATIENT
Start: 2024-10-15 | End: 2024-10-15

## 2024-10-15 RX ORDER — LIDOCAINE 50 MG/G
OINTMENT TOPICAL ONCE
OUTPATIENT
Start: 2024-10-15 | End: 2024-10-15

## 2024-10-15 RX ORDER — SODIUM CHLOR/HYPOCHLOROUS ACID 0.033 %
SOLUTION, IRRIGATION IRRIGATION ONCE
OUTPATIENT
Start: 2024-10-15 | End: 2024-10-15

## 2024-10-15 RX ORDER — LIDOCAINE 40 MG/G
CREAM TOPICAL ONCE
OUTPATIENT
Start: 2024-10-15 | End: 2024-10-15

## 2024-10-15 RX ORDER — SILVER SULFADIAZINE 10 MG/G
CREAM TOPICAL ONCE
OUTPATIENT
Start: 2024-10-15 | End: 2024-10-15

## 2024-10-15 RX ORDER — LIDOCAINE HYDROCHLORIDE 40 MG/ML
SOLUTION TOPICAL ONCE
OUTPATIENT
Start: 2024-10-15 | End: 2024-10-15

## 2024-10-15 RX ORDER — BETAMETHASONE DIPROPIONATE 0.5 MG/G
CREAM TOPICAL ONCE
OUTPATIENT
Start: 2024-10-15 | End: 2024-10-15

## 2024-10-15 NOTE — PLAN OF CARE
Problem: Wound:  Goal: Will show signs of wound healing; wound closure and no evidence of infection  Description: Will show signs of wound healing; wound closure and no evidence of infection  Outcome: Progressing   Patient seen in clinic today for right leg wound. No s/s of infection. See AVS. Follow up in 2 week/s. Care plan reviewed with patient. Patient verbalizes understanding of the plan of care and contributes to goal setting.

## 2024-10-15 NOTE — PATIENT INSTRUCTIONS
Visit Discharge/Physician Orders:  - okay to shower, just clean with saline after and apply new dressing  - Grafix graft - #1 applied 7/9/24, graft #2 applied 7/16/24, graft #3 applied 7/30/24, graft #4 applied 8/27/24, graft #5 applied 9/17/24, graft #6 applied 9/24/24, graft #7 applied 10/01/2024 (no Grafix beyond 10/1/24)  - Continue to elevate for swelling.    Wound Location: Right lower leg    Dressing orders:     1) Gather wound care supplies and arrange on clean table.     2) Wash your hands with soap and water or use alcohol based hand  for 20 seconds (sing \"Happy Birthday\" twice).    3) Right lower leg- Apply dry gauze to wound, secure with compression stocking.  Change daily or as needed.     Keep all dressings clean & dry.    Follow up visit: 2 weeks - Tuesday October 29 th at 8:15 AM     Supplies:     Keep next scheduled appointment. Please give 24 hour notice if unable to keep appointment. [phone]    If you experience any of the following, please call the Wound Care Service during business hours: Monday through Friday 8:00 am - 4:30 pm  ([phone]).   *Increase in pain   *Temperature over 101   *Increase in drainage from your wound or a foul odor   *Uncontrolled swelling   *Need for compression bandage changes due to slippage, breakthrough drainage    If you need medical attention outside of business hours, please contact your Primary Care Doctor or go to the nearest emergency room.

## 2024-10-15 NOTE — PROGRESS NOTES
Maceration;Intact 10/15/24 1421   Margins Attached edges 10/15/24 1421   Wound Thickness Description not for Pressure Injury Full thickness 10/15/24 1421   Number of days: 140       LABS      CBC:   Lab Results   Component Value Date/Time    WBC 5.4 05/25/2024 06:00 AM    HGB 10.7 05/25/2024 06:00 AM    HCT 32.8 05/25/2024 06:00 AM    MCV 96.8 05/25/2024 06:00 AM     05/25/2024 06:00 AM     BMP:   Lab Results   Component Value Date/Time     05/22/2024 05:24 AM    K 4.6 05/22/2024 05:24 AM     05/22/2024 05:24 AM    CO2 27 05/22/2024 05:24 AM    BUN 12 05/22/2024 05:24 AM    CREATININE 0.9 05/24/2024 05:24 AM     PT/INR:   Lab Results   Component Value Date/Time    INR 1.08 05/22/2024 05:24 AM     Prealbumin:   Lab Results   Component Value Date/Time    PREALBUMIN 23.3 04/25/2024 09:00 AM     Albumin:No results found for: \"LABALBU\"  Sed Rate:No results found for: \"SEDRATE\"  CRP: No results found for: \"CRP\"  Micro: No results found for: \"BC\"   Hemoglobin A1C: No results found for: \"LABA1C\"    Assessment:     Ulcer Identification:  Ulcer Type: venous and traumatic  Contributing Factors: edema, venous stasis, and lymphedema    Wound: Contusion    Depth of Diabetic/Pressure/Non Pressure Ulcers or Wound:  Non-Pressure ulcer, muscle necrosis    Patient Active Problem List   Diagnosis Code    Osteoarthritis M19.90    Essential hypertension I10    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 extremities I87.2    Body mass index (BMI) 45.0-49.9, adult Z68.42    Failure of flap graft T86.821    Wound of right leg, subsequent encounter S81.801D    Morbid obesity E66.01    Contact dermatitis of lower leg L25.9    Allergic contact dermatitis due to adhesives L23.1       Procedure Note  Indications:  Based on my examination of this patient's wound(s)/ulcer(s)

## 2024-10-29 ENCOUNTER — HOSPITAL ENCOUNTER (OUTPATIENT)
Dept: WOUND CARE | Age: 64
Discharge: HOME | End: 2024-10-29
Attending: PODIATRIST
Payer: MEDICARE

## 2024-10-29 VITALS
DIASTOLIC BLOOD PRESSURE: 82 MMHG | RESPIRATION RATE: 18 BRPM | SYSTOLIC BLOOD PRESSURE: 175 MMHG | HEART RATE: 116 BPM | TEMPERATURE: 97.5 F | OXYGEN SATURATION: 96 %

## 2024-10-29 DIAGNOSIS — I87.2 CHRONIC VENOUS STASIS DERMATITIS OF RIGHT LOWER EXTREMITY: Primary | ICD-10-CM

## 2024-10-29 DIAGNOSIS — S81.801D WOUND OF RIGHT LEG, SUBSEQUENT ENCOUNTER: ICD-10-CM

## 2024-10-29 PROCEDURE — 6370000000 HC RX 637 (ALT 250 FOR IP): Performed by: PODIATRIST

## 2024-10-29 PROCEDURE — 29580 STRAPPING UNNA BOOT: CPT

## 2024-10-29 RX ORDER — CLOBETASOL PROPIONATE 0.5 MG/G
OINTMENT TOPICAL ONCE
OUTPATIENT
Start: 2024-10-29 | End: 2024-10-29

## 2024-10-29 RX ORDER — TRIAMCINOLONE ACETONIDE 1 MG/G
CREAM TOPICAL ONCE
Status: COMPLETED | OUTPATIENT
Start: 2024-10-29 | End: 2024-10-29

## 2024-10-29 RX ORDER — SODIUM CHLOR/HYPOCHLOROUS ACID 0.033 %
SOLUTION, IRRIGATION IRRIGATION ONCE
OUTPATIENT
Start: 2024-10-29 | End: 2024-10-29

## 2024-10-29 RX ORDER — LIDOCAINE HYDROCHLORIDE 20 MG/ML
JELLY TOPICAL ONCE
OUTPATIENT
Start: 2024-10-29 | End: 2024-10-29

## 2024-10-29 RX ORDER — TRIAMCINOLONE ACETONIDE 1 MG/G
OINTMENT TOPICAL ONCE
Status: DISCONTINUED | OUTPATIENT
Start: 2024-10-29 | End: 2024-10-29

## 2024-10-29 RX ORDER — SILVER SULFADIAZINE 10 MG/G
CREAM TOPICAL ONCE
OUTPATIENT
Start: 2024-10-29 | End: 2024-10-29

## 2024-10-29 RX ORDER — NEOMYCIN/BACITRACIN/POLYMYXINB 3.5-400-5K
OINTMENT (GRAM) TOPICAL ONCE
OUTPATIENT
Start: 2024-10-29 | End: 2024-10-29

## 2024-10-29 RX ORDER — LIDOCAINE HYDROCHLORIDE 40 MG/ML
SOLUTION TOPICAL ONCE
OUTPATIENT
Start: 2024-10-29 | End: 2024-10-29

## 2024-10-29 RX ORDER — LIDOCAINE 40 MG/G
CREAM TOPICAL ONCE
OUTPATIENT
Start: 2024-10-29 | End: 2024-10-29

## 2024-10-29 RX ORDER — BACITRACIN ZINC AND POLYMYXIN B SULFATE 500; 1000 [USP'U]/G; [USP'U]/G
OINTMENT TOPICAL ONCE
OUTPATIENT
Start: 2024-10-29 | End: 2024-10-29

## 2024-10-29 RX ORDER — TRIAMCINOLONE ACETONIDE 1 MG/G
OINTMENT TOPICAL ONCE
OUTPATIENT
Start: 2024-10-29 | End: 2024-10-29

## 2024-10-29 RX ORDER — LIDOCAINE 50 MG/G
OINTMENT TOPICAL ONCE
OUTPATIENT
Start: 2024-10-29 | End: 2024-10-29

## 2024-10-29 RX ORDER — GINSENG 100 MG
CAPSULE ORAL ONCE
OUTPATIENT
Start: 2024-10-29 | End: 2024-10-29

## 2024-10-29 RX ORDER — MUPIROCIN 20 MG/G
OINTMENT TOPICAL ONCE
OUTPATIENT
Start: 2024-10-29 | End: 2024-10-29

## 2024-10-29 RX ORDER — GENTAMICIN SULFATE 1 MG/G
OINTMENT TOPICAL ONCE
OUTPATIENT
Start: 2024-10-29 | End: 2024-10-29

## 2024-10-29 RX ORDER — BETAMETHASONE DIPROPIONATE 0.5 MG/G
CREAM TOPICAL 2 TIMES DAILY
Status: DISCONTINUED | OUTPATIENT
Start: 2024-10-29 | End: 2024-10-30 | Stop reason: HOSPADM

## 2024-10-29 RX ORDER — BETAMETHASONE DIPROPIONATE 0.5 MG/G
CREAM TOPICAL ONCE
OUTPATIENT
Start: 2024-10-29 | End: 2024-10-29

## 2024-10-29 RX ADMIN — TRIAMCINOLONE ACETONIDE: 1 CREAM TOPICAL at 09:07

## 2024-10-29 NOTE — PATIENT INSTRUCTIONS
Visit Discharge/Physician Orders:  - will order velcro compression today  - okay to shower, just clean with saline after and apply new dressing  - Grafix graft - #1 applied 7/9/24, graft #2 applied 7/16/24, graft #3 applied 7/30/24, graft #4 applied 8/27/24, graft #5 applied 9/17/24, graft #6 applied 9/24/24, graft #7 applied 10/01/2024 (no Grafix beyond 10/1/24)  - Continue to elevate for swelling.    Wound Location: Right lower leg    Dressing orders:     1) Gather wound care supplies and arrange on clean table.     2) Wash your hands with soap and water or use alcohol based hand  for 20 seconds (sing \"Happy Birthday\" twice).    3) Right lower leg- Apply collagen powder to wound, cover with alginate, then wrap with unna boot layer, roll gauze, coban from base of toes to 1-2 inches below the bend of the knee.  Can take off at end of week then convert to velcro compression. Once converted change dressing 3 times weekly.    Keep all dressings clean & dry.    Follow up visit: 2 weeks - November 12th at 8:00 AM     Supplies:     Keep next scheduled appointment. Please give 24 hour notice if unable to keep appointment. [phone]    If you experience any of the following, please call the Wound Care Service during business hours: Monday through Friday 8:00 am - 4:30 pm  ([phone]).   *Increase in pain   *Temperature over 101   *Increase in drainage from your wound or a foul odor   *Uncontrolled swelling   *Need for compression bandage changes due to slippage, breakthrough drainage    If you need medical attention outside of business hours, please contact your Primary Care Doctor or go to the nearest emergency room.

## 2024-10-29 NOTE — PROGRESS NOTES
Unna Boot Application   Below Knee    NAME:  Damien Aguilar  YOB: 1960  MEDICAL RECORD NUMBER:  [id number]  DATE:  10/29/2024    Unna boot: Applied moisturizing agent to dry skin as needed.   Appied primary and secondary dressing as ordered.  Applied Unna roll from toes to knee overlapping each time.   Applied ace wrap or coban from toes to below the knee.   Secured with tape and/or metal clips covered with tape.   Instructed patient/caregiver to keep dressing dry and intact. DO NOT REMOVE DRESSING.   Instructed pt/family/caregiver to report excessive draining, loose bandage, wet dressing, severe pain or tingling in toes.  Applied Unna Boot dressing below the knee to right lower leg.    Unna Boot(s) were applied per  Guidelines.     Electronically signed by Geni Moreland RN on 10/29/2024 at 10:18 AM      
    Wound Care Supplies      Supply Company:     [employer] [address] [place] [address] f: [phone] f: [phone] p: [phone] [email or web address]      Ordering Center:   [facility]  [address] [address]  [phone]  WOUND CARE Dept: [phone]   FAX NUMBER [phone]    Patient Information:      Rodriguez Johnston  [address] [facility] [address]  [facility] [address]   [phone]   : 1960  AGE: 63 y.o.     GENDER: male   EPISODE DATE: 10/29/2024    Insurance:      PRIMARY INSURANCE:  Plan: MEDICARE PART A AND B  Coverage: MEDICARE  Effective Date: 2023  Group Number: [unfilled]  Subscriber Number: [id number] - (Medicare)    Payer/Plan Subscr  Sex Relation Sub. Ins. ID Effective Group Num   1. BCBS - BCBS -* ADRIANA JOHNSTONEN P 1960 Male Self [id number]                                   [address]   2. MEDICARE - [place]* RODRIGUEZ JOHNSTON P 1960 Male Self [id number]                                    [address]        Patient Wound Information:      Problem List Items Addressed This Visit          Circulatory    Chronic venous stasis dermatitis of right lower extremity - Primary    Relevant Medications    augmented betamethasone dipropionate (DIPROLENE-AF) 0.05 % cream    Other Relevant Orders    Initiate Outpatient Wound Care Protocol       Other    Wound of right leg, subsequent encounter    Relevant Orders    Initiate Outpatient Wound Care Protocol       WOUNDS REQUIRING DRESSING SUPPLIES:     Wound 24 Leg Right (Active)   Wound Image   10/29/24 0822   Wound Etiology Non-Healing Surgical 10/29/24 0822   Dressing Status Intact;Old drainage noted;New dressing applied 10/29/24 0822   Wound Cleansed Cleansed with saline 10/29/24 0822   Dressing/Treatment Other (comment);Collagen;Alginate 10/29/24 0822   Offloading for Diabetic Foot Ulcers Offloading not required 10/29/24 0822   Wound Length (cm) 3.3 
lower leg     Provider Information:    PROVIDER'S NAME: Ja Rivas DPM    NPI: [id number]   
1-2 inches below the bend of the knee.  Can take off at end of week then convert to velcro compression. Once converted change dressing 3 times weekly.    Keep all dressings clean & dry.    Follow up visit: 2 weeks - November 12th at 8:00 AM     Supplies:     Keep next scheduled appointment. Please give 24 hour notice if unable to keep appointment. [phone]    If you experience any of the following, please call the Wound Care Service during business hours: Monday through Friday 8:00 am - 4:30 pm  ([phone]).   *Increase in pain   *Temperature over 101   *Increase in drainage from your wound or a foul odor   *Uncontrolled swelling   *Need for compression bandage changes due to slippage, breakthrough drainage    If you need medical attention outside of business hours, please contact your Primary Care Doctor or go to the nearest emergency room.      Electronically signed by Ja Rivas DPM, FACFAS on 10/29/2024 at 9:22 AM

## 2024-11-12 ENCOUNTER — HOSPITAL ENCOUNTER (OUTPATIENT)
Dept: WOUND CARE | Age: 64
Discharge: HOME | End: 2024-11-12
Attending: PODIATRIST
Payer: MEDICARE

## 2024-11-12 VITALS
OXYGEN SATURATION: 95 % | HEART RATE: 73 BPM | RESPIRATION RATE: 18 BRPM | TEMPERATURE: 98.5 F | DIASTOLIC BLOOD PRESSURE: 99 MMHG | SYSTOLIC BLOOD PRESSURE: 151 MMHG

## 2024-11-12 DIAGNOSIS — S81.801D WOUND OF RIGHT LEG, SUBSEQUENT ENCOUNTER: Primary | ICD-10-CM

## 2024-11-12 DIAGNOSIS — I87.2 CHRONIC VENOUS STASIS DERMATITIS OF RIGHT LOWER EXTREMITY: ICD-10-CM

## 2024-11-12 DIAGNOSIS — T86.821 FAILURE OF FLAP GRAFT: ICD-10-CM

## 2024-11-12 PROCEDURE — 97597 DBRDMT OPN WND 1ST 20 CM/<: CPT

## 2024-11-12 RX ORDER — TRIAMCINOLONE ACETONIDE 1 MG/G
OINTMENT TOPICAL ONCE
OUTPATIENT
Start: 2024-11-12 | End: 2024-11-12

## 2024-11-12 RX ORDER — SODIUM CHLOR/HYPOCHLOROUS ACID 0.033 %
SOLUTION, IRRIGATION IRRIGATION ONCE
OUTPATIENT
Start: 2024-11-12 | End: 2024-11-12

## 2024-11-12 RX ORDER — LIDOCAINE 40 MG/G
CREAM TOPICAL ONCE
OUTPATIENT
Start: 2024-11-12 | End: 2024-11-12

## 2024-11-12 RX ORDER — SILVER SULFADIAZINE 10 MG/G
CREAM TOPICAL ONCE
OUTPATIENT
Start: 2024-11-12 | End: 2024-11-12

## 2024-11-12 RX ORDER — LIDOCAINE HYDROCHLORIDE 40 MG/ML
SOLUTION TOPICAL ONCE
OUTPATIENT
Start: 2024-11-12 | End: 2024-11-12

## 2024-11-12 RX ORDER — LIDOCAINE 50 MG/G
OINTMENT TOPICAL ONCE
OUTPATIENT
Start: 2024-11-12 | End: 2024-11-12

## 2024-11-12 RX ORDER — GINSENG 100 MG
CAPSULE ORAL ONCE
OUTPATIENT
Start: 2024-11-12 | End: 2024-11-12

## 2024-11-12 RX ORDER — MUPIROCIN 20 MG/G
OINTMENT TOPICAL ONCE
OUTPATIENT
Start: 2024-11-12 | End: 2024-11-12

## 2024-11-12 RX ORDER — LIDOCAINE HYDROCHLORIDE 20 MG/ML
JELLY TOPICAL ONCE
OUTPATIENT
Start: 2024-11-12 | End: 2024-11-12

## 2024-11-12 RX ORDER — GENTAMICIN SULFATE 1 MG/G
OINTMENT TOPICAL ONCE
OUTPATIENT
Start: 2024-11-12 | End: 2024-11-12

## 2024-11-12 RX ORDER — BETAMETHASONE DIPROPIONATE 0.5 MG/G
CREAM TOPICAL ONCE
OUTPATIENT
Start: 2024-11-12 | End: 2024-11-12

## 2024-11-12 RX ORDER — CLOBETASOL PROPIONATE 0.5 MG/G
OINTMENT TOPICAL ONCE
OUTPATIENT
Start: 2024-11-12 | End: 2024-11-12

## 2024-11-12 RX ORDER — BACITRACIN ZINC AND POLYMYXIN B SULFATE 500; 1000 [USP'U]/G; [USP'U]/G
OINTMENT TOPICAL ONCE
OUTPATIENT
Start: 2024-11-12 | End: 2024-11-12

## 2024-11-12 RX ORDER — NEOMYCIN/BACITRACIN/POLYMYXINB 3.5-400-5K
OINTMENT (GRAM) TOPICAL ONCE
OUTPATIENT
Start: 2024-11-12 | End: 2024-11-12

## 2024-11-12 NOTE — PROGRESS NOTES
Teaching Note:    I was present with the resident during the visit.  I discussed the case with the resident and agree with the findings and the plan as documented in the residents note.    Ja Rivas DPM, FACFAS  Podiatric Medicine & Surgery       [facility]

## 2024-11-12 NOTE — PROGRESS NOTES
[facility]          Progress Note and Procedure Note      Damien Aguilar  MEDICAL RECORD NUMBER:  [id number]  AGE: 63 y.o.   GENDER: male  : 1960  EPISODE DATE:  2024    Subjective:     Presents for follow-up of the wound to his right leg status post medial Jorge L soleus muscle flap and now patient is having local wound care in order to completely resolved and epithelialized the wound that has been created.     HISTORY of PRESENT ILLNESS HPI     Damien Aguilar is a 63 y.o. male Established patient referred by TIJERINA, who presents today for wound/ulcer evaluation.   History of Wound Context: Patient has been doing really well however he has been on his feet a lot more and there has been increased swelling of the wound it does not hurt or cause any problems and he continues to treat with compression socks however there is still seems to be quite a bit of swelling.  Wound/Ulcer Pain Timing/Severity: mild  Quality of pain: dull  Severity:  0 / 10   Modifying Factors: None  Associated Signs/Symptoms: edema and drainage    Interval History:   Patient presents today for follow up on wound/ulcer's progression. Denies any systemic symptoms. Had Unna boot last visit and was removed this past Friday. Has been using collagen and dry gauze since then. Denies any new or significant issues to the right lower extremity.          PAST MEDICAL HISTORY        Diagnosis Date    Morbid obesity     Osteoarthritis     Snoring     possible apnea - per wife, better since lost 20# recently.  BMI 41.97, neck circ 17.5 cm, no daytime somnolence, no am headaches.       PAST SURGICAL HISTORY    Past Surgical History:   Procedure Laterality Date    HERNIA REPAIR      left inguinal    JOINT REPLACEMENT Left 2020    left hip    OTHER SURGICAL HISTORY      right leg vein stripping    PRESSURE ULCER DEBRIDEMENT Right 2024    Right Leg Excision and Debridement of Wound, Right Medial Soleus

## 2024-11-12 NOTE — PATIENT INSTRUCTIONS
Visit Discharge/Physician Orders:  - okay to shower, just clean with saline after and apply new dressing  - Grafix graft - #1 applied 7/9/24, graft #2 applied 7/16/24, graft #3 applied 7/30/24, graft #4 applied 8/27/24, graft #5 applied 9/17/24, graft #6 applied 9/24/24, graft #7 applied 10/01/2024 (no Grafix beyond 10/1/24)  - Continue to elevate for swelling.  -debridement done today   -blood work ordered today, have done before next appointment. Have faxed to [phone]  -discussed PRP and surgery today       Wound Location: Right lower leg    Dressing orders:     1) Gather wound care supplies and arrange on clean table.     2) Wash your hands with soap and water or use alcohol based hand  for 20 seconds (sing \"Happy Birthday\" twice).    3) Right lower leg- Apply collagen powder to wound, moisten with saline, cover with dry dressing,  change dressing 3 times weekly.    Keep all dressings clean & dry.    Follow up visit: 2 weeks - November 26th at 8:00 am     Supplies:     Keep next scheduled appointment. Please give 24 hour notice if unable to keep appointment. [phone]    If you experience any of the following, please call the Wound Care Service during business hours: Monday through Friday 8:00 am - 4:30 pm  ([phone]).   *Increase in pain   *Temperature over 101   *Increase in drainage from your wound or a foul odor   *Uncontrolled swelling   *Need for compression bandage changes due to slippage, breakthrough drainage    If you need medical attention outside of business hours, please contact your Primary Care Doctor or go to the nearest emergency room.

## 2024-11-14 ENCOUNTER — LAB (OUTPATIENT)
Dept: FAMILY MEDICINE CLINIC | Age: 64
End: 2024-11-14
Payer: MEDICARE

## 2024-11-14 DIAGNOSIS — S81.801D WOUND OF RIGHT LEG, SUBSEQUENT ENCOUNTER: ICD-10-CM

## 2024-11-14 DIAGNOSIS — T86.821 FAILURE OF FLAP GRAFT: ICD-10-CM

## 2024-11-14 LAB
ANION GAP SERPL CALC-SCNC: 13 MEQ/L (ref 8–16)
BASOPHILS ABSOLUTE: 0 THOU/MM3 (ref 0–0.1)
BASOPHILS NFR BLD AUTO: 0.7 %
BUN SERPL-MCNC: 14 MG/DL (ref 7–22)
CALCIUM SERPL-MCNC: 9.2 MG/DL (ref 8.5–10.5)
CHLORIDE SERPL-SCNC: 103 MEQ/L (ref 98–111)
CO2 SERPL-SCNC: 27 MEQ/L (ref 23–33)
CREAT SERPL-MCNC: 0.9 MG/DL (ref 0.4–1.2)
DEPRECATED RDW RBC AUTO: 46.3 FL (ref 35–45)
EOSINOPHIL NFR BLD AUTO: 1.4 %
EOSINOPHILS ABSOLUTE: 0.1 THOU/MM3 (ref 0–0.4)
ERYTHROCYTE [DISTWIDTH] IN BLOOD BY AUTOMATED COUNT: 13.5 % (ref 11.5–14.5)
GFR SERPL CREATININE-BSD FRML MDRD: > 90 ML/MIN/1.73M2
GLUCOSE SERPL-MCNC: 103 MG/DL (ref 70–108)
HCT VFR BLD AUTO: 42.1 % (ref 42–52)
HGB BLD-MCNC: 13.9 GM/DL (ref 14–18)
IMM GRANULOCYTES # BLD AUTO: 0.01 THOU/MM3 (ref 0–0.07)
IMM GRANULOCYTES NFR BLD AUTO: 0.2 %
LYMPHOCYTES ABSOLUTE: 0.7 THOU/MM3 (ref 1–4.8)
LYMPHOCYTES NFR BLD AUTO: 17.1 %
MCH RBC QN AUTO: 31 PG (ref 26–33)
MCHC RBC AUTO-ENTMCNC: 33 GM/DL (ref 32.2–35.5)
MCV RBC AUTO: 94 FL (ref 80–94)
MONOCYTES ABSOLUTE: 0.4 THOU/MM3 (ref 0.4–1.3)
MONOCYTES NFR BLD AUTO: 8.7 %
NEUTROPHILS ABSOLUTE: 3.1 THOU/MM3 (ref 1.8–7.7)
NEUTROPHILS NFR BLD AUTO: 71.9 %
NRBC BLD AUTO-RTO: 0 /100 WBC
PLATELET # BLD AUTO: 273 THOU/MM3 (ref 130–400)
PMV BLD AUTO: 10.6 FL (ref 9.4–12.4)
POTASSIUM SERPL-SCNC: 3.9 MEQ/L (ref 3.5–5.2)
RBC # BLD AUTO: 4.48 MILL/MM3 (ref 4.7–6.1)
SODIUM SERPL-SCNC: 143 MEQ/L (ref 135–145)
WBC # BLD AUTO: 4.3 THOU/MM3 (ref 4.8–10.8)

## 2024-11-14 PROCEDURE — 36415 COLL VENOUS BLD VENIPUNCTURE: CPT | Performed by: NURSE PRACTITIONER

## 2024-11-14 NOTE — PROGRESS NOTES
Blood work drawn today in the office, venous puncture, left arm, pt tolerated well. Drawn per orders of Dr Rivas.

## 2024-11-15 LAB
25(OH)D3 SERPL-MCNC: 41 NG/ML (ref 30–100)
PREALB SERPL-MCNC: 23.6 MG/DL (ref 20–40)

## 2024-11-26 ENCOUNTER — HOSPITAL ENCOUNTER (OUTPATIENT)
Dept: WOUND CARE | Age: 64
Discharge: HOME | End: 2024-11-26
Attending: PODIATRIST
Payer: COMMERCIAL

## 2024-11-26 VITALS
DIASTOLIC BLOOD PRESSURE: 83 MMHG | TEMPERATURE: 98.8 F | OXYGEN SATURATION: 96 % | SYSTOLIC BLOOD PRESSURE: 156 MMHG | HEART RATE: 65 BPM | RESPIRATION RATE: 18 BRPM

## 2024-11-26 DIAGNOSIS — S81.801D WOUND OF RIGHT LEG, SUBSEQUENT ENCOUNTER: ICD-10-CM

## 2024-11-26 DIAGNOSIS — I87.2 CHRONIC VENOUS STASIS DERMATITIS OF RIGHT LOWER EXTREMITY: Primary | ICD-10-CM

## 2024-11-26 PROCEDURE — 97597 DBRDMT OPN WND 1ST 20 CM/<: CPT

## 2024-11-26 PROCEDURE — 87205 SMEAR GRAM STAIN: CPT

## 2024-11-26 PROCEDURE — 87186 SC STD MICRODIL/AGAR DIL: CPT

## 2024-11-26 PROCEDURE — 87077 CULTURE AEROBIC IDENTIFY: CPT

## 2024-11-26 PROCEDURE — 29580 STRAPPING UNNA BOOT: CPT

## 2024-11-26 PROCEDURE — 87070 CULTURE OTHR SPECIMN AEROBIC: CPT

## 2024-11-26 PROCEDURE — 87147 CULTURE TYPE IMMUNOLOGIC: CPT

## 2024-11-26 RX ORDER — CLOBETASOL PROPIONATE 0.5 MG/G
OINTMENT TOPICAL ONCE
OUTPATIENT
Start: 2024-11-26 | End: 2024-11-26

## 2024-11-26 RX ORDER — LIDOCAINE 40 MG/G
CREAM TOPICAL ONCE
OUTPATIENT
Start: 2024-11-26 | End: 2024-11-26

## 2024-11-26 RX ORDER — LIDOCAINE HYDROCHLORIDE 20 MG/ML
JELLY TOPICAL ONCE
OUTPATIENT
Start: 2024-11-26 | End: 2024-11-26

## 2024-11-26 RX ORDER — NEOMYCIN/BACITRACIN/POLYMYXINB 3.5-400-5K
OINTMENT (GRAM) TOPICAL ONCE
OUTPATIENT
Start: 2024-11-26 | End: 2024-11-26

## 2024-11-26 RX ORDER — TRIAMCINOLONE ACETONIDE 1 MG/G
OINTMENT TOPICAL ONCE
OUTPATIENT
Start: 2024-11-26 | End: 2024-11-26

## 2024-11-26 RX ORDER — TRIAMCINOLONE ACETONIDE 1 MG/G
OINTMENT TOPICAL ONCE
Status: COMPLETED | OUTPATIENT
Start: 2024-11-26 | End: 2024-11-26

## 2024-11-26 RX ORDER — SILVER SULFADIAZINE 10 MG/G
CREAM TOPICAL ONCE
OUTPATIENT
Start: 2024-11-26 | End: 2024-11-26

## 2024-11-26 RX ORDER — SODIUM CHLOR/HYPOCHLOROUS ACID 0.033 %
SOLUTION, IRRIGATION IRRIGATION ONCE
OUTPATIENT
Start: 2024-11-26 | End: 2024-11-26

## 2024-11-26 RX ORDER — GINSENG 100 MG
CAPSULE ORAL ONCE
OUTPATIENT
Start: 2024-11-26 | End: 2024-11-26

## 2024-11-26 RX ORDER — MUPIROCIN 20 MG/G
OINTMENT TOPICAL ONCE
OUTPATIENT
Start: 2024-11-26 | End: 2024-11-26

## 2024-11-26 RX ORDER — BACITRACIN ZINC AND POLYMYXIN B SULFATE 500; 1000 [USP'U]/G; [USP'U]/G
OINTMENT TOPICAL ONCE
OUTPATIENT
Start: 2024-11-26 | End: 2024-11-26

## 2024-11-26 RX ORDER — SULFAMETHOXAZOLE AND TRIMETHOPRIM 800; 160 MG/1; MG/1
1 TABLET ORAL 2 TIMES DAILY
Qty: 28 TABLET | Refills: 0 | Status: SHIPPED | OUTPATIENT
Start: 2024-11-26 | End: 2024-12-10

## 2024-11-26 RX ORDER — GENTAMICIN SULFATE 1 MG/G
OINTMENT TOPICAL ONCE
OUTPATIENT
Start: 2024-11-26 | End: 2024-11-26

## 2024-11-26 RX ORDER — LIDOCAINE 50 MG/G
OINTMENT TOPICAL ONCE
OUTPATIENT
Start: 2024-11-26 | End: 2024-11-26

## 2024-11-26 RX ORDER — BETAMETHASONE DIPROPIONATE 0.5 MG/G
CREAM TOPICAL ONCE
OUTPATIENT
Start: 2024-11-26 | End: 2024-11-26

## 2024-11-26 RX ORDER — LIDOCAINE HYDROCHLORIDE 40 MG/ML
SOLUTION TOPICAL ONCE
OUTPATIENT
Start: 2024-11-26 | End: 2024-11-26

## 2024-11-26 RX ADMIN — TRIAMCINOLONE ACETONIDE: 1 OINTMENT TOPICAL at 08:38

## 2024-11-26 NOTE — PATIENT INSTRUCTIONS
Visit Discharge/Physician Orders:  - okay to shower but cover with 3M waterproof bandage, just clean with saline after and apply new dressing after. No showering with unna boot on.   - Grafix graft - #1 applied 7/9/24, graft #2 applied 7/16/24, graft #3 applied 7/30/24, graft #4 applied 8/27/24, graft #5 applied 9/17/24, graft #6 applied 9/24/24, graft #7 applied 10/01/2024 (no Grafix beyond 10/1/24)  - Continue to elevate for swelling.  -debridement done today   -discussed PRP and surgery today   -increase protein intake    Wound Location: Right lower leg    Dressing orders:     1) Gather wound care supplies and arrange on clean table.     2) Wash your hands with soap and water or use alcohol based hand  for 20 seconds (sing \"Happy Birthday\" twice).    3) Right lower leg- Collagen powder and unna boot applied today. Leave unna boot on until Thursday or Friday than take off and go back to -Apply collagen powder to wound, moisten with saline, cover with dry dressing,  change dressing 3 times weekly.     If unna boot becomes too tight- raise/elevate legs above the level of the heart for 15-20 minutes if swelling does not go down then carefully cut off unna boot and call clinic or go to local ER or family physician. If unna boot becomes wet or starts to roll down then carefully remove unna boot and call clinic.       Keep all dressings clean & dry.    Follow up visit: 2 weeks - Tuesday December 10th at 8:00 am     Supplies:     Keep next scheduled appointment. Please give 24 hour notice if unable to keep appointment. [phone]    If you experience any of the following, please call the Wound Care Service during business hours: Monday through Friday 8:00 am - 4:30 pm  ([phone]).   *Increase in pain   *Temperature over 101   *Increase in drainage from your wound or a foul odor   *Uncontrolled swelling   *Need for compression bandage changes due to slippage, breakthrough drainage    If you need medical

## 2024-11-26 NOTE — PROGRESS NOTES
[facility]          Progress Note and Procedure Note      Damien Aguilar  MEDICAL RECORD NUMBER:  [id number]  AGE: 64 y.o.   GENDER: male  : 1960  EPISODE DATE:  2024    Subjective:     Presents for follow-up of the wound to his right leg status post medial Jorge L soleus muscle flap and now patient is having local wound care in order to completely resolved and epithelialized the wound that has been created.     HISTORY of PRESENT ILLNESS HPI     Damien Aguilar is a 64 y.o. male Established patient referred by TIJERINA, who presents today for wound/ulcer evaluation.   History of Wound Context: Patient has been doing collagen and a compression wrap he presents with a lot of swelling after being on his foot for a while already this morning the patient is not have any any systemic signs or symptoms he is doing really well using the collagen but he is getting his leg wet in the shower.  Wound/Ulcer Pain Timing/Severity: mild  Quality of pain: dull  Severity:  0 / 10   Modifying Factors: None  Associated Signs/Symptoms: edema and drainage    Interval History:   Patient presents today for follow up on wound/ulcer's progression. Denies any systemic symptoms. Had Unna boot last visit and was removed this past Friday. Has been using collagen and dry gauze since then. Denies any new or significant issues to the right lower extremity.          PAST MEDICAL HISTORY        Diagnosis Date    Morbid obesity     Osteoarthritis     Snoring     possible apnea - per wife, better since lost 20# recently.  BMI 41.97, neck circ 17.5 cm, no daytime somnolence, no am headaches.       PAST SURGICAL HISTORY    Past Surgical History:   Procedure Laterality Date    HERNIA REPAIR      left inguinal    JOINT REPLACEMENT Left 2020    left hip    OTHER SURGICAL HISTORY      right leg vein stripping    PRESSURE ULCER DEBRIDEMENT Right 2024    Right Leg Excision and Debridement of Wound, Right

## 2024-11-26 NOTE — PROGRESS NOTES
Unna Boot Application   Below Knee    NAME:  Damien Aguilar  YOB: 1960  MEDICAL RECORD NUMBER:  [id number]  DATE:  11/26/2024    Unna boot: Applied moisturizing agent to dry skin as needed.   Appied primary and secondary dressing as ordered.  Applied Unna roll from toes to knee overlapping each time.   Applied ace wrap or coban from toes to below the knee.   Secured with tape and/or metal clips covered with tape.   Instructed patient/caregiver to keep dressing dry and intact. DO NOT REMOVE DRESSING.   Instructed pt/family/caregiver to report excessive draining, loose bandage, wet dressing, severe pain or tingling in toes.  Applied Unna Boot dressing below the knee to right lower leg.    Unna Boot(s) were applied per  Guidelines.     Electronically signed by Lalita Lindsay RN on 11/26/2024 at 12:19 PM

## 2024-11-29 LAB
BACTERIA SPEC AEROBE CULT: ABNORMAL
BACTERIA SPEC AEROBE CULT: ABNORMAL
GRAM STN SPEC: ABNORMAL
ORGANISM: ABNORMAL
ORGANISM: ABNORMAL

## 2024-12-04 ENCOUNTER — TELEPHONE (OUTPATIENT)
Dept: WOUND CARE | Age: 64
End: 2024-12-04

## 2024-12-04 NOTE — TELEPHONE ENCOUNTER
Attempted to call patient regarding change in antibiotic. Dr Galvan sent in new script for final wound culture results. Patient to stop taking Bactrim as bacteria is resistant. No answer. Message left. Patient to return call with questions.

## 2024-12-10 ENCOUNTER — HOSPITAL ENCOUNTER (OUTPATIENT)
Dept: WOUND CARE | Age: 64
Discharge: HOME | End: 2024-12-10
Attending: PODIATRIST
Payer: COMMERCIAL

## 2024-12-10 VITALS
TEMPERATURE: 98.6 F | SYSTOLIC BLOOD PRESSURE: 133 MMHG | DIASTOLIC BLOOD PRESSURE: 63 MMHG | HEART RATE: 69 BPM | RESPIRATION RATE: 18 BRPM | OXYGEN SATURATION: 95 %

## 2024-12-10 DIAGNOSIS — S81.801D WOUND OF RIGHT LEG, SUBSEQUENT ENCOUNTER: ICD-10-CM

## 2024-12-10 DIAGNOSIS — I87.2 CHRONIC VENOUS STASIS DERMATITIS OF RIGHT LOWER EXTREMITY: Primary | ICD-10-CM

## 2024-12-10 PROCEDURE — 29580 STRAPPING UNNA BOOT: CPT

## 2024-12-10 PROCEDURE — 97597 DBRDMT OPN WND 1ST 20 CM/<: CPT

## 2024-12-10 RX ORDER — LIDOCAINE HYDROCHLORIDE 20 MG/ML
JELLY TOPICAL ONCE
OUTPATIENT
Start: 2024-12-10 | End: 2024-12-10

## 2024-12-10 RX ORDER — GENTAMICIN SULFATE 1 MG/G
OINTMENT TOPICAL ONCE
OUTPATIENT
Start: 2024-12-10 | End: 2024-12-10

## 2024-12-10 RX ORDER — SODIUM CHLOR/HYPOCHLOROUS ACID 0.033 %
SOLUTION, IRRIGATION IRRIGATION ONCE
OUTPATIENT
Start: 2024-12-10 | End: 2024-12-10

## 2024-12-10 RX ORDER — MUPIROCIN 20 MG/G
OINTMENT TOPICAL ONCE
OUTPATIENT
Start: 2024-12-10 | End: 2024-12-10

## 2024-12-10 RX ORDER — LIDOCAINE 40 MG/G
CREAM TOPICAL ONCE
OUTPATIENT
Start: 2024-12-10 | End: 2024-12-10

## 2024-12-10 RX ORDER — TRIAMCINOLONE ACETONIDE 1 MG/G
OINTMENT TOPICAL ONCE
OUTPATIENT
Start: 2024-12-10 | End: 2024-12-10

## 2024-12-10 RX ORDER — CLOBETASOL PROPIONATE 0.5 MG/G
OINTMENT TOPICAL ONCE
OUTPATIENT
Start: 2024-12-10 | End: 2024-12-10

## 2024-12-10 RX ORDER — BACITRACIN ZINC AND POLYMYXIN B SULFATE 500; 1000 [USP'U]/G; [USP'U]/G
OINTMENT TOPICAL ONCE
OUTPATIENT
Start: 2024-12-10 | End: 2024-12-10

## 2024-12-10 RX ORDER — LIDOCAINE HYDROCHLORIDE 40 MG/ML
SOLUTION TOPICAL ONCE
OUTPATIENT
Start: 2024-12-10 | End: 2024-12-10

## 2024-12-10 RX ORDER — LIDOCAINE 50 MG/G
OINTMENT TOPICAL ONCE
OUTPATIENT
Start: 2024-12-10 | End: 2024-12-10

## 2024-12-10 RX ORDER — SILVER SULFADIAZINE 10 MG/G
CREAM TOPICAL ONCE
OUTPATIENT
Start: 2024-12-10 | End: 2024-12-10

## 2024-12-10 RX ORDER — BETAMETHASONE DIPROPIONATE 0.5 MG/G
CREAM TOPICAL ONCE
OUTPATIENT
Start: 2024-12-10 | End: 2024-12-10

## 2024-12-10 RX ORDER — NEOMYCIN/BACITRACIN/POLYMYXINB 3.5-400-5K
OINTMENT (GRAM) TOPICAL ONCE
OUTPATIENT
Start: 2024-12-10 | End: 2024-12-10

## 2024-12-10 RX ORDER — CEPHALEXIN 500 MG/1
500 CAPSULE ORAL 4 TIMES DAILY
Qty: 28 CAPSULE | Refills: 0 | Status: SHIPPED | OUTPATIENT
Start: 2024-12-10 | End: 2024-12-24

## 2024-12-10 RX ORDER — GINSENG 100 MG
CAPSULE ORAL ONCE
OUTPATIENT
Start: 2024-12-10 | End: 2024-12-10

## 2024-12-10 NOTE — PROGRESS NOTES
Unna Boot Application   Below Knee    NAME:  Damien Aguilar  YOB: 1960  MEDICAL RECORD NUMBER:  [id number]  DATE:  12/10/2024    Unna boot: Applied moisturizing agent to dry skin as needed.   Appied primary and secondary dressing as ordered.  Applied Unna roll from toes to knee overlapping each time.   Applied ace wrap or coban from toes to below the knee.   Secured with tape and/or metal clips covered with tape.   Instructed patient/caregiver to keep dressing dry and intact. DO NOT REMOVE DRESSING.   Instructed pt/family/caregiver to report excessive draining, loose bandage, wet dressing, severe pain or tingling in toes.  Applied Unna Boot dressing below the knee to right lower leg.    Unna Boot(s) were applied per  Guidelines.     Electronically signed by Lalita Lindsay RN on 12/10/2024 at 11:52 AM      
elevate for swelling.  -debridement done today   -discussed PRP and surgery  -increase protein intake  -continue antibiotic at this time     Wound Location: Right lower leg    Dressing orders:     1) Gather wound care supplies and arrange on clean table.     2) Wash your hands with soap and water or use alcohol based hand  for 20 seconds (sing \"Happy Birthday\" twice).    3) Right lower leg- Collagen powder and unna boot applied today. Leave unna boot on until next week.      If unna boot becomes too tight- raise/elevate legs above the level of the heart for 15-20 minutes if swelling does not go down then carefully cut off unna boot and call clinic or go to local ER or family physician. If unna boot becomes wet or starts to roll down then carefully remove unna boot and call clinic.       Keep all dressings clean & dry.    Follow up visit: 1 week - Tuesday December 17th at 2:15 pm      Supplies:     Keep next scheduled appointment. Please give 24 hour notice if unable to keep appointment. [phone]    If you experience any of the following, please call the Wound Care Service during business hours: Monday through Friday 8:00 am - 4:30 pm  ([phone]).   *Increase in pain   *Temperature over 101   *Increase in drainage from your wound or a foul odor   *Uncontrolled swelling   *Need for compression bandage changes due to slippage, breakthrough drainage    If you need medical attention outside of business hours, please contact your Primary Care Doctor or go to the nearest emergency room.      Electronically signed by Ja Rivas DPM, FAROOQ on 12/10/2024 at 9:36 AM

## 2024-12-10 NOTE — PATIENT INSTRUCTIONS
Visit Discharge/Physician Orders:  - okay to shower but cover with 3M waterproof bandage, just clean with saline after and apply new dressing after. No showering with unna boot on.   - Grafix graft - #1 applied 7/9/24, graft #2 applied 7/16/24, graft #3 applied 7/30/24, graft #4 applied 8/27/24, graft #5 applied 9/17/24, graft #6 applied 9/24/24, graft #7 applied 10/01/2024 (no Grafix beyond 10/1/24)  - Continue to elevate for swelling.  -debridement done today   -discussed PRP and surgery  -increase protein intake  -continue antibiotic at this time     Wound Location: Right lower leg    Dressing orders:     1) Gather wound care supplies and arrange on clean table.     2) Wash your hands with soap and water or use alcohol based hand  for 20 seconds (sing \"Happy Birthday\" twice).    3) Right lower leg- Collagen powder and unna boot applied today. Leave unna boot on until next week.      If unna boot becomes too tight- raise/elevate legs above the level of the heart for 15-20 minutes if swelling does not go down then carefully cut off unna boot and call clinic or go to local ER or family physician. If unna boot becomes wet or starts to roll down then carefully remove unna boot and call clinic.       Keep all dressings clean & dry.    Follow up visit: 1 week - Tuesday December 17th at      Supplies:     Keep next scheduled appointment. Please give 24 hour notice if unable to keep appointment. [phone]    If you experience any of the following, please call the Wound Care Service during business hours: Monday through Friday 8:00 am - 4:30 pm  ([phone]).   *Increase in pain   *Temperature over 101   *Increase in drainage from your wound or a foul odor   *Uncontrolled swelling   *Need for compression bandage changes due to slippage, breakthrough drainage    If you need medical attention outside of business hours, please contact your Primary Care Doctor or go to the nearest emergency room.

## 2024-12-12 ENCOUNTER — OFFICE VISIT (OUTPATIENT)
Dept: PULMONOLOGY | Age: 64
End: 2024-12-12
Payer: MEDICARE

## 2024-12-12 VITALS
HEART RATE: 74 BPM | WEIGHT: 278 LBS | TEMPERATURE: 99 F | OXYGEN SATURATION: 98 % | DIASTOLIC BLOOD PRESSURE: 72 MMHG | BODY MASS INDEX: 44.68 KG/M2 | HEIGHT: 66 IN | SYSTOLIC BLOOD PRESSURE: 134 MMHG

## 2024-12-12 DIAGNOSIS — E66.01 OBESITY, MORBID, BMI 40.0-49.9: ICD-10-CM

## 2024-12-12 DIAGNOSIS — G47.33 OSA (OBSTRUCTIVE SLEEP APNEA): Primary | ICD-10-CM

## 2024-12-12 PROCEDURE — 3017F COLORECTAL CA SCREEN DOC REV: CPT

## 2024-12-12 PROCEDURE — G8417 CALC BMI ABV UP PARAM F/U: HCPCS

## 2024-12-12 PROCEDURE — 1159F MED LIST DOCD IN RCRD: CPT

## 2024-12-12 PROCEDURE — 3078F DIAST BP <80 MM HG: CPT

## 2024-12-12 PROCEDURE — G8427 DOCREV CUR MEDS BY ELIG CLIN: HCPCS

## 2024-12-12 PROCEDURE — 1036F TOBACCO NON-USER: CPT

## 2024-12-12 PROCEDURE — 3075F SYST BP GE 130 - 139MM HG: CPT

## 2024-12-12 PROCEDURE — 99214 OFFICE O/P EST MOD 30 MIN: CPT

## 2024-12-12 PROCEDURE — 1124F ACP DISCUSS-NO DSCNMKR DOCD: CPT

## 2024-12-12 PROCEDURE — G8484 FLU IMMUNIZE NO ADMIN: HCPCS

## 2024-12-12 ASSESSMENT — ENCOUNTER SYMPTOMS
COUGH: 0
RHINORRHEA: 0
WHEEZING: 0
SHORTNESS OF BREATH: 0
SORE THROAT: 0

## 2024-12-12 NOTE — PROGRESS NOTES
Onset    Cancer Mother     Hypertension Father      CURRENT MEDICATIONS:  Current Outpatient Medications   Medication Sig Dispense Refill    amLODIPine (NORVASC) 10 MG tablet Take 1 tablet by mouth daily 90 tablet 1    atorvastatin (LIPITOR) 20 MG tablet Take 1 tablet by mouth daily 90 tablet 1    furosemide (LASIX) 20 MG tablet Take 1 tablet by mouth 2 times daily 180 tablet 1    metoprolol succinate (TOPROL XL) 50 MG extended release tablet Take 1 tablet by mouth daily 90 tablet 1    potassium chloride (K-TAB) 20 MEQ TBCR extended release tablet Take 1 tablet by mouth daily 90 tablet 1    valsartan (DIOVAN) 160 MG tablet Take 1 tablet by mouth daily 90 tablet 1    VITAMIN D, CHOLECALCIFEROL, PO Take 3 capsules by mouth daily 43463 IU units (takes 3 capsules)      vitamin C (ASCORBIC ACID) 500 MG tablet Take 1 tablet by mouth daily      Misc. Devices (CPAP MACHINE) MISC by Does not apply route      Misc. Devices MISC 1 pair of above the knee compression stockings. Strength of 30 mmHg. 2 each 0    Multiple Vitamins-Minerals (MULTI COMPLETE PO) Take by mouth      cephALEXin (KEFLEX) 500 MG capsule Take 1 capsule by mouth 4 times daily for 14 days (Patient not taking: Reported on 12/12/2024) 28 capsule 0     No current facility-administered medications for this visit.       Review of systems     Review of Systems   Constitutional:  Negative for appetite change, fatigue and fever.   HENT:  Negative for congestion, postnasal drip, rhinorrhea, sneezing and sore throat.    Respiratory:  Negative for cough, shortness of breath and wheezing.    Cardiovascular: Negative.    Allergic/Immunologic: Negative for environmental allergies.          Physical exam     BMI:  Body mass index is 44.87 kg/m².    Wt Readings from Last 3 Encounters:   12/12/24 126.1 kg (278 lb)   09/20/24 122.9 kg (271 lb)   05/21/24 120.9 kg (266 lb 8.6 oz)     Vitals: /72 (Site: Right Upper Arm, Position: Sitting, Cuff Size: Large Adult)   Pulse

## 2024-12-17 ENCOUNTER — HOSPITAL ENCOUNTER (OUTPATIENT)
Dept: WOUND CARE | Age: 64
Discharge: HOME | End: 2024-12-17
Attending: PODIATRIST
Payer: MEDICARE

## 2024-12-17 VITALS
OXYGEN SATURATION: 95 % | TEMPERATURE: 97.8 F | DIASTOLIC BLOOD PRESSURE: 67 MMHG | HEART RATE: 81 BPM | SYSTOLIC BLOOD PRESSURE: 151 MMHG | RESPIRATION RATE: 18 BRPM

## 2024-12-17 DIAGNOSIS — I87.2 CHRONIC VENOUS STASIS DERMATITIS OF RIGHT LOWER EXTREMITY: Primary | ICD-10-CM

## 2024-12-17 DIAGNOSIS — S81.801D WOUND OF RIGHT LEG, SUBSEQUENT ENCOUNTER: ICD-10-CM

## 2024-12-17 PROCEDURE — 29580 STRAPPING UNNA BOOT: CPT

## 2024-12-17 RX ORDER — LIDOCAINE HYDROCHLORIDE 40 MG/ML
SOLUTION TOPICAL ONCE
OUTPATIENT
Start: 2024-12-17 | End: 2024-12-17

## 2024-12-17 RX ORDER — LIDOCAINE HYDROCHLORIDE 20 MG/ML
JELLY TOPICAL ONCE
OUTPATIENT
Start: 2024-12-17 | End: 2024-12-17

## 2024-12-17 RX ORDER — CLOBETASOL PROPIONATE 0.5 MG/G
OINTMENT TOPICAL ONCE
OUTPATIENT
Start: 2024-12-17 | End: 2024-12-17

## 2024-12-17 RX ORDER — BETAMETHASONE DIPROPIONATE 0.5 MG/G
CREAM TOPICAL ONCE
OUTPATIENT
Start: 2024-12-17 | End: 2024-12-17

## 2024-12-17 RX ORDER — GENTAMICIN SULFATE 1 MG/G
OINTMENT TOPICAL ONCE
OUTPATIENT
Start: 2024-12-17 | End: 2024-12-17

## 2024-12-17 RX ORDER — SILVER SULFADIAZINE 10 MG/G
CREAM TOPICAL ONCE
OUTPATIENT
Start: 2024-12-17 | End: 2024-12-17

## 2024-12-17 RX ORDER — GINSENG 100 MG
CAPSULE ORAL ONCE
OUTPATIENT
Start: 2024-12-17 | End: 2024-12-17

## 2024-12-17 RX ORDER — NEOMYCIN/BACITRACIN/POLYMYXINB 3.5-400-5K
OINTMENT (GRAM) TOPICAL ONCE
OUTPATIENT
Start: 2024-12-17 | End: 2024-12-17

## 2024-12-17 RX ORDER — BACITRACIN ZINC AND POLYMYXIN B SULFATE 500; 1000 [USP'U]/G; [USP'U]/G
OINTMENT TOPICAL ONCE
OUTPATIENT
Start: 2024-12-17 | End: 2024-12-17

## 2024-12-17 RX ORDER — SODIUM CHLOR/HYPOCHLOROUS ACID 0.033 %
SOLUTION, IRRIGATION IRRIGATION ONCE
OUTPATIENT
Start: 2024-12-17 | End: 2024-12-17

## 2024-12-17 RX ORDER — LIDOCAINE 40 MG/G
CREAM TOPICAL ONCE
OUTPATIENT
Start: 2024-12-17 | End: 2024-12-17

## 2024-12-17 RX ORDER — MUPIROCIN 20 MG/G
OINTMENT TOPICAL ONCE
OUTPATIENT
Start: 2024-12-17 | End: 2024-12-17

## 2024-12-17 RX ORDER — TRIAMCINOLONE ACETONIDE 1 MG/G
OINTMENT TOPICAL ONCE
OUTPATIENT
Start: 2024-12-17 | End: 2024-12-17

## 2024-12-17 RX ORDER — LIDOCAINE 50 MG/G
OINTMENT TOPICAL ONCE
OUTPATIENT
Start: 2024-12-17 | End: 2024-12-17

## 2024-12-17 NOTE — PLAN OF CARE
Problem: Wound:  Goal: Will show signs of wound healing; wound closure and no evidence of infection  Description: Will show signs of wound healing; wound closure and no evidence of infection  Outcome: Progressing     Patient presents to wound clinic for right leg wound. No s/s of infection noted. See AVS for discharge instructions. Follow up visit: 2 weeks - Tuesday December 31st at 2:45 pm     Care plan reviewed with patient.  Patient verbalize understanding of the plan of care and contribute to goal setting.

## 2024-12-17 NOTE — PATIENT INSTRUCTIONS
Visit Discharge/Physician Orders:  - okay to shower but cover with 3M waterproof bandage, just clean with saline after and apply new dressing after. No showering with unna boot on.   - Grafix graft - #1 applied 7/9/24, graft #2 applied 7/16/24, graft #3 applied 7/30/24, graft #4 applied 8/27/24, graft #5 applied 9/17/24, graft #6 applied 9/24/24, graft #7 applied 10/01/2024 (no Grafix beyond 10/1/24)  - Continue to elevate for swelling.    Wound Location: Right lower leg    Dressing orders:     1) Gather wound care supplies and arrange on clean table.     2) Wash your hands with soap and water or use alcohol based hand  for 20 seconds (sing \"Happy Birthday\" twice).    3) Right lower leg- Collagen powder and unna boot applied today. Leave unna boot on until next week. Remove on December 24th. Then apply collagen powder to wound. Cover with dry dressing. Wear your compression stocking and velcro compression wrap. Change every other day.     If unna boot becomes too tight- raise/elevate legs above the level of the heart for 15-20 minutes if swelling does not go down then carefully cut off unna boot and call clinic or go to local ER or family physician. If unna boot becomes wet or starts to roll down then carefully remove unna boot and call clinic.       Keep all dressings clean & dry.    Follow up visit: 2 weeks - Tuesday December 31st at 2:45 pm     Supplies:     Keep next scheduled appointment. Please give 24 hour notice if unable to keep appointment. [phone]    If you experience any of the following, please call the Wound Care Service during business hours: Monday through Friday 8:00 am - 4:30 pm  ([phone]).   *Increase in pain   *Temperature over 101   *Increase in drainage from your wound or a foul odor   *Uncontrolled swelling   *Need for compression bandage changes due to slippage, breakthrough drainage    If you need medical attention outside of business hours, please contact your Primary

## 2024-12-26 DIAGNOSIS — S81.801D WOUND OF RIGHT LOWER EXTREMITY, SUBSEQUENT ENCOUNTER: ICD-10-CM

## 2024-12-26 RX ORDER — MELOXICAM 7.5 MG/1
7.5 TABLET ORAL DAILY
Qty: 90 TABLET | Refills: 0 | OUTPATIENT
Start: 2024-12-26

## 2024-12-31 ENCOUNTER — HOSPITAL ENCOUNTER (OUTPATIENT)
Dept: WOUND CARE | Age: 64
Discharge: HOME | End: 2024-12-31
Attending: PODIATRIST
Payer: COMMERCIAL

## 2024-12-31 VITALS
DIASTOLIC BLOOD PRESSURE: 75 MMHG | SYSTOLIC BLOOD PRESSURE: 158 MMHG | OXYGEN SATURATION: 95 % | RESPIRATION RATE: 18 BRPM | TEMPERATURE: 98.2 F | HEART RATE: 70 BPM

## 2024-12-31 DIAGNOSIS — I87.2 CHRONIC VENOUS STASIS DERMATITIS OF RIGHT LOWER EXTREMITY: Primary | ICD-10-CM

## 2024-12-31 DIAGNOSIS — S81.801D WOUND OF RIGHT LEG, SUBSEQUENT ENCOUNTER: ICD-10-CM

## 2024-12-31 PROCEDURE — 97597 DBRDMT OPN WND 1ST 20 CM/<: CPT

## 2024-12-31 RX ORDER — GINSENG 100 MG
CAPSULE ORAL ONCE
OUTPATIENT
Start: 2024-12-31 | End: 2024-12-31

## 2024-12-31 RX ORDER — LIDOCAINE 50 MG/G
OINTMENT TOPICAL ONCE
OUTPATIENT
Start: 2024-12-31 | End: 2024-12-31

## 2024-12-31 RX ORDER — NEOMYCIN/BACITRACIN/POLYMYXINB 3.5-400-5K
OINTMENT (GRAM) TOPICAL ONCE
OUTPATIENT
Start: 2024-12-31 | End: 2024-12-31

## 2024-12-31 RX ORDER — CLOBETASOL PROPIONATE 0.5 MG/G
OINTMENT TOPICAL ONCE
OUTPATIENT
Start: 2024-12-31 | End: 2024-12-31

## 2024-12-31 RX ORDER — LIDOCAINE 40 MG/G
CREAM TOPICAL ONCE
OUTPATIENT
Start: 2024-12-31 | End: 2024-12-31

## 2024-12-31 RX ORDER — GENTAMICIN SULFATE 1 MG/G
OINTMENT TOPICAL ONCE
OUTPATIENT
Start: 2024-12-31 | End: 2024-12-31

## 2024-12-31 RX ORDER — SODIUM CHLOR/HYPOCHLOROUS ACID 0.033 %
SOLUTION, IRRIGATION IRRIGATION ONCE
OUTPATIENT
Start: 2024-12-31 | End: 2024-12-31

## 2024-12-31 RX ORDER — BETAMETHASONE DIPROPIONATE 0.5 MG/G
CREAM TOPICAL ONCE
OUTPATIENT
Start: 2024-12-31 | End: 2024-12-31

## 2024-12-31 RX ORDER — TRIAMCINOLONE ACETONIDE 1 MG/G
OINTMENT TOPICAL ONCE
OUTPATIENT
Start: 2024-12-31 | End: 2024-12-31

## 2024-12-31 RX ORDER — LIDOCAINE HYDROCHLORIDE 20 MG/ML
JELLY TOPICAL ONCE
OUTPATIENT
Start: 2024-12-31 | End: 2024-12-31

## 2024-12-31 RX ORDER — MUPIROCIN 20 MG/G
OINTMENT TOPICAL ONCE
OUTPATIENT
Start: 2024-12-31 | End: 2024-12-31

## 2024-12-31 RX ORDER — BACITRACIN ZINC AND POLYMYXIN B SULFATE 500; 1000 [USP'U]/G; [USP'U]/G
OINTMENT TOPICAL ONCE
OUTPATIENT
Start: 2024-12-31 | End: 2024-12-31

## 2024-12-31 RX ORDER — LIDOCAINE HYDROCHLORIDE 40 MG/ML
SOLUTION TOPICAL ONCE
OUTPATIENT
Start: 2024-12-31 | End: 2024-12-31

## 2024-12-31 RX ORDER — SILVER SULFADIAZINE 10 MG/G
CREAM TOPICAL ONCE
OUTPATIENT
Start: 2024-12-31 | End: 2024-12-31

## 2024-12-31 NOTE — PROGRESS NOTES
Doctor or go to the nearest emergency room.      Electronically signed by Kendra Galvan DPM on 12/31/2024 at 3:42 PM    Electronically signed by Ja Rivas DPM, FACFAS on 12/31/2024 at 5:19 PM

## 2024-12-31 NOTE — PATIENT INSTRUCTIONS
Visit Discharge/Physician Orders:  - Re-adjust juxtalite wrap midday to improve compression.   - Okay to shower but cover with 3M waterproof bandage, just clean with saline after and apply new dressing after.   - Grafix graft - #1 applied 7/9/24, graft #2 applied 7/16/24, graft #3 applied 7/30/24, graft #4 applied 8/27/24, graft #5 applied 9/17/24, graft #6 applied 9/24/24, graft #7 applied 10/01/2024 (no Grafix beyond 10/1/24)  - Continue to elevate for swelling.    Wound Location: Right lower leg    Dressing orders:     1) Gather wound care supplies and arrange on clean table.     2) Wash your hands with soap and water or use alcohol based hand  for 20 seconds (sing \"Happy Birthday\" twice).    3) Right lower leg- Apply collagen powder and bordered foam dressing. Change daily.       Keep all dressings clean & dry.    Follow up visit: 3 weeks - Tuesday 1/21/25 @ 2:30 pm     Supplies:     Keep next scheduled appointment. Please give 24 hour notice if unable to keep appointment. [phone]    If you experience any of the following, please call the Wound Care Service during business hours: Monday through Friday 8:00 am - 4:30 pm  ([phone]).   *Increase in pain   *Temperature over 101   *Increase in drainage from your wound or a foul odor   *Uncontrolled swelling   *Need for compression bandage changes due to slippage, breakthrough drainage    If you need medical attention outside of business hours, please contact your Primary Care Doctor or go to the nearest emergency room.

## 2025-01-21 ENCOUNTER — HOSPITAL ENCOUNTER (OUTPATIENT)
Dept: WOUND CARE | Age: 65
Discharge: HOME | End: 2025-01-21
Attending: PODIATRIST
Payer: COMMERCIAL

## 2025-01-21 VITALS
SYSTOLIC BLOOD PRESSURE: 160 MMHG | RESPIRATION RATE: 18 BRPM | TEMPERATURE: 97.8 F | OXYGEN SATURATION: 95 % | HEART RATE: 72 BPM | DIASTOLIC BLOOD PRESSURE: 79 MMHG

## 2025-01-21 DIAGNOSIS — S81.801D WOUND OF RIGHT LEG, SUBSEQUENT ENCOUNTER: ICD-10-CM

## 2025-01-21 DIAGNOSIS — I87.2 CHRONIC VENOUS STASIS DERMATITIS OF RIGHT LOWER EXTREMITY: Primary | ICD-10-CM

## 2025-01-21 PROCEDURE — 99212 OFFICE O/P EST SF 10 MIN: CPT

## 2025-01-21 RX ORDER — LIDOCAINE HYDROCHLORIDE 40 MG/ML
SOLUTION TOPICAL ONCE
OUTPATIENT
Start: 2025-01-21 | End: 2025-01-21

## 2025-01-21 RX ORDER — SODIUM CHLOR/HYPOCHLOROUS ACID 0.033 %
SOLUTION, IRRIGATION IRRIGATION ONCE
OUTPATIENT
Start: 2025-01-21 | End: 2025-01-21

## 2025-01-21 RX ORDER — BACITRACIN ZINC AND POLYMYXIN B SULFATE 500; 1000 [USP'U]/G; [USP'U]/G
OINTMENT TOPICAL ONCE
OUTPATIENT
Start: 2025-01-21 | End: 2025-01-21

## 2025-01-21 RX ORDER — GENTAMICIN SULFATE 1 MG/G
OINTMENT TOPICAL ONCE
OUTPATIENT
Start: 2025-01-21 | End: 2025-01-21

## 2025-01-21 RX ORDER — LIDOCAINE 40 MG/G
CREAM TOPICAL ONCE
OUTPATIENT
Start: 2025-01-21 | End: 2025-01-21

## 2025-01-21 RX ORDER — TRIAMCINOLONE ACETONIDE 1 MG/G
OINTMENT TOPICAL ONCE
OUTPATIENT
Start: 2025-01-21 | End: 2025-01-21

## 2025-01-21 RX ORDER — SILVER SULFADIAZINE 10 MG/G
CREAM TOPICAL ONCE
OUTPATIENT
Start: 2025-01-21 | End: 2025-01-21

## 2025-01-21 RX ORDER — BETAMETHASONE DIPROPIONATE 0.5 MG/G
CREAM TOPICAL ONCE
OUTPATIENT
Start: 2025-01-21 | End: 2025-01-21

## 2025-01-21 RX ORDER — NEOMYCIN/BACITRACIN/POLYMYXINB 3.5-400-5K
OINTMENT (GRAM) TOPICAL ONCE
OUTPATIENT
Start: 2025-01-21 | End: 2025-01-21

## 2025-01-21 RX ORDER — LIDOCAINE HYDROCHLORIDE 20 MG/ML
JELLY TOPICAL ONCE
OUTPATIENT
Start: 2025-01-21 | End: 2025-01-21

## 2025-01-21 RX ORDER — CLOBETASOL PROPIONATE 0.5 MG/G
OINTMENT TOPICAL ONCE
OUTPATIENT
Start: 2025-01-21 | End: 2025-01-21

## 2025-01-21 RX ORDER — LIDOCAINE 50 MG/G
OINTMENT TOPICAL ONCE
OUTPATIENT
Start: 2025-01-21 | End: 2025-01-21

## 2025-01-21 RX ORDER — MUPIROCIN 20 MG/G
OINTMENT TOPICAL ONCE
OUTPATIENT
Start: 2025-01-21 | End: 2025-01-21

## 2025-01-21 RX ORDER — GINSENG 100 MG
CAPSULE ORAL ONCE
OUTPATIENT
Start: 2025-01-21 | End: 2025-01-21

## 2025-01-21 NOTE — PATIENT INSTRUCTIONS
Visit Discharge/Physician Orders:  - Re-adjust juxtalite wrap midday to improve compression.   - Grafix graft - #1 applied 7/9/24, graft #2 applied 7/16/24, graft #3 applied 7/30/24, graft #4 applied 8/27/24, graft #5 applied 9/17/24, graft #6 applied 9/24/24, graft #7 applied 10/01/2024 (no Grafix beyond 10/1/24)  - Continue to elevate for swelling.    Wound Location: Right lower leg    Dressing orders:     1) Gather wound care supplies and arrange on clean table.     2) Wash your hands with soap and water or use alcohol based hand  for 20 seconds (sing \"Happy Birthday\" twice).    3) Right lower leg- Can keep applying collagen powder this month and cover with dry gauze. Change daily.       Keep all dressings clean & dry.    Follow up visit: as needed      Supplies:     Keep next scheduled appointment. Please give 24 hour notice if unable to keep appointment. [phone]    If you experience any of the following, please call the Wound Care Service during business hours: Monday through Friday 8:00 am - 4:30 pm  ([phone]).   *Increase in pain   *Temperature over 101   *Increase in drainage from your wound or a foul odor   *Uncontrolled swelling   *Need for compression bandage changes due to slippage, breakthrough drainage    If you need medical attention outside of business hours, please contact your Primary Care Doctor or go to the nearest emergency room.

## 2025-01-21 NOTE — PLAN OF CARE
Problem: Wound:  Goal: Will show signs of wound healing; wound closure and no evidence of infection  Description: Will show signs of wound healing; wound closure and no evidence of infection  Outcome: Completed   Patient seen in clinic today for right leg wound. No s/s of infection. See AVS. Follow up as needed. Care plan reviewed with patient. Patient verbalizes understanding of the plan of care and contributes to goal setting.

## 2025-01-21 NOTE — PROGRESS NOTES
[facility]          Progress Note and Procedure Note      Damien Aguilar  MEDICAL RECORD NUMBER:  [id number]  AGE: 64 y.o.   GENDER: male  : 1960  EPISODE DATE:  2025    Subjective:     Presents for follow-up of the wound to his right leg    HISTORY of PRESENT ILLNESS HPI     Damien Aguilar is a 64 y.o. male Established patient referred by TIJERINA, who presents today for wound/ulcer evaluation.   History of Wound Context: Patient using pneumatic compression devices and he is using compression devices with collagen and it seems to be completely healed  Wound/Ulcer Pain Timing/Severity: mild  Quality of pain: dull  Severity:  0 / 10   Modifying Factors: None  Associated Signs/Symptoms: edema and drainage    Interval History:   Patient presents today for follow up on wound/ulcer's progression. Denies any systemic symptoms.  Patient returns after having an Unna boot on his leg for a week and then he went back to his compression devices.  States he has been putting collagen powder on the wound and using his compression devices.        PAST MEDICAL HISTORY        Diagnosis Date    Morbid obesity     Osteoarthritis     Snoring     possible apnea - per wife, better since lost 20# recently.  BMI 41.97, neck circ 17.5 cm, no daytime somnolence, no am headaches.       PAST SURGICAL HISTORY    Past Surgical History:   Procedure Laterality Date    HERNIA REPAIR      left inguinal    JOINT REPLACEMENT Left 2020    left hip    OTHER SURGICAL HISTORY      right leg vein stripping    PRESSURE ULCER DEBRIDEMENT Right 2024    Right Leg Excision and Debridement of Wound, Right Medial Soleus Muscle Flap Right Skin Substitute Graft, Application of Wound Vac performed by Ja Tijerina DPM at [facility] OR    TONSILLECTOMY AND ADENOIDECTOMY      WISDOM TOOTH EXTRACTION         FAMILY HISTORY    Family History   Problem Relation Age of Onset    Cancer Mother     Hypertension Father

## 2025-03-23 DIAGNOSIS — E78.00 HYPERCHOLESTEROLEMIA: ICD-10-CM

## 2025-03-23 DIAGNOSIS — I10 ESSENTIAL HYPERTENSION: ICD-10-CM

## 2025-03-23 DIAGNOSIS — R60.0 BILATERAL LOWER EXTREMITY EDEMA: ICD-10-CM

## 2025-03-24 RX ORDER — VALSARTAN 160 MG/1
160 TABLET ORAL DAILY
Qty: 90 TABLET | Refills: 0 | Status: SHIPPED | OUTPATIENT
Start: 2025-03-24 | End: 2025-03-27 | Stop reason: SDUPTHER

## 2025-03-24 RX ORDER — ATORVASTATIN CALCIUM 20 MG/1
20 TABLET, FILM COATED ORAL DAILY
Qty: 90 TABLET | Refills: 0 | Status: SHIPPED | OUTPATIENT
Start: 2025-03-24 | End: 2025-03-27 | Stop reason: SDUPTHER

## 2025-03-24 RX ORDER — FUROSEMIDE 20 MG/1
20 TABLET ORAL 2 TIMES DAILY
Qty: 180 TABLET | Refills: 0 | Status: SHIPPED | OUTPATIENT
Start: 2025-03-24 | End: 2025-03-27 | Stop reason: SDUPTHER

## 2025-03-24 RX ORDER — METOPROLOL SUCCINATE 50 MG/1
50 TABLET, EXTENDED RELEASE ORAL DAILY
Qty: 90 TABLET | Refills: 0 | Status: SHIPPED | OUTPATIENT
Start: 2025-03-24 | End: 2025-03-27 | Stop reason: SDUPTHER

## 2025-03-24 RX ORDER — POTASSIUM CHLORIDE 1500 MG/1
20 TABLET, EXTENDED RELEASE ORAL DAILY
Qty: 90 TABLET | Refills: 0 | Status: SHIPPED | OUTPATIENT
Start: 2025-03-24 | End: 2025-03-27 | Stop reason: SDUPTHER

## 2025-03-24 NOTE — TELEPHONE ENCOUNTER
Damien Aguilar called requesting a refill on the following medications:  Requested Prescriptions     Pending Prescriptions Disp Refills    atorvastatin (LIPITOR) 20 MG tablet [Pharmacy Med Name: ATORVASTATIN 20 MG TABLET] 90 tablet 1     Sig: TAKE 1 TABLET BY MOUTH EVERY DAY    potassium chloride (K-TAB) 20 MEQ TBCR extended release tablet [Pharmacy Med Name: POTASSIUM CL ER 20 MEQ TAB WAX] 90 tablet 1     Sig: TAKE 1 TABLET BY MOUTH EVERY DAY    furosemide (LASIX) 20 MG tablet [Pharmacy Med Name: FUROSEMIDE 20 MG TABLET] 180 tablet 1     Sig: TAKE 1 TABLET BY MOUTH TWICE A DAY    valsartan (DIOVAN) 160 MG tablet [Pharmacy Med Name: VALSARTAN 160 MG TABLET] 90 tablet 1     Sig: TAKE 1 TABLET BY MOUTH EVERY DAY    metoprolol succinate (TOPROL XL) 50 MG extended release tablet [Pharmacy Med Name: METOPROLOL SUCC ER 50 MG TAB] 90 tablet 1     Sig: TAKE 1 TABLET BY MOUTH EVERY DAY       Date of last visit: 9/20/2024  Date of next visit (if applicable):Visit date not found  Date of last refill: 9/20/2024 #90/1  Pharmacy Name: [facility] Brito, [facility]

## 2025-03-25 DIAGNOSIS — I10 ESSENTIAL HYPERTENSION: ICD-10-CM

## 2025-03-25 RX ORDER — AMLODIPINE BESYLATE 10 MG/1
10 TABLET ORAL DAILY
Qty: 90 TABLET | Refills: 0 | Status: SHIPPED | OUTPATIENT
Start: 2025-03-25 | End: 2025-03-27 | Stop reason: SDUPTHER

## 2025-03-25 NOTE — TELEPHONE ENCOUNTER
Damien Aguilar called requesting a refill on the following medications:  Requested Prescriptions     Pending Prescriptions Disp Refills    amLODIPine (NORVASC) 10 MG tablet [Pharmacy Med Name: AMLODIPINE BESYLATE 10 MG TAB] 90 tablet 1     Sig: TAKE 1 TABLET BY MOUTH EVERY DAY       Date of last visit: 9/20/2024  Date of next visit (if applicable):Visit date not found  Date of last refill: 9/20/2024 #90/1  Pharmacy Name: [facility]ia Murphy, [facility]

## 2025-03-27 ENCOUNTER — OFFICE VISIT (OUTPATIENT)
Dept: FAMILY MEDICINE CLINIC | Age: 65
End: 2025-03-27
Payer: MEDICARE

## 2025-03-27 VITALS
BODY MASS INDEX: 44.29 KG/M2 | SYSTOLIC BLOOD PRESSURE: 112 MMHG | WEIGHT: 274.4 LBS | OXYGEN SATURATION: 96 % | DIASTOLIC BLOOD PRESSURE: 60 MMHG | HEART RATE: 54 BPM

## 2025-03-27 DIAGNOSIS — E78.00 HYPERCHOLESTEROLEMIA: ICD-10-CM

## 2025-03-27 DIAGNOSIS — R35.1 NOCTURIA: ICD-10-CM

## 2025-03-27 DIAGNOSIS — I10 ESSENTIAL HYPERTENSION: Primary | ICD-10-CM

## 2025-03-27 DIAGNOSIS — R60.0 BILATERAL LOWER EXTREMITY EDEMA: ICD-10-CM

## 2025-03-27 PROCEDURE — 1124F ACP DISCUSS-NO DSCNMKR DOCD: CPT | Performed by: NURSE PRACTITIONER

## 2025-03-27 PROCEDURE — 3017F COLORECTAL CA SCREEN DOC REV: CPT | Performed by: NURSE PRACTITIONER

## 2025-03-27 PROCEDURE — G8417 CALC BMI ABV UP PARAM F/U: HCPCS | Performed by: NURSE PRACTITIONER

## 2025-03-27 PROCEDURE — G8427 DOCREV CUR MEDS BY ELIG CLIN: HCPCS | Performed by: NURSE PRACTITIONER

## 2025-03-27 PROCEDURE — 3074F SYST BP LT 130 MM HG: CPT | Performed by: NURSE PRACTITIONER

## 2025-03-27 PROCEDURE — 1036F TOBACCO NON-USER: CPT | Performed by: NURSE PRACTITIONER

## 2025-03-27 PROCEDURE — 3078F DIAST BP <80 MM HG: CPT | Performed by: NURSE PRACTITIONER

## 2025-03-27 PROCEDURE — 99214 OFFICE O/P EST MOD 30 MIN: CPT | Performed by: NURSE PRACTITIONER

## 2025-03-27 PROCEDURE — 1159F MED LIST DOCD IN RCRD: CPT | Performed by: NURSE PRACTITIONER

## 2025-03-27 PROCEDURE — G2211 COMPLEX E/M VISIT ADD ON: HCPCS | Performed by: NURSE PRACTITIONER

## 2025-03-27 RX ORDER — VALSARTAN 160 MG/1
160 TABLET ORAL DAILY
Qty: 90 TABLET | Refills: 0 | Status: SHIPPED | OUTPATIENT
Start: 2025-03-27

## 2025-03-27 RX ORDER — ATORVASTATIN CALCIUM 20 MG/1
20 TABLET, FILM COATED ORAL DAILY
Qty: 90 TABLET | Refills: 0 | Status: SHIPPED | OUTPATIENT
Start: 2025-03-27

## 2025-03-27 RX ORDER — POTASSIUM CHLORIDE 1500 MG/1
20 TABLET, EXTENDED RELEASE ORAL DAILY
Qty: 90 TABLET | Refills: 0 | Status: SHIPPED | OUTPATIENT
Start: 2025-03-27

## 2025-03-27 RX ORDER — FUROSEMIDE 20 MG/1
20 TABLET ORAL 2 TIMES DAILY
Qty: 180 TABLET | Refills: 0 | Status: SHIPPED | OUTPATIENT
Start: 2025-03-27

## 2025-03-27 RX ORDER — AMLODIPINE BESYLATE 10 MG/1
10 TABLET ORAL DAILY
Qty: 90 TABLET | Refills: 0 | Status: SHIPPED | OUTPATIENT
Start: 2025-03-27

## 2025-03-27 RX ORDER — METOPROLOL SUCCINATE 50 MG/1
50 TABLET, EXTENDED RELEASE ORAL DAILY
Qty: 90 TABLET | Refills: 0 | Status: SHIPPED | OUTPATIENT
Start: 2025-03-27

## 2025-03-27 SDOH — ECONOMIC STABILITY: FOOD INSECURITY: WITHIN THE PAST 12 MONTHS, YOU WORRIED THAT YOUR FOOD WOULD RUN OUT BEFORE YOU GOT MONEY TO BUY MORE.: NEVER TRUE

## 2025-03-27 SDOH — ECONOMIC STABILITY: FOOD INSECURITY: WITHIN THE PAST 12 MONTHS, THE FOOD YOU BOUGHT JUST DIDN'T LAST AND YOU DIDN'T HAVE MONEY TO GET MORE.: NEVER TRUE

## 2025-03-27 ASSESSMENT — ENCOUNTER SYMPTOMS
BLURRED VISION: 0
SHORTNESS OF BREATH: 0

## 2025-03-27 ASSESSMENT — PATIENT HEALTH QUESTIONNAIRE - PHQ9
SUM OF ALL RESPONSES TO PHQ QUESTIONS 1-9: 0
2. FEELING DOWN, DEPRESSED OR HOPELESS: NOT AT ALL
1. LITTLE INTEREST OR PLEASURE IN DOING THINGS: NOT AT ALL
SUM OF ALL RESPONSES TO PHQ QUESTIONS 1-9: 0

## 2025-03-27 NOTE — PROGRESS NOTES
controlled. Associated symptoms include peripheral edema. Pertinent negatives include no blurred vision, chest pain, headaches or shortness of breath. Risk factors for coronary artery disease include male gender, obesity and dyslipidemia. Past treatments include diuretics, beta blockers, angiotensin blockers and calcium channel blockers. The current treatment provides significant improvement.   Hyperlipidemia  This is a chronic problem. The current episode started more than 1 year ago. The problem is controlled. Recent lipid tests were reviewed and are high. Exacerbating diseases include obesity. Pertinent negatives include no chest pain or shortness of breath. Current antihyperlipidemic treatment includes statins. The current treatment provides significant improvement of lipids. Risk factors for coronary artery disease include obesity, male sex and hypertension.       Review of Systems   Constitutional:  Negative for fever.   Eyes:  Negative for blurred vision.   Respiratory:  Negative for shortness of breath.    Cardiovascular:  Positive for leg swelling. Negative for chest pain.   Skin:  Negative for wound.   Neurological:  Negative for headaches.          Objective   Physical Exam  Vitals and nursing note reviewed.   Constitutional:       General: He is awake.      Appearance: Normal appearance.   HENT:      Head: Normocephalic and atraumatic.      Right Ear: Hearing and external ear normal.      Left Ear: Hearing and external ear normal.      Nose: Nose normal. No congestion or rhinorrhea.   Eyes:      General: Lids are normal.         Right eye: No discharge.         Left eye: No discharge.      Conjunctiva/sclera: Conjunctivae normal.   Neck:      Trachea: No tracheal deviation.   Cardiovascular:      Rate and Rhythm: Normal rate and regular rhythm.      Heart sounds: Normal heart sounds. No murmur heard.  Pulmonary:      Effort: Pulmonary effort is normal. No respiratory distress.      Breath sounds: No

## 2025-03-28 ENCOUNTER — HOSPITAL ENCOUNTER (OUTPATIENT)
Age: 65
Discharge: HOME | End: 2025-03-28
Payer: COMMERCIAL

## 2025-03-28 DIAGNOSIS — E78.00 HYPERCHOLESTEROLEMIA: ICD-10-CM

## 2025-03-28 DIAGNOSIS — R35.1 NOCTURIA: ICD-10-CM

## 2025-03-28 LAB
CHOLESTEROL, FASTING: 133 MG/DL (ref 100–199)
HDLC SERPL-MCNC: 55 MG/DL
LDLC SERPL CALC-MCNC: 70 MG/DL
PSA SERPL-MCNC: 0.8 NG/ML (ref 0–1)
TRIGLYCERIDE, FASTING: 41 MG/DL (ref 0–199)

## 2025-03-28 PROCEDURE — 36415 COLL VENOUS BLD VENIPUNCTURE: CPT

## 2025-03-28 PROCEDURE — 84153 ASSAY OF PSA TOTAL: CPT

## 2025-03-28 PROCEDURE — 80061 LIPID PANEL: CPT

## 2025-03-31 ENCOUNTER — RESULTS FOLLOW-UP (OUTPATIENT)
Dept: FAMILY MEDICINE CLINIC | Age: 65
End: 2025-03-31

## 2025-06-25 DIAGNOSIS — R60.0 BILATERAL LOWER EXTREMITY EDEMA: ICD-10-CM

## 2025-06-25 DIAGNOSIS — E78.00 HYPERCHOLESTEROLEMIA: ICD-10-CM

## 2025-06-25 DIAGNOSIS — I10 ESSENTIAL HYPERTENSION: ICD-10-CM

## 2025-06-25 RX ORDER — VALSARTAN 160 MG/1
160 TABLET ORAL DAILY
Qty: 90 TABLET | Refills: 0 | Status: SHIPPED | OUTPATIENT
Start: 2025-06-25

## 2025-06-25 RX ORDER — METOPROLOL SUCCINATE 50 MG/1
50 TABLET, EXTENDED RELEASE ORAL DAILY
Qty: 90 TABLET | Refills: 0 | Status: SHIPPED | OUTPATIENT
Start: 2025-06-25

## 2025-06-25 RX ORDER — ATORVASTATIN CALCIUM 20 MG/1
20 TABLET, FILM COATED ORAL DAILY
Qty: 90 TABLET | Refills: 0 | Status: SHIPPED | OUTPATIENT
Start: 2025-06-25

## 2025-06-25 RX ORDER — FUROSEMIDE 20 MG/1
20 TABLET ORAL 2 TIMES DAILY
Qty: 180 TABLET | Refills: 0 | Status: SHIPPED | OUTPATIENT
Start: 2025-06-25

## 2025-06-25 RX ORDER — POTASSIUM CHLORIDE 1500 MG/1
20 TABLET, EXTENDED RELEASE ORAL DAILY
Qty: 90 TABLET | Refills: 0 | Status: SHIPPED | OUTPATIENT
Start: 2025-06-25

## 2025-06-25 NOTE — TELEPHONE ENCOUNTER
Dmaien Aguilar called requesting a refill on the following medications:  Requested Prescriptions     Pending Prescriptions Disp Refills    atorvastatin (LIPITOR) 20 MG tablet [Pharmacy Med Name: ATORVASTATIN 20 MG TABLET] 90 tablet 0     Sig: TAKE 1 TABLET BY MOUTH EVERY DAY    potassium chloride (K-TAB) 20 MEQ TBCR extended release tablet [Pharmacy Med Name: POTASSIUM CL ER 20 MEQ TAB WAX] 90 tablet 0     Sig: TAKE 1 TABLET BY MOUTH EVERY DAY    metoprolol succinate (TOPROL XL) 50 MG extended release tablet [Pharmacy Med Name: METOPROLOL SUCC ER 50 MG TAB] 90 tablet 0     Sig: TAKE 1 TABLET BY MOUTH EVERY DAY    furosemide (LASIX) 20 MG tablet [Pharmacy Med Name: FUROSEMIDE 20 MG TABLET] 180 tablet 0     Sig: TAKE 1 TABLET BY MOUTH TWICE A DAY    valsartan (DIOVAN) 160 MG tablet [Pharmacy Med Name: VALSARTAN 160 MG TABLET] 90 tablet 0     Sig: TAKE 1 TABLET BY MOUTH EVERY DAY       Date of last visit: 3/27/2025  Date of next visit (if applicable):Visit date not found  Date of last refill: 03/27/2025  Pharmacy Name: [facility], [place], [place].       Thanks,  ANTHONY AJ LPN

## 2025-07-04 NOTE — TELEPHONE ENCOUNTER
Ashley Kaur needs refill of   Requested Prescriptions     Pending Prescriptions Disp Refills    amLODIPine (NORVASC) 5 MG tablet [Pharmacy Med Name: AMLODIPINE BESYLATE 5 MG TAB] 30 tablet 0     Sig: TAKE 1 TABLET BY MOUTH EVERY DAY       Last Filled on:  03/29/2021. Last Visit Date:  3/29/2021    Next Visit Date:    8/24/2021.
Blood pressure is too high. Increased dose of both amlodipine and hctz. One month supply ep'ed. Please have him follow up in no longer than one month in office and have him bring A. M. blood pressure journal.
Does patient have list of bp readings?
LMTCB
Pt called in home BP readings  4/23/2021 175/87, 120/94, 138/81  04/24/2021 150/101, 163/93, 157/81  04/25/2021 166/91, 143/87, 161/89  04/26/2021 164/92
Pt returned call, notified him of medication increase, BP log and appointment.  Pt was agreeable, appointment scheduled 05/24/2021
04-Jul-2025 19:05

## (undated) DEVICE — APPLIER LIG CLP M L11IN TI STR RNG HNDL FOR 20 CLP DISP

## (undated) DEVICE — CANISTER NEG PRSS 1000ML W/ GEL INFOVAC

## (undated) DEVICE — SOLUTION PREP PAINT POV IOD FOR SKIN MUCOUS MEM

## (undated) DEVICE — CLEANSER WND CLN DEB SYS IRRISEPT

## (undated) DEVICE — GLOVE ORANGE PI 8   MSG9080

## (undated) DEVICE — GLOVE SURG SZ 8 L11.77IN FNGR THK9.8MIL STRW LTX POLYMER

## (undated) DEVICE — APPLIER CLP L9.375IN APER 2.1MM CLS L3.8MM 20 SM TI CLP

## (undated) DEVICE — HYPODERMIC SAFETY NEEDLE: Brand: MAGELLAN

## (undated) DEVICE — BASIC SINGLE BASIN BTC-LF: Brand: MEDLINE INDUSTRIES, INC.

## (undated) DEVICE — DRAPE,EXTREMITY,89X128,STERILE: Brand: MEDLINE

## (undated) DEVICE — SOLUTION SCRB 4OZ 7.5% POVIDONE IOD ANTIMIC BTL

## (undated) DEVICE — PENCIL SMK EVAC ALL IN 1 DSGN ENH VISIBILITY IMPROVED AIR

## (undated) DEVICE — SPLINT QUICK STEP SCOTCHCAST 4 X 30

## (undated) DEVICE — DRESSING ALG W3XL4IN TRNSPAR ANTIMIC WND CNTCT LAYR W/

## (undated) DEVICE — SET UP: Brand: MEDLINE INDUSTRIES, INC.

## (undated) DEVICE — DRESSING NEG PRSS M 18X12.5X3.3CM POLYUR FOR WND THER VAC

## (undated) DEVICE — GOWN,SIRUS,NONRNF,SETINSLV,XL,20/CS: Brand: MEDLINE

## (undated) DEVICE — IMPREGNATED GAUZE DRESSING: Brand: CUTICERIN 7.5X20CM CTN 50

## (undated) DEVICE — PREMIUM DRY TRAY LF: Brand: MEDLINE INDUSTRIES, INC.

## (undated) DEVICE — DRAPE,U/SHT,SPLIT,FILM,60X84,STERILE: Brand: MEDLINE